# Patient Record
Sex: MALE | Race: WHITE | NOT HISPANIC OR LATINO | URBAN - METROPOLITAN AREA
[De-identification: names, ages, dates, MRNs, and addresses within clinical notes are randomized per-mention and may not be internally consistent; named-entity substitution may affect disease eponyms.]

---

## 2017-01-04 ENCOUNTER — OUTPATIENT (OUTPATIENT)
Dept: OUTPATIENT SERVICES | Facility: HOSPITAL | Age: 27
LOS: 1 days | Discharge: HOME | End: 2017-01-04

## 2017-01-04 VITALS
TEMPERATURE: 98 F | OXYGEN SATURATION: 99 % | DIASTOLIC BLOOD PRESSURE: 79 MMHG | SYSTOLIC BLOOD PRESSURE: 132 MMHG | HEART RATE: 81 BPM | RESPIRATION RATE: 18 BRPM

## 2017-01-04 VITALS
RESPIRATION RATE: 14 BRPM | TEMPERATURE: 98 F | HEIGHT: 71 IN | HEART RATE: 68 BPM | SYSTOLIC BLOOD PRESSURE: 118 MMHG | WEIGHT: 199.08 LBS | OXYGEN SATURATION: 98 % | DIASTOLIC BLOOD PRESSURE: 70 MMHG

## 2017-01-04 DIAGNOSIS — S62.617A DISPLACED FRACTURE OF PROXIMAL PHALANX OF LEFT LITTLE FINGER, INITIAL ENCOUNTER FOR CLOSED FRACTURE: ICD-10-CM

## 2017-01-04 DIAGNOSIS — Z98.890 OTHER SPECIFIED POSTPROCEDURAL STATES: Chronic | ICD-10-CM

## 2017-01-04 LAB
HCT VFR BLD CALC: 46.6 % — SIGNIFICANT CHANGE UP (ref 39–50)
HGB BLD-MCNC: 16.1 G/DL — SIGNIFICANT CHANGE UP (ref 13–17)
MCHC RBC-ENTMCNC: 28.9 PG — SIGNIFICANT CHANGE UP (ref 27–34)
MCHC RBC-ENTMCNC: 34.5 % — SIGNIFICANT CHANGE UP (ref 32–36)
MCV RBC AUTO: 83.5 FL — SIGNIFICANT CHANGE UP (ref 80–100)
PLATELET # BLD AUTO: 296 K/UL — SIGNIFICANT CHANGE UP (ref 150–400)
PMV BLD: 9.6 FL — SIGNIFICANT CHANGE UP (ref 7–13)
RBC # BLD: 5.58 M/UL — SIGNIFICANT CHANGE UP (ref 4.2–5.8)
RBC # FLD: 13 % — SIGNIFICANT CHANGE UP (ref 10.3–14.5)
WBC # BLD: 8.73 K/UL — SIGNIFICANT CHANGE UP (ref 3.8–10.5)
WBC # FLD AUTO: 8.73 K/UL — SIGNIFICANT CHANGE UP (ref 3.8–10.5)

## 2017-01-04 NOTE — H&P PST ADULT - NEGATIVE GENERAL SYMPTOMS
no polyphagia/no malaise/no sweating/no weight loss/no chills/no polyuria/no weight gain/no fatigue/no fever/no anorexia/no polydipsia

## 2017-01-04 NOTE — H&P PST ADULT - PROBLEM SELECTOR PLAN 1
Scheduled for Left Little Finger Proximal Phalanx Pinning today on 1/4/2017.  Pt's last oral intake was 10pm on 1/3/2017

## 2017-01-04 NOTE — H&P PST ADULT - NEGATIVE OPHTHALMOLOGIC SYMPTOMS
no loss of vision R/no pain R/pt wears glasses for reaading/no diplopia/no pain L/no blurred vision L/no loss of vision L/no blurred vision R/no photophobia no blurred vision L/no pain L/no diplopia/no blurred vision R/pt wears glasses for reading/no loss of vision R/no loss of vision L/no photophobia/no pain R

## 2017-01-04 NOTE — H&P PST ADULT - NEGATIVE GENERAL GENITOURINARY SYMPTOMS
no hematuria/no bladder infections/no dysuria no flank pain L/no flank pain R/no hematuria/normal urinary frequency/no dysuria/no bladder infections

## 2017-01-04 NOTE — H&P PST ADULT - MUSCULOSKELETAL COMMENTS
Ace bandage wrap in place to 4th and 5th left fingers of left hand, +capillary refill noted with swelling

## 2017-01-04 NOTE — H&P PST ADULT - NSANTHOSAYNRD_GEN_A_CORE
No. PABLO screening performed.  STOP BANG Legend: 0-2 = LOW Risk; 3-4 = INTERMEDIATE Risk; 5-8 = HIGH Risk

## 2017-01-04 NOTE — H&P PST ADULT - MS GEN HX ROS MEA POS PC
left finger fracture/joint pain/muscle cramps/joint swelling joint swelling/displaced fracture of proximal phalanx left little finger/muscle cramps/joint pain

## 2017-01-04 NOTE — ASU DISCHARGE PLAN (ADULT/PEDIATRIC). - CONDITIONS AT DISCHARGE
Alert and awake. VS stable. Tolerating po. PIV d/c. Meets all discharge criteria, meds reconciled, seen and cleared for dc home by anesthesia. Discharge instructions given and understood. Post -operative pain management expectation education and fall prevention education  provided to pt. and accompanying adult

## 2017-01-04 NOTE — H&P PST ADULT - NEGATIVE CARDIOVASCULAR SYMPTOMS
no orthopnea/no dyspnea on exertion/no paroxysmal nocturnal dyspnea/no peripheral edema/no chest pain/no palpitations/no claudication

## 2017-01-04 NOTE — H&P PST ADULT - PMH
ADD (attention deficit disorder)    Difficulty sleeping    Displaced fracture of proximal phalanx of left little finger

## 2017-01-04 NOTE — H&P PST ADULT - ANESTHESIA, PREVIOUS REACTION, PROFILE
nausea/vomiting/Pt reports after his santiago-facial surgery he experienced severe nausea and vomiting. Mother also experiences nausea and vomiting after anesthesia

## 2017-01-04 NOTE — H&P PST ADULT - LYMPHATIC
supraclavicular R/supraclavicular L/posterior cervical L/anterior cervical L/anterior cervical R/posterior cervical R

## 2017-01-04 NOTE — ASU DISCHARGE PLAN (ADULT/PEDIATRIC). - NOTIFY
Swelling that continues/Fever greater than 101/Bleeding that does not stop/Pain not relieved by Medications/Persistent Nausea and Vomiting/Numbness, color, or temperature change to extremity

## 2017-01-04 NOTE — H&P PST ADULT - FAMILY HISTORY
Grandparent  Still living? Unknown  Family history of heart disease, Age at diagnosis: Age Unknown  Family history of diabetes mellitus in grandmother, Age at diagnosis: Age Unknown

## 2017-01-04 NOTE — H&P PST ADULT - RS GEN HX ROS MEA POS PC
cough/pt reports [place] to the [place] 2 days ago he experienced a cough pt reports after flying back to the [place] 2 days ago he has experienced a dry cough/cough

## 2017-01-04 NOTE — H&P PST ADULT - NEGATIVE NEUROLOGICAL SYMPTOMS
no facial palsy/no confusion/no hemiparesis/no generalized seizures/no transient paralysis/no focal seizures/no loss of consciousness/no loss of sensation/no syncope/no weakness/no headache/no tremors/no paresthesias/no difficulty walking/no vertigo

## 2017-01-04 NOTE — H&P PST ADULT - NEGATIVE ENMT SYMPTOMS
no ear pain/no nose bleeds/no vertigo/no post-nasal discharge/no dysphagia/no hearing difficulty/no gum bleeding/no tinnitus

## 2017-01-04 NOTE — H&P PST ADULT - ACTIVITY
walks 24 city blocks a day, works out 1-2 days a week with , ADLs, house chores walks 24 city blocks daily, works out 1-2 days a week with , ADLs, house chores

## 2017-01-04 NOTE — H&P PST ADULT - NEGATIVE BREAST SYMPTOMS
no nipple discharge R/no breast lump R/no breast lump L/no nipple discharge L/no breast tenderness R/no breast tenderness L

## 2017-01-04 NOTE — H&P PST ADULT - HISTORY OF PRESENT ILLNESS
27 y/o male with PMH of ADD and Sleep Disorder presents to PST for preoperative evaluation with diagnosis of displaced fracture of proximal phalanx of left little finger. Pt reports he tripped and fell on concrete 1 week ago while vacationing in [place]. During the fall he put his hand out to wich caused his finger to twist in the opposite way. Pt is scheduled for 27 y/o male with PMH of ADD and Sleep Disorder presents to PST for preoperative evaluation with diagnosis of displaced fracture of proximal phalanx of left little finger. Pt reports he tripped and fell on concrete 1 week ago while vacationing in [place]. Pt states he put his hand out in order to break the fall but doing so caused his finger to "twist in the opposite direction".Pt is scheduled for Left Little Finger Proximal Phalanx Pinning on 1/4/2017.

## 2017-01-04 NOTE — H&P PST ADULT - NEGATIVE SKIN SYMPTOMS
no hair loss/no rash/no dryness/no itching/no pitted nails no dryness/no hair loss/no itching/no change in size/color of mole/no pitted nails/no rash

## 2017-08-23 ENCOUNTER — EMERGENCY (EMERGENCY)
Facility: HOSPITAL | Age: 27
LOS: 1 days | Discharge: HOME | End: 2017-08-23
Attending: EMERGENCY MEDICINE | Admitting: EMERGENCY MEDICINE
Payer: COMMERCIAL

## 2017-08-23 VITALS
WEIGHT: 190.04 LBS | SYSTOLIC BLOOD PRESSURE: 151 MMHG | HEART RATE: 124 BPM | RESPIRATION RATE: 19 BRPM | DIASTOLIC BLOOD PRESSURE: 97 MMHG | TEMPERATURE: 98 F | OXYGEN SATURATION: 96 %

## 2017-08-23 DIAGNOSIS — Z98.890 OTHER SPECIFIED POSTPROCEDURAL STATES: Chronic | ICD-10-CM

## 2017-08-23 LAB — GAS PNL BLDV: SIGNIFICANT CHANGE UP

## 2017-08-23 PROCEDURE — 93010 ELECTROCARDIOGRAM REPORT: CPT

## 2017-08-23 PROCEDURE — 99053 MED SERV 10PM-8AM 24 HR FAC: CPT

## 2017-08-23 PROCEDURE — 99285 EMERGENCY DEPT VISIT HI MDM: CPT | Mod: 25

## 2017-08-23 NOTE — ED ADULT NURSE NOTE - OBJECTIVE STATEMENT
brought in by [facility] ambulance from home; parents came home and found pt in bed with abnormal respirations, clammy, cyancotic; with eyes rolling back; mom had just spoken to him 45 mins prior on the phone and he was described as normal; dad is md; mom is respiratory therapist; pt was aroused with tactile stimuli after a few mins; pt had pulse; pt now alert and oriented x 3; admits to taking usual 1 mg of xanax tonight; and also had one bloody jewel; denies illicit drug use; denies si or hi; only c/o now is that he feels tired

## 2017-08-23 NOTE — ED ADULT NURSE NOTE - CHIEF COMPLAINT
The patient is a 27y Male complaining of The patient is a 27y Male complaining of pd of unresponsivness

## 2017-08-24 VITALS
RESPIRATION RATE: 18 BRPM | HEART RATE: 94 BPM | DIASTOLIC BLOOD PRESSURE: 83 MMHG | SYSTOLIC BLOOD PRESSURE: 132 MMHG | TEMPERATURE: 98 F | OXYGEN SATURATION: 100 %

## 2017-08-24 PROBLEM — S62.617A DISPLACED FRACTURE OF PROXIMAL PHALANX OF LEFT LITTLE FINGER, INITIAL ENCOUNTER FOR CLOSED FRACTURE: Chronic | Status: ACTIVE | Noted: 2017-01-04

## 2017-08-24 PROBLEM — F98.8 OTHER SPECIFIED BEHAVIORAL AND EMOTIONAL DISORDERS WITH ONSET USUALLY OCCURRING IN CHILDHOOD AND ADOLESCENCE: Chronic | Status: ACTIVE | Noted: 2017-01-04

## 2017-08-24 PROBLEM — G47.9 SLEEP DISORDER, UNSPECIFIED: Chronic | Status: ACTIVE | Noted: 2017-01-04

## 2017-08-24 LAB
ALBUMIN SERPL ELPH-MCNC: 4.7 G/DL — SIGNIFICANT CHANGE UP (ref 3.3–5)
ALP SERPL-CCNC: 54 U/L — SIGNIFICANT CHANGE UP (ref 40–120)
ALT FLD-CCNC: 29 U/L RC — SIGNIFICANT CHANGE UP (ref 10–45)
ANION GAP SERPL CALC-SCNC: 15 MMOL/L — SIGNIFICANT CHANGE UP (ref 5–17)
APAP SERPL-MCNC: <15 UG/ML — SIGNIFICANT CHANGE UP (ref 10–30)
AST SERPL-CCNC: 18 U/L — SIGNIFICANT CHANGE UP (ref 10–40)
BASE EXCESS BLDV CALC-SCNC: -0.1 MMOL/L — SIGNIFICANT CHANGE UP (ref -2–2)
BASOPHILS # BLD AUTO: 0.1 K/UL — SIGNIFICANT CHANGE UP (ref 0–0.2)
BASOPHILS NFR BLD AUTO: 0.8 % — SIGNIFICANT CHANGE UP (ref 0–2)
BILIRUB SERPL-MCNC: 0.4 MG/DL — SIGNIFICANT CHANGE UP (ref 0.2–1.2)
BUN SERPL-MCNC: 15 MG/DL — SIGNIFICANT CHANGE UP (ref 7–23)
CA-I SERPL-SCNC: 1.22 MMOL/L — SIGNIFICANT CHANGE UP (ref 1.12–1.3)
CALCIUM SERPL-MCNC: 9.1 MG/DL — SIGNIFICANT CHANGE UP (ref 8.4–10.5)
CHLORIDE BLDV-SCNC: 105 MMOL/L — SIGNIFICANT CHANGE UP (ref 96–108)
CHLORIDE SERPL-SCNC: 105 MMOL/L — SIGNIFICANT CHANGE UP (ref 96–108)
CK MB CFR SERPL CALC: 1.9 NG/ML — SIGNIFICANT CHANGE UP (ref 0–6.7)
CO2 BLDV-SCNC: 29 MMOL/L — SIGNIFICANT CHANGE UP (ref 22–30)
CO2 SERPL-SCNC: 27 MMOL/L — SIGNIFICANT CHANGE UP (ref 22–31)
CREAT SERPL-MCNC: 1.11 MG/DL — SIGNIFICANT CHANGE UP (ref 0.5–1.3)
EOSINOPHIL # BLD AUTO: 0.1 K/UL — SIGNIFICANT CHANGE UP (ref 0–0.5)
EOSINOPHIL NFR BLD AUTO: 1.2 % — SIGNIFICANT CHANGE UP (ref 0–6)
ETHANOL SERPL-MCNC: SIGNIFICANT CHANGE UP MG/DL (ref 0–10)
GAS PNL BLDV: 140 MMOL/L — SIGNIFICANT CHANGE UP (ref 136–145)
GAS PNL BLDV: SIGNIFICANT CHANGE UP
GLUCOSE BLDV-MCNC: 150 MG/DL — HIGH (ref 70–99)
GLUCOSE SERPL-MCNC: 156 MG/DL — HIGH (ref 70–99)
HCO3 BLDV-SCNC: 28 MMOL/L — SIGNIFICANT CHANGE UP (ref 21–29)
HCT VFR BLD CALC: 45.8 % — SIGNIFICANT CHANGE UP (ref 39–50)
HCT VFR BLDA CALC: 48 % — SIGNIFICANT CHANGE UP (ref 39–50)
HGB BLD CALC-MCNC: 15.7 G/DL — SIGNIFICANT CHANGE UP (ref 13–17)
HGB BLD-MCNC: 15.7 G/DL — SIGNIFICANT CHANGE UP (ref 13–17)
LACTATE BLDV-MCNC: 1.8 MMOL/L — SIGNIFICANT CHANGE UP (ref 0.7–2)
LYMPHOCYTES # BLD AUTO: 2.7 K/UL — SIGNIFICANT CHANGE UP (ref 1–3.3)
LYMPHOCYTES # BLD AUTO: 26.2 % — SIGNIFICANT CHANGE UP (ref 13–44)
MCHC RBC-ENTMCNC: 29.8 PG — SIGNIFICANT CHANGE UP (ref 27–34)
MCHC RBC-ENTMCNC: 34.3 GM/DL — SIGNIFICANT CHANGE UP (ref 32–36)
MCV RBC AUTO: 87 FL — SIGNIFICANT CHANGE UP (ref 80–100)
MONOCYTES # BLD AUTO: 0.8 K/UL — SIGNIFICANT CHANGE UP (ref 0–0.9)
MONOCYTES NFR BLD AUTO: 7.9 % — SIGNIFICANT CHANGE UP (ref 2–14)
NEUTROPHILS # BLD AUTO: 6.6 K/UL — SIGNIFICANT CHANGE UP (ref 1.8–7.4)
NEUTROPHILS NFR BLD AUTO: 63.9 % — SIGNIFICANT CHANGE UP (ref 43–77)
OTHER CELLS CSF MANUAL: 18 ML/DL — SIGNIFICANT CHANGE UP (ref 18–22)
PCO2 BLDV: 59 MMHG — HIGH (ref 35–50)
PH BLDV: 7.29 — LOW (ref 7.35–7.45)
PLATELET # BLD AUTO: 318 K/UL — SIGNIFICANT CHANGE UP (ref 150–400)
PO2 BLDV: 56 MMHG — HIGH (ref 25–45)
POTASSIUM BLDV-SCNC: 3.6 MMOL/L — SIGNIFICANT CHANGE UP (ref 3.5–5)
POTASSIUM SERPL-MCNC: 3.9 MMOL/L — SIGNIFICANT CHANGE UP (ref 3.5–5.3)
POTASSIUM SERPL-SCNC: 3.9 MMOL/L — SIGNIFICANT CHANGE UP (ref 3.5–5.3)
PROT SERPL-MCNC: 7.1 G/DL — SIGNIFICANT CHANGE UP (ref 6–8.3)
RBC # BLD: 5.26 M/UL — SIGNIFICANT CHANGE UP (ref 4.2–5.8)
RBC # FLD: 12 % — SIGNIFICANT CHANGE UP (ref 10.3–14.5)
SALICYLATES SERPL-MCNC: <2 MG/DL — LOW (ref 15–30)
SAO2 % BLDV: 85 % — SIGNIFICANT CHANGE UP (ref 67–88)
SODIUM SERPL-SCNC: 147 MMOL/L — HIGH (ref 135–145)
TROPONIN T SERPL-MCNC: <0.01 NG/ML — SIGNIFICANT CHANGE UP (ref 0–0.06)
WBC # BLD: 10.3 K/UL — SIGNIFICANT CHANGE UP (ref 3.8–10.5)
WBC # FLD AUTO: 10.3 K/UL — SIGNIFICANT CHANGE UP (ref 3.8–10.5)

## 2017-08-24 PROCEDURE — 82435 ASSAY OF BLOOD CHLORIDE: CPT

## 2017-08-24 PROCEDURE — 93005 ELECTROCARDIOGRAM TRACING: CPT | Mod: 76

## 2017-08-24 PROCEDURE — 80307 DRUG TEST PRSMV CHEM ANLYZR: CPT

## 2017-08-24 PROCEDURE — 82962 GLUCOSE BLOOD TEST: CPT

## 2017-08-24 PROCEDURE — 82330 ASSAY OF CALCIUM: CPT

## 2017-08-24 PROCEDURE — 82803 BLOOD GASES ANY COMBINATION: CPT

## 2017-08-24 PROCEDURE — 84132 ASSAY OF SERUM POTASSIUM: CPT

## 2017-08-24 PROCEDURE — 85014 HEMATOCRIT: CPT

## 2017-08-24 PROCEDURE — 83605 ASSAY OF LACTIC ACID: CPT

## 2017-08-24 PROCEDURE — 80053 COMPREHEN METABOLIC PANEL: CPT

## 2017-08-24 PROCEDURE — 82947 ASSAY GLUCOSE BLOOD QUANT: CPT

## 2017-08-24 PROCEDURE — 99284 EMERGENCY DEPT VISIT MOD MDM: CPT | Mod: 25

## 2017-08-24 PROCEDURE — 84295 ASSAY OF SERUM SODIUM: CPT

## 2017-08-24 PROCEDURE — 85027 COMPLETE CBC AUTOMATED: CPT

## 2017-08-24 PROCEDURE — 82553 CREATINE MB FRACTION: CPT

## 2017-08-24 PROCEDURE — 84484 ASSAY OF TROPONIN QUANT: CPT

## 2017-08-24 RX ORDER — SODIUM CHLORIDE 9 MG/ML
1000 INJECTION INTRAMUSCULAR; INTRAVENOUS; SUBCUTANEOUS ONCE
Qty: 0 | Refills: 0 | Status: COMPLETED | OUTPATIENT
Start: 2017-08-24 | End: 2017-08-24

## 2017-08-24 RX ADMIN — SODIUM CHLORIDE 1000 MILLILITER(S): 9 INJECTION INTRAMUSCULAR; INTRAVENOUS; SUBCUTANEOUS at 00:58

## 2017-08-24 NOTE — ED PROVIDER NOTE - PMH
ADD (attention deficit disorder)    Difficulty sleeping    Displaced fracture of proximal phalanx of left little finger    Migraine

## 2017-08-24 NOTE — ED PROVIDER NOTE - PHYSICAL EXAMINATION
Gen: NAD, AOx3  Head: NCAT  HEENT: PERRL, oral mucosa moist, normal conjunctiva, bilateral horizontal nystagmus  Lung: CTAB, no respiratory distress, no wheezing, rales, rhonchi  CV: normal s1/s2, rrr, no murmurs, Normal perfusion, pulses 2+ throughout  Abd: soft, NTND, no CVA tenderness  MSK: No edema, no visible deformities, full range of motion in all 4 extremities  Neuro: CN II-XII grossly intact, No focal neurologic deficits  Skin: No rash   Psych: normal affect Gen: NAD, AOx3  Head: NCAT  HEENT: PERRL, oral mucosa moist, normal conjunctiva, bilateral horizontal nystagmus  Lung: CTAB, no respiratory distress, no wheezing, rales, rhonchi  CV: normal s1/s2, tachycardic, no murmurs, Normal perfusion, pulses 2+ throughout  Abd: soft, NTND, no CVA tenderness  MSK: No edema, no visible deformities, full range of motion in all 4 extremities  Neuro: CN II-XII grossly intact, No focal neurologic deficits  Skin: No rash   Psych: normal affect

## 2017-08-24 NOTE — ED PROVIDER NOTE - ATTENDING CONTRIBUTION TO CARE
I have seen and evaluated this patient with the resident.   I agree with the findings  unless other wise stated.  After my face to face bedside evaluation, I am [age] year old male with episode of decreased responsiveness at home.Pe: att exam: patient awake alert NAD, slightly somnolent. LUNGS CTAB no wheeze no crackle. CARD tachycardic,  no m/r/g.  Abdomen soft NT ND no rebound no guarding no CVA tenderness. EXT WWP no edema no calf tenderness CV 2+DP/PT bilaterally. neuro A&Ox3, no focal deficits, finger to nose intact, gait normal. psych: no si/hi.  skin warm and dry no rash

## 2017-08-24 NOTE — ED PROVIDER NOTE - MEDICAL DECISION MAKING DETAILS
28yo male PMh anxiety, ADD, migraines presenting with episode of unresponsiveness, unclear syncope vs seizure, will obtain EKG, labs, tox screen, give IV hydration, reassess. Lima Bai DO

## 2017-08-24 NOTE — ED PROVIDER NOTE - PLAN OF CARE
1. Follow up with your primary care physician within 2-3days for reevaluation.  2.  Return to the Emergency Department for worsening, progressive or any other concerning symptoms.

## 2017-08-24 NOTE — ED PROVIDER NOTE - CARE PLAN
Principal Discharge DX:	Syncope  Instructions for follow-up, activity and diet:	1. Follow up with your primary care physician within 2-3days for reevaluation.  2.  Return to the Emergency Department for worsening, progressive or any other concerning symptoms.

## 2017-08-24 NOTE — ED PROVIDER NOTE - OBJECTIVE STATEMENT
26yo male PMH ADD, insomnia, migraines, anxiety presenting with episode of unresponsiveness. 26yo male PMH ADD, insomnia, migraines, anxiety presenting with episode of unresponsiveness. Patient was found in his bed at home by parents, breathing abnormally, clammy, difficult to arouse. Patient's parents called EMS and patient had a pulse, was responsive after a few minutes. Patient now awake and alert, has no complaints at this time. No fevers or chills. Of note, patient states he took xanax 1mg and drank alcohol tonight. He took oxycodone a few days ago for a migraine. Denies any other drug use. No chest pain, palpitations, or shortness of breath. Patient states that he hasn't been sleeping well over last 2 nights. 26yo male PMH ADD, insomnia, migraines, anxiety presenting with episode of unresponsiveness. Patient was found in his bed at home by parents, breathing abnormally, clammy, difficult to arouse. Patient's parents called EMS and patient always had a pulse,  was responsive after a few minutes. Patient now awake and alert, has no complaints at this time. No fevers or chills. Of note, patient states he took xanax 1mg and drank alcohol tonight. He took oxycodone a few days ago for a migraine. Denies any other drug use. No chest pain, palpitations, or shortness of breath. Patient states that he hasn't been sleeping well over last 2 nights.

## 2019-11-19 PROBLEM — G43.909 MIGRAINE, UNSPECIFIED, NOT INTRACTABLE, WITHOUT STATUS MIGRAINOSUS: Chronic | Status: ACTIVE | Noted: 2017-08-23

## 2019-11-26 ENCOUNTER — APPOINTMENT (OUTPATIENT)
Dept: MRI IMAGING | Facility: HOSPITAL | Age: 29
End: 2019-11-26
Payer: COMMERCIAL

## 2019-11-26 ENCOUNTER — OUTPATIENT (OUTPATIENT)
Dept: OUTPATIENT SERVICES | Facility: HOSPITAL | Age: 29
LOS: 1 days | End: 2019-11-26
Payer: COMMERCIAL

## 2019-11-26 DIAGNOSIS — Z98.890 OTHER SPECIFIED POSTPROCEDURAL STATES: Chronic | ICD-10-CM

## 2019-11-26 PROCEDURE — 70551 MRI BRAIN STEM W/O DYE: CPT | Mod: 26

## 2019-11-26 PROCEDURE — 70551 MRI BRAIN STEM W/O DYE: CPT

## 2020-05-24 NOTE — H&P PST ADULT - EXTREMITIES
Patient is a 84y old  Male who presents with a chief complaint of Anemia, COVID 19 infection (23 May 2020 20:31)      HPI:  HPI: The patient is an 85 yo male with Hx. of end stage CKD on hemodialysis and recent GI bleed, who was sent to the ED from [facility] because of low grade temp and tested positive for COVID 19. He had EGD for anemia and had gastric AVM s/p endoclip  He was restarted on Apixaban and has noted black stool  On 5/18/20 he developed low grade fever. His daughter and son in low tested positive for COVID 19 and were self  isolating in basement. He tested tested positive for COVID 19 on 519/20.   This AM, he is feeling well and notes that his stool is much less dark    PAST MEDICAL & SURGICAL HISTORY:  Afib  TIA (transient ischemic attack)  CAD (coronary artery disease)  OAB (overactive bladder)  Gout  LBBB (left bundle branch block)  CHF (congestive heart failure)  Aortic valve stenosis  Squamous cell carcinoma of skin  Pleural effusion on left  SANG (obstructive sleep apnea)  ESRD on dialysis  BPH (benign prostatic hyperplasia)  Cutaneous T-cell lymphoma  Hypothyroid  Hypercholesteremia  DM (diabetes mellitus)  HTN (hypertension)  H/O eye surgery  S/P coronary artery stent placement  S/P TAVR (transcatheter aortic valve replacement)  H/O hernia repair    MEDICATIONS  (STANDING):  aspirin enteric coated 81 milliGRAM(s) Oral daily  dextrose 5%. 1000 milliLiter(s) (50 mL/Hr) IV Continuous <Continuous>  dextrose 50% Injectable 12.5 Gram(s) IV Push once  dextrose 50% Injectable 25 Gram(s) IV Push once  dextrose 50% Injectable 25 Gram(s) IV Push once  epoetin-shakila-epbx (RETACRIT) Injectable 94331 Unit(s) SubCutaneous every 7 days  famotidine    Tablet 20 milliGRAM(s) Oral daily  insulin lispro (HumaLOG) corrective regimen sliding scale   SubCutaneous three times a day before meals  levothyroxine 75 MICROGram(s) Oral daily  melatonin 5 milliGRAM(s) Oral at bedtime  metoprolol tartrate 25 milliGRAM(s) Oral two times a day  pantoprazole    Tablet 40 milliGRAM(s) Oral before breakfast  polyethylene glycol 3350 17 Gram(s) Oral daily  sevelamer carbonate 800 milliGRAM(s) Oral three times a day  tamsulosin 0.4 milliGRAM(s) Oral at bedtime    MEDICATIONS  (PRN):  dextrose 40% Gel 15 Gram(s) Oral once PRN Blood Glucose LESS THAN 70 milliGRAM(s)/deciliter  glucagon  Injectable 1 milliGRAM(s) IntraMuscular once PRN Glucose LESS THAN 70 milligrams/deciliter    Allergies  No Known Allergies    SOCIAL HISTORY:no tob or etoh    FAMILY HISTORY:  No pertinent family history in first degree relatives      REVIEW OF SYSTEMS:    CONSTITUTIONAL: weakness  EYES/ENT: No visual changes;  No vertigo or throat pain   NECK: No pain or stiffness  RESPIRATORY: No cough, wheezing, hemoptysis; No shortness of breath  CARDIOVASCULAR: No chest pain or palpitations  GASTROINTESTINAL: as above  GENITOURINARY: No dysuria, frequency or hematuria  NEUROLOGICAL: No numbness or weakness  SKIN: No itching, burning, rashes, or lesions   PSYCH: Normal mood and affect  All other review of systems is negative unless indicated above.    Vital Signs Last 24 Hrs  T(C): 37 (24 May 2020 05:01), Max: 37.3 (24 May 2020 00:59)  T(F): 98.6 (24 May 2020 05:01), Max: 99.2 (24 May 2020 00:59)  HR: 62 (24 May 2020 05:01) (62 - 90)  BP: 134/72 (24 May 2020 05:01) (119/61 - 150/79)  BP(mean): 75 (23 May 2020 10:54) (75 - 75)  RR: 17 (24 May 2020 05:01) (17 - 20)  SpO2: 99% (24 May 2020 05:01) (97% - 100%)    PHYSICAL EXAM:    Constitutional: NAD  HEENT: MMM  Neck: No LAD  Respiratory: CTAB  Cardiovascular: S1 and S2, RRR, no M/G/R  Gastrointestinal: BS+, soft, NT/ND  Extremities: trace edema  Vascular: 1+ peripheral pulses  Neurological: A/O x 3  Psychiatric: Normal mood, normal affect  Skin: No rashes    LABS:                        9.2    5.41  )-----------( 125      ( 24 May 2020 06:44 )             28.4     05-23    138  |  102  |  66<H>  ----------------------------<  104<H>  3.9   |  26  |  6.82<H>    Ca    8.4<L>      23 May 2020 11:48  Phos  4.5     05-23  Mg     2.2     05-23            RADIOLOGY & ADDITIONAL STUDIES: detailed exam

## 2020-06-10 ENCOUNTER — INPATIENT (INPATIENT)
Facility: HOSPITAL | Age: 30
LOS: 18 days | Discharge: HOME HEALTH | End: 2020-06-29
Attending: SURGERY | Admitting: SURGERY
Payer: COMMERCIAL

## 2020-06-10 ENCOUNTER — TRANSCRIPTION ENCOUNTER (OUTPATIENT)
Age: 30
End: 2020-06-10

## 2020-06-10 VITALS
DIASTOLIC BLOOD PRESSURE: 113 MMHG | SYSTOLIC BLOOD PRESSURE: 169 MMHG | HEART RATE: 128 BPM | OXYGEN SATURATION: 100 % | TEMPERATURE: 100 F | RESPIRATION RATE: 24 BRPM

## 2020-06-10 DIAGNOSIS — Z98.890 OTHER SPECIFIED POSTPROCEDURAL STATES: Chronic | ICD-10-CM

## 2020-06-10 DIAGNOSIS — N17.8 OTHER ACUTE KIDNEY FAILURE: ICD-10-CM

## 2020-06-10 DIAGNOSIS — E87.5 HYPERKALEMIA: ICD-10-CM

## 2020-06-10 DIAGNOSIS — N17.9 ACUTE KIDNEY FAILURE, UNSPECIFIED: ICD-10-CM

## 2020-06-10 DIAGNOSIS — E83.51 HYPOCALCEMIA: ICD-10-CM

## 2020-06-10 LAB
ALBUMIN SERPL ELPH-MCNC: 2.3 G/DL — LOW (ref 3.3–5)
ALBUMIN SERPL ELPH-MCNC: 2.3 G/DL — LOW (ref 3.3–5)
ALP SERPL-CCNC: 51 U/L — SIGNIFICANT CHANGE UP (ref 40–120)
ALP SERPL-CCNC: 56 U/L — SIGNIFICANT CHANGE UP (ref 40–120)
ALT FLD-CCNC: 291 U/L — HIGH (ref 4–41)
ALT FLD-CCNC: 297 U/L — HIGH (ref 4–41)
ANION GAP SERPL CALC-SCNC: 12 MMO/L — SIGNIFICANT CHANGE UP (ref 7–14)
ANION GAP SERPL CALC-SCNC: 13 MMO/L — SIGNIFICANT CHANGE UP (ref 7–14)
APAP SERPL-MCNC: < 15 UG/ML — LOW (ref 15–25)
APAP SERPL-MCNC: > 600 UG/ML — CRITICAL HIGH (ref 15–25)
APTT BLD: 42.9 SEC — HIGH (ref 27.5–36.3)
AST SERPL-CCNC: 797 U/L — HIGH (ref 4–40)
AST SERPL-CCNC: 816 U/L — HIGH (ref 4–40)
BASE EXCESS BLDV CALC-SCNC: -6.9 MMOL/L — SIGNIFICANT CHANGE UP
BASOPHILS # BLD AUTO: 0.02 K/UL — SIGNIFICANT CHANGE UP (ref 0–0.2)
BASOPHILS NFR BLD AUTO: 0.1 % — SIGNIFICANT CHANGE UP (ref 0–2)
BILIRUB SERPL-MCNC: 0.5 MG/DL — SIGNIFICANT CHANGE UP (ref 0.2–1.2)
BILIRUB SERPL-MCNC: 0.5 MG/DL — SIGNIFICANT CHANGE UP (ref 0.2–1.2)
BLOOD GAS VENOUS - CREATININE: 3.46 MG/DL — HIGH (ref 0.5–1.3)
BLOOD GAS VENOUS - FIO2: 21 — SIGNIFICANT CHANGE UP
BUN SERPL-MCNC: 31 MG/DL — HIGH (ref 7–23)
BUN SERPL-MCNC: 37 MG/DL — HIGH (ref 7–23)
CALCIUM SERPL-MCNC: 5.7 MG/DL — CRITICAL LOW (ref 8.4–10.5)
CALCIUM SERPL-MCNC: 6.8 MG/DL — LOW (ref 8.4–10.5)
CHLORIDE BLDV-SCNC: 111 MMOL/L — HIGH (ref 96–108)
CHLORIDE SERPL-SCNC: 105 MMOL/L — SIGNIFICANT CHANGE UP (ref 98–107)
CHLORIDE SERPL-SCNC: 111 MMOL/L — HIGH (ref 98–107)
CK MB BLD-MCNC: 1 — SIGNIFICANT CHANGE UP (ref 0–2.5)
CK MB BLD-MCNC: 222.4 NG/ML — HIGH (ref 1–6.6)
CK SERPL-CCNC: HIGH U/L (ref 30–200)
CK SERPL-CCNC: HIGH U/L (ref 30–200)
CO2 SERPL-SCNC: 15 MMOL/L — LOW (ref 22–31)
CO2 SERPL-SCNC: 16 MMOL/L — LOW (ref 22–31)
CREAT SERPL-MCNC: 3.42 MG/DL — HIGH (ref 0.5–1.3)
CREAT SERPL-MCNC: 4.62 MG/DL — HIGH (ref 0.5–1.3)
EOSINOPHIL # BLD AUTO: 0 K/UL — SIGNIFICANT CHANGE UP (ref 0–0.5)
EOSINOPHIL NFR BLD AUTO: 0 % — SIGNIFICANT CHANGE UP (ref 0–6)
ETHANOL BLD-MCNC: < 10 MG/DL — SIGNIFICANT CHANGE UP
GAS PNL BLDV: 133 MMOL/L — LOW (ref 136–146)
GLUCOSE BLDV-MCNC: 116 MG/DL — HIGH (ref 70–99)
GLUCOSE SERPL-MCNC: 112 MG/DL — HIGH (ref 70–99)
GLUCOSE SERPL-MCNC: 125 MG/DL — HIGH (ref 70–99)
HCO3 BLDV-SCNC: 18 MMOL/L — LOW (ref 20–27)
HCT VFR BLD CALC: 38.3 % — LOW (ref 39–50)
HCT VFR BLDV CALC: 44.1 % — SIGNIFICANT CHANGE UP (ref 39–51)
HGB BLD-MCNC: 12.8 G/DL — LOW (ref 13–17)
HGB BLDV-MCNC: 14.4 G/DL — SIGNIFICANT CHANGE UP (ref 13–17)
IMM GRANULOCYTES NFR BLD AUTO: 0.3 % — SIGNIFICANT CHANGE UP (ref 0–1.5)
INR BLD: 1.87 — HIGH (ref 0.88–1.17)
LACTATE BLDV-MCNC: 1.4 MMOL/L — SIGNIFICANT CHANGE UP (ref 0.5–2)
LITHIUM SERPL-MCNC: < 0.04 MMOL/L — LOW (ref 0.6–1.2)
LYMPHOCYTES # BLD AUTO: 0.63 K/UL — LOW (ref 1–3.3)
LYMPHOCYTES # BLD AUTO: 4.3 % — LOW (ref 13–44)
MAGNESIUM SERPL-MCNC: 1.5 MG/DL — LOW (ref 1.6–2.6)
MAGNESIUM SERPL-MCNC: 2.4 MG/DL — SIGNIFICANT CHANGE UP (ref 1.6–2.6)
MCHC RBC-ENTMCNC: 28 PG — SIGNIFICANT CHANGE UP (ref 27–34)
MCHC RBC-ENTMCNC: 33.4 % — SIGNIFICANT CHANGE UP (ref 32–36)
MCV RBC AUTO: 83.8 FL — SIGNIFICANT CHANGE UP (ref 80–100)
MONOCYTES # BLD AUTO: 1.02 K/UL — HIGH (ref 0–0.9)
MONOCYTES NFR BLD AUTO: 6.9 % — SIGNIFICANT CHANGE UP (ref 2–14)
NEUTROPHILS # BLD AUTO: 13.08 K/UL — HIGH (ref 1.8–7.4)
NEUTROPHILS NFR BLD AUTO: 88.4 % — HIGH (ref 43–77)
NRBC # FLD: 0 K/UL — SIGNIFICANT CHANGE UP (ref 0–0)
PCO2 BLDV: 37 MMHG — LOW (ref 41–51)
PH BLDV: 7.31 PH — LOW (ref 7.32–7.43)
PHOSPHATE SERPL-MCNC: 3 MG/DL — SIGNIFICANT CHANGE UP (ref 2.5–4.5)
PHOSPHATE SERPL-MCNC: 4.5 MG/DL — SIGNIFICANT CHANGE UP (ref 2.5–4.5)
PLATELET # BLD AUTO: 119 K/UL — LOW (ref 150–400)
PMV BLD: 9.9 FL — SIGNIFICANT CHANGE UP (ref 7–13)
PO2 BLDV: 42 MMHG — HIGH (ref 35–40)
POTASSIUM BLDV-SCNC: 3.9 MMOL/L — SIGNIFICANT CHANGE UP (ref 3.4–4.5)
POTASSIUM SERPL-MCNC: 4 MMOL/L — SIGNIFICANT CHANGE UP (ref 3.5–5.3)
POTASSIUM SERPL-MCNC: 4.1 MMOL/L — SIGNIFICANT CHANGE UP (ref 3.5–5.3)
POTASSIUM SERPL-SCNC: 4 MMOL/L — SIGNIFICANT CHANGE UP (ref 3.5–5.3)
POTASSIUM SERPL-SCNC: 4.1 MMOL/L — SIGNIFICANT CHANGE UP (ref 3.5–5.3)
PROT SERPL-MCNC: 4 G/DL — LOW (ref 6–8.3)
PROT SERPL-MCNC: 4.6 G/DL — LOW (ref 6–8.3)
PROTHROM AB SERPL-ACNC: 21.9 SEC — HIGH (ref 9.8–13.1)
RBC # BLD: 4.57 M/UL — SIGNIFICANT CHANGE UP (ref 4.2–5.8)
RBC # FLD: 13.3 % — SIGNIFICANT CHANGE UP (ref 10.3–14.5)
RH IG SCN BLD-IMP: POSITIVE — SIGNIFICANT CHANGE UP
SALICYLATES SERPL-MCNC: < 5 MG/DL — LOW (ref 15–30)
SAO2 % BLDV: 77 % — SIGNIFICANT CHANGE UP (ref 60–85)
SODIUM SERPL-SCNC: 134 MMOL/L — LOW (ref 135–145)
SODIUM SERPL-SCNC: 138 MMOL/L — SIGNIFICANT CHANGE UP (ref 135–145)
WBC # BLD: 14.8 K/UL — HIGH (ref 3.8–10.5)
WBC # FLD AUTO: 14.8 K/UL — HIGH (ref 3.8–10.5)

## 2020-06-10 PROCEDURE — 99291 CRITICAL CARE FIRST HOUR: CPT | Mod: 25

## 2020-06-10 PROCEDURE — 86079 PHYS BLOOD BANK SERV AUTHRJ: CPT

## 2020-06-10 PROCEDURE — 99223 1ST HOSP IP/OBS HIGH 75: CPT

## 2020-06-10 PROCEDURE — 36556 INSERT NON-TUNNEL CV CATH: CPT

## 2020-06-10 PROCEDURE — 71045 X-RAY EXAM CHEST 1 VIEW: CPT | Mod: 26,77

## 2020-06-10 PROCEDURE — 71045 X-RAY EXAM CHEST 1 VIEW: CPT | Mod: 26

## 2020-06-10 RX ORDER — ACETAMINOPHEN 500 MG
1000 TABLET ORAL ONCE
Refills: 0 | Status: COMPLETED | OUTPATIENT
Start: 2020-06-10 | End: 2020-06-10

## 2020-06-10 RX ORDER — CHLORHEXIDINE GLUCONATE 213 G/1000ML
1 SOLUTION TOPICAL
Refills: 0 | Status: DISCONTINUED | OUTPATIENT
Start: 2020-06-10 | End: 2020-06-29

## 2020-06-10 RX ORDER — HYDROMORPHONE HYDROCHLORIDE 2 MG/ML
1 INJECTION INTRAMUSCULAR; INTRAVENOUS; SUBCUTANEOUS ONCE
Refills: 0 | Status: DISCONTINUED | OUTPATIENT
Start: 2020-06-10 | End: 2020-06-10

## 2020-06-10 RX ORDER — CEFAZOLIN SODIUM 1 G
VIAL (EA) INJECTION
Refills: 0 | Status: DISCONTINUED | OUTPATIENT
Start: 2020-06-10 | End: 2020-06-11

## 2020-06-10 RX ORDER — MAGNESIUM SULFATE 500 MG/ML
2 VIAL (ML) INJECTION ONCE
Refills: 0 | Status: COMPLETED | OUTPATIENT
Start: 2020-06-10 | End: 2020-06-10

## 2020-06-10 RX ORDER — FUROSEMIDE 40 MG
40 TABLET ORAL ONCE
Refills: 0 | Status: DISCONTINUED | OUTPATIENT
Start: 2020-06-10 | End: 2020-06-10

## 2020-06-10 RX ORDER — LIDOCAINE 4 G/100G
1 CREAM TOPICAL EVERY 24 HOURS
Refills: 0 | Status: DISCONTINUED | OUTPATIENT
Start: 2020-06-10 | End: 2020-06-11

## 2020-06-10 RX ORDER — HYDROMORPHONE HYDROCHLORIDE 2 MG/ML
2 INJECTION INTRAMUSCULAR; INTRAVENOUS; SUBCUTANEOUS EVERY 6 HOURS
Refills: 0 | Status: DISCONTINUED | OUTPATIENT
Start: 2020-06-10 | End: 2020-06-11

## 2020-06-10 RX ORDER — ALPRAZOLAM 0.25 MG
1 TABLET ORAL EVERY 8 HOURS
Refills: 0 | Status: DISCONTINUED | OUTPATIENT
Start: 2020-06-10 | End: 2020-06-11

## 2020-06-10 RX ORDER — CEFAZOLIN SODIUM 1 G
1000 VIAL (EA) INJECTION ONCE
Refills: 0 | Status: COMPLETED | OUTPATIENT
Start: 2020-06-10 | End: 2020-06-10

## 2020-06-10 RX ORDER — SODIUM CHLORIDE 9 MG/ML
10 INJECTION INTRAMUSCULAR; INTRAVENOUS; SUBCUTANEOUS
Refills: 0 | Status: DISCONTINUED | OUTPATIENT
Start: 2020-06-10 | End: 2020-06-29

## 2020-06-10 RX ORDER — FUROSEMIDE 40 MG
80 TABLET ORAL ONCE
Refills: 0 | Status: COMPLETED | OUTPATIENT
Start: 2020-06-10 | End: 2020-06-10

## 2020-06-10 RX ORDER — CALCIUM GLUCONATE 100 MG/ML
2 VIAL (ML) INTRAVENOUS ONCE
Refills: 0 | Status: COMPLETED | OUTPATIENT
Start: 2020-06-10 | End: 2020-06-10

## 2020-06-10 RX ORDER — HYDROMORPHONE HYDROCHLORIDE 2 MG/ML
2 INJECTION INTRAMUSCULAR; INTRAVENOUS; SUBCUTANEOUS ONCE
Refills: 0 | Status: DISCONTINUED | OUTPATIENT
Start: 2020-06-10 | End: 2020-06-10

## 2020-06-10 RX ORDER — CEFAZOLIN SODIUM 1 G
1000 VIAL (EA) INJECTION EVERY 12 HOURS
Refills: 0 | Status: DISCONTINUED | OUTPATIENT
Start: 2020-06-11 | End: 2020-06-11

## 2020-06-10 RX ORDER — DEXTROAMPHETAMINE SACCHARATE, AMPHETAMINE ASPARTATE, DEXTROAMPHETAMINE SULFATE AND AMPHETAMINE SULFATE 1.875; 1.875; 1.875; 1.875 MG/1; MG/1; MG/1; MG/1
30 TABLET ORAL EVERY 12 HOURS
Refills: 0 | Status: DISCONTINUED | OUTPATIENT
Start: 2020-06-10 | End: 2020-06-11

## 2020-06-10 RX ORDER — TRAMADOL HYDROCHLORIDE 50 MG/1
50 TABLET ORAL EVERY 8 HOURS
Refills: 0 | Status: DISCONTINUED | OUTPATIENT
Start: 2020-06-10 | End: 2020-06-10

## 2020-06-10 RX ORDER — HEPARIN SODIUM 5000 [USP'U]/ML
5000 INJECTION INTRAVENOUS; SUBCUTANEOUS EVERY 8 HOURS
Refills: 0 | Status: DISCONTINUED | OUTPATIENT
Start: 2020-06-10 | End: 2020-06-29

## 2020-06-10 RX ORDER — ALPRAZOLAM 0.25 MG
1 TABLET ORAL
Qty: 0 | Refills: 0 | DISCHARGE

## 2020-06-10 RX ORDER — SODIUM CHLORIDE 9 MG/ML
1000 INJECTION, SOLUTION INTRAVENOUS
Refills: 0 | Status: DISCONTINUED | OUTPATIENT
Start: 2020-06-10 | End: 2020-06-11

## 2020-06-10 RX ADMIN — SODIUM CHLORIDE 200 MILLILITER(S): 9 INJECTION, SOLUTION INTRAVENOUS at 14:16

## 2020-06-10 RX ADMIN — Medication 50 GRAM(S): at 17:58

## 2020-06-10 RX ADMIN — HYDROMORPHONE HYDROCHLORIDE 1 MILLIGRAM(S): 2 INJECTION INTRAMUSCULAR; INTRAVENOUS; SUBCUTANEOUS at 19:28

## 2020-06-10 RX ADMIN — Medication 1 MILLIGRAM(S): at 14:29

## 2020-06-10 RX ADMIN — Medication 400 MILLIGRAM(S): at 14:15

## 2020-06-10 RX ADMIN — SODIUM CHLORIDE 200 MILLILITER(S): 9 INJECTION, SOLUTION INTRAVENOUS at 14:30

## 2020-06-10 RX ADMIN — HYDROMORPHONE HYDROCHLORIDE 2 MILLIGRAM(S): 2 INJECTION INTRAMUSCULAR; INTRAVENOUS; SUBCUTANEOUS at 22:42

## 2020-06-10 RX ADMIN — Medication 80 MILLIGRAM(S): at 14:03

## 2020-06-10 RX ADMIN — HYDROMORPHONE HYDROCHLORIDE 2 MILLIGRAM(S): 2 INJECTION INTRAMUSCULAR; INTRAVENOUS; SUBCUTANEOUS at 21:35

## 2020-06-10 RX ADMIN — Medication 100 MILLIGRAM(S): at 14:50

## 2020-06-10 RX ADMIN — HEPARIN SODIUM 5000 UNIT(S): 5000 INJECTION INTRAVENOUS; SUBCUTANEOUS at 21:18

## 2020-06-10 RX ADMIN — Medication 1 MILLIGRAM(S): at 21:18

## 2020-06-10 RX ADMIN — Medication 200 GRAM(S): at 17:59

## 2020-06-10 NOTE — H&P ADULT - NSHPSOCIALHISTORY_GEN_ALL_CORE
Lives alone. Currently working. Former smoker, occasionally vapes. Drinks 1 alcohol beverage a week, denies illicit drug use.

## 2020-06-10 NOTE — H&P ADULT - NSHPPHYSICALEXAM_GEN_ALL_CORE
ICU Vital Signs Last 24 Hrs  T(C): 38 (10 Ramírez 2020 12:45), Max: 38 (10 Ramírez 2020 12:45)  T(F): 100.4 (10 Ramírez 2020 12:45), Max: 100.4 (10 Ramírez 2020 12:45)  HR: 120 (10 Ramírez 2020 13:05) (120 - 128)  BP: 160/126 (10 Ramírez 2020 13:05) (160/126 - 169/113)  BP(mean): 137 (10 Ramírez 2020 13:05) (137 - 137)  ABP: --  ABP(mean): --  RR: 22 (10 Ramírez 2020 13:05) (20 - 24)  SpO2: 98% (10 Ramírez 2020 13:05) (98% - 100%)    PHYSICAL EXAM:  GENERAL: NAD, well-developed  HEAD:  Atraumatic, Normocephalic  EYES: EOMI, PERRLA, conjunctiva and sclera clear  NECK: Supple, No JVD  CHEST/LUNG: Clear to auscultation bilaterally; No wheeze  HEART: Regular rate and rhythm; No murmurs, rubs, or gallops  ABDOMEN: Soft, Nontender, Nondistended; Bowel sounds present  EXTREMITIES:  2+ Peripheral Pulses, LUE swelling, erythema  PSYCH: AAOx3  NEUROLOGY: non-focal  SKIN: No rashes or lesions ICU Vital Signs Last 24 Hrs  T(C): 38 (10 Ramírez 2020 12:45), Max: 38 (10 Ramírez 2020 12:45)  T(F): 100.4 (10 Ramírez 2020 12:45), Max: 100.4 (10 Ramírez 2020 12:45)  HR: 120 (10 Ramírez 2020 13:05) (120 - 128)  BP: 160/126 (10 Ramírez 2020 13:05) (160/126 - 169/113)  BP(mean): 137 (10 Ramírez 2020 13:05) (137 - 137)  ABP: --  ABP(mean): --  RR: 22 (10 Ramírez 2020 13:05) (20 - 24)  SpO2: 98% (10 Ramírez 2020 13:05) (98% - 100%)    PHYSICAL EXAM:  GENERAL: lethargic  HEAD:  Atraumatic, Normocephalic  EYES: EOMI, PERRLA, conjunctiva and sclera clear  NECK: Supple, No JVD  CHEST/LUNG: Clear to auscultation bilaterally; No wheeze  HEART: Regular rate and rhythm; No murmurs, rubs, or gallops  ABDOMEN: Soft, Nontender, Nondistended; Bowel sounds present  EXTREMITIES:  2+ Peripheral Pulses, LUE swelling, erythema  PSYCH: AAOx3  NEUROLOGY: non-focal  SKIN: No rashes or lesions

## 2020-06-10 NOTE — H&P ADULT - NSICDXPASTMEDICALHX_GEN_ALL_CORE_FT
PAST MEDICAL HISTORY:  ADD (attention deficit disorder)     Bipolar depression     Difficulty sleeping     Displaced fracture of proximal phalanx of left little finger     Migraine

## 2020-06-10 NOTE — H&P ADULT - NSHPLABSRESULTS_GEN_ALL_CORE
12.8   14.80 )-----------( 119      ( 10 Ramírez 2020 14:15 )             38.3 12.8   14.80 )-----------( 119      ( 10 Ramírez 2020 14:15 )             38.3  06-10    138  |  111<H>  |  31<H>  ----------------------------<  112<H>  4.0   |  15<L>  |  3.42<H>    Ca    5.7<LL>      10 Ramírez 2020 14:15  Phos  3.0     06-10  Mg     1.5     06-10    TPro  4.0<L>  /  Alb  2.3<L>  /  TBili  0.5  /  DBili  x   /  AST  816<H>  /  ALT  291<H>  /  AlkPhos  51  06-10

## 2020-06-10 NOTE — PROCEDURE NOTE - NSPOSTPRCRAD_GEN_A_CORE
central line located in the superior vena cava/no pneumothorax/post-procedure radiography performed/line adjusted to depth of insertion

## 2020-06-10 NOTE — H&P ADULT - NSICDXFAMILYHX_GEN_ALL_CORE_FT
FAMILY HISTORY:  Grandparent  Still living? Unknown  Family history of diabetes mellitus in grandmother, Age at diagnosis: Age Unknown  Family history of heart disease, Age at diagnosis: Age Unknown

## 2020-06-10 NOTE — H&P ADULT - ASSESSMENT
29y M with PMH of bipolar depression     #Neuro  NAIs    #CV  Sinus tachy    #Resp  mildly tachypneic  satting well on RA, NAIs  check VBG with lactate    #Renal  Cr at OSH was 3.2, BUN 30. In 2017, Cr 1.11  c/w flanagan, stat 80 Lasix x1  monitor strict Is and Os  f/u renal recs  check CPK- LR at 200cc/hr    #ID  febrile to 100.4F, tachy  LUE appears cellulitic  start cefazolin  check blood cx, U/A    #Heme  DVT ppx: HSQ TID  check T&S, coags    #MSK  surgery c/s to r/o compartment syndrome of LUE    #Psych  Hx of bipolar depression, denies SI/HI  on xanax 1 TID, adderall 30 BID, and oxy 30 qD at home  c/w Xanax  check Lithium level  Reference #: [id number] 29y M with PMH of bipolar depression     #Neuro  NAIs    #CV  Sinus tachy    #Resp  mildly tachypneic  satting well on RA, NAIs  check VBG with lactate    #Renal  Acute renal failure due to rhabdomyolysis  Cr at OSH was 3.2, BUN 30. ,000. In 2017, Cr 1.11  c/w flanagan, stat 80 Lasix x1  monitor strict Is and Os  f/u renal recs, and determine need for HD  repeat CPK  start LR at 200cc/hr    #GI  NPO for now  Check LFTs, pt with transaminitis at OSH    #ID  febrile to 100.4F, tachy  LUE appears cellulitic  start cefazolin  check blood cx, U/A  COVID (-)     #Heme  DVT ppx: HSQ TID  check T&S, coags    #MSK  surgery c/s to r/o compartment syndrome of LUE    #Psych  Hx of bipolar depression, denies SI/HI  on xanax 1 TID, adderall 30 BID, and oxy 30 qD at home  c/w Xanax  check Lithium level  Reference #: [id number] 29y M with PMH of bipolar depression presenting with renal failure due to rhabdomyolysis    #Neuro  NAIs    #CV  Sinus tachy    #Resp  mildly tachypneic  satting well on RA, NAIs  check VBG with lactate    #Renal  Acute renal failure due to rhabdomyolysis  Cr at OSH was 3.2, BUN 30. ,000. In 2017, Cr 1.11  c/w flanagan, stat 80 Lasix x1  monitor strict Is and Os  f/u renal recs, and determine need for HD  repeat CPK  start LR at 200cc/hr    #GI  NPO for now  Check LFTs, pt with transaminitis at OSH    #ID  febrile to 100.4F, tachy- likely due to pain and rhabdo  LUE is erythematous   start cefazolin  check blood cx, U/A  COVID (-)     #Heme  DVT ppx: HSQ TID  check T&S, coags    #MSK  ortho c/s to r/o compartment syndrome of LUE    #Psych  Hx of bipolar depression, denies SI/HI  on xanax 1 TID, adderall 30 BID, and oxy 30 qD at home  c/w Xanax to avoid benzo withdrawal and c/w Adderall  check Alexander level- low at OSH  Reference #: [id number] 29y M with PMH of bipolar depression presenting with renal failure due to rhabdomyolysis    #Neuro  NAIs    #CV  Sinus tachy    #Resp  mildly tachypneic  satting well on RA, NAIs  check VBG with lactate    #Renal  Acute renal failure due to rhabdomyolysis  Cr at OSH was 3.2, BUN 30. ,000. In 2017, Cr 1.11  c/w flanagan, stat 80 Lasix x1  monitor strict Is and Os  f/u renal recs, and determine need for HD  repeat CPK  start LR at 200cc/hr    #GI  NPO for now  Check LFTs, pt with transaminitis at OSH    #ID  febrile to 100.4F, tachy with leukocytosis- likely due to pain and rhabdo  LUE is erythematous   start cefazolin  check blood cx, U/A  COVID (-)     #Heme  DVT ppx: HSQ TID  Pt with thombocytopenia, platelets 119, continue to monitor  check T&S, coags    #MSK  ortho c/s to r/o compartment syndrome of LUE, f/u recs    #Psych  Hx of bipolar depression, denies SI/HI  on xanax 1 TID, adderall 30 BID, and oxy 30 qD at home  c/w Xanax to avoid benzo withdrawal and c/w Adderall  check Arvin level- low at OSH  Reference #: [id number] 29y M with PMH of bipolar depression presenting with renal failure due to rhabdomyolysis    #Neuro  NAIs    #CV  Sinus tachy    #Resp  mildly tachypneic  satting well on RA, NAIs  check VBG with lactate    #Renal  Acute renal failure due to rhabdomyolysis  Cr at OSH was 3.2, BUN 30. ,000. In 2017, Cr 1.11  c/w flanagan, stat 80 Lasix x1  monitor strict Is and Os  f/u renal recs, and determine need for HD  repeat CPK is 93,000  start LR at 200cc/hr    #GI  NPO for now  Check LFTs, pt with transaminitis at OSH    #ID  febrile to 100.4F, tachy with leukocytosis- likely due to pain and rhabdo  LUE is erythematous   start cefazolin  check blood cx, U/A  COVID (-)     #Heme  DVT ppx: HSQ TID  Pt with thombocytopenia, platelets 119, continue to monitor  check T&S, coags    #MSK  ortho c/s to r/o compartment syndrome of LUE, f/u recs    #Psych  Hx of bipolar depression, denies SI/HI  on xanax 1 TID, adderall 30 BID, and oxy 30 qD at home  c/w Xanax to avoid benzo withdrawal and c/w Adderall  check [place] level- low at OSH  Reference #: [id number]

## 2020-06-10 NOTE — H&P ADULT - ATTENDING COMMENTS
28 yo male with bipolar disorder admitted with rhabdomyolysis with CK >[id number] after found down. Minimal UO after Lasix. Will need HD. Repeat Tylenol level (was drawn during Tylenol administration).

## 2020-06-10 NOTE — CONSULT NOTE ADULT - ASSESSMENT
ASSESSMENT: 29 y M with PMH of bipolar depression transferred from [facility] for further management of acute renal failure due to rhabdomyolysis, surgery called for r/o compartment syndrome as pt w/ swollen LUE.     PLAN:    - Pt exam not consistent w/ compartment syndrome - no pain w/ passive motion, soft, palpable pulses, distal ROM intact  - care per micu team    Seen and discussed w/ vascular fellow Dr. Granados, to be discussed w/ attending  C Surgery, [id number] ASSESSMENT: 29 y M with PMH of bipolar depression transferred from [facility] for further management of acute renal failure due to rhabdomyolysis, surgery called for r/o compartment syndrome as pt w/ swollen LUE.     PLAN:    - Pt exam not consistent w/ compartment syndrome - no pain w/ passive motion, soft, palpable pulses, distal ROM intact  - recommend xray of LUE and LLE  - care per micu team    Seen and discussed w/ vascular fellow Dr. Granados, to be discussed w/ attending  C Surgery, [id number]

## 2020-06-10 NOTE — H&P ADULT - HISTORY OF PRESENT ILLNESS
29 y M with PMH of bipolar depression transferred from [facility] for rhabdomyolysis. Pt was found down at home. 29 y M with PMH of bipolar depression transferred from [facility] for further management of acute renal failure due to rhabdomyolysis. Pt was found unresponsive at home, lying on his L side. He states he has not been taking his Lithium for at least 2 wks. At the OSH, he was treated for hyperkalemia. He received D5, insulin, calcium gluconate, Kayexalate Last BM was on 6/9. For L leg pain he was given lidocaine patch, dilaudid 1mg prn and tramadol 50mg. Hydralazine 20mg given for HTN. From 1 to 7 am only 100cc of UOP. Urine was tea colored. 500cc bolus of NS was given. CT cervical spine at OSH was (-). CT Head (-). CTAP and CXR were (-). CK on admission there was 177,000.

## 2020-06-10 NOTE — CHART NOTE - NSCHARTNOTEFT_GEN_A_CORE
Thoroughly reviewed risks and benefits of RRT/HD with patient. Patient agrees to dialytic therapy if needed.   Dr. Mejia witness to consent.  All questions answered.

## 2020-06-10 NOTE — CONSULT NOTE ADULT - SUBJECTIVE AND OBJECTIVE BOX
[facility] DIVISION OF KIDNEY DISEASES AND HYPERTENSION -- [phone]  -- INITIAL CONSULT NOTE  --------------------------------------------------------------------------------  HPI:        PAST HISTORY  --------------------------------------------------------------------------------  PAST MEDICAL & SURGICAL HISTORY:  Bipolar depression  Migraine  Displaced fracture of proximal phalanx of left little finger  Difficulty sleeping  ADD (attention deficit disorder)  History of facial surgery: Fadi-Facial Realignment Surgery in 2008    FAMILY HISTORY:  Family history of diabetes mellitus in grandmother (Grandparent)  Family history of heart disease (Grandparent)    PAST SOCIAL HISTORY: works in marketing, endorses previous illicit drug use.     ALLERGIES & MEDICATIONS  --------------------------------------------------------------------------------  Allergies    No Known Allergies    Intolerances      Standing Inpatient Medications  acetaminophen  IVPB .. 1000 milliGRAM(s) IV Intermittent once  ALPRAZolam 1 milliGRAM(s) Oral every 8 hours  amphetamine/dextroamphetamine 30 milliGRAM(s) Oral every 12 hours  ceFAZolin   IVPB      chlorhexidine 4% Liquid 1 Application(s) Topical <User Schedule>  lactated ringers. 1000 milliLiter(s) IV Continuous <Continuous>    REVIEW OF SYSTEMS  --------------------------------------------------------------------------------  Gen: no fatigue  Respiratory: No dyspnea  CV: No chest pain  GI: No abdominal pain  MSK: left arm and leg pain  Neuro: No dizziness  Heme: No bleeding    All other systems were reviewed and are negative, except as noted.    VITALS/PHYSICAL EXAM  --------------------------------------------------------------------------------  T(C): 38 (06-10-20 @ 12:45), Max: 38 (06-10-20 @ 12:45)  HR: 120 (06-10-20 @ 13:05) (120 - 128)  BP: 160/126 (06-10-20 @ 13:05) (160/126 - 169/113)  RR: 22 (06-10-20 @ 13:05) (20 - 24)  SpO2: 98% (06-10-20 @ 13:05) (98% - 100%)  Wt(kg): --    06-10-20 @ 07:01  -  06-10-20 @ 14:07  --------------------------------------------------------  IN: 0 mL / OUT: 20 mL / NET: -20 mL    Physical Exam:  	Gen: NAD  	HEENT: MMM  	Pulm: CTA B/L  	CV: S1S2  	Abd: Soft, +BS               : flanagan catheter in place  	Ext: left arm and leg swollen, no edema noted  	Neuro: Awake but drowsy  	Skin: Warm and dry  	    LABS/STUDIES  --------------------------------------------------------------------------------                Creatinine Trend: [facility] DIVISION OF KIDNEY DISEASES AND HYPERTENSION -- [phone]  -- INITIAL CONSULT NOTE  --------------------------------------------------------------------------------  HPI: 29-year-old male, with history bipolar depression was transferred from [facility] to [facility] for severe rhabdomyolysis. History obtained from pt and chart review. As per EMS report, patient was found down in his kitchen, minimally responsive. In the ambulance, pt had another syncopal episode. Pt says that he passed out in his kitchen and was found about 10 hours later. Pt. mostly complaining of left arm and leg pain. Denies any activity the night prior. Pt. denies any drug or alcohol use. Endorses that he stopped taking his Lithium medication on his own.  As per paper chart, pt was found to have cocaine and amphetamines in urine drug screen. Pt also with an elevated CPK ~ 180K. Pt was given multiple liters of IVF at prior hospital but no significant urine output. Nephrology team consulted for ANTOINETTE and rhabdomyolyiss. Upon review of labs on [facility], Scr was 1.11 on 8/23/2017. Labs on admission currently pending.    Pt. evaluated at bedside, complaining of arm and leg pain. Minimal urine noted in flanagan. Discussed possibility of requiring HD and patient is agreeable if required.    PAST HISTORY  --------------------------------------------------------------------------------  PAST MEDICAL & SURGICAL HISTORY:  Bipolar depression  Migraine  Displaced fracture of proximal phalanx of left little finger  Difficulty sleeping  ADD (attention deficit disorder)  History of facial surgery: Fadi-Facial Realignment Surgery in 2008    FAMILY HISTORY:  Family history of diabetes mellitus in grandmother (Grandparent)  Family history of heart disease (Grandparent)    PAST SOCIAL HISTORY: works in marketing, endorses previous illicit drug use.     ALLERGIES & MEDICATIONS  --------------------------------------------------------------------------------  Allergies    No Known Allergies    Intolerances    Standing Inpatient Medications  acetaminophen  IVPB .. 1000 milliGRAM(s) IV Intermittent once  ALPRAZolam 1 milliGRAM(s) Oral every 8 hours  amphetamine/dextroamphetamine 30 milliGRAM(s) Oral every 12 hours  ceFAZolin   IVPB      chlorhexidine 4% Liquid 1 Application(s) Topical <User Schedule>  lactated ringers. 1000 milliLiter(s) IV Continuous <Continuous>    REVIEW OF SYSTEMS  --------------------------------------------------------------------------------  Gen: no fatigue  Respiratory: No dyspnea  CV: No chest pain  GI: No abdominal pain  MSK: left arm and leg pain  Neuro: No dizziness  Heme: No bleeding    All other systems were reviewed and are negative, except as noted.    VITALS/PHYSICAL EXAM  --------------------------------------------------------------------------------  T(C): 38 (06-10-20 @ 12:45), Max: 38 (06-10-20 @ 12:45)  HR: 120 (06-10-20 @ 13:05) (120 - 128)  BP: 160/126 (06-10-20 @ 13:05) (160/126 - 169/113)  RR: 22 (06-10-20 @ 13:05) (20 - 24)  SpO2: 98% (06-10-20 @ 13:05) (98% - 100%)  Wt(kg): --    06-10-20 @ 07:01  -  06-10-20 @ 14:07  --------------------------------------------------------  IN: 0 mL / OUT: 20 mL / NET: -20 mL    Physical Exam:  	Gen: NAD  	HEENT: MMM  	Pulm: CTA B/L  	CV: S1S2  	Abd: Soft, +BS               : flanagan catheter in place  	Ext: left arm and leg swollen, no edema noted  	Neuro: Awake but drowsy  	Skin: Warm and dry  	  LABS/STUDIES  --------------------------------------------------------------------------------                Creatinine Trend:

## 2020-06-10 NOTE — CONSULT NOTE ADULT - SUBJECTIVE AND OBJECTIVE BOX
VASCULAR SURGERY CONSULT NOTE  --------------------------------------------------------------------------------------------    HPI:  29 y M with PMH of bipolar depression transferred from [facility] for further management of acute renal failure due to rhabdomyolysis. Pt was found unresponsive at home, lying on his L side. He states he has not been taking his Lithium for at least 2 wks. At the OSH, he was treated for hyperkalemia. He received D5, insulin, calcium gluconate, Kayexalate Last BM was on 6/9. For L leg pain he was given lidocaine patch, dilaudid 1mg prn and tramadol 50mg. Hydralazine 20mg given for HTN. From 1 to 7 am only 100cc of UOP. Urine was tea colored. 500cc bolus of NS was given. CT cervical spine at OSH was (-). CT Head (-). CTAP and CXR were (-). CK on admission there was 177,000.   Pt was seen in the MICU for LUE swelling and r/o compartment syndrome. pt reports he was down for at least 10 hours on his left side. He reports he is having trouble moving his L arm and L leg. He reports pain at this time and says he is able to move his left hand and foot, but not more proximally on extremity.       ***      PAST MEDICAL & SURGICAL HISTORY:  Bipolar depression  Migraine  Displaced fracture of proximal phalanx of left little finger  Difficulty sleeping  ADD (attention deficit disorder)  History of facial surgery: Fadi-Facial Realignment Surgery in 2008    FAMILY HISTORY:  Family history of diabetes mellitus in grandmother (Grandparent)  Family history of heart disease (Grandparent)  [] Family history not pertinent as reviewed with the patient and family    SOCIAL HISTORY:  ***    ALLERGIES: No Known Allergies      HOME MEDICATIONS: ***    CURRENT MEDICATIONS  MEDICATIONS (STANDING): ALPRAZolam 1 milliGRAM(s) Oral every 8 hours  amphetamine/dextroamphetamine 30 milliGRAM(s) Oral every 12 hours  calcium gluconate IVPB 2 Gram(s) IV Intermittent once  ceFAZolin   IVPB      heparin   Injectable 5000 Unit(s) SubCutaneous every 8 hours  lactated ringers. 1000 milliLiter(s) IV Continuous <Continuous>  magnesium sulfate  IVPB 2 Gram(s) IV Intermittent once    MEDICATIONS (PRN):  --------------------------------------------------------------------------------------------    Vitals:   T(C): 38 (06-10-20 @ 12:45), Max: 38 (06-10-20 @ 12:45)  HR: 117 (06-10-20 @ 15:15) (117 - 128)  BP: 162/115 (06-10-20 @ 15:15) (160/126 - 169/113)  RR: 16 (06-10-20 @ 15:15) (15 - 24)  SpO2: 98% (06-10-20 @ 15:15) (98% - 100%)  CAPILLARY BLOOD GLUCOSE          06-10 @ 07:01  -  06-10 @ 16:36  --------------------------------------------------------  IN:    lactated ringers.: 800 mL  Total IN: 800 mL    OUT:    Indwelling Catheter - Urethral: 20 mL  Total OUT: 20 mL    Total NET: 780 mL        Height (cm): 180.3 (06-10 @ 14:11)  Weight (kg): 104.7 (06-10 @ 14:11)  BMI (kg/m2): 32.2 (06-10 @ 14:11)  BSA (m2): 2.24 (06-10 @ 14:11)      PHYSICAL EXAM: ***  General: NAD, Lying in bed comfortably  Neuro: A+Ox3  Cardio: RRR, nml S1/S2  Resp: Good effort, CTA b/l  GI/Abd: Soft, NT/ND, no rebound/guarding, no masses palpated  Vascular: palpable radial pulses bilaterally, palpable DP/PT  Skin: Intact, no breakdown  Lymphatic/Nodes: No palpable lymphadenopathy  Musculoskeletal: RUE moving spontaneously, LUE hand moving, forearm moving at elbow, unable to move arm at shoulder, no pain with passive movement, swollen and erythematous from elbow to shoulder, however soft  RLE normal movement, LLE not swollen, soft   --------------------------------------------------------------------------------------------    LABS  CBC (06-10 @ 14:15)                              12.8<L>                         14.80<H>  )----------------(  119<L>     88.4<H>% Neutrophils, 4.3<L>% Lymphocytes, ANC: 13.08<H>                              38.3<L>    BMP (06-10 @ 14:15)             138     |  111<H>  |  31<H> 		Ca++ --      Ca 5.7<LL>             ---------------------------------( 112<H>		Mg 1.5<L>             4.0     |  15<L>   |  3.42<H>			Ph 3.0       LFTs (06-10 @ 14:15)      TPro 4.0<L> / Alb 2.3<L> / TBili 0.5 / DBili -- / <H> / <H> / AlkPhos 51    Coags (06-10 @ 14:30)  aPTT 42.9<H> / INR 1.87<H> / PT 21.9<H>    Cardiac Markers (06-10 @ 14:15)     HSTrop: -- / CKMB: -- / CK: > 78073      VBG (06-10 @ 14:15)     7.31<L> / 37<L> / 42<H> / 18<L> / -6.9 / 77.0%     Lactate: 1.4    --------------------------------------------------------------------------------------------    MICROBIOLOGY      --------------------------------------------------------------------------------------------    IMAGING  ***    --------------------------------------------------------------------------------------------

## 2020-06-11 LAB
ALBUMIN SERPL ELPH-MCNC: 2.6 G/DL — LOW (ref 3.3–5)
ALP SERPL-CCNC: 63 U/L — SIGNIFICANT CHANGE UP (ref 40–120)
ALT FLD-CCNC: 300 U/L — HIGH (ref 4–41)
ANION GAP SERPL CALC-SCNC: 13 MMO/L — SIGNIFICANT CHANGE UP (ref 7–14)
ANION GAP SERPL CALC-SCNC: 14 MMO/L — SIGNIFICANT CHANGE UP (ref 7–14)
APTT BLD: 28.1 SEC — SIGNIFICANT CHANGE UP (ref 27.5–36.3)
AST SERPL-CCNC: 793 U/L — HIGH (ref 4–40)
BASE EXCESS BLDA CALC-SCNC: -3.4 MMOL/L — SIGNIFICANT CHANGE UP
BASOPHILS # BLD AUTO: 0.04 K/UL — SIGNIFICANT CHANGE UP (ref 0–0.2)
BASOPHILS NFR BLD AUTO: 0.3 % — SIGNIFICANT CHANGE UP (ref 0–2)
BILIRUB SERPL-MCNC: 0.8 MG/DL — SIGNIFICANT CHANGE UP (ref 0.2–1.2)
BLD GP AB SCN SERPL QL: NEGATIVE — SIGNIFICANT CHANGE UP
BUN SERPL-MCNC: 31 MG/DL — HIGH (ref 7–23)
BUN SERPL-MCNC: 39 MG/DL — HIGH (ref 7–23)
CA-I BLDA-SCNC: 1.01 MMOL/L — LOW (ref 1.15–1.29)
CALCIUM SERPL-MCNC: 7.1 MG/DL — LOW (ref 8.4–10.5)
CALCIUM SERPL-MCNC: 7.4 MG/DL — LOW (ref 8.4–10.5)
CHLORIDE SERPL-SCNC: 98 MMOL/L — SIGNIFICANT CHANGE UP (ref 98–107)
CHLORIDE SERPL-SCNC: 99 MMOL/L — SIGNIFICANT CHANGE UP (ref 98–107)
CK SERPL-CCNC: HIGH U/L (ref 30–200)
CO2 SERPL-SCNC: 18 MMOL/L — LOW (ref 22–31)
CO2 SERPL-SCNC: 21 MMOL/L — LOW (ref 22–31)
CREAT SERPL-MCNC: 4.53 MG/DL — HIGH (ref 0.5–1.3)
CREAT SERPL-MCNC: 5.89 MG/DL — HIGH (ref 0.5–1.3)
EOSINOPHIL # BLD AUTO: 0.01 K/UL — SIGNIFICANT CHANGE UP (ref 0–0.5)
EOSINOPHIL NFR BLD AUTO: 0.1 % — SIGNIFICANT CHANGE UP (ref 0–6)
GLUCOSE BLDA-MCNC: 113 MG/DL — HIGH (ref 70–99)
GLUCOSE SERPL-MCNC: 111 MG/DL — HIGH (ref 70–99)
GLUCOSE SERPL-MCNC: 128 MG/DL — HIGH (ref 70–99)
HBV SURFACE AB SER-ACNC: 9.2 MLU/ML — LOW
HBV SURFACE AB SER-ACNC: NONREACTIVE — SIGNIFICANT CHANGE UP
HBV SURFACE AG SER-ACNC: NEGATIVE — SIGNIFICANT CHANGE UP
HCO3 BLDA-SCNC: 22 MMOL/L — SIGNIFICANT CHANGE UP (ref 22–26)
HCT VFR BLD CALC: 33.3 % — LOW (ref 39–50)
HCT VFR BLD CALC: 34.5 % — LOW (ref 39–50)
HCT VFR BLD CALC: 42.2 % — SIGNIFICANT CHANGE UP (ref 39–50)
HCT VFR BLDA CALC: 35.5 % — LOW (ref 39–51)
HCV AB S/CO SERPL IA: 0.07 S/CO — SIGNIFICANT CHANGE UP (ref 0–0.99)
HCV AB SERPL-IMP: SIGNIFICANT CHANGE UP
HGB BLD-MCNC: 11.5 G/DL — LOW (ref 13–17)
HGB BLD-MCNC: 11.8 G/DL — LOW (ref 13–17)
HGB BLD-MCNC: 14.1 G/DL — SIGNIFICANT CHANGE UP (ref 13–17)
HGB BLDA-MCNC: 11.5 G/DL — LOW (ref 13–17)
HIV 1+2 AB+HIV1 P24 AG SERPL QL IA: SIGNIFICANT CHANGE UP
IMM GRANULOCYTES NFR BLD AUTO: 0.4 % — SIGNIFICANT CHANGE UP (ref 0–1.5)
INR BLD: 1.08 — SIGNIFICANT CHANGE UP (ref 0.88–1.17)
LYMPHOCYTES # BLD AUTO: 0.99 K/UL — LOW (ref 1–3.3)
LYMPHOCYTES # BLD AUTO: 7.1 % — LOW (ref 13–44)
MAGNESIUM SERPL-MCNC: 2 MG/DL — SIGNIFICANT CHANGE UP (ref 1.6–2.6)
MAGNESIUM SERPL-MCNC: 2.1 MG/DL — SIGNIFICANT CHANGE UP (ref 1.6–2.6)
MCHC RBC-ENTMCNC: 27.6 PG — SIGNIFICANT CHANGE UP (ref 27–34)
MCHC RBC-ENTMCNC: 28.1 PG — SIGNIFICANT CHANGE UP (ref 27–34)
MCHC RBC-ENTMCNC: 28.9 PG — SIGNIFICANT CHANGE UP (ref 27–34)
MCHC RBC-ENTMCNC: 33.4 % — SIGNIFICANT CHANGE UP (ref 32–36)
MCHC RBC-ENTMCNC: 34.2 % — SIGNIFICANT CHANGE UP (ref 32–36)
MCHC RBC-ENTMCNC: 34.5 % — SIGNIFICANT CHANGE UP (ref 32–36)
MCV RBC AUTO: 82.1 FL — SIGNIFICANT CHANGE UP (ref 80–100)
MCV RBC AUTO: 82.7 FL — SIGNIFICANT CHANGE UP (ref 80–100)
MCV RBC AUTO: 83.7 FL — SIGNIFICANT CHANGE UP (ref 80–100)
MONOCYTES # BLD AUTO: 1.04 K/UL — HIGH (ref 0–0.9)
MONOCYTES NFR BLD AUTO: 7.5 % — SIGNIFICANT CHANGE UP (ref 2–14)
NEUTROPHILS # BLD AUTO: 11.78 K/UL — HIGH (ref 1.8–7.4)
NEUTROPHILS NFR BLD AUTO: 84.6 % — HIGH (ref 43–77)
NRBC # FLD: 0 K/UL — SIGNIFICANT CHANGE UP (ref 0–0)
PCO2 BLDA: 35 MMHG — SIGNIFICANT CHANGE UP (ref 35–48)
PH BLDA: 7.39 PH — SIGNIFICANT CHANGE UP (ref 7.35–7.45)
PHOSPHATE SERPL-MCNC: 3.5 MG/DL — SIGNIFICANT CHANGE UP (ref 2.5–4.5)
PHOSPHATE SERPL-MCNC: 5.3 MG/DL — HIGH (ref 2.5–4.5)
PLATELET # BLD AUTO: 162 K/UL — SIGNIFICANT CHANGE UP (ref 150–400)
PLATELET # BLD AUTO: 167 K/UL — SIGNIFICANT CHANGE UP (ref 150–400)
PLATELET # BLD AUTO: 203 K/UL — SIGNIFICANT CHANGE UP (ref 150–400)
PMV BLD: 10.3 FL — SIGNIFICANT CHANGE UP (ref 7–13)
PMV BLD: 9.1 FL — SIGNIFICANT CHANGE UP (ref 7–13)
PMV BLD: 9.9 FL — SIGNIFICANT CHANGE UP (ref 7–13)
PO2 BLDA: 238 MMHG — HIGH (ref 83–108)
POTASSIUM BLDA-SCNC: 3.4 MMOL/L — SIGNIFICANT CHANGE UP (ref 3.4–4.5)
POTASSIUM SERPL-MCNC: 3.8 MMOL/L — SIGNIFICANT CHANGE UP (ref 3.5–5.3)
POTASSIUM SERPL-MCNC: 4 MMOL/L — SIGNIFICANT CHANGE UP (ref 3.5–5.3)
POTASSIUM SERPL-SCNC: 3.8 MMOL/L — SIGNIFICANT CHANGE UP (ref 3.5–5.3)
POTASSIUM SERPL-SCNC: 4 MMOL/L — SIGNIFICANT CHANGE UP (ref 3.5–5.3)
PROT SERPL-MCNC: 4.9 G/DL — LOW (ref 6–8.3)
PROTHROM AB SERPL-ACNC: 12.5 SEC — SIGNIFICANT CHANGE UP (ref 9.8–13.1)
RBC # BLD: 3.98 M/UL — LOW (ref 4.2–5.8)
RBC # BLD: 4.2 M/UL — SIGNIFICANT CHANGE UP (ref 4.2–5.8)
RBC # BLD: 5.1 M/UL — SIGNIFICANT CHANGE UP (ref 4.2–5.8)
RBC # FLD: 12.9 % — SIGNIFICANT CHANGE UP (ref 10.3–14.5)
RBC # FLD: 13.1 % — SIGNIFICANT CHANGE UP (ref 10.3–14.5)
RBC # FLD: 13.2 % — SIGNIFICANT CHANGE UP (ref 10.3–14.5)
RH IG SCN BLD-IMP: POSITIVE — SIGNIFICANT CHANGE UP
SAO2 % BLDA: 99.4 % — HIGH (ref 95–99)
SARS-COV-2 RNA SPEC QL NAA+PROBE: SIGNIFICANT CHANGE UP
SODIUM BLDA-SCNC: 127 MMOL/L — LOW (ref 136–146)
SODIUM SERPL-SCNC: 129 MMOL/L — LOW (ref 135–145)
SODIUM SERPL-SCNC: 134 MMOL/L — LOW (ref 135–145)
WBC # BLD: 12.02 K/UL — HIGH (ref 3.8–10.5)
WBC # BLD: 13.59 K/UL — HIGH (ref 3.8–10.5)
WBC # BLD: 13.92 K/UL — HIGH (ref 3.8–10.5)
WBC # FLD AUTO: 12.02 K/UL — HIGH (ref 3.8–10.5)
WBC # FLD AUTO: 13.59 K/UL — HIGH (ref 3.8–10.5)
WBC # FLD AUTO: 13.92 K/UL — HIGH (ref 3.8–10.5)

## 2020-06-11 PROCEDURE — 99291 CRITICAL CARE FIRST HOUR: CPT

## 2020-06-11 PROCEDURE — 27496 DECOMPRESSION OF THIGH/KNEE: CPT | Mod: LT

## 2020-06-11 PROCEDURE — 99253 IP/OBS CNSLTJ NEW/EST LOW 45: CPT | Mod: 57

## 2020-06-11 PROCEDURE — 99233 SBSQ HOSP IP/OBS HIGH 50: CPT

## 2020-06-11 PROCEDURE — 27602 DECOMPRESSION OF LOWER LEG: CPT | Mod: LT

## 2020-06-11 RX ORDER — LABETALOL HCL 100 MG
10 TABLET ORAL EVERY 6 HOURS
Refills: 0 | Status: DISCONTINUED | OUTPATIENT
Start: 2020-06-11 | End: 2020-06-11

## 2020-06-11 RX ORDER — HYDROMORPHONE HYDROCHLORIDE 2 MG/ML
1 INJECTION INTRAMUSCULAR; INTRAVENOUS; SUBCUTANEOUS ONCE
Refills: 0 | Status: DISCONTINUED | OUTPATIENT
Start: 2020-06-11 | End: 2020-06-11

## 2020-06-11 RX ORDER — HYDRALAZINE HCL 50 MG
5 TABLET ORAL ONCE
Refills: 0 | Status: COMPLETED | OUTPATIENT
Start: 2020-06-11 | End: 2020-06-11

## 2020-06-11 RX ORDER — LABETALOL HCL 100 MG
10 TABLET ORAL ONCE
Refills: 0 | Status: COMPLETED | OUTPATIENT
Start: 2020-06-11 | End: 2020-06-11

## 2020-06-11 RX ORDER — METOCLOPRAMIDE HCL 10 MG
10 TABLET ORAL ONCE
Refills: 0 | Status: DISCONTINUED | OUTPATIENT
Start: 2020-06-11 | End: 2020-06-11

## 2020-06-11 RX ORDER — DIPHENHYDRAMINE HCL 50 MG
25 CAPSULE ORAL EVERY 4 HOURS
Refills: 0 | Status: DISCONTINUED | OUTPATIENT
Start: 2020-06-11 | End: 2020-06-11

## 2020-06-11 RX ORDER — ONDANSETRON 8 MG/1
4 TABLET, FILM COATED ORAL EVERY 6 HOURS
Refills: 0 | Status: DISCONTINUED | OUTPATIENT
Start: 2020-06-11 | End: 2020-06-17

## 2020-06-11 RX ORDER — SODIUM CHLORIDE 9 MG/ML
1000 INJECTION, SOLUTION INTRAVENOUS
Refills: 0 | Status: DISCONTINUED | OUTPATIENT
Start: 2020-06-11 | End: 2020-06-12

## 2020-06-11 RX ORDER — HYDRALAZINE HCL 50 MG
10 TABLET ORAL EVERY 6 HOURS
Refills: 0 | Status: DISCONTINUED | OUTPATIENT
Start: 2020-06-11 | End: 2020-06-11

## 2020-06-11 RX ORDER — OXYCODONE HYDROCHLORIDE 5 MG/1
10 TABLET ORAL EVERY 8 HOURS
Refills: 0 | Status: DISCONTINUED | OUTPATIENT
Start: 2020-06-11 | End: 2020-06-11

## 2020-06-11 RX ORDER — SODIUM CHLORIDE 9 MG/ML
1000 INJECTION, SOLUTION INTRAVENOUS
Refills: 0 | Status: DISCONTINUED | OUTPATIENT
Start: 2020-06-11 | End: 2020-06-11

## 2020-06-11 RX ORDER — HYDROMORPHONE HYDROCHLORIDE 2 MG/ML
30 INJECTION INTRAMUSCULAR; INTRAVENOUS; SUBCUTANEOUS
Refills: 0 | Status: DISCONTINUED | OUTPATIENT
Start: 2020-06-11 | End: 2020-06-16

## 2020-06-11 RX ORDER — LABETALOL HCL 100 MG
100 TABLET ORAL THREE TIMES A DAY
Refills: 0 | Status: DISCONTINUED | OUTPATIENT
Start: 2020-06-11 | End: 2020-06-11

## 2020-06-11 RX ORDER — HYDROMORPHONE HYDROCHLORIDE 2 MG/ML
1 INJECTION INTRAMUSCULAR; INTRAVENOUS; SUBCUTANEOUS
Refills: 0 | Status: DISCONTINUED | OUTPATIENT
Start: 2020-06-11 | End: 2020-06-11

## 2020-06-11 RX ORDER — NALOXONE HYDROCHLORIDE 4 MG/.1ML
0.1 SPRAY NASAL
Refills: 0 | Status: DISCONTINUED | OUTPATIENT
Start: 2020-06-11 | End: 2020-06-17

## 2020-06-11 RX ORDER — BUMETANIDE 0.25 MG/ML
4 INJECTION INTRAMUSCULAR; INTRAVENOUS ONCE
Refills: 0 | Status: COMPLETED | OUTPATIENT
Start: 2020-06-11 | End: 2020-06-11

## 2020-06-11 RX ORDER — HYDROMORPHONE HYDROCHLORIDE 2 MG/ML
0.5 INJECTION INTRAMUSCULAR; INTRAVENOUS; SUBCUTANEOUS
Refills: 0 | Status: DISCONTINUED | OUTPATIENT
Start: 2020-06-11 | End: 2020-06-11

## 2020-06-11 RX ORDER — ONDANSETRON 8 MG/1
4 TABLET, FILM COATED ORAL ONCE
Refills: 0 | Status: DISCONTINUED | OUTPATIENT
Start: 2020-06-11 | End: 2020-06-11

## 2020-06-11 RX ORDER — CEFAZOLIN SODIUM 1 G
1000 VIAL (EA) INJECTION EVERY 24 HOURS
Refills: 0 | Status: DISCONTINUED | OUTPATIENT
Start: 2020-06-12 | End: 2020-06-15

## 2020-06-11 RX ORDER — HYDROMORPHONE HYDROCHLORIDE 2 MG/ML
1 INJECTION INTRAMUSCULAR; INTRAVENOUS; SUBCUTANEOUS
Refills: 0 | Status: DISCONTINUED | OUTPATIENT
Start: 2020-06-11 | End: 2020-06-16

## 2020-06-11 RX ORDER — HYDROMORPHONE HYDROCHLORIDE 2 MG/ML
2 INJECTION INTRAMUSCULAR; INTRAVENOUS; SUBCUTANEOUS
Refills: 0 | Status: DISCONTINUED | OUTPATIENT
Start: 2020-06-11 | End: 2020-06-11

## 2020-06-11 RX ADMIN — HYDROMORPHONE HYDROCHLORIDE 30 MILLILITER(S): 2 INJECTION INTRAMUSCULAR; INTRAVENOUS; SUBCUTANEOUS at 21:55

## 2020-06-11 RX ADMIN — HYDROMORPHONE HYDROCHLORIDE 2 MILLIGRAM(S): 2 INJECTION INTRAMUSCULAR; INTRAVENOUS; SUBCUTANEOUS at 14:54

## 2020-06-11 RX ADMIN — LIDOCAINE 1 PATCH: 4 CREAM TOPICAL at 08:00

## 2020-06-11 RX ADMIN — OXYCODONE HYDROCHLORIDE 10 MILLIGRAM(S): 5 TABLET ORAL at 06:24

## 2020-06-11 RX ADMIN — HYDROMORPHONE HYDROCHLORIDE 2 MILLIGRAM(S): 2 INJECTION INTRAMUSCULAR; INTRAVENOUS; SUBCUTANEOUS at 00:33

## 2020-06-11 RX ADMIN — HYDROMORPHONE HYDROCHLORIDE 30 MILLILITER(S): 2 INJECTION INTRAMUSCULAR; INTRAVENOUS; SUBCUTANEOUS at 21:14

## 2020-06-11 RX ADMIN — HYDROMORPHONE HYDROCHLORIDE 1 MILLIGRAM(S): 2 INJECTION INTRAMUSCULAR; INTRAVENOUS; SUBCUTANEOUS at 02:53

## 2020-06-11 RX ADMIN — HEPARIN SODIUM 5000 UNIT(S): 5000 INJECTION INTRAVENOUS; SUBCUTANEOUS at 13:39

## 2020-06-11 RX ADMIN — BUMETANIDE 132 MILLIGRAM(S): 0.25 INJECTION INTRAMUSCULAR; INTRAVENOUS at 08:19

## 2020-06-11 RX ADMIN — Medication 1 MILLIGRAM(S): at 13:38

## 2020-06-11 RX ADMIN — OXYCODONE HYDROCHLORIDE 10 MILLIGRAM(S): 5 TABLET ORAL at 15:45

## 2020-06-11 RX ADMIN — HYDROMORPHONE HYDROCHLORIDE 2 MILLIGRAM(S): 2 INJECTION INTRAMUSCULAR; INTRAVENOUS; SUBCUTANEOUS at 08:00

## 2020-06-11 RX ADMIN — Medication 100 MILLIGRAM(S): at 05:57

## 2020-06-11 RX ADMIN — LIDOCAINE 1 PATCH: 4 CREAM TOPICAL at 13:33

## 2020-06-11 RX ADMIN — Medication 10 MILLIGRAM(S): at 13:39

## 2020-06-11 RX ADMIN — Medication 10 MILLIGRAM(S): at 23:49

## 2020-06-11 RX ADMIN — HEPARIN SODIUM 5000 UNIT(S): 5000 INJECTION INTRAVENOUS; SUBCUTANEOUS at 05:55

## 2020-06-11 RX ADMIN — Medication 100 MILLIGRAM(S): at 05:55

## 2020-06-11 RX ADMIN — Medication 1 MILLIGRAM(S): at 05:56

## 2020-06-11 RX ADMIN — Medication 5 MILLIGRAM(S): at 01:40

## 2020-06-11 RX ADMIN — CHLORHEXIDINE GLUCONATE 1 APPLICATION(S): 213 SOLUTION TOPICAL at 08:00

## 2020-06-11 RX ADMIN — SODIUM CHLORIDE 200 MILLILITER(S): 9 INJECTION, SOLUTION INTRAVENOUS at 22:47

## 2020-06-11 RX ADMIN — Medication 5 MILLIGRAM(S): at 02:46

## 2020-06-11 RX ADMIN — LIDOCAINE 1 PATCH: 4 CREAM TOPICAL at 01:40

## 2020-06-11 RX ADMIN — HYDROMORPHONE HYDROCHLORIDE 2 MILLIGRAM(S): 2 INJECTION INTRAMUSCULAR; INTRAVENOUS; SUBCUTANEOUS at 03:50

## 2020-06-11 RX ADMIN — HYDROMORPHONE HYDROCHLORIDE 2 MILLIGRAM(S): 2 INJECTION INTRAMUSCULAR; INTRAVENOUS; SUBCUTANEOUS at 11:30

## 2020-06-11 RX ADMIN — Medication 10 MILLIGRAM(S): at 04:23

## 2020-06-11 RX ADMIN — HEPARIN SODIUM 5000 UNIT(S): 5000 INJECTION INTRAVENOUS; SUBCUTANEOUS at 22:46

## 2020-06-11 NOTE — PROGRESS NOTE ADULT - ASSESSMENT
29y M with PMH of bipolar depression presenting with renal failure due to rhabdomyolysis    #Neuro  NAIs    #CV  Sinus tachy    #Resp  mildly tachypneic  satting well on RA, NAIs  check VBG with lactate    #Renal  Acute renal failure due to rhabdomyolysis  Cr at OSH was 3.2, BUN 30. ,000. In 2017, Cr 1.11  c/w flanagan, stat 80 Lasix x1  monitor strict Is and Os  f/u renal recs, and determine need for HD  repeat CPK is 93,000  start LR at 200cc/hr    #GI  NPO for now  Check LFTs, pt with transaminitis at OSH    #ID  febrile to 100.4F, tachy with leukocytosis- likely due to pain and rhabdo  LUE is erythematous   start cefazolin  check blood cx, U/A  COVID (-)     #Heme  DVT ppx: HSQ TID  Pt with thombocytopenia, platelets 119, continue to monitor  check T&S, coags    #MSK  ortho c/s to r/o compartment syndrome of LUE, f/u recs    #Psych  Hx of bipolar depression, denies SI/HI  on xanax 1 TID, adderall 30 BID, and oxy 30 qD at home  c/w Xanax to avoid benzo withdrawal and c/w Adderall  check [place] level- low at OSH  Reference #: [id number] 29y M with PMH of bipolar depression presenting with ATN due to rhabdomyolysis    #Neuro  Pain control with Dilaudid 2mg q3 for severe pain, 10mg Oxy for moderate pain, pain currently well controlled    #CV  Sinus tachy- likely due to pain    #Resp  satting well on RA, NAIs    #Renal  ATN due to rhabdomyolysis  Cr remains elevated at 4.5  c/w flanagan, stat IV Bumex 4 x1  monitor strict Is and Os- pt UOP has been 20-30 cc/hr  f/u renal recs, no plan for HD today  LR decreased to 50 cc/hr    #GI  LFTs downtrending, continue to trend    #ID  febrile to 100.4F, tachy with leukocytosis- likely due to pain and rhabdo  LUE is erythematous and edematous  start cefazolin  check blood cx, U/A  COVID (-)     #Heme  DVT ppx: HSQ TID  Pt with thombocytopenia, platelets 119, continue to monitor  check T&S, coags    #MSK  surgery eval appreciated  LUE is edematous but has 2+ pulses  continue to monitor    #Psych  Hx of bipolar depression, denies SI/HI  on xanax 1 TID, adderall 30 BID at home  c/w Xanax to avoid benzo withdrawal 29y M with PMH of bipolar depression presenting with ATN due to rhabdomyolysis, s/p HD    #Neuro  Pain control with Dilaudid 2mg q3 for severe pain, 10mg Oxy for moderate pain, pain currently well controlled    #CV  Sinus tachy- likely due to pain  HTN: labetalol 10 q6 prn for SBP> 160    #Resp  satting well on RA, NAIs    #Renal  ATN due to rhabdomyolysis  Cr remains elevated at 4.5  c/w flanagan, stat IV Bumex 4 x1  monitor strict Is and Os- pt UOP has been 20-30 cc/hr  f/u renal recs, no plan for HD today  LR decreased to 100 cc/hr  avoid nephrotoxic agents and renally dose meds    #GI  LFTs downtrending, repeat tylenol level was (-), caterinaley elevated because blood was drawn during tylenol administration   continue to trend CMP    #ID  LUE is erythematous and edematous  c/w cefazolin 1 q24 to treat cellulitis  check blood cx, U/A  COVID (-)     #Heme  DVT ppx: HSQ TID    #MSK  surgery eval appreciated  LUE is edematous but has 2+ pulses  continue to monitor  check Xrays of LUE and LLE    #Psych  Hx of bipolar depression, denies SI/HI  on xanax 1 TID, Adderall 30 BID at home  c/w Xanax to avoid benzo withdrawal, hold Adderall 29y M with PMH of bipolar depression presenting with ATN due to rhabdomyolysis, s/p HD    #Neuro  Pain control with Dilaudid 2mg q3 for severe pain, 10mg Oxy for moderate pain, pain currently well controlled    #CV  Sinus tachy- likely due to pain  HTN: labetalol 10 q6 prn for SBP> 160    #Resp  satting well on RA, NAIs    #Renal  ATN due to rhabdomyolysis  Cr remains elevated at 4.5, CPK is 64,949  c/w flanagan, stat IV Bumex 4 x1  monitor strict Is and Os- pt UOP has been 20-30 cc/hr  f/u renal recs, no plan for HD today  LR decreased to 100 cc/hr  avoid nephrotoxic agents and renally dose meds    #GI  LFTs downtrending, repeat tylenol level was (-), caterinaley elevated because blood was drawn during tylenol administration   continue to trend CMP    #ID  LUE is erythematous and edematous  c/w cefazolin 1 q24 to treat cellulitis  check blood cx, U/A  COVID (-)     #Heme  DVT ppx: HSQ TID    #MSK  surgery eval appreciated  LUE is edematous but has 2+ pulses  continue to monitor  check Xrays of LUE and LLE    #Psych  Hx of bipolar depression, denies SI/HI  on xanax 1 TID, Adderall 30 BID at home  c/w Xanax to avoid benzo withdrawal, hold Adderall

## 2020-06-11 NOTE — CONSULT NOTE ADULT - PROBLEM SELECTOR RECOMMENDATION 2
Serum potassium elevated at 5.1 on labs drawn at previous hospital. Pt. currently anuric. Continue IVF for now. Admission labs pending. Low potassium diet.
I Adam Sauer MD performed a history and physical exam of the patient and discussed  the findings and plan with the house officer. I reviewed the resident note and agree with the findings and plan

## 2020-06-11 NOTE — CONSULT NOTE ADULT - PROBLEM SELECTOR RECOMMENDATION 4
I Adam Sauer MD performed a history and physical exam of the patient and discussed  the findings and plan with the house officer. I reviewed the resident note and agree with the findings and plan

## 2020-06-11 NOTE — CHART NOTE - NSCHARTNOTEFT_GEN_A_CORE
Pt remains hypertensive with minimal urine output.  Will arrange for 2 hr HD session for clearance and 0.5L UF.  HD nurses aware. Pt remains hypertensive with minimal urine output.  Recommend to discontinue IVF at this time.  Will arrange for 2 hr HD session for clearance and 0.5L UF.  HD nurses aware.

## 2020-06-11 NOTE — DIETITIAN INITIAL EVALUATION ADULT. - OTHER INFO
29y M with PMH of bipolar depression presenting with ATN due to rhabdomyolysis, s/p HD. Pt. lethargic, , w/ 3+ L arm, leg , knee and foot edema . PO diet initiated , will monitor PO intake , tolerance , no known food allergies .

## 2020-06-11 NOTE — CONSULT NOTE ADULT - PROBLEM SELECTOR RECOMMENDATION 9
Pt with ANTOINETTE in the setting of rhabdomyolysis. Upon lab review, Scr was 1.11 on 8/23/2017. Scr at [facility] this AM was 3.06. Admission labs pending. Pt with oliguric ANTOINETTE secondary to rhabdomyolysis. Discussed with ICU team, attempting trial of fluids and Lasix initially. If no improvement, will initiate HD today. Monitor labs and urine output. Avoid potential nephrotoxins.
I Adam Sauer MD performed a history and physical exam of the patient and discussed  the findings and plan with the house officer. I reviewed the resident note and agree with the findings and plan

## 2020-06-11 NOTE — CONSULT NOTE ADULT - ASSESSMENT
ASSESSMENT:  29M with PMH of bipolar depression transferred from [facility] for further management of acute renal failure due to rhabdomyolysis, surgery now called for concern of LLE compartment syndrome.     PLAN:   - Physical exam now more concerning for LLE compartment syndrome than on previous exams  - Patient added on and consented for LLE fasciotomy  - NPO  - PRe-op labs sent  - COVID test sent  - Likely SICU post-op    Seen and examined with Dr. Cristiane Valentine, PGY-3  C Team Surgery [id number] ASSESSMENT:  29M with PMH of bipolar depression transferred from [facility] for further management of acute renal failure due to rhabdomyolysis, surgery now called for concern of LLE compartment syndrome anf s/o left gluteal compartment syndrome w a combo of neurologic and arterial insuff sx     PLAN:   - Physical exam now more concerning for LLE compartment syndrome than on previous exams  - Patient added on and consented for LLE fasciotomy  - NPO  - PRe-op labs sent  - COVID test sent  - Likely SICU post-op    Seen and examined with Dr. Cristiane Valentine, PGY-3  C Team Surgery [id number]

## 2020-06-11 NOTE — CHART NOTE - NSCHARTNOTEFT_GEN_A_CORE
Patient Name: Colin Camacho     YOB: 1990       Address: [address] [place] [address]  [place] [address]     Sex: Male                     Rx Written    Rx Dispensed    Drug    Quantity    Days Supply    Prescriber Name    Payment Method    Dispenser      06/03/2020 06/06/2020 dextroamp-amphetamin 30 mg tab  60 30 Doe White MD Cash [facility] #[id number]   06/03/2020 06/06/2020 alprazolam 1 mg tablet  90 30 Doe White MD Insurance [facility] #[id number]   05/29/2020 05/29/2020 oxycodone hcl 30 mg tablet  30 30 Doe White MD Cash [facility] #[id number]   05/19/2020 05/19/2020 dextroamp-amphetamin 30 mg tab  60 30 Doe White MD Cash [facility] #[id number]   05/19/2020 05/19/2020 alprazolam 1 mg tablet  90 30 Doe White MD Insurance [facility] #[id number]   04/21/2020 04/21/2020 dextroamp-amphetamin 30 mg tab  60 30 Doe White MD Cash [facility] #[id number]   04/21/2020 04/21/2020 alprazolam 1 mg tablet  90 30 Doe White MD Insurance [facility] #[id number]   02/15/2020 02/15/2020 dextroamp-amphetamin 30 mg tab  60 30 Doe White MD Other [facility] #[id number]   02/15/2020 02/15/2020 alprazolam 1 mg tablet  90 30 Doe White MD Other [facility] #[id number]   01/03/2020 01/07/2020 dextroamp-amphetamin 30 mg tab  60 30 Doe White MD Other [facility] #[id number]   01/03/2020 01/03/2020 alprazolam 1 mg tablet  90 30 Doe White MD Other [facility] #[id number]   12/07/2019 12/07/2019 dextroamp-amphetamin 30 mg tab  60 30 Doe White MD Other [facility] #[id number]   12/07/2019 12/07/2019 alprazolam 1 mg tablet  90 30 Doe White MD Other [facility] #[id number]   11/09/2019 11/09/2019 dextroamp-amphetamin 30 mg tab  60 30 Doe White MD Other [facility] #[id number]   11/09/2019 11/09/2019 zolpidem tartrate 10 mg tablet  30 30 Doe White MD Other [facility] #[id number]   11/09/2019 11/09/2019 alprazolam 1 mg tablet  90 30 Doe White MD Other [facility] #[id number]

## 2020-06-11 NOTE — CONSULT NOTE ADULT - SUBJECTIVE AND OBJECTIVE BOX
VASCULAR SURGERY CONSULT NOTE  --------------------------------------------------------------------------------------------    HPI:  29 y M with PMH of bipolar depression transferred from [facility] for further management of acute renal failure due to rhabdomyolysis. Pt was found unresponsive at home, lying on his L side. He states he has not been taking his Lithium for at least 2 wks. At the OSH, he was treated for hyperkalemia. He received D5, insulin, calcium gluconate, Kayexalate Last BM was on 6/9. For L leg pain he was given lidocaine patch, dilaudid 1mg prn and tramadol 50mg. Hydralazine 20mg given for HTN. From 1 to 7 am only 100cc of UOP. Urine was tea colored. 500cc bolus of NS was given. CT cervical spine at OSH was (-). CT Head (-). CTAP and CXR were (-). CK on admission there was 177,000.     The patient was admitted to the MICU yesterday for rhabdomyolysis. Seen by vascular surgery team yesterday for concern of compartment syndrome in the left upper extremity. Now vascular surgery being re-consulted for concern of compartment syndrome in the left lower extremity.      PAST MEDICAL & SURGICAL HISTORY:  Bipolar depression  Migraine  Displaced fracture of proximal phalanx of left little finger  Difficulty sleeping  ADD (attention deficit disorder)  History of facial surgery: Fadi-Facial Realignment Surgery in 2008    FAMILY HISTORY:  Family history of diabetes mellitus in grandmother (Grandparent)  Family history of heart disease (Grandparent)  [] Family history not pertinent as reviewed with the patient and family      ALLERGIES: No Known Allergies      CURRENT MEDICATIONS  MEDICATIONS (STANDING): ALPRAZolam 1 milliGRAM(s) Oral every 8 hours  heparin   Injectable 5000 Unit(s) SubCutaneous every 8 hours    MEDICATIONS (PRN):HYDROmorphone  Injectable 2 milliGRAM(s) IV Push every 3 hours PRN Severe Pain (7 - 10)  labetalol Injectable 10 milliGRAM(s) IV Push every 6 hours PRN Systolic blood pressure >180  oxyCODONE    IR 10 milliGRAM(s) Oral every 8 hours PRN Moderate Pain (4 - 6)  sodium chloride 0.9% lock flush 10 milliLiter(s) IV Push every 1 hour PRN Pre/post blood products, medications, blood draw, and to maintain line patency    --------------------------------------------------------------------------------------------    Vitals:   T(C): 37.1 (06-11-20 @ 16:00), Max: 37.2 (06-10-20 @ 20:00)  HR: 109 (06-11-20 @ 16:00) (98 - 126)  BP: 152/99 (06-11-20 @ 16:00) (152/99 - 192/127)  RR: 12 (06-11-20 @ 16:00) (12 - 26)  SpO2: 100% (06-11-20 @ 16:00) (96% - 100%)  CAPILLARY BLOOD GLUCOSE    06-10 @ 07:01  -  06-11 @ 07:00  --------------------------------------------------------  IN:    IV PiggyBack: 50 mL    lactated ringers.: 3400 mL  Total IN: 3450 mL    OUT:    Indwelling Catheter - Urethral: 525 mL  Total OUT: 525 mL    Total NET: 2925 mL      06-11 @ 07:01  -  06-11 @ 18:09  --------------------------------------------------------  IN:    IV PiggyBack: 60 mL    lactated ringers.: 600 mL    Oral Fluid: 250 mL  Total IN: 910 mL    OUT:    Indwelling Catheter - Urethral: 200 mL  Total OUT: 200 mL    Total NET: 710 mL    Height (cm): 180.3 (06-10 @ 14:11)  Weight (kg): 104.7 (06-10 @ 14:11)  BMI (kg/m2): 32.2 (06-10 @ 14:11)  BSA (m2): 2.24 (06-10 @ 14:11)      PHYSICAL EXAM:   General: NAD, Lying in bed comfortably  Neuro: A+Ox3  HEENT: NC/AT, EOMI  Neck: Soft, supple  Cardio: RRR, nml S1/S2  Resp: Good effort, CTA b/l  Thorax: No chest wall tenderness  GI/Abd: Soft, NT/ND, no rebound/guarding, no masses palpated  LLE: Decreased sensation between interspace of toes. Positive foot drop. Motor 3/5 in LLE. Palpable DP/PT.  --------------------------------------------------------------------------------------------    LABS  CBC (06-11 @ 17:30)                              11.8<L>                         12.02<H>  )----------------(  162        --    % Neutrophils, --    % Lymphocytes, ANC: --                                  34.5<L>  CBC (06-11 @ 03:40)                              14.1                           13.92<H>  )----------------(  203        84.6<H>% Neutrophils, 7.1<L>% Lymphocytes, ANC: 11.78<H>                              42.2      BMP (06-11 @ 03:40)             134<L>  |  99      |  31<H> 		Ca++ --      Ca 7.4<L>             ---------------------------------( 111<H>		Mg 2.1                3.8     |  21<L>   |  4.53<H>			Ph 3.5     BMP (06-10 @ 19:30)             134<L>  |  105     |  37<H> 		Ca++ --      Ca 6.8<L>             ---------------------------------( 125<H>		Mg 2.4                4.1     |  16<L>   |  4.62<H>			Ph 4.5       LFTs (06-11 @ 03:40)      TPro 4.9<L> / Alb 2.6<L> / TBili 0.8 / DBili -- / <H> / <H> / AlkPhos 63  LFTs (06-10 @ 19:30)      TPro 4.6<L> / Alb 2.3<L> / TBili 0.5 / DBili -- / <H> / <H> / AlkPhos 56    Coags (06-10 @ 14:30)  aPTT 42.9<H> / INR 1.87<H> / PT 21.9<H>    Cardiac Markers (06-11 @ 03:40)     HSTrop: -- / CKMB: -- / CK: > 36815  Cardiac Markers (06-10 @ 14:15)     HSTrop: -- / CKMB: 222.40 / CK: > 34258      VBG (06-10 @ 14:15)     7.31<L> / 37<L> / 42<H> / 18<L> / -6.9 / 77.0%     Lactate: 1.4    --------------------------------------------------------------------------------------------    MICROBIOLOGY    -> .Blood Blood-Peripheral Culture (06-10 @ 16:31)     NG    NG    No growth to date.      --------------------------------------------------------------------------------------------    IMAGING  < from: Xray Chest 1 View- PORTABLE-Urgent (06.10.20 @ 19:26) >  IMPRESSION:    The mediastinal cardiac silhouette is unremarkable. Right central superior vena cava. No pneumothorax.    The lungs are clear.     No acute osseous finding.     < end of copied text > VASCULAR SURGERY CONSULT NOTE  --------------------------------------------------------------------------------------------    HPI:  29 y M with PMH of bipolar depression transferred from [facility] for further management of acute renal failure due to rhabdomyolysis. Pt was found unresponsive at home, lying on his L side. He states he has not been taking his Lithium for at least 2 wks. At the OSH, he was treated for hyperkalemia. He received D5, insulin, calcium gluconate, Kayexalate Last BM was on 6/9. For L leg pain he was given lidocaine patch, dilaudid 1mg prn and tramadol 50mg. Hydralazine 20mg given for HTN. From 1 to 7 am only 100cc of UOP. Urine was tea colored. 500cc bolus of NS was given. CT cervical spine at OSH was (-). CT Head (-). CTAP and CXR were (-). CK on admission there was 177,000.     The patient was admitted to the MICU yesterday for rhabdomyolysis. Seen by vascular surgery team yesterday for concern of compartment syndrome in the left upper extremity. Now vascular surgery being re-consulted for concern of compartment syndrome in the left lower extremity.      PAST MEDICAL & SURGICAL HISTORY:  Bipolar depression  Migraine  Displaced fracture of proximal phalanx of left little finger  Difficulty sleeping  ADD (attention deficit disorder)  History of facial surgery: Fadi-Facial Realignment Surgery in 2008    FAMILY HISTORY:  Family history of diabetes mellitus in grandmother (Grandparent)  Family history of heart disease (Grandparent)  [] Family history not pertinent as reviewed with the patient and family      ALLERGIES: No Known Allergies      CURRENT MEDICATIONS  MEDICATIONS (STANDING): ALPRAZolam 1 milliGRAM(s) Oral every 8 hours  heparin   Injectable 5000 Unit(s) SubCutaneous every 8 hours    MEDICATIONS (PRN):HYDROmorphone  Injectable 2 milliGRAM(s) IV Push every 3 hours PRN Severe Pain (7 - 10)  labetalol Injectable 10 milliGRAM(s) IV Push every 6 hours PRN Systolic blood pressure >180  oxyCODONE    IR 10 milliGRAM(s) Oral every 8 hours PRN Moderate Pain (4 - 6)  sodium chloride 0.9% lock flush 10 milliLiter(s) IV Push every 1 hour PRN Pre/post blood products, medications, blood draw, and to maintain line patency    --------------------------------------------------------------------------------------------    Vitals:   T(C): 37.1 (06-11-20 @ 16:00), Max: 37.2 (06-10-20 @ 20:00)  HR: 109 (06-11-20 @ 16:00) (98 - 126)  BP: 152/99 (06-11-20 @ 16:00) (152/99 - 192/127)  RR: 12 (06-11-20 @ 16:00) (12 - 26)  SpO2: 100% (06-11-20 @ 16:00) (96% - 100%)  CAPILLARY BLOOD GLUCOSE    06-10 @ 07:01  -  06-11 @ 07:00  --------------------------------------------------------  IN:    IV PiggyBack: 50 mL    lactated ringers.: 3400 mL  Total IN: 3450 mL    OUT:    Indwelling Catheter - Urethral: 525 mL  Total OUT: 525 mL    Total NET: 2925 mL      06-11 @ 07:01  -  06-11 @ 18:09  --------------------------------------------------------  IN:    IV PiggyBack: 60 mL    lactated ringers.: 600 mL    Oral Fluid: 250 mL  Total IN: 910 mL    OUT:    Indwelling Catheter - Urethral: 200 mL  Total OUT: 200 mL    Total NET: 710 mL    Height (cm): 180.3 (06-10 @ 14:11)  Weight (kg): 104.7 (06-10 @ 14:11)  BMI (kg/m2): 32.2 (06-10 @ 14:11)  BSA (m2): 2.24 (06-10 @ 14:11)      PHYSICAL EXAM:   General: NAD, Lying in bed comfortably  Neuro: A+Ox3  HEENT: NC/AT, EOMI  Neck: Soft, supple  Cardio: RRR, nml S1/S2  Resp: Good effort, CTA b/l  Thorax: No chest wall tenderness  GI/Abd: Soft, NT/ND, no rebound/guarding, no masses palpated  LLE: Decreased sensation between interspace of toes. Positive foot drop. Motor 3/5 in LLE. Palpable DP/PT. limited flexion at the hip and knee level pt has limited lle logrolling also left buttock cheek w  mottled skin changes   --------------------------------------------------------------------------------------------    LABS  CBC (06-11 @ 17:30)                              11.8<L>                         12.02<H>  )----------------(  162        --    % Neutrophils, --    % Lymphocytes, ANC: --                                  34.5<L>  CBC (06-11 @ 03:40)                              14.1                           13.92<H>  )----------------(  203        84.6<H>% Neutrophils, 7.1<L>% Lymphocytes, ANC: 11.78<H>                              42.2      BMP (06-11 @ 03:40)             134<L>  |  99      |  31<H> 		Ca++ --      Ca 7.4<L>             ---------------------------------( 111<H>		Mg 2.1                3.8     |  21<L>   |  4.53<H>			Ph 3.5     BMP (06-10 @ 19:30)             134<L>  |  105     |  37<H> 		Ca++ --      Ca 6.8<L>             ---------------------------------( 125<H>		Mg 2.4                4.1     |  16<L>   |  4.62<H>			Ph 4.5       LFTs (06-11 @ 03:40)      TPro 4.9<L> / Alb 2.6<L> / TBili 0.8 / DBili -- / <H> / <H> / AlkPhos 63  LFTs (06-10 @ 19:30)      TPro 4.6<L> / Alb 2.3<L> / TBili 0.5 / DBili -- / <H> / <H> / AlkPhos 56    Coags (06-10 @ 14:30)  aPTT 42.9<H> / INR 1.87<H> / PT 21.9<H>    Cardiac Markers (06-11 @ 03:40)     HSTrop: -- / CKMB: -- / CK: > 31812  Cardiac Markers (06-10 @ 14:15)     HSTrop: -- / CKMB: 222.40 / CK: > 26393      VBG (06-10 @ 14:15)     7.31<L> / 37<L> / 42<H> / 18<L> / -6.9 / 77.0%     Lactate: 1.4    --------------------------------------------------------------------------------------------    MICROBIOLOGY    -> .Blood Blood-Peripheral Culture (06-10 @ 16:31)     NG    NG    No growth to date.      --------------------------------------------------------------------------------------------    IMAGING  < from: Xray Chest 1 View- PORTABLE-Urgent (06.10.20 @ 19:26) >  IMPRESSION:    The mediastinal cardiac silhouette is unremarkable. Right central superior vena cava. No pneumothorax.    The lungs are clear.     No acute osseous finding.     < end of copied text >

## 2020-06-11 NOTE — CONSULT NOTE ADULT - PROBLEM SELECTOR RECOMMENDATION 3
Pt. noted to have lower serum calcium on labs drawn at [facility]. Serum calcium 7.7 corrected and patient with borderline high serum potassium. Admission labs pending. Recommend Calcium gluconate 2 gm IV. Check EKG. Monitor serum calcium.    If any questions, please feel free to contact me  Denys Lipscomb   Nephrology Fellow  [phone]  (After 5 pm or on weekends please page the on-call fellow)
I Adam Sauer MD performed a history and physical exam of the patient and discussed  the findings and plan with the house officer. I reviewed the resident note and agree with the findings and plan

## 2020-06-11 NOTE — PROGRESS NOTE ADULT - SUBJECTIVE AND OBJECTIVE BOX
[facility] DIVISION OF KIDNEY DISEASES AND HYPERTENSION -- FOLLOW UP NOTE  --------------------------------------------------------------------------------  HPI: 29-year-old male, with history bipolar depression was transferred from [facility] to [facility] for severe rhabdomyolysis. As per paper chart, pt was found to have cocaine and amphetamines in urine drug screen. Pt also with an elevated CPK ~ 180K. Nephrology team consulted for ANTOINETTE and rhabdomyolyiss. Upon review of labs on [facility], Scr was 1.11 on 8/23/2017. Pt underwent HD yesterday with no UF for rising serum potassium and low serum calcium.    Pt. evaluated at bedside, continues to complain of leg pain. Pt. noted to have some urine output. Patient remains on IVF.     PAST HISTORY  --------------------------------------------------------------------------------  No significant changes to PMH, PSH, FHx, SHx, unless otherwise noted    ALLERGIES & MEDICATIONS  --------------------------------------------------------------------------------  Allergies    No Known Allergies    Intolerances    Standing Inpatient Medications  ALPRAZolam 1 milliGRAM(s) Oral every 8 hours  ceFAZolin   IVPB 1000 milliGRAM(s) IV Intermittent every 12 hours  ceFAZolin   IVPB      chlorhexidine 4% Liquid 1 Application(s) Topical <User Schedule>  heparin   Injectable 5000 Unit(s) SubCutaneous every 8 hours  lactated ringers. 1000 milliLiter(s) IV Continuous <Continuous>  lidocaine   Patch 1 Patch Transdermal every 24 hours    REVIEW OF SYSTEMS  --------------------------------------------------------------------------------  Gen: no fatigue  Respiratory: No dyspnea  CV: No chest pain  GI: No abdominal pain  MSK: left arm and leg pain  Neuro: No dizziness  Heme: No bleeding    All other systems were reviewed and are negative, except as noted.    VITALS/PHYSICAL EXAM  --------------------------------------------------------------------------------  T(C): 37 (06-11-20 @ 08:00), Max: 38 (06-10-20 @ 12:45)  HR: 104 (06-11-20 @ 09:00) (102 - 128)  BP: 160/105 (06-11-20 @ 09:00) (154/137 - 192/127)  RR: 14 (06-11-20 @ 09:00) (12 - 24)  SpO2: 100% (06-11-20 @ 09:00) (96% - 100%)  Wt(kg): --  Height (cm): 180.3 (06-10-20 @ 14:11)  Weight (kg): 104.7 (06-10-20 @ 14:11)  BMI (kg/m2): 32.2 (06-10-20 @ 14:11)  BSA (m2): 2.24 (06-10-20 @ 14:11)    06-10-20 @ 07:01  -  06-11-20 @ 07:00  --------------------------------------------------------  IN: 3450 mL / OUT: 525 mL / NET: 2925 mL    06-11-20 @ 07:01  -  06-11-20 @ 09:26  --------------------------------------------------------  IN: 460 mL / OUT: 45 mL / NET: 415 mL    Physical Exam:  	Gen: NAD  	HEENT: MMM  	Pulm: CTA B/L  	CV: S1S2  	Abd: Soft, +BS               : flanagan catheter in place  	Ext: left arm and leg swollen, no edema noted  	Neuro: Awake  	Skin: Warm and dry    LABS/STUDIES  --------------------------------------------------------------------------------              14.1   13.92 >-----------<  203      [06-11-20 @ 03:40]              42.2     134  |  99  |  31  ----------------------------<  111      [06-11-20 @ 03:40]  3.8   |  21  |  4.53        Ca     7.4     [06-11-20 @ 03:40]      Mg     2.1     [06-11-20 @ 03:40]      Phos  3.5     [06-11-20 @ 03:40]    CK > 71377      [06-11-20 @ 03:40]    Creatinine Trend:  SCr 4.53 [06-11 @ 03:40]  SCr 4.62 [06-10 @ 19:30]  SCr 3.42 [06-10 @ 14:15]    HBsAg NEGATIVE      [06-11-20 @ 03:40]  HIV Nonreactive    [06-10-20 @ 14:15]

## 2020-06-11 NOTE — PROGRESS NOTE ADULT - PROBLEM SELECTOR PLAN 1
Pt with ANTOINETTE in the setting of rhabdomyolysis. Upon lab review, Scr was 1.11 on 8/23/2017. Scr at [facility] this AM was 3.06. Scr today is 4.53. Pt with oliguric ANTOINETTE secondary to rhabdomyolysis. Pt. underwent HD yesterday for clearance. Labs reviewed, no plans for HD today. Decrease rate of LR to 100 ml/hour. Monitor labs and urine output. Avoid potential nephrotoxins.

## 2020-06-11 NOTE — PROGRESS NOTE ADULT - ATTENDING COMMENTS
28 yo with ANTOINETTE and rhabdo, minimal UO and persistently elevated CKs. Now with concern for compartment syndrome, being taken to OR emergently.

## 2020-06-11 NOTE — PROGRESS NOTE ADULT - SUBJECTIVE AND OBJECTIVE BOX
INTERVAL HPI/OVERNIGHT EVENTS:    SUBJECTIVE: Patient seen and examined at bedside.     ROS:  CONSTITUTIONAL: No weakness, fevers or chills  EYES/ENT: No visual changes;  No vertigo or throat pain   NECK: No pain or stiffness  RESPIRATORY: No cough, wheezing, hemoptysis; No shortness of breath  CARDIOVASCULAR: No chest pain or palpitations  GASTROINTESTINAL: No abdominal or epigastric pain. No nausea, vomiting, or hematemesis; No diarrhea or constipation. No melena or hematochezia.  GENITOURINARY: No dysuria, frequency or hematuria  NEUROLOGICAL: No numbness or weakness  SKIN: No itching, rashes    OBJECTIVE:    VITAL SIGNS:  ICU Vital Signs Last 24 Hrs  T(C): 36.7 (11 Jun 2020 04:00), Max: 38 (10 Ramírez 2020 12:45)  T(F): 98.1 (11 Jun 2020 04:00), Max: 100.4 (10 Ramírez 2020 12:45)  HR: 113 (11 Jun 2020 06:00) (102 - 128)  BP: 170/118 (11 Jun 2020 06:00) (154/137 - 192/127)  BP(mean): 132 (11 Jun 2020 06:00) (119 - 152)  ABP: --  ABP(mean): --  RR: 16 (11 Jun 2020 06:00) (12 - 24)  SpO2: 99% (11 Jun 2020 06:00) (97% - 100%)        06-10 @ 07:01  -  06-11 @ 07:00  --------------------------------------------------------  IN: 3450 mL / OUT: 525 mL / NET: 2925 mL      CAPILLARY BLOOD GLUCOSE          PHYSICAL EXAM:    General: NAD  HEENT: NC/AT; PERRL, clear conjunctiva  Neck: supple  Respiratory: CTA b/l  Cardiovascular: +S1/S2; RRR  Abdomen: soft, NT/ND; +BS x4  Extremities: WWP, 2+ peripheral pulses b/l; no LE edema  Skin: normal color and turgor; no rash  Neurological:    MEDICATIONS:  MEDICATIONS  (STANDING):  ALPRAZolam 1 milliGRAM(s) Oral every 8 hours  buMETAnide IVPB 4 milliGRAM(s) IV Intermittent once  ceFAZolin   IVPB 1000 milliGRAM(s) IV Intermittent every 12 hours  ceFAZolin   IVPB      chlorhexidine 4% Liquid 1 Application(s) Topical <User Schedule>  heparin   Injectable 5000 Unit(s) SubCutaneous every 8 hours  lactated ringers. 1000 milliLiter(s) (50 mL/Hr) IV Continuous <Continuous>  lidocaine   Patch 1 Patch Transdermal every 24 hours    MEDICATIONS  (PRN):  HYDROmorphone  Injectable 2 milliGRAM(s) IV Push every 3 hours PRN Severe Pain (7 - 10)  labetalol Injectable 10 milliGRAM(s) IV Push every 6 hours PRN Systolic blood pressure >180  oxyCODONE    IR 10 milliGRAM(s) Oral every 8 hours PRN Moderate Pain (4 - 6)  sodium chloride 0.9% lock flush 10 milliLiter(s) IV Push every 1 hour PRN Pre/post blood products, medications, blood draw, and to maintain line patency      ALLERGIES:  Allergies    No Known Allergies    Intolerances        LABS:                        14.1   13.92 )-----------( 203      ( 11 Jun 2020 03:40 )             42.2     06-11    134<L>  |  99  |  31<H>  ----------------------------<  111<H>  3.8   |  21<L>  |  4.53<H>    Ca    7.4<L>      11 Jun 2020 03:40  Phos  3.5     06-11  Mg     2.1     06-11    TPro  4.9<L>  /  Alb  2.6<L>  /  TBili  0.8  /  DBili  x   /  AST  793<H>  /  ALT  300<H>  /  AlkPhos  63  06-11    PT/INR - ( 10 Ramírez 2020 14:30 )   PT: 21.9 SEC;   INR: 1.87          PTT - ( 10 Ramírez 2020 14:30 )  PTT:42.9 SEC      RADIOLOGY & ADDITIONAL TESTS: Reviewed. INTERVAL HPI/OVERNIGHT EVENTS: Pt was hypertensive, treated with IV hydral and IV labetatolol. Completed 1 session of HD.     SUBJECTIVE: Patient seen and examined at bedside.     ROS:  CONSTITUTIONAL: No fevers or chills  EYES/ENT: No visual changes;  No vertigo or throat pain   NECK: No pain or stiffness  RESPIRATORY: No cough, wheezing, hemoptysis; No shortness of breath  CARDIOVASCULAR: No chest pain or palpitations  GASTROINTESTINAL: No abdominal or epigastric pain. No nausea, vomiting, or hematemesis; No diarrhea or constipation. No melena or hematochezia.  GENITOURINARY: No dysuria, frequency or hematuria  NEUROLOGICAL: No numbness, + generalized weakness  MSK: +L sided pain  SKIN: No itching, rashes    OBJECTIVE:    VITAL SIGNS:  ICU Vital Signs Last 24 Hrs  T(C): 36.7 (11 Jun 2020 04:00), Max: 38 (10 Ramírez 2020 12:45)  T(F): 98.1 (11 Jun 2020 04:00), Max: 100.4 (10 Ramírez 2020 12:45)  HR: 113 (11 Jun 2020 06:00) (102 - 128)  BP: 170/118 (11 Jun 2020 06:00) (154/137 - 192/127)  BP(mean): 132 (11 Jun 2020 06:00) (119 - 152)  ABP: --  ABP(mean): --  RR: 16 (11 Jun 2020 06:00) (12 - 24)  SpO2: 99% (11 Jun 2020 06:00) (97% - 100%)        06-10 @ 07:01  -  06-11 @ 07:00  --------------------------------------------------------  IN: 3450 mL / OUT: 525 mL / NET: 2925 mL      CAPILLARY BLOOD GLUCOSE          PHYSICAL EXAM:    General: NAD  HEENT: NC/AT; PERRL, clear conjunctiva  Neck: supple  Respiratory: CTA b/l  Cardiovascular: +S1/S2; RRR  Abdomen: soft, NT/ND; +BS x4  Extremities: WWP, 2+ peripheral pulses b/l; no LE edema  Skin: normal color and turgor; no rash  Neurological:    MEDICATIONS:  MEDICATIONS  (STANDING):  ALPRAZolam 1 milliGRAM(s) Oral every 8 hours  buMETAnide IVPB 4 milliGRAM(s) IV Intermittent once  ceFAZolin   IVPB 1000 milliGRAM(s) IV Intermittent every 12 hours  ceFAZolin   IVPB      chlorhexidine 4% Liquid 1 Application(s) Topical <User Schedule>  heparin   Injectable 5000 Unit(s) SubCutaneous every 8 hours  lactated ringers. 1000 milliLiter(s) (50 mL/Hr) IV Continuous <Continuous>  lidocaine   Patch 1 Patch Transdermal every 24 hours    MEDICATIONS  (PRN):  HYDROmorphone  Injectable 2 milliGRAM(s) IV Push every 3 hours PRN Severe Pain (7 - 10)  labetalol Injectable 10 milliGRAM(s) IV Push every 6 hours PRN Systolic blood pressure >180  oxyCODONE    IR 10 milliGRAM(s) Oral every 8 hours PRN Moderate Pain (4 - 6)  sodium chloride 0.9% lock flush 10 milliLiter(s) IV Push every 1 hour PRN Pre/post blood products, medications, blood draw, and to maintain line patency      ALLERGIES:  Allergies    No Known Allergies    Intolerances        LABS:                        14.1   13.92 )-----------( 203      ( 11 Jun 2020 03:40 )             42.2     06-11    134<L>  |  99  |  31<H>  ----------------------------<  111<H>  3.8   |  21<L>  |  4.53<H>    Ca    7.4<L>      11 Jun 2020 03:40  Phos  3.5     06-11  Mg     2.1     06-11    TPro  4.9<L>  /  Alb  2.6<L>  /  TBili  0.8  /  DBili  x   /  AST  793<H>  /  ALT  300<H>  /  AlkPhos  63  06-11    PT/INR - ( 10 Ramírez 2020 14:30 )   PT: 21.9 SEC;   INR: 1.87          PTT - ( 10 Ramírez 2020 14:30 )  PTT:42.9 SEC      RADIOLOGY & ADDITIONAL TESTS: Reviewed. INTERVAL HPI/OVERNIGHT EVENTS: Pt was hypertensive, treated with IV hydral and IV labetatolol. Completed 1 session of HD.     SUBJECTIVE: Patient seen and examined at bedside.     ROS:  CONSTITUTIONAL: No fevers or chills  EYES/ENT: No visual changes;  No vertigo or throat pain   NECK: No pain or stiffness  RESPIRATORY: No cough, wheezing, hemoptysis; No shortness of breath  CARDIOVASCULAR: No chest pain or palpitations  GASTROINTESTINAL: No abdominal or epigastric pain. No nausea, vomiting, or hematemesis; No diarrhea or constipation. No melena or hematochezia.  GENITOURINARY: No dysuria, frequency or hematuria  NEUROLOGICAL: No numbness, + generalized weakness  MSK: +L sided pain 4/10 in severity  SKIN: No itching, rashes    OBJECTIVE:    VITAL SIGNS:  ICU Vital Signs Last 24 Hrs  T(C): 36.7 (11 Jun 2020 04:00), Max: 38 (10 Ramírez 2020 12:45)  T(F): 98.1 (11 Jun 2020 04:00), Max: 100.4 (10 Ramírez 2020 12:45)  HR: 113 (11 Jun 2020 06:00) (102 - 128)  BP: 170/118 (11 Jun 2020 06:00) (154/137 - 192/127)  BP(mean): 132 (11 Jun 2020 06:00) (119 - 152)  ABP: --  ABP(mean): --  RR: 16 (11 Jun 2020 06:00) (12 - 24)  SpO2: 99% (11 Jun 2020 06:00) (97% - 100%)        06-10 @ 07:01  -  06-11 @ 07:00  --------------------------------------------------------  IN: 3450 mL / OUT: 525 mL / NET: 2925 mL      CAPILLARY BLOOD GLUCOSE          PHYSICAL EXAM:    General: NAD  HEENT: NC/AT; PERRL, clear conjunctiva  Neck: supple  Respiratory: CTA b/l  Cardiovascular: +S1/S2; RRR  Abdomen: soft, NT/ND; +BS x4  Extremities: WWP, 2+ peripheral pulses b/l; no LE edema  Skin: normal color and turgor; no rash  Neurological:    MEDICATIONS:  MEDICATIONS  (STANDING):  ALPRAZolam 1 milliGRAM(s) Oral every 8 hours  buMETAnide IVPB 4 milliGRAM(s) IV Intermittent once  ceFAZolin   IVPB 1000 milliGRAM(s) IV Intermittent every 12 hours  ceFAZolin   IVPB      chlorhexidine 4% Liquid 1 Application(s) Topical <User Schedule>  heparin   Injectable 5000 Unit(s) SubCutaneous every 8 hours  lactated ringers. 1000 milliLiter(s) (50 mL/Hr) IV Continuous <Continuous>  lidocaine   Patch 1 Patch Transdermal every 24 hours    MEDICATIONS  (PRN):  HYDROmorphone  Injectable 2 milliGRAM(s) IV Push every 3 hours PRN Severe Pain (7 - 10)  labetalol Injectable 10 milliGRAM(s) IV Push every 6 hours PRN Systolic blood pressure >180  oxyCODONE    IR 10 milliGRAM(s) Oral every 8 hours PRN Moderate Pain (4 - 6)  sodium chloride 0.9% lock flush 10 milliLiter(s) IV Push every 1 hour PRN Pre/post blood products, medications, blood draw, and to maintain line patency      ALLERGIES:  Allergies    No Known Allergies    Intolerances        LABS:                        14.1   13.92 )-----------( 203      ( 11 Jun 2020 03:40 )             42.2     06-11    134<L>  |  99  |  31<H>  ----------------------------<  111<H>  3.8   |  21<L>  |  4.53<H>    Ca    7.4<L>      11 Jun 2020 03:40  Phos  3.5     06-11  Mg     2.1     06-11    TPro  4.9<L>  /  Alb  2.6<L>  /  TBili  0.8  /  DBili  x   /  AST  793<H>  /  ALT  300<H>  /  AlkPhos  63  06-11    PT/INR - ( 10 Ramírez 2020 14:30 )   PT: 21.9 SEC;   INR: 1.87          PTT - ( 10 Ramírez 2020 14:30 )  PTT:42.9 SEC      RADIOLOGY & ADDITIONAL TESTS: Reviewed.

## 2020-06-12 DIAGNOSIS — T79.A29A TRAUMATIC COMPARTMENT SYNDROME OF UNSPECIFIED LOWER EXTREMITY, INITIAL ENCOUNTER: ICD-10-CM

## 2020-06-12 DIAGNOSIS — T79.A22A TRAUMATIC COMPARTMENT SYNDROME OF LEFT LOWER EXTREMITY, INITIAL ENCOUNTER: ICD-10-CM

## 2020-06-12 DIAGNOSIS — F31.9 BIPOLAR DISORDER, UNSPECIFIED: ICD-10-CM

## 2020-06-12 DIAGNOSIS — N18.5 CHRONIC KIDNEY DISEASE, STAGE 5: ICD-10-CM

## 2020-06-12 LAB
ANION GAP SERPL CALC-SCNC: 14 MMO/L — SIGNIFICANT CHANGE UP (ref 7–14)
ANION GAP SERPL CALC-SCNC: 16 MMO/L — HIGH (ref 7–14)
BUN SERPL-MCNC: 29 MG/DL — HIGH (ref 7–23)
BUN SERPL-MCNC: 46 MG/DL — HIGH (ref 7–23)
CALCIUM SERPL-MCNC: 7.5 MG/DL — LOW (ref 8.4–10.5)
CALCIUM SERPL-MCNC: 8.1 MG/DL — LOW (ref 8.4–10.5)
CHLORIDE SERPL-SCNC: 95 MMOL/L — LOW (ref 98–107)
CHLORIDE SERPL-SCNC: 98 MMOL/L — SIGNIFICANT CHANGE UP (ref 98–107)
CK MB BLD-MCNC: 27.71 NG/ML — HIGH (ref 1–6.6)
CK MB BLD-MCNC: SIGNIFICANT CHANGE UP (ref 0–2.5)
CK SERPL-CCNC: HIGH U/L (ref 30–200)
CO2 SERPL-SCNC: 19 MMOL/L — LOW (ref 22–31)
CO2 SERPL-SCNC: 21 MMOL/L — LOW (ref 22–31)
CREAT SERPL-MCNC: 4.43 MG/DL — HIGH (ref 0.5–1.3)
CREAT SERPL-MCNC: 6.3 MG/DL — HIGH (ref 0.5–1.3)
GLUCOSE SERPL-MCNC: 124 MG/DL — HIGH (ref 70–99)
GLUCOSE SERPL-MCNC: 158 MG/DL — HIGH (ref 70–99)
HCT VFR BLD CALC: 31.4 % — LOW (ref 39–50)
HGB BLD-MCNC: 10.6 G/DL — LOW (ref 13–17)
LACTATE BLDV-MCNC: 2 MMOL/L — SIGNIFICANT CHANGE UP (ref 0.5–2)
MAGNESIUM SERPL-MCNC: 1.8 MG/DL — SIGNIFICANT CHANGE UP (ref 1.6–2.6)
MAGNESIUM SERPL-MCNC: 2.2 MG/DL — SIGNIFICANT CHANGE UP (ref 1.6–2.6)
MCHC RBC-ENTMCNC: 28 PG — SIGNIFICANT CHANGE UP (ref 27–34)
MCHC RBC-ENTMCNC: 33.8 % — SIGNIFICANT CHANGE UP (ref 32–36)
MCV RBC AUTO: 83.1 FL — SIGNIFICANT CHANGE UP (ref 80–100)
NRBC # FLD: 0 K/UL — SIGNIFICANT CHANGE UP (ref 0–0)
PHOSPHATE SERPL-MCNC: 4.1 MG/DL — SIGNIFICANT CHANGE UP (ref 2.5–4.5)
PHOSPHATE SERPL-MCNC: 4.7 MG/DL — HIGH (ref 2.5–4.5)
PLATELET # BLD AUTO: 154 K/UL — SIGNIFICANT CHANGE UP (ref 150–400)
PMV BLD: 9.7 FL — SIGNIFICANT CHANGE UP (ref 7–13)
POTASSIUM SERPL-MCNC: 4.1 MMOL/L — SIGNIFICANT CHANGE UP (ref 3.5–5.3)
POTASSIUM SERPL-MCNC: 4.4 MMOL/L — SIGNIFICANT CHANGE UP (ref 3.5–5.3)
POTASSIUM SERPL-SCNC: 4.1 MMOL/L — SIGNIFICANT CHANGE UP (ref 3.5–5.3)
POTASSIUM SERPL-SCNC: 4.4 MMOL/L — SIGNIFICANT CHANGE UP (ref 3.5–5.3)
RBC # BLD: 3.78 M/UL — LOW (ref 4.2–5.8)
RBC # FLD: 12.9 % — SIGNIFICANT CHANGE UP (ref 10.3–14.5)
SODIUM SERPL-SCNC: 130 MMOL/L — LOW (ref 135–145)
SODIUM SERPL-SCNC: 133 MMOL/L — LOW (ref 135–145)
WBC # BLD: 10.49 K/UL — SIGNIFICANT CHANGE UP (ref 3.8–10.5)
WBC # FLD AUTO: 10.49 K/UL — SIGNIFICANT CHANGE UP (ref 3.8–10.5)

## 2020-06-12 PROCEDURE — 99233 SBSQ HOSP IP/OBS HIGH 50: CPT

## 2020-06-12 PROCEDURE — 71045 X-RAY EXAM CHEST 1 VIEW: CPT | Mod: 26

## 2020-06-12 PROCEDURE — 99291 CRITICAL CARE FIRST HOUR: CPT

## 2020-06-12 PROCEDURE — 99223 1ST HOSP IP/OBS HIGH 75: CPT

## 2020-06-12 RX ORDER — GABAPENTIN 400 MG/1
100 CAPSULE ORAL THREE TIMES A DAY
Refills: 0 | Status: DISCONTINUED | OUTPATIENT
Start: 2020-06-12 | End: 2020-06-15

## 2020-06-12 RX ORDER — BUMETANIDE 0.25 MG/ML
4 INJECTION INTRAMUSCULAR; INTRAVENOUS ONCE
Refills: 0 | Status: COMPLETED | OUTPATIENT
Start: 2020-06-12 | End: 2020-06-12

## 2020-06-12 RX ORDER — LABETALOL HCL 100 MG
10 TABLET ORAL ONCE
Refills: 0 | Status: COMPLETED | OUTPATIENT
Start: 2020-06-12 | End: 2020-06-12

## 2020-06-12 RX ORDER — CLEVIDIPINE BUTYRATE 50MG/100ML
5 VIAL (ML) INTRAVENOUS
Qty: 25 | Refills: 0 | Status: DISCONTINUED | OUTPATIENT
Start: 2020-06-12 | End: 2020-06-15

## 2020-06-12 RX ORDER — FUROSEMIDE 40 MG
40 TABLET ORAL ONCE
Refills: 0 | Status: COMPLETED | OUTPATIENT
Start: 2020-06-12 | End: 2020-06-12

## 2020-06-12 RX ORDER — CALCIUM GLUCONATE 100 MG/ML
2 VIAL (ML) INTRAVENOUS ONCE
Refills: 0 | Status: COMPLETED | OUTPATIENT
Start: 2020-06-12 | End: 2020-06-12

## 2020-06-12 RX ORDER — ALPRAZOLAM 0.25 MG
1 TABLET ORAL THREE TIMES A DAY
Refills: 0 | Status: DISCONTINUED | OUTPATIENT
Start: 2020-06-12 | End: 2020-06-19

## 2020-06-12 RX ORDER — SODIUM CHLORIDE 9 MG/ML
1000 INJECTION, SOLUTION INTRAVENOUS ONCE
Refills: 0 | Status: COMPLETED | OUTPATIENT
Start: 2020-06-12 | End: 2020-06-12

## 2020-06-12 RX ORDER — CYCLOBENZAPRINE HYDROCHLORIDE 10 MG/1
5 TABLET, FILM COATED ORAL THREE TIMES A DAY
Refills: 0 | Status: DISCONTINUED | OUTPATIENT
Start: 2020-06-12 | End: 2020-06-17

## 2020-06-12 RX ADMIN — Medication 100 MILLIGRAM(S): at 13:44

## 2020-06-12 RX ADMIN — Medication 40 MILLIGRAM(S): at 04:30

## 2020-06-12 RX ADMIN — HEPARIN SODIUM 5000 UNIT(S): 5000 INJECTION INTRAVENOUS; SUBCUTANEOUS at 05:57

## 2020-06-12 RX ADMIN — Medication 10 MILLIGRAM(S): at 00:50

## 2020-06-12 RX ADMIN — HYDROMORPHONE HYDROCHLORIDE 30 MILLILITER(S): 2 INJECTION INTRAMUSCULAR; INTRAVENOUS; SUBCUTANEOUS at 07:32

## 2020-06-12 RX ADMIN — HYDROMORPHONE HYDROCHLORIDE 30 MILLILITER(S): 2 INJECTION INTRAMUSCULAR; INTRAVENOUS; SUBCUTANEOUS at 19:24

## 2020-06-12 RX ADMIN — BUMETANIDE 132 MILLIGRAM(S): 0.25 INJECTION INTRAMUSCULAR; INTRAVENOUS at 12:23

## 2020-06-12 RX ADMIN — CHLORHEXIDINE GLUCONATE 1 APPLICATION(S): 213 SOLUTION TOPICAL at 06:40

## 2020-06-12 RX ADMIN — HEPARIN SODIUM 5000 UNIT(S): 5000 INJECTION INTRAVENOUS; SUBCUTANEOUS at 13:44

## 2020-06-12 RX ADMIN — Medication 200 GRAM(S): at 05:57

## 2020-06-12 RX ADMIN — Medication 10 MG/HR: at 07:01

## 2020-06-12 RX ADMIN — SODIUM CHLORIDE 1000 MILLILITER(S): 9 INJECTION, SOLUTION INTRAVENOUS at 05:56

## 2020-06-12 RX ADMIN — SODIUM CHLORIDE 1000 MILLILITER(S): 9 INJECTION, SOLUTION INTRAVENOUS at 00:45

## 2020-06-12 RX ADMIN — Medication 10 MILLIGRAM(S): at 02:15

## 2020-06-12 RX ADMIN — HEPARIN SODIUM 5000 UNIT(S): 5000 INJECTION INTRAVENOUS; SUBCUTANEOUS at 23:07

## 2020-06-12 RX ADMIN — Medication 10 MG/HR: at 19:36

## 2020-06-12 RX ADMIN — GABAPENTIN 100 MILLIGRAM(S): 400 CAPSULE ORAL at 23:07

## 2020-06-12 NOTE — PROGRESS NOTE ADULT - PROBLEM SELECTOR PLAN 1
Pt with ANTOINETTE in the setting of rhabdomyolysis. Upon lab review, Scr was 1.11 on 8/23/2017. Scr at [facility] this AM was 3.06. Scr today is 4.43. Pt with oliguric ANTOINETTE secondary to rhabdomyolysis. Pt. underwent HD yesterday for clearance. Labs reviewed, no plans for HD today. Decrease rate of LR to 100 ml/hour. Monitor labs and urine output. Avoid potential nephrotoxins. Pt with ANTOINETTE in the setting of rhabdomyolysis. Upon lab review, Scr was 1.11 on 8/23/2017. Scr at [facility] this AM was 3.06. Scr today is 4.43. Pt with oliguric ANTOINETTE secondary to rhabdomyolysis. Pt. underwent HD yesterday for clearance. Labs reviewed. Decrease rate of LR to 100 ml/hour. Give Bumex 4 mg IV. If no improvement in urine output, will arrange for HD with [facility]. Monitor labs and urine output. Avoid potential nephrotoxins.    If any questions, please feel free to contact me  Denys Lipscomb   Nephrology Fellow  [phone]  (After 5 pm or on weekends please page the on-call fellow) Pt with ANTOINETTE in the setting of rhabdomyolysis. Upon lab review, Scr was 1.11 on 8/23/2017. Scr at [facility] this AM was 3.06. Scr today is 4.43. Pt with oliguric ANTOINETTE secondary to rhabdomyolysis. Pt. underwent HD yesterday for clearance. Labs reviewed. Hold IVF. Give Bumex 4 mg IV. If no improvement in urine output, will arrange for HD with UF. Monitor labs and urine output. Avoid potential nephrotoxins.    If any questions, please feel free to contact me  Denys Lipscomb   Nephrology Fellow  [phone]  (After 5 pm or on weekends please page the on-call fellow)

## 2020-06-12 NOTE — PROGRESS NOTE ADULT - ATTENDING COMMENTS
2I Adam Sauer MD have seen and examined the patient today and agree with  the  evaluation, assessment and plan of the surgical house officer  I Adam Sauer MD have personally seen and examined the patient at bedside today at  9am

## 2020-06-12 NOTE — BEHAVIORAL HEALTH ASSESSMENT NOTE - CASE SUMMARY
Pt seen/evaluated with PGY2, agree with above. In brief, pt is a 30 yo M, no significant PMHx, PPHx bipolar disorder, no prior inpatient hospitalizations, follows w/ Dr. White once every 2 weeks, no prior SA, +occasional cocaine and alcohol use, transferred from [facility] after presenting with acute renal failure 2/2 rhabdomyolysis after being found unresponsive at home by neighbors. Patient has received HDx2 since admission, and is s/p LLE 4-compartment fasciotomy. On exam, pt calm, cooperative, no current mood symptoms, no psychosis, denies suicidal ideation, states he does not know what led him to being on the floor. Utox + cocaine, benzo, amphetamines, oxy. No SI/HI. Impression: bipolar I disorder, appears currently to be at his baseline mental status. Plan: as above- pt no longer on lithium so will not restart that. Xanax 1mg BID as above; hold Adderall at this time. No psych contraindication to discharge, once pt is medically stable. He will continue with outpatient psychiatrist Dr. White. Will follow while here.

## 2020-06-12 NOTE — BEHAVIORAL HEALTH ASSESSMENT NOTE - HPI (INCLUDE ILLNESS QUALITY, SEVERITY, DURATION, TIMING, CONTEXT, MODIFYING FACTORS, ASSOCIATED SIGNS AND SYMPTOMS)
Patient is a 28 y/o single male, no significant PMHx, PPHx bipolar disorder, no prior inpatient hospitalizations, follows w/ Dr. White once every 2 weeks, no prior SA, no prior NSSIB, occasional cocaine and alcohol use, transferred from [facility] after presenting with acute renal failure 2/2 rhabdomyolysis after being found unresponsive at home by neighbors. Patient has received HDx2 since admission, and is s/p fasciotomy for compartment syndrome in E.   Patient reports he recalls he was watching TV at home in the afteroon, and then being on the floor and woken by his neighbors and brought to the hospital. He states he has no other recollection of what happened, and has no idea as to what led to him being passed out on the floor for so long. When he did not show up to work the next day, his work called his emergency contact, his parents, who then called his neighbors to check on him. He states since being at the hospital he has been in kidney failure and had to get surgery on his leg. He says he continues to have some pain, but "otherwise I'm okay." Patient denies recent depressive episodes, states he has had good sleep, good appetite, has been able to focus on work, no anhedonia, no hopelesness, no change in energy. He denies ever having had symptoms of siria, no AVH, no paranoia, no other delusions elicited. Patient states he was taking Lithium for approximately 1 year, but was told to discontinue it 2 weeks ago by his psychiatrist, as the lithium level was high. otherwise he has been taking xanax as needed, and adderall for work occasionally. He reports he had been compliant with medications. He reports he uses cocaine occasionally, last use 2 weeks ago, drinks alcohol 1drink/week, denies other drug use. Patient denies suicidal ideation, denies hx of suicidal ideation, denies prior suicide attempts, denies NSSIB. No current HI.    Collateral obtained from father in person: Patient has been functioning well, had been working, had been keeping to himself more but has friends, has been dealing with some issues around his sexuality, and feels like he's not as successful as his brother, which has been a long standing issue. Otherwise, patient has made vague statements like "I shouldn't be here anymore" but has never made any active suicidal comments, has no prior SA, no hx NSSIB. He did not know that patient was using cocaine and found no evidence of it at the house. Knows of no other substance use. patient has been seeing outpatient psychiatrist in [place] for some time, has been on Lithium for bipolar disorder, patient has had episodes of siria in the past. Recently mood has been stable. Wonders if it would be beneficial to see therapist more regularly. Has no safety concerns, does not believe this was a suicide attempt.

## 2020-06-12 NOTE — BEHAVIORAL HEALTH ASSESSMENT NOTE - NSBHCHARTREVIEWVS_PSY_A_CORE FT
Vital Signs Last 24 Hrs  T(C): 37 (12 Jun 2020 12:00), Max: 38 (11 Jun 2020 22:00)  T(F): 98.6 (12 Jun 2020 12:00), Max: 100.4 (11 Jun 2020 22:00)  HR: 126 (12 Jun 2020 14:00) (96 - 127)  BP: 150/90 (12 Jun 2020 02:15) (150/90 - 179/113)  BP(mean): 102 (12 Jun 2020 02:15) (102 - 130)  RR: 17 (12 Jun 2020 14:00) (0 - 19)  SpO2: 95% (12 Jun 2020 14:00) (92% - 100%)

## 2020-06-12 NOTE — PROGRESS NOTE ADULT - ATTENDING COMMENTS
Patient noted to have diffuse bibasilar B lines this morning on POCUS performed by me as well as new oxygen requirement.  Suspect patient is now hypervolemic and therefore would hold on further IVF and consider a diuretic challenge -- CPK beginning to trend down.

## 2020-06-12 NOTE — BEHAVIORAL HEALTH ASSESSMENT NOTE - NSBHADMITCOUNSEL_PSY_A_CORE
importance of adherence to chosen treatment/client/family/caregiver education/instructions for management, treatment and follow up/prognosis

## 2020-06-12 NOTE — PROGRESS NOTE ADULT - SUBJECTIVE AND OBJECTIVE BOX
Anesthesia Pain Management Service    SUBJECTIVE: Patient is doing well with IV PCA and no significant problems reported.    Pain Scale Score	At rest: _4_ 	With Activity: ___ 	[X ] Refer to charted pain scores    THERAPY:    [ ] IV PCA Morphine		[ ] 5 mg/mL	[ ] 1 mg/mL  [X ] IV PCA Hydromorphone	[ ] 5 mg/mL	[X ] 1 mg/mL  [ ] IV PCA Fentanyl		[ ] 50 micrograms/mL    Demand dose __0.2_ lockout __6_ (minutes) Continuous Rate _0__ Total: _7__  mg used (in past 24 hours)      MEDICATIONS  (STANDING):  ceFAZolin   IVPB 1000 milliGRAM(s) IV Intermittent every 24 hours  chlorhexidine 4% Liquid 1 Application(s) Topical <User Schedule>  clevidipine Infusion 5 mG/Hr (10 mL/Hr) IV Continuous <Continuous>  heparin   Injectable 5000 Unit(s) SubCutaneous every 8 hours  HYDROmorphone PCA (1 mG/mL) 30 milliLiter(s) PCA Continuous PCA Continuous    MEDICATIONS  (PRN):  HYDROmorphone PCA (1 mG/mL) Rescue Clinician Bolus 1 milliGRAM(s) IV Push every 15 minutes PRN for Pain Scale GREATER THAN 6  naloxone Injectable 0.1 milliGRAM(s) IV Push every 3 minutes PRN For ANY of the following changes in patient status:  A. RR LESS THAN 10 breaths per minute, B. Oxygen saturation LESS THAN 90%, C. Sedation score of 6  ondansetron Injectable 4 milliGRAM(s) IV Push every 6 hours PRN Nausea  sodium chloride 0.9% lock flush 10 milliLiter(s) IV Push every 1 hour PRN Pre/post blood products, medications, blood draw, and to maintain line patency      OBJECTIVE: laying in bed     Sedation Score:	[ X] Alert	[ ] Drowsy 	[ ] Arousable	[ ] Asleep	[ ] Unresponsive    Side Effects:	[X ] None	[ ] Nausea	[ ] Vomiting	[ ] Pruritus  		[ ] Other:    Vital Signs Last 24 Hrs  T(C): 37 (12 Jun 2020 12:00), Max: 38 (11 Jun 2020 22:00)  T(F): 98.6 (12 Jun 2020 12:00), Max: 100.4 (11 Jun 2020 22:00)  HR: 123 (12 Jun 2020 13:00) (96 - 127)  BP: 150/90 (12 Jun 2020 02:15) (150/90 - 179/113)  BP(mean): 102 (12 Jun 2020 02:15) (102 - 130)  RR: 16 (12 Jun 2020 13:00) (0 - 19)  SpO2: 96% (12 Jun 2020 13:00) (92% - 100%)    ASSESSMENT/ PLAN    Therapy to  be:	[ X] Continue   [ ] Discontinued   [ ] Change to prn Analgesics    Documentation and Verification of current medications:   [X] Done	[ ] Not done, not elligible    Comments: Recommend non-opioid adjuvant analgesics to be used when possible and when allowed by primary surgical team.    Progress Note written now but Patient was seen earlier.

## 2020-06-12 NOTE — PROGRESS NOTE ADULT - ASSESSMENT
29 y M with PMH of bipolar depression transferred from [facility] for further management of acute renal failure due to rhabdomyolysis, Taken to the OR for LLE compartment syndrome. Now s/p four compartment fasciotomy of the left lower leg. HD after surgery. Doing well today    Plan  - f/u CK, Labs  - Will discuss with Dr. Sauer in the AM  - Appreciate excellent SICU care    C Team Surgery  [id number] 29 y M with PMH of bipolar depression transferred from [facility] for further management of acute renal failure due to rhabdomyolysis, Taken to the OR for LLE compartment syndrome. Now s/p four compartment fasciotomy of the left lower leg. HD after surgery. Doing well today    Plan  - pain management  - f/u CK, Labs  - VAC placement Saturday with vascular team / fellow  - Appreciate excellent SICU care    	  C Team Surgery  [id number] 29 y M with PMH of bipolar depression transferred from [facility] for further management of acute renal failure due to rhabdomyolysis, Taken to the OR for LLE compartment syndrome. Now s/p five compartment fasciotomy of the left lower leg. HD after surgery. Doing well today    Plan  - pt is clinically improving  dressing change milad  continue renal and hepatic optimazation  will follow

## 2020-06-12 NOTE — PROVIDER CONTACT NOTE (OTHER) - ACTION/TREATMENT ORDERED:
Per vascular fellow, No dressing change necessary at this time, dressing to be changed tomorrow morning. Continue to change nora if soiled.

## 2020-06-12 NOTE — PROVIDER CONTACT NOTE (OTHER) - REASON
L upper thigh/gluteal dressing heavily saturated with serous drainage, nora changed and SICU team and vascular notified

## 2020-06-12 NOTE — BEHAVIORAL HEALTH ASSESSMENT NOTE - NSBHREFER_PSY_A_CORE
No retinal holes or tears seen on exam. Recommended OBSERVATION. We reviewed the signs and symptoms of retinal tear/retinal detachment and the importance of prompt evaluation should there be increasing floaters, new flashing lights, or decreasing peripheral vision in either eye at any time. Patient understands condition, prognosis and need for follow up care. inpatient Medical/Surgical team

## 2020-06-12 NOTE — BEHAVIORAL HEALTH ASSESSMENT NOTE - SUICIDE RISK FACTORS
Mood Disorder current/past/ADHD current/past/Access to lethal methods (pills, firearm, etc.: Ask specifically about presence or absence of a firearm in the home or ease of accessing

## 2020-06-12 NOTE — BEHAVIORAL HEALTH ASSESSMENT NOTE - SUICIDE PROTECTIVE FACTORS
Supportive social network of family or friends/Responsibility to family and others/Identifies reasons for living/Has future plans/Beloved pets/Engaged in work or school/Positive therapeutic relationships

## 2020-06-12 NOTE — CONSULT NOTE ADULT - ASSESSMENT
29M w/ PMH of bipolar depression, s/p found down at home, transferred from [facility] for further management of acute renal failure due to rhabdomyolysis, found to have compartment syndrome on the LLE, now s/p four compartment fasciotomy and lateral/medial thigh fasciotomies    Neuro  - AO x 3  - pain control w/ PCA    Resp  - No issues, monitor for fluid overload in setting of aggressive IVF ressucitation    CVS  - Hypertensive to 180~190's o/n, improved by 10 labetolol IV push given  - lactate of 1.4    GI 29M w/ PMH of bipolar depression, s/p found down at home, transferred from [facility] for further management of acute renal failure due to rhabdomyolysis, found to have compartment syndrome on the LLE, now s/p four compartment fasciotomy and lateral/medial thigh fasciotomies    Neuro  - AO x 3  - pain control w/ PCA    Resp  - No issues, monitor for fluid overload in setting of aggressive IVF ressucitation    CVS  - Hypertensive to 180~190's o/n, improved by 10 labetolol IV push given  - lactate of 1.4    GI  - regular diet      - ANTOINETTE in setting of rhabdomyolysis  - aggressive fluid resuscitation w/ IVF, w/ goal UOP of about 100cc/hr, currently oliguric to 10cc/hr  - CPK postop >22,000  - 1L bolus given, IVF @ 250cc/hr  - HD obtained postop o/n    Heme  - postop CBC stable  - SQH for dvt ppx    ID  - Ancef for LLE cellulitis  - mild leukocytosis    Endo  - monitor FGS 29M w/ PMH of bipolar depression, s/p found down at home, transferred from [facility] for further management of acute renal failure due to rhabdomyolysis, found to have compartment syndrome on the LLE, now s/p four compartment fasciotomy and lateral/medial thigh fasciotomies    Neuro  - AO x 3  - pain control w/ PCA    Resp  -currently requiring 2L NC   -continue to monitor for fluid overload in setting     CVS  - Hypertensive to 180~190's o/n, given 10 labetolol IV push x3   -started on Cleviprex for strict BP control   - lactate of 1.4    GI  - regular diet      - ANTOINETTE in setting of rhabdomyolysis  - aggressive fluid resuscitation w/ IVF, w/ goal UOP of about 100cc/hr, currently oliguric to 10cc/hr  - CPK postop >22,000  - 1L bolus given, IVF @ 250cc/hr--> per nephrology d/c IVF   - no HD for     Heme  - postop CBC stable  - SQH for dvt ppx    ID  - Ancef for LLE cellulitis  - mild leukocytosis    Endo  - monitor FGS

## 2020-06-12 NOTE — BEHAVIORAL HEALTH ASSESSMENT NOTE - SUMMARY
Patient is a 28 y/o single male, no significant PMHx, PPHx bipolar disorder, no prior inpatient hospitalizations, follows w/ Dr. White once every 2 weeks, no prior SA, no prior NSSIB, occasional cocaine and alcohol use, transferred from [facility] after presenting with acute renal failure 2/2 rhabdomyolysis after being found unresponsive at home by neighbors. Patient has received HDx2 since admission, and is s/p fasciotomy for compartment syndrome in E. No current mood symptoms, no psychosis, denies suicidal ideation, adamantly states he does not know what led him to being on the floor. Utox + cocaine, benzo, amphetamines, oxy.

## 2020-06-12 NOTE — PROGRESS NOTE ADULT - SUBJECTIVE AND OBJECTIVE BOX
[facility] DIVISION OF KIDNEY DISEASES AND HYPERTENSION -- FOLLOW UP NOTE  --------------------------------------------------------------------------------  HPI: 29-year-old male, with history bipolar depression was transferred from [facility] to [facility] for severe rhabdomyolysis. As per paper chart, pt was found to have cocaine and amphetamines in urine drug screen. Pt also with an elevated CPK ~ 180K. Nephrology team consulted for ANTOINETTE and rhabdomyolyiss. Upon review of labs on [facility], Scr was 1.11 on 8/23/2017. Pt. underwent fasciotomy yesterday (6/11/20) for LLE compartement syndrome. Pt. underwent two sessions of HD, last HD yesterday.    Pt. evaluated at bedside, reports improvement in leg pain. Pt. given Lasix with minimal response.     PAST HISTORY  --------------------------------------------------------------------------------  No significant changes to PMH, PSH, FHx, SHx, unless otherwise noted    ALLERGIES & MEDICATIONS  --------------------------------------------------------------------------------  Allergies    No Known Allergies    Intolerances    Standing Inpatient Medications  ceFAZolin   IVPB 1000 milliGRAM(s) IV Intermittent every 24 hours  chlorhexidine 4% Liquid 1 Application(s) Topical <User Schedule>  clevidipine Infusion 5 mG/Hr IV Continuous <Continuous>  heparin   Injectable 5000 Unit(s) SubCutaneous every 8 hours  HYDROmorphone PCA (1 mG/mL) 30 milliLiter(s) PCA Continuous PCA Continuous    REVIEW OF SYSTEMS  --------------------------------------------------------------------------------  Gen: no fatigue  Respiratory: No dyspnea  CV: No chest pain  GI: No abdominal pain  MSK: left arm and leg pain  Neuro: No dizziness  Heme: No bleeding    All other systems were reviewed and are negative, except as noted.    VITALS/PHYSICAL EXAM  --------------------------------------------------------------------------------  T(C): 36.9 (06-12-20 @ 08:00), Max: 38 (06-11-20 @ 22:00)  HR: 120 (06-12-20 @ 08:15) (96 - 121)  BP: 150/90 (06-12-20 @ 02:15) (150/90 - 179/113)  RR: 16 (06-12-20 @ 08:15) (0 - 26)  SpO2: 92% (06-12-20 @ 08:15) (92% - 100%)  Wt(kg): --  Height (cm): 180.3 (06-10-20 @ 14:11)  Weight (kg): 104.7 (06-10-20 @ 14:11)  BMI (kg/m2): 32.2 (06-10-20 @ 14:11)  BSA (m2): 2.24 (06-10-20 @ 14:11)    06-11-20 @ 07:01  -  06-12-20 @ 07:00  --------------------------------------------------------  IN: 6720 mL / OUT: 885 mL / NET: 5835 mL    06-12-20 @ 07:01  -  06-12-20 @ 09:00  --------------------------------------------------------  IN: 30 mL / OUT: 0 mL / NET: 30 mL    Physical Exam:  	Gen: NAD  	HEENT: MMM  	Pulm: CTA B/L  	CV: S1S2  	Abd: Soft, +BS               : flanagan catheter in place  	Ext: left lower extremity in bandage  	Neuro: Awake  	Skin: Warm and dry              Vascular: RIJ non tunneled HD catheter    LABS/STUDIES  --------------------------------------------------------------------------------              10.6   10.49 >-----------<  154      [06-12-20 @ 01:20]              31.4     133  |  98  |  29  ----------------------------<  124      [06-12-20 @ 01:20]  4.1   |  21  |  4.43        Ca     8.1     [06-12-20 @ 01:20]      Mg     1.8     [06-12-20 @ 01:20]      Phos  4.1     [06-12-20 @ 01:20]    Creatinine Trend:  SCr 4.43 [06-12 @ 01:20]  SCr 5.89 [06-11 @ 20:50]  SCr 4.53 [06-11 @ 03:40]  SCr 4.62 [06-10 @ 19:30]  SCr 3.42 [06-10 @ 14:15]

## 2020-06-12 NOTE — CONSULT NOTE ADULT - SUBJECTIVE AND OBJECTIVE BOX
SICU CONSULT NOTE    HPI  29 y M with PMH of bipolar depression transferred from [facility] for further management of acute renal failure due to rhabdomyolysis. Pt was found unresponsive at home, lying on his L side. He states he has not been taking his Lithium for at least 2 wks. At the OSH, he was treated for hyperkalemia. He received D5, insulin, calcium gluconate, Kayexalate Last BM was on 6/9. For L leg pain he was given lidocaine patch, dilaudid 1mg prn and tramadol 50mg. Hydralazine 20mg given for HTN. From 1 to 7 am only 100cc of UOP. Urine was tea colored. 500cc bolus of NS was given. CT cervical spine at OSH was (-). CT Head (-). CTAP and CXR were (-). CK on admission there was 177,000.     Pt was seen by surgery team in MICU for LUE swelling and compartment syndrome was ruled out. Surgery team was re-consulted today due to new symptoms of L foot drop and numbness. Pt was found to be highly suspicious for compartment syndrome in the upper thigh, and was emergently taken to OR.     Pt is now s/p four compartment fasciotomy of LLE, and left lateral and medial thigh fasciotomies. SICU consulted for q1h neurovascular checks and hemodynamic monitoring        PAST MEDICAL HISTORY: Bipolar depression  Migraine  Displaced fracture of proximal phalanx of left little finger  Difficulty sleeping  ADD (attention deficit disorder)      PAST SURGICAL HISTORY: History of facial surgery      FAMILY HISTORY: Family history of diabetes mellitus in grandmother (Grandparent)  Family history of heart disease (Grandparent)    ALLERGIES: No Known Allergies      VITAL SIGNS:  ICU Vital Signs Last 24 Hrs  T(C): 37.3 (12 Jun 2020 00:00), Max: 38 (11 Jun 2020 22:00)  T(F): 99.1 (12 Jun 2020 00:00), Max: 100.4 (11 Jun 2020 22:00)  HR: 102 (12 Jun 2020 00:00) (98 - 126)  BP: 154/90 (11 Jun 2020 22:15) (152/99 - 192/127)  BP(mean): 105 (11 Jun 2020 22:15) (105 - 152)  ABP: 171/94 (12 Jun 2020 00:00) (137/77 - 186/100)  ABP(mean): 120 (12 Jun 2020 00:00) (99 - 127)  RR: 13 (12 Jun 2020 00:00) (9 - 26)  SpO2: 98% (12 Jun 2020 00:00) (95% - 100%)      NEURO  Exam: AOx3  HYDROmorphone PCA (1 mG/mL) 30 milliLiter(s) PCA Continuous PCA Continuous  HYDROmorphone PCA (1 mG/mL) Rescue Clinician Bolus 1 milliGRAM(s) IV Push every 15 minutes PRN for Pain Scale GREATER THAN 6  ondansetron Injectable 4 milliGRAM(s) IV Push every 6 hours PRN Nausea      RESPIRATORY  Mechanical Ventilation: none  ABG - ( 11 Jun 2020 19:20 )  pH: 7.39  /  pCO2: 35    /  pO2: 238   / HCO3: 22    / Base Excess: -3.4  /  SaO2: 99.4    Lactate: x      Exam: CTABL      CARDIOVASCULAR  VBG - ( 10 Ramírez 2020 14:15 )  pH: 7.31  /  pCO2: 37    /  pO2: 42    / HCO3: 18    / Base Excess: -6.9  /  SaO2: 77.0   Lactate: 1.4    Exam: RRR, no murmurs, rubs, gallops  Cardiac Rhythm: sinus      GI/NUTRITION  Exam: soft, NT/ND, no guarding  Diet: regular      GENITOURINARY/RENAL  lactated ringers. 1000 milliLiter(s) IV Continuous <Continuous>  sodium chloride 0.9% lock flush 10 milliLiter(s) IV Push every 1 hour PRN Pre/post blood products, medications, blood draw, and to maintain line patency      06-10 @ 07:01 - 06-11 @ 07:00  --------------------------------------------------------  IN:    IV PiggyBack: 50 mL    lactated ringers.: 3400 mL  Total IN: 3450 mL    OUT:    Indwelling Catheter - Urethral: 525 mL  Total OUT: 525 mL    Total NET: 2925 mL      06-11 @ 07:01  -  06-12 @ 00:19  --------------------------------------------------------  IN:    IV PiggyBack: 60 mL    lactated ringers.: 600 mL    lactated ringers.: 650 mL    Oral Fluid: 250 mL  Total IN: 1560 mL    OUT:    Indwelling Catheter - Urethral: 260 mL  Total OUT: 260 mL    Total NET: 1300 mL          06-11    129<L>  |  98  |  39<H>  ----------------------------<  128<H>  4.0   |  18<L>  |  5.89<H>    Ca    7.1<L>      11 Jun 2020 20:50  Phos  5.3     06-11  Mg     2.0     06-11    TPro  4.9<L>  /  Alb  2.6<L>  /  TBili  0.8  /  DBili  x   /  AST  793<H>  /  ALT  300<H>  /  AlkPhos  63  06-11    [ ] Motta catheter, indication: urine output monitoring in critically ill patient    HEMATOLOGIC  [ ] VTE Prophylaxis:  heparin   Injectable 5000 Unit(s) SubCutaneous every 8 hours                          11.5   13.59 )-----------( 167      ( 11 Jun 2020 20:50 )             33.3     PT/INR - ( 11 Jun 2020 20:50 )   PT: 12.5 SEC;   INR: 1.08          PTT - ( 11 Jun 2020 20:50 )  PTT:28.1 SEC  Transfusion: [ ] PRBC	[ ] Platelets	[ ] FFP	[ ] Cryoprecipitate      INFECTIOUS DISEASES  ceFAZolin   IVPB 1000 milliGRAM(s) IV Intermittent every 24 hours    RECENT CULTURES:  Specimen Source: .Blood Blood-Peripheral  Date/Time: 06-10 @ 16:31  Culture Results:   No growth to date.  Gram Stain: --  Organism: --      ENDOCRINE    CAPILLARY BLOOD GLUCOSE          PATIENT CARE ACCESS DEVICES:  [x ] Peripheral IV  [ ] Central Venous Line	[ ] R	[ ] L	[ ] IJ	[ ] Fem	[ ] SC	Placed:   [x ] Arterial Line		[ ] R	[ ] L	[ ] Fem	[ ] Rad	[ ] Ax	Placed:   [ ] PICC:					[ ] Mediport  [ ] Urinary Catheter, Date Placed:   [x] Necessity of urinary, arterial, and venous catheters discussed    OTHER MEDICATIONS: chlorhexidine 4% Liquid 1 Application(s) Topical <User Schedule>  naloxone Injectable 0.1 milliGRAM(s) IV Push every 3 minutes PRN

## 2020-06-12 NOTE — CHART NOTE - NSCHARTNOTEFT_GEN_A_CORE
POST-OPERATIVE NOTE    Subjective:  Patient is s/p LLE 4 compartment fasciotomy. Patient reports pain issues, denies any n/v, chest pain, or SOB. Pain is currently well controlled. Recovering appropriately.    Objective:  Physical Exam:  General: moderately discomfort, resting comfortably in bed  Pulmonary: Nonlabored breathing, no respiratory distress  Cardiovascular: RRR  Abdominal: soft, NTND  Extremities: WWP, Left gluteal dressing saturated, no active bleeding, dressing reinforced  : blader flanagan in place draining clear yellow urine      Vital Signs Last 24 Hrs  T(C): 37.3 (12 Jun 2020 00:00), Max: 38 (11 Jun 2020 22:00)  T(F): 99.1 (12 Jun 2020 00:00), Max: 100.4 (11 Jun 2020 22:00)  HR: 109 (12 Jun 2020 02:00) (96 - 126)  BP: 158/94 (12 Jun 2020 01:22) (152/99 - 192/127)  BP(mean): 109 (12 Jun 2020 01:22) (105 - 145)  RR: 17 (12 Jun 2020 02:00) (9 - 26)  SpO2: 97% (12 Jun 2020 02:00) (95% - 100%)  I&O's Detail    10 Ramírez 2020 07:01  -  11 Jun 2020 07:00  --------------------------------------------------------  IN:    IV PiggyBack: 50 mL    lactated ringers.: 3400 mL  Total IN: 3450 mL    OUT:    Indwelling Catheter - Urethral: 525 mL  Total OUT: 525 mL    Total NET: 2925 mL      11 Jun 2020 07:01  -  12 Jun 2020 02:08  --------------------------------------------------------  IN:    IV PiggyBack: 60 mL    Lactated Ringers IV Bolus: 1000 mL    lactated ringers.: 600 mL    lactated ringers.: 900 mL    Oral Fluid: 250 mL  Total IN: 2810 mL    OUT:    Indwelling Catheter - Urethral: 265 mL  Total OUT: 265 mL    Total NET: 2545 mL        ceFAZolin   IVPB 1000  ceFAZolin   IVPB 1000  heparin   Injectable 5000    PAST MEDICAL & SURGICAL HISTORY:  Bipolar depression  Migraine  Displaced fracture of proximal phalanx of left little finger  Difficulty sleeping  ADD (attention deficit disorder)  History of facial surgery: Fadi-Facial Realignment Surgery in 2008          LABS:                        11.5   13.59 )-----------( 167      ( 11 Jun 2020 20:50 )             33.3     06-11    129<L>  |  98  |  39<H>  ----------------------------<  128<H>  4.0   |  18<L>  |  5.89<H>    Ca    7.1<L>      11 Jun 2020 20:50  Phos  5.3     06-11  Mg     2.0     06-11    TPro  4.9<L>  /  Alb  2.6<L>  /  TBili  0.8  /  DBili  x   /  AST  793<H>  /  ALT  300<H>  /  AlkPhos  63  06-11    PT/INR - ( 11 Jun 2020 20:50 )   PT: 12.5 SEC;   INR: 1.08          PTT - ( 11 Jun 2020 20:50 )  PTT:28.1 SEC  CAPILLARY BLOOD GLUCOSE          Radiology and Additional Studies:    Assessment:  The patient is a 29y Male who is now several hours post-op from a LLE 4 compartment fasciotomy.     Plan:  - Regular diet  - Pain control as needed  - SQH for DVT ppx  - OOB and ambulating as tolerated  - F/u AM labs    C Team Surgery  [id number]

## 2020-06-12 NOTE — BEHAVIORAL HEALTH ASSESSMENT NOTE - DESCRIPTION (FIRST USE, LAST USE, QUANTITY, FREQUENCY, DURATION)
drinks 1 drink/week uses "rarely," last use 2 weeks ago. Of note, utox + cocaine at [facility]

## 2020-06-12 NOTE — PROGRESS NOTE ADULT - PROBLEM SELECTOR PLAN 1
I Adam Sauer MD have seen and examined the patient today and agree with  the  evaluation, assessment and plan of the surgical house officer

## 2020-06-12 NOTE — BEHAVIORAL HEALTH ASSESSMENT NOTE - RISK ASSESSMENT
Low Acute Suicide Risk Risk factors include hx bipolar disorder, lives alone, active substance use, male, acute medical issues. Protective factors include no prior inpatient hospitalizations, no prior suicide attempts, connected with family, has a pet, engaged in work, highly educated

## 2020-06-12 NOTE — PROGRESS NOTE ADULT - SUBJECTIVE AND OBJECTIVE BOX
Vascular Progress Note    S: Patient seen and examined. Pain controlled    O:  Physical Exam:  Gen: Laying in bed, NAD  HEENT: atrumatic, EMOI  Resp: Unlabored breathing  Abd: soft, NT,ND, no rebound or guarding.   Vascular: palpable DP & PT   Ext: Fasciotomy dressing c/d/i      Vital Signs Last 24 Hrs  T(C): 37.3 (12 Jun 2020 00:00), Max: 38 (11 Jun 2020 22:00)  T(F): 99.1 (12 Jun 2020 00:00), Max: 100.4 (11 Jun 2020 22:00)  HR: 97 (12 Jun 2020 01:00) (96 - 126)  BP: 154/90 (11 Jun 2020 22:15) (152/99 - 192/127)  BP(mean): 105 (11 Jun 2020 22:15) (105 - 152)  RR: 12 (12 Jun 2020 01:00) (9 - 26)  SpO2: 99% (12 Jun 2020 01:00) (95% - 100%)    I&O's Detail    10 Ramírez 2020 07:01  -  11 Jun 2020 07:00  --------------------------------------------------------  IN:    IV PiggyBack: 50 mL    lactated ringers.: 3400 mL  Total IN: 3450 mL    OUT:    Indwelling Catheter - Urethral: 525 mL  Total OUT: 525 mL    Total NET: 2925 mL      11 Jun 2020 07:01  -  12 Jun 2020 01:11  --------------------------------------------------------  IN:    IV PiggyBack: 60 mL    Lactated Ringers IV Bolus: 1000 mL    lactated ringers.: 600 mL    lactated ringers.: 900 mL    Oral Fluid: 250 mL  Total IN: 2810 mL    OUT:    Indwelling Catheter - Urethral: 265 mL  Total OUT: 265 mL    Total NET: 2545 mL                                11.5   13.59 )-----------( 167      ( 11 Jun 2020 20:50 )             33.3       06-11    129<L>  |  98  |  39<H>  ----------------------------<  128<H>  4.0   |  18<L>  |  5.89<H>    Ca    7.1<L>      11 Jun 2020 20:50  Phos  5.3     06-11  Mg     2.0     06-11    TPro  4.9<L>  /  Alb  2.6<L>  /  TBili  0.8  /  DBili  x   /  AST  793<H>  /  ALT  300<H>  /  AlkPhos  63  06-11 Vascular Progress Note    S: Patient seen and examined. Pain controlled    O:  Physical Exam:  Gen: Laying in bed, NAD  HEENT: atrumatic, EMOI  Resp: Unlabored breathing  Abd: soft, NT,ND, no rebound or guarding.   Vascular: palpable DP & PT   Ext: Fasciotomy dressing c/d/i      Vital Signs Last 24 Hrs  T(C): 36.9 (12 Jun 2020 08:00), Max: 38 (11 Jun 2020 22:00)  T(F): 98.4 (12 Jun 2020 08:00), Max: 100.4 (11 Jun 2020 22:00)  HR: 114 (12 Jun 2020 09:00) (96 - 121)  BP: 150/90 (12 Jun 2020 02:15) (150/90 - 179/113)  BP(mean): 102 (12 Jun 2020 02:15) (102 - 130)  RR: 14 (12 Jun 2020 09:00) (0 - 26)  SpO2: 98% (12 Jun 2020 09:00) (92% - 100%)    I&O's Summary    11 Jun 2020 07:01  -  12 Jun 2020 07:00  --------------------------------------------------------  IN: 6720 mL / OUT: 885 mL / NET: 5835 mL    12 Jun 2020 07:01  -  12 Jun 2020 09:25  --------------------------------------------------------  IN: 30 mL / OUT: 0 mL / NET: 30 mL      LABS:                           10.6   10.49 )-----------( 154      ( 12 Jun 2020 01:20 )             31.4     06-12    133<L>  |  98  |  29<H>  ----------------------------<  124<H>  4.1   |  21<L>  |  4.43<H>    Ca    8.1<L>      12 Jun 2020 01:20  Phos  4.1     06-12  Mg     1.8     06-12    TPro  4.9<L>  /  Alb  2.6<L>  /  TBili  0.8  /  DBili  x   /  AST  793<H>  /  ALT  300<H>  /  AlkPhos  63  06-11 Vascular Progress Note    S: Patient seen and examined. Pain controlled  pt states left foot w more sensation and he is able to move the left leg a little better     O:  Physical Exam:  Gen: Laying in bed, NAD  HEENT: atrumatic, EMOI  Resp: Unlabored breathing  Abd: soft, NT,ND, no rebound or guarding.   Vascular: palpable DP & PT  jonn +2 w excellent cap refill and perf   Ext: Fasciotomy dressing c/d/i  pt w improvment in left foot dorsum and left 1and 2nd web space sensation (not 100%) and increased motor function  for left knee and hip flexion   pt is able to break gravity       Vital Signs Last 24 Hrs  T(C): 36.9 (12 Jun 2020 08:00), Max: 38 (11 Jun 2020 22:00)  T(F): 98.4 (12 Jun 2020 08:00), Max: 100.4 (11 Jun 2020 22:00)  HR: 114 (12 Jun 2020 09:00) (96 - 121)  BP: 150/90 (12 Jun 2020 02:15) (150/90 - 179/113)  BP(mean): 102 (12 Jun 2020 02:15) (102 - 130)  RR: 14 (12 Jun 2020 09:00) (0 - 26)  SpO2: 98% (12 Jun 2020 09:00) (92% - 100%)    I&O's Summary    11 Jun 2020 07:01  -  12 Jun 2020 07:00  --------------------------------------------------------  IN: 6720 mL / OUT: 885 mL / NET: 5835 mL    12 Jun 2020 07:01  -  12 Jun 2020 09:25  --------------------------------------------------------  IN: 30 mL / OUT: 0 mL / NET: 30 mL      LABS:                           10.6   10.49 )-----------( 154      ( 12 Jun 2020 01:20 )             31.4     06-12    133<L>  |  98  |  29<H>  ----------------------------<  124<H>  4.1   |  21<L>  |  4.43<H>    Ca    8.1<L>      12 Jun 2020 01:20  Phos  4.1     06-12  Mg     1.8     06-12    TPro  4.9<L>  /  Alb  2.6<L>  /  TBili  0.8  /  DBili  x   /  AST  793<H>  /  ALT  300<H>  /  AlkPhos  63  06-11

## 2020-06-12 NOTE — BEHAVIORAL HEALTH ASSESSMENT NOTE - NSBHCHARTREVIEWLAB_PSY_A_CORE FT
10.6   10.49 )-----------( 154      ( 12 Jun 2020 01:20 )             31.4     06-12    130<L>  |  95<L>  |  46<H>  ----------------------------<  158<H>  4.4   |  19<L>  |  6.30<H>    Ca    7.5<L>      12 Jun 2020 14:26  Phos  4.7     06-12  Mg     2.2     06-12    TPro  4.9<L>  /  Alb  2.6<L>  /  TBili  0.8  /  DBili  x   /  AST  793<H>  /  ALT  300<H>  /  AlkPhos  63  06-11      LIVER FUNCTIONS - ( 11 Jun 2020 03:40 )  Alb: 2.6 g/dL / Pro: 4.9 g/dL / ALK PHOS: 63 u/L / ALT: 300 u/L / AST: 793 u/L / GGT: x           PT/INR - ( 11 Jun 2020 20:50 )   PT: 12.5 SEC;   INR: 1.08          PTT - ( 11 Jun 2020 20:50 )  PTT:28.1 SEC

## 2020-06-12 NOTE — BEHAVIORAL HEALTH ASSESSMENT NOTE - COMMENTS ON SUICIDE RISK/PROTECTIVE FACTORS:
Patient reports he has many reasons to live including his family, dog, friends, he is future oriented, denies SI/I/P

## 2020-06-13 DIAGNOSIS — T79.6XXS TRAUMATIC ISCHEMIA OF MUSCLE, SEQUELA: ICD-10-CM

## 2020-06-13 LAB
ALBUMIN SERPL ELPH-MCNC: 2.7 G/DL — LOW (ref 3.3–5)
ALP SERPL-CCNC: 72 U/L — SIGNIFICANT CHANGE UP (ref 40–120)
ALT FLD-CCNC: 42 U/L — HIGH (ref 4–41)
ANION GAP SERPL CALC-SCNC: 15 MMO/L — HIGH (ref 7–14)
APTT BLD: 28.4 SEC — SIGNIFICANT CHANGE UP (ref 27.5–36.3)
AST SERPL-CCNC: 374 U/L — HIGH (ref 4–40)
BASE EXCESS BLDA CALC-SCNC: 2 MMOL/L — SIGNIFICANT CHANGE UP
BILIRUB DIRECT SERPL-MCNC: < 0.2 MG/DL — SIGNIFICANT CHANGE UP (ref 0.1–0.2)
BILIRUB SERPL-MCNC: 0.5 MG/DL — SIGNIFICANT CHANGE UP (ref 0.2–1.2)
BLOOD GAS ARTERIAL - FIO2: 60 — SIGNIFICANT CHANGE UP
BUN SERPL-MCNC: 35 MG/DL — HIGH (ref 7–23)
CALCIUM SERPL-MCNC: 7.4 MG/DL — LOW (ref 8.4–10.5)
CHLORIDE BLDA-SCNC: 97 MMOL/L — SIGNIFICANT CHANGE UP (ref 96–108)
CHLORIDE SERPL-SCNC: 95 MMOL/L — LOW (ref 98–107)
CK SERPL-CCNC: HIGH U/L (ref 30–200)
CO2 SERPL-SCNC: 22 MMOL/L — SIGNIFICANT CHANGE UP (ref 22–31)
CREAT SERPL-MCNC: 5.2 MG/DL — HIGH (ref 0.5–1.3)
GLUCOSE BLDA-MCNC: 133 MG/DL — HIGH (ref 70–99)
GLUCOSE SERPL-MCNC: 131 MG/DL — HIGH (ref 70–99)
HCO3 BLDA-SCNC: 26 MMOL/L — SIGNIFICANT CHANGE UP (ref 22–26)
HCT VFR BLD CALC: 30 % — LOW (ref 39–50)
HCT VFR BLDA CALC: 32.5 % — LOW (ref 39–51)
HGB BLD-MCNC: 10.3 G/DL — LOW (ref 13–17)
HGB BLDA-MCNC: 10.5 G/DL — LOW (ref 13–17)
INR BLD: 0.98 — SIGNIFICANT CHANGE UP (ref 0.88–1.17)
LACTATE BLDA-SCNC: 1 MMOL/L — SIGNIFICANT CHANGE UP (ref 0.5–2)
MAGNESIUM SERPL-MCNC: 2.1 MG/DL — SIGNIFICANT CHANGE UP (ref 1.6–2.6)
MCHC RBC-ENTMCNC: 28.1 PG — SIGNIFICANT CHANGE UP (ref 27–34)
MCHC RBC-ENTMCNC: 34.3 % — SIGNIFICANT CHANGE UP (ref 32–36)
MCV RBC AUTO: 82 FL — SIGNIFICANT CHANGE UP (ref 80–100)
NRBC # FLD: 0 K/UL — SIGNIFICANT CHANGE UP (ref 0–0)
PCO2 BLDA: 37 MMHG — SIGNIFICANT CHANGE UP (ref 35–48)
PH BLDA: 7.45 PH — SIGNIFICANT CHANGE UP (ref 7.35–7.45)
PHOSPHATE SERPL-MCNC: 3.6 MG/DL — SIGNIFICANT CHANGE UP (ref 2.5–4.5)
PLATELET # BLD AUTO: 203 K/UL — SIGNIFICANT CHANGE UP (ref 150–400)
PMV BLD: 9.3 FL — SIGNIFICANT CHANGE UP (ref 7–13)
PO2 BLDA: 106 MMHG — SIGNIFICANT CHANGE UP (ref 83–108)
POTASSIUM BLDA-SCNC: 3.6 MMOL/L — SIGNIFICANT CHANGE UP (ref 3.4–4.5)
POTASSIUM SERPL-MCNC: 3.8 MMOL/L — SIGNIFICANT CHANGE UP (ref 3.5–5.3)
POTASSIUM SERPL-SCNC: 3.8 MMOL/L — SIGNIFICANT CHANGE UP (ref 3.5–5.3)
PROT SERPL-MCNC: 4.9 G/DL — LOW (ref 6–8.3)
PROTHROM AB SERPL-ACNC: 11.3 SEC — SIGNIFICANT CHANGE UP (ref 9.8–13.1)
RBC # BLD: 3.66 M/UL — LOW (ref 4.2–5.8)
RBC # FLD: 12.9 % — SIGNIFICANT CHANGE UP (ref 10.3–14.5)
SAO2 % BLDA: 98 % — SIGNIFICANT CHANGE UP (ref 95–99)
SODIUM BLDA-SCNC: 129 MMOL/L — LOW (ref 136–146)
SODIUM SERPL-SCNC: 132 MMOL/L — LOW (ref 135–145)
WBC # BLD: 14.78 K/UL — HIGH (ref 3.8–10.5)
WBC # FLD AUTO: 14.78 K/UL — HIGH (ref 3.8–10.5)

## 2020-06-13 PROCEDURE — 99233 SBSQ HOSP IP/OBS HIGH 50: CPT | Mod: GC

## 2020-06-13 PROCEDURE — 99291 CRITICAL CARE FIRST HOUR: CPT

## 2020-06-13 PROCEDURE — 76604 US EXAM CHEST: CPT | Mod: 26

## 2020-06-13 RX ORDER — HYDROMORPHONE HYDROCHLORIDE 2 MG/ML
1 INJECTION INTRAMUSCULAR; INTRAVENOUS; SUBCUTANEOUS ONCE
Refills: 0 | Status: DISCONTINUED | OUTPATIENT
Start: 2020-06-13 | End: 2020-06-13

## 2020-06-13 RX ORDER — ACETAMINOPHEN 500 MG
650 TABLET ORAL EVERY 6 HOURS
Refills: 0 | Status: DISCONTINUED | OUTPATIENT
Start: 2020-06-13 | End: 2020-06-24

## 2020-06-13 RX ORDER — BUMETANIDE 0.25 MG/ML
4 INJECTION INTRAMUSCULAR; INTRAVENOUS ONCE
Refills: 0 | Status: COMPLETED | OUTPATIENT
Start: 2020-06-13 | End: 2020-06-13

## 2020-06-13 RX ADMIN — HYDROMORPHONE HYDROCHLORIDE 1 MILLIGRAM(S): 2 INJECTION INTRAMUSCULAR; INTRAVENOUS; SUBCUTANEOUS at 07:25

## 2020-06-13 RX ADMIN — Medication 650 MILLIGRAM(S): at 23:02

## 2020-06-13 RX ADMIN — HEPARIN SODIUM 5000 UNIT(S): 5000 INJECTION INTRAVENOUS; SUBCUTANEOUS at 14:49

## 2020-06-13 RX ADMIN — Medication 100 MILLIGRAM(S): at 04:21

## 2020-06-13 RX ADMIN — GABAPENTIN 100 MILLIGRAM(S): 400 CAPSULE ORAL at 14:49

## 2020-06-13 RX ADMIN — HYDROMORPHONE HYDROCHLORIDE 30 MILLILITER(S): 2 INJECTION INTRAMUSCULAR; INTRAVENOUS; SUBCUTANEOUS at 16:16

## 2020-06-13 RX ADMIN — Medication 10 MG/HR: at 19:25

## 2020-06-13 RX ADMIN — HYDROMORPHONE HYDROCHLORIDE 1 MILLIGRAM(S): 2 INJECTION INTRAMUSCULAR; INTRAVENOUS; SUBCUTANEOUS at 07:43

## 2020-06-13 RX ADMIN — HYDROMORPHONE HYDROCHLORIDE 30 MILLILITER(S): 2 INJECTION INTRAMUSCULAR; INTRAVENOUS; SUBCUTANEOUS at 07:02

## 2020-06-13 RX ADMIN — HYDROMORPHONE HYDROCHLORIDE 30 MILLILITER(S): 2 INJECTION INTRAMUSCULAR; INTRAVENOUS; SUBCUTANEOUS at 18:55

## 2020-06-13 RX ADMIN — BUMETANIDE 4 MILLIGRAM(S): 0.25 INJECTION INTRAMUSCULAR; INTRAVENOUS at 10:49

## 2020-06-13 RX ADMIN — HYDROMORPHONE HYDROCHLORIDE 1 MILLIGRAM(S): 2 INJECTION INTRAMUSCULAR; INTRAVENOUS; SUBCUTANEOUS at 16:32

## 2020-06-13 RX ADMIN — HEPARIN SODIUM 5000 UNIT(S): 5000 INJECTION INTRAVENOUS; SUBCUTANEOUS at 05:43

## 2020-06-13 RX ADMIN — HEPARIN SODIUM 5000 UNIT(S): 5000 INJECTION INTRAVENOUS; SUBCUTANEOUS at 23:02

## 2020-06-13 RX ADMIN — Medication 650 MILLIGRAM(S): at 16:36

## 2020-06-13 RX ADMIN — GABAPENTIN 100 MILLIGRAM(S): 400 CAPSULE ORAL at 23:02

## 2020-06-13 RX ADMIN — GABAPENTIN 100 MILLIGRAM(S): 400 CAPSULE ORAL at 05:43

## 2020-06-13 RX ADMIN — Medication 1 MILLIGRAM(S): at 14:50

## 2020-06-13 NOTE — PROGRESS NOTE ADULT - ATTENDING COMMENTS
Recommendations as noted above.  Patient required repeat UF yesterday with some improvement in symptoms today.  Need to monitor closely for need to further increase fluid removal.  Still with marked elevation in CK.  Reviewed course with patient at bedside and ICU staff.

## 2020-06-13 NOTE — PROGRESS NOTE ADULT - SUBJECTIVE AND OBJECTIVE BOX
SICU DAILY PROGRESS NOTE    29 y M with PMH of bipolar depression transferred from [facility] for further management of acute renal failure due to rhabdomyolysis. Pt was found unresponsive at home, lying on his L side. He states he has not been taking his Lithium for at least 2 wks. At the OSH, he was treated for hyperkalemia. He received D5, insulin, calcium gluconate, Kayexalate Last BM was on 6/9. For L leg pain he was given lidocaine patch, dilaudid 1mg prn and tramadol 50mg. Hydralazine 20mg given for HTN. From 1 to 7 am only 100cc of UOP. Urine was tea colored. 500cc bolus of NS was given. CT cervical spine at OSH was (-). CT Head (-). CTAP and CXR were (-). CK on admission there was 177,000.     Pt was seen by surgery team in MICU for LUE swelling and compartment syndrome was ruled out. Surgery team was re-consulted today due to new symptoms of L foot drop and numbness. Pt was found to be highly suspicious for compartment syndrome in the upper thigh, and was emergently taken to OR.     Pt is now s/p four compartment fasciotomy of LLE, and left lateral and medial thigh fasciotomies. SICU consulted for q1h neurovascular checks and hemodynamic monitoring      24 HOUR EVENTS:  - IV locked   - 4 bumex given w/o response  - started on cleviprex gtt for hypertension  - psych consulted, hold off on Li, started Xanax  - CPK decreasing, q4 labs to q8  - advanced to regular diet  - Pt received HD in the PM w/ -1L taken off  - During the day POCUS showing multiple b lines b/l lung fields, repeat POCUS in the evening redemonstrating b/l b lines  - gabapentin and flexeril added for better pain control o/n  - O2 requirements increased through the evening, put on Venturi mask, 2nd round of HD done overnight      SUBJECTIVE/ROS:  [ ] A ten-point review of systems was otherwise negative except as noted.  [ ] Due to altered mental status/intubation, subjective information were not able to be obtained from the patient. History was obtained, to the extent possible, from review of the chart and collateral sources of information.      NEURO  Exam: awake, alert, oriented  Meds: ALPRAZolam 1 milliGRAM(s) Oral three times a day PRN Anxiety  cyclobenzaprine 5 milliGRAM(s) Oral three times a day PRN Muscle Spasm  gabapentin 100 milliGRAM(s) Oral three times a day  HYDROmorphone PCA (1 mG/mL) 30 milliLiter(s) PCA Continuous PCA Continuous  HYDROmorphone PCA (1 mG/mL) Rescue Clinician Bolus 1 milliGRAM(s) IV Push every 15 minutes PRN for Pain Scale GREATER THAN 6  ondansetron Injectable 4 milliGRAM(s) IV Push every 6 hours PRN Nausea    [x] Adequacy of sedation and pain control has been assessed and adjusted      RESPIRATORY  RR: 18 (06-13-20 @ 00:37) (0 - 23)  SpO2: 91% (06-13-20 @ 00:37) (87% - 100%)  Wt(kg): --  Exam: unlabored, clear to auscultation bilaterally  Mechanical Ventilation:   ABG - ( 11 Jun 2020 19:20 )  pH: 7.39  /  pCO2: 35    /  pO2: 238   / HCO3: 22    / Base Excess: -3.4  /  SaO2: 99.4    Lactate: x                [N/A] Extubation Readiness Assessed  Meds:       CARDIOVASCULAR  HR: 120 (06-13-20 @ 00:37) (96 - 129)  BP: 150/90 (06-12-20 @ 02:15) (150/90 - 158/94)  BP(mean): 102 (06-12-20 @ 02:15) (102 - 109)  ABP: 142/79 (06-13-20 @ 00:37) (121/77 - 198/102)  ABP(mean): 105 (06-13-20 @ 00:00) (88 - 131)  Wt(kg): --  CVP(cm H2O): --  VBG - ( 12 Jun 2020 01:20 )  pH: x     /  pCO2: x     /  pO2: x     / HCO3: x     / Base Excess: x     /  SaO2: x      Lactate: 2.0                Exam: regular rate and rhythm  Cardiac Rhythm: sinus  Perfusion     [x]Adequate   [ ]Inadequate  Mentation   [x]Normal       [ ]Reduced  Extremities  [x]Warm         [ ]Cool  Volume Status [ ]Hypervolemic [x]Euvolemic [ ]Hypovolemic  Meds: clevidipine Infusion 5 mG/Hr IV Continuous <Continuous>        GI/NUTRITION  Exam: soft, nontender, nondistended, incision C/D/I    GENITOURINARY  I&O's Detail    06-11 @ 07:01 - 06-12 @ 07:00  --------------------------------------------------------  IN:    clevidipine Infusion: 10 mL    IV PiggyBack: 160 mL    Lactated Ringers IV Bolus: 2000 mL    lactated ringers.: 600 mL    lactated ringers.: 3300 mL    Oral Fluid: 250 mL    Other: 400 mL  Total IN: 6720 mL    OUT:    Indwelling Catheter - Urethral: 320 mL    Other: 565 mL  Total OUT: 885 mL    Total NET: 5835 mL      06-12 @ 07:01 - 06-13 @ 00:43  --------------------------------------------------------  IN:    clevidipine Infusion: 300 mL    IV PiggyBack: 100 mL    Oral Fluid: 500 mL    Other: 400 mL  Total IN: 1300 mL    OUT:    Indwelling Catheter - Urethral: 187 mL    Other: 1400 mL  Total OUT: 1587 mL    Total NET: -287 mL          06-12    130<L>  |  95<L>  |  46<H>  ----------------------------<  158<H>  4.4   |  19<L>  |  6.30<H>    Ca    7.5<L>      12 Jun 2020 14:26  Phos  4.7     06-12  Mg     2.2     06-12    TPro  4.9<L>  /  Alb  2.6<L>  /  TBili  0.8  /  DBili  x   /  AST  793<H>  /  ALT  300<H>  /  AlkPhos  63  06-11    [ ] Motta catheter, indication: N/A  Meds: sodium chloride 0.9% lock flush 10 milliLiter(s) IV Push every 1 hour PRN Pre/post blood products, medications, blood draw, and to maintain line patency        HEMATOLOGIC  Meds: heparin   Injectable 5000 Unit(s) SubCutaneous every 8 hours    [x] VTE Prophylaxis                        10.6   10.49 )-----------( 154      ( 12 Jun 2020 01:20 )             31.4     PT/INR - ( 11 Jun 2020 20:50 )   PT: 12.5 SEC;   INR: 1.08          PTT - ( 11 Jun 2020 20:50 )  PTT:28.1 SEC  Transfusion     [ ] PRBC   [ ] Platelets   [ ] FFP   [ ] Cryoprecipitate      INFECTIOUS DISEASES  WBC Count: 10.49 K/uL (06-12 @ 01:20)    RECENT CULTURES:  Specimen Source: .Blood Blood-Peripheral  Date/Time: 06-10 @ 16:31  Culture Results:   No growth to date.  Gram Stain: --  Organism: --    Meds: ceFAZolin   IVPB 1000 milliGRAM(s) IV Intermittent every 24 hours        ENDOCRINE  CAPILLARY BLOOD GLUCOSE        Meds:       ACCESS DEVICES:  [ x] Peripheral IV  [ ] Central Venous Line	[ ] R	[ ] L	[ ] IJ	[ ] Fem	[ ] SC	Placed:   [ ] Arterial Line		[ ] R	[ ] L	[ ] Fem	[ ] Rad	[ ] Ax	Placed:   [ ] PICC:					[ ] Mediport  [ ] Urinary Catheter, Date Placed:   [x] Necessity of urinary, arterial, and venous catheters discussed    OTHER MEDICATIONS:  chlorhexidine 4% Liquid 1 Application(s) Topical <User Schedule>  naloxone Injectable 0.1 milliGRAM(s) IV Push every 3 minutes PRN SICU DAILY PROGRESS NOTE    29 y M with PMH of bipolar depression transferred from [facility] for further management of acute renal failure due to rhabdomyolysis. Pt was found unresponsive at home, lying on his L side. He states he has not been taking his Lithium for at least 2 wks. At the OSH, he was treated for hyperkalemia. He received D5, insulin, calcium gluconate, Kayexalate Last BM was on 6/9. For L leg pain he was given lidocaine patch, dilaudid 1mg prn and tramadol 50mg. Hydralazine 20mg given for HTN. From 1 to 7 am only 100cc of UOP. Urine was tea colored. 500cc bolus of NS was given. CT cervical spine at OSH was (-). CT Head (-). CTAP and CXR were (-). CK on admission there was 177,000.     Pt was seen by surgery team in MICU for LUE swelling and compartment syndrome was ruled out. Surgery team was re-consulted today due to new symptoms of L foot drop and numbness. Pt was found to be highly suspicious for compartment syndrome in the upper thigh, and was emergently taken to OR.     Pt is now s/p four compartment fasciotomy of LLE, and left lateral and medial thigh fasciotomies. SICU consulted for q1h neurovascular checks and hemodynamic monitoring      24 HOUR EVENTS:  - IV locked   - 4 bumex given w/o response  - started on cleviprex gtt for hypertension  - psych consulted, hold off on Li, started Xanax  - CPK decreasing, q4 labs to q8  - advanced to regular diet  - Pt received HD in the PM w/ -1L taken off  - During the day POCUS showing multiple b lines b/l lung fields, repeat POCUS in the evening redemonstrating b/l b lines  - gabapentin and flexeril added for better pain control o/n  - O2 requirements increased through the evening, put on Venturi mask, 2nd round of HD done overnight, -2L      SUBJECTIVE/ROS:  [ ] A ten-point review of systems was otherwise negative except as noted.  [ ] Due to altered mental status/intubation, subjective information were not able to be obtained from the patient. History was obtained, to the extent possible, from review of the chart and collateral sources of information.      NEURO  Exam: awake, alert, oriented  Meds: ALPRAZolam 1 milliGRAM(s) Oral three times a day PRN Anxiety  cyclobenzaprine 5 milliGRAM(s) Oral three times a day PRN Muscle Spasm  gabapentin 100 milliGRAM(s) Oral three times a day  HYDROmorphone PCA (1 mG/mL) 30 milliLiter(s) PCA Continuous PCA Continuous  HYDROmorphone PCA (1 mG/mL) Rescue Clinician Bolus 1 milliGRAM(s) IV Push every 15 minutes PRN for Pain Scale GREATER THAN 6  ondansetron Injectable 4 milliGRAM(s) IV Push every 6 hours PRN Nausea    [x] Adequacy of sedation and pain control has been assessed and adjusted      RESPIRATORY  RR: 18 (06-13-20 @ 00:37) (0 - 23)  SpO2: 91% (06-13-20 @ 00:37) (87% - 100%)  Wt(kg): --  Exam: unlabored, clear to auscultation bilaterally  Mechanical Ventilation:   ABG - ( 11 Jun 2020 19:20 )  pH: 7.39  /  pCO2: 35    /  pO2: 238   / HCO3: 22    / Base Excess: -3.4  /  SaO2: 99.4    Lactate: x                [N/A] Extubation Readiness Assessed  Meds:       CARDIOVASCULAR  HR: 120 (06-13-20 @ 00:37) (96 - 129)  BP: 150/90 (06-12-20 @ 02:15) (150/90 - 158/94)  BP(mean): 102 (06-12-20 @ 02:15) (102 - 109)  ABP: 142/79 (06-13-20 @ 00:37) (121/77 - 198/102)  ABP(mean): 105 (06-13-20 @ 00:00) (88 - 131)  Wt(kg): --  CVP(cm H2O): --  VBG - ( 12 Jun 2020 01:20 )  pH: x     /  pCO2: x     /  pO2: x     / HCO3: x     / Base Excess: x     /  SaO2: x      Lactate: 2.0                Exam: regular rate and rhythm  Cardiac Rhythm: sinus  Perfusion     [x]Adequate   [ ]Inadequate  Mentation   [x]Normal       [ ]Reduced  Extremities  [x]Warm         [ ]Cool  Volume Status [ ]Hypervolemic [x]Euvolemic [ ]Hypovolemic  Meds: clevidipine Infusion 5 mG/Hr IV Continuous <Continuous>        GI/NUTRITION  Exam: soft, nontender, nondistended, incision C/D/I    GENITOURINARY  I&O's Detail    06-11 @ 07:01 - 06-12 @ 07:00  --------------------------------------------------------  IN:    clevidipine Infusion: 10 mL    IV PiggyBack: 160 mL    Lactated Ringers IV Bolus: 2000 mL    lactated ringers.: 600 mL    lactated ringers.: 3300 mL    Oral Fluid: 250 mL    Other: 400 mL  Total IN: 6720 mL    OUT:    Indwelling Catheter - Urethral: 320 mL    Other: 565 mL  Total OUT: 885 mL    Total NET: 5835 mL      06-12 @ 07:01 - 06-13 @ 00:43  --------------------------------------------------------  IN:    clevidipine Infusion: 300 mL    IV PiggyBack: 100 mL    Oral Fluid: 500 mL    Other: 400 mL  Total IN: 1300 mL    OUT:    Indwelling Catheter - Urethral: 187 mL    Other: 1400 mL  Total OUT: 1587 mL    Total NET: -287 mL          06-12    130<L>  |  95<L>  |  46<H>  ----------------------------<  158<H>  4.4   |  19<L>  |  6.30<H>    Ca    7.5<L>      12 Jun 2020 14:26  Phos  4.7     06-12  Mg     2.2     06-12    TPro  4.9<L>  /  Alb  2.6<L>  /  TBili  0.8  /  DBili  x   /  AST  793<H>  /  ALT  300<H>  /  AlkPhos  63  06-11    [ ] Motta catheter, indication: N/A  Meds: sodium chloride 0.9% lock flush 10 milliLiter(s) IV Push every 1 hour PRN Pre/post blood products, medications, blood draw, and to maintain line patency        HEMATOLOGIC  Meds: heparin   Injectable 5000 Unit(s) SubCutaneous every 8 hours    [x] VTE Prophylaxis                        10.6   10.49 )-----------( 154      ( 12 Jun 2020 01:20 )             31.4     PT/INR - ( 11 Jun 2020 20:50 )   PT: 12.5 SEC;   INR: 1.08          PTT - ( 11 Jun 2020 20:50 )  PTT:28.1 SEC  Transfusion     [ ] PRBC   [ ] Platelets   [ ] FFP   [ ] Cryoprecipitate      INFECTIOUS DISEASES  WBC Count: 10.49 K/uL (06-12 @ 01:20)    RECENT CULTURES:  Specimen Source: .Blood Blood-Peripheral  Date/Time: 06-10 @ 16:31  Culture Results:   No growth to date.  Gram Stain: --  Organism: --    Meds: ceFAZolin   IVPB 1000 milliGRAM(s) IV Intermittent every 24 hours        ENDOCRINE  CAPILLARY BLOOD GLUCOSE        Meds:       ACCESS DEVICES:  [ x] Peripheral IV  [ ] Central Venous Line	[ ] R	[ ] L	[ ] IJ	[ ] Fem	[ ] SC	Placed:   [ ] Arterial Line		[ ] R	[ ] L	[ ] Fem	[ ] Rad	[ ] Ax	Placed:   [ ] PICC:					[ ] Mediport  [ ] Urinary Catheter, Date Placed:   [x] Necessity of urinary, arterial, and venous catheters discussed    OTHER MEDICATIONS:  chlorhexidine 4% Liquid 1 Application(s) Topical <User Schedule>  naloxone Injectable 0.1 milliGRAM(s) IV Push every 3 minutes PRN SICU DAILY PROGRESS NOTE    29 y M with PMH of bipolar depression transferred from [facility] for further management of acute renal failure due to rhabdomyolysis. Pt was found unresponsive at home, lying on his L side. He states he has not been taking his Lithium for at least 2 wks. At the OSH, he was treated for hyperkalemia. He received D5, insulin, calcium gluconate, Kayexalate Last BM was on 6/9. For L leg pain he was given lidocaine patch, dilaudid 1mg prn and tramadol 50mg. Hydralazine 20mg given for HTN. From 1 to 7 am only 100cc of UOP. Urine was tea colored. 500cc bolus of NS was given. CT cervical spine at OSH was (-). CT Head (-). CTAP and CXR were (-). CK on admission there was 177,000.     Pt was seen by surgery team in MICU for LUE swelling and compartment syndrome was ruled out. Surgery team was re-consulted today due to new symptoms of L foot drop and numbness. Pt was found to be highly suspicious for compartment syndrome in the upper thigh, and was emergently taken to OR.     Pt is now s/p four compartment fasciotomy of LLE, and left lateral and medial thigh fasciotomies. SICU consulted for q1h neurovascular checks and hemodynamic monitoring      24 HOUR EVENTS:  - IV locked   - 4 bumex given w/o response  - started on cleviprex gtt for hypertension  - psych consulted, hold off on Li, started Xanax  - CPK decreasing, q4 labs to q8  - advanced to regular diet  - Pt received HD in the PM w/ -1L taken off  - During the day POCUS showing multiple b lines b/l lung fields, repeat POCUS in the evening redemonstrating b/l b lines  - gabapentin and flexeril added for better pain control o/n  - O2 requirements increased through the evening, put on HFNC o/n, 2nd round of HD done overnight, -2L      SUBJECTIVE/ROS:  [ ] A ten-point review of systems was otherwise negative except as noted.  [ ] Due to altered mental status/intubation, subjective information were not able to be obtained from the patient. History was obtained, to the extent possible, from review of the chart and collateral sources of information.      NEURO  Exam: awake, alert, oriented  Meds: ALPRAZolam 1 milliGRAM(s) Oral three times a day PRN Anxiety  cyclobenzaprine 5 milliGRAM(s) Oral three times a day PRN Muscle Spasm  gabapentin 100 milliGRAM(s) Oral three times a day  HYDROmorphone PCA (1 mG/mL) 30 milliLiter(s) PCA Continuous PCA Continuous  HYDROmorphone PCA (1 mG/mL) Rescue Clinician Bolus 1 milliGRAM(s) IV Push every 15 minutes PRN for Pain Scale GREATER THAN 6  ondansetron Injectable 4 milliGRAM(s) IV Push every 6 hours PRN Nausea    [x] Adequacy of sedation and pain control has been assessed and adjusted      RESPIRATORY  RR: 18 (06-13-20 @ 00:37) (0 - 23)  SpO2: 91% (06-13-20 @ 00:37) (87% - 100%)  Wt(kg): --  Exam: unlabored, clear to auscultation bilaterally  Mechanical Ventilation:   ABG - ( 11 Jun 2020 19:20 )  pH: 7.39  /  pCO2: 35    /  pO2: 238   / HCO3: 22    / Base Excess: -3.4  /  SaO2: 99.4    Lactate: x                [N/A] Extubation Readiness Assessed  Meds:       CARDIOVASCULAR  HR: 120 (06-13-20 @ 00:37) (96 - 129)  BP: 150/90 (06-12-20 @ 02:15) (150/90 - 158/94)  BP(mean): 102 (06-12-20 @ 02:15) (102 - 109)  ABP: 142/79 (06-13-20 @ 00:37) (121/77 - 198/102)  ABP(mean): 105 (06-13-20 @ 00:00) (88 - 131)  Wt(kg): --  CVP(cm H2O): --  VBG - ( 12 Jun 2020 01:20 )  pH: x     /  pCO2: x     /  pO2: x     / HCO3: x     / Base Excess: x     /  SaO2: x      Lactate: 2.0                Exam: regular rate and rhythm  Cardiac Rhythm: sinus  Perfusion     [x]Adequate   [ ]Inadequate  Mentation   [x]Normal       [ ]Reduced  Extremities  [x]Warm         [ ]Cool  Volume Status [ ]Hypervolemic [x]Euvolemic [ ]Hypovolemic  Meds: clevidipine Infusion 5 mG/Hr IV Continuous <Continuous>        GI/NUTRITION  Exam: soft, nontender, nondistended, incision C/D/I    GENITOURINARY  I&O's Detail    06-11 @ 07:01 - 06-12 @ 07:00  --------------------------------------------------------  IN:    clevidipine Infusion: 10 mL    IV PiggyBack: 160 mL    Lactated Ringers IV Bolus: 2000 mL    lactated ringers.: 600 mL    lactated ringers.: 3300 mL    Oral Fluid: 250 mL    Other: 400 mL  Total IN: 6720 mL    OUT:    Indwelling Catheter - Urethral: 320 mL    Other: 565 mL  Total OUT: 885 mL    Total NET: 5835 mL      06-12 @ 07:01 - 06-13 @ 00:43  --------------------------------------------------------  IN:    clevidipine Infusion: 300 mL    IV PiggyBack: 100 mL    Oral Fluid: 500 mL    Other: 400 mL  Total IN: 1300 mL    OUT:    Indwelling Catheter - Urethral: 187 mL    Other: 1400 mL  Total OUT: 1587 mL    Total NET: -287 mL          06-12    130<L>  |  95<L>  |  46<H>  ----------------------------<  158<H>  4.4   |  19<L>  |  6.30<H>    Ca    7.5<L>      12 Jun 2020 14:26  Phos  4.7     06-12  Mg     2.2     06-12    TPro  4.9<L>  /  Alb  2.6<L>  /  TBili  0.8  /  DBili  x   /  AST  793<H>  /  ALT  300<H>  /  AlkPhos  63  06-11    [ ] Motta catheter, indication: N/A  Meds: sodium chloride 0.9% lock flush 10 milliLiter(s) IV Push every 1 hour PRN Pre/post blood products, medications, blood draw, and to maintain line patency        HEMATOLOGIC  Meds: heparin   Injectable 5000 Unit(s) SubCutaneous every 8 hours    [x] VTE Prophylaxis                        10.6   10.49 )-----------( 154      ( 12 Jun 2020 01:20 )             31.4     PT/INR - ( 11 Jun 2020 20:50 )   PT: 12.5 SEC;   INR: 1.08          PTT - ( 11 Jun 2020 20:50 )  PTT:28.1 SEC  Transfusion     [ ] PRBC   [ ] Platelets   [ ] FFP   [ ] Cryoprecipitate      INFECTIOUS DISEASES  WBC Count: 10.49 K/uL (06-12 @ 01:20)    RECENT CULTURES:  Specimen Source: .Blood Blood-Peripheral  Date/Time: 06-10 @ 16:31  Culture Results:   No growth to date.  Gram Stain: --  Organism: --    Meds: ceFAZolin   IVPB 1000 milliGRAM(s) IV Intermittent every 24 hours        ENDOCRINE  CAPILLARY BLOOD GLUCOSE        Meds:       ACCESS DEVICES:  [ x] Peripheral IV  [ ] Central Venous Line	[ ] R	[ ] L	[ ] IJ	[ ] Fem	[ ] SC	Placed:   [ ] Arterial Line		[ ] R	[ ] L	[ ] Fem	[ ] Rad	[ ] Ax	Placed:   [ ] PICC:					[ ] Mediport  [ ] Urinary Catheter, Date Placed:   [x] Necessity of urinary, arterial, and venous catheters discussed    OTHER MEDICATIONS:  chlorhexidine 4% Liquid 1 Application(s) Topical <User Schedule>  naloxone Injectable 0.1 milliGRAM(s) IV Push every 3 minutes PRN

## 2020-06-13 NOTE — PROGRESS NOTE ADULT - ASSESSMENT
29 y M with PMH of bipolar depression transferred from [facility] for further management of acute renal failure due to rhabdomyolysis, Taken to the OR for LLE compartment syndrome. Now s/p five compartment fasciotomy of the left lower leg. HD after surgery. Doing well today    Plan  - pt is clinically improving  -dressing change   - continue renal and hepatic optimization  - trend CK 29 y M with PMH of bipolar depression transferred from [facility] for further management of acute renal failure due to rhabdomyolysis, Taken to the OR for LLE compartment syndrome. Now s/p five compartment fasciotomy of the left lower leg. HD after surgery. Doing well today    Plan  - pt is clinically improving from a vasc surg standpoint   -dressing change   - continue renal and hepatic optimization  - trend CK  pt/ot eval   will follow

## 2020-06-13 NOTE — CHART NOTE - NSCHARTNOTEFT_GEN_A_CORE
Pt with worsening sob with increasing oxygen requirements as per SICU, remains hypertensive, oliguric, will do HD tonight again goal 2 to 2.5 lit as tolerated.

## 2020-06-13 NOTE — PROGRESS NOTE ADULT - SUBJECTIVE AND OBJECTIVE BOX
Anesthesia Pain Management Service    SUBJECTIVE: Pumps helped out a lot     Pain Scale Score	At rest: _3__ 	With Activity: ___ 	[X ] Refer to charted pain scores    THERAPY:    [ ] IV PCA Morphine		[ ] 5 mg/mL	[ ] 1 mg/mL  [X ] IV PCA Hydromorphone	[ ] 5 mg/mL	[X ] 1 mg/mL  [ ] IV PCA Fentanyl		[ ] 50 micrograms/mL    Demand dose __0.2_ lockout __6_ (minutes) Continuous Rate _0__ Total: __12.75_  mg used (in past 24 hours)      MEDICATIONS  (STANDING):  buMETAnide Injectable 4 milliGRAM(s) IV Push once  ceFAZolin   IVPB 1000 milliGRAM(s) IV Intermittent every 24 hours  chlorhexidine 4% Liquid 1 Application(s) Topical <User Schedule>  clevidipine Infusion 5 mG/Hr (10 mL/Hr) IV Continuous <Continuous>  gabapentin 100 milliGRAM(s) Oral three times a day  heparin   Injectable 5000 Unit(s) SubCutaneous every 8 hours  HYDROmorphone PCA (1 mG/mL) 30 milliLiter(s) PCA Continuous PCA Continuous    MEDICATIONS  (PRN):  ALPRAZolam 1 milliGRAM(s) Oral three times a day PRN Anxiety  cyclobenzaprine 5 milliGRAM(s) Oral three times a day PRN Muscle Spasm  HYDROmorphone PCA (1 mG/mL) Rescue Clinician Bolus 1 milliGRAM(s) IV Push every 15 minutes PRN for Pain Scale GREATER THAN 6  naloxone Injectable 0.1 milliGRAM(s) IV Push every 3 minutes PRN For ANY of the following changes in patient status:  A. RR LESS THAN 10 breaths per minute, B. Oxygen saturation LESS THAN 90%, C. Sedation score of 6  ondansetron Injectable 4 milliGRAM(s) IV Push every 6 hours PRN Nausea  sodium chloride 0.9% lock flush 10 milliLiter(s) IV Push every 1 hour PRN Pre/post blood products, medications, blood draw, and to maintain line patency      OBJECTIVE: laying in bed     Sedation Score:	[ X] Alert	[ ] Drowsy 	[ ] Arousable	[ ] Asleep	[ ] Unresponsive    Side Effects:	[X ] None	[ ] Nausea	[ ] Vomiting	[ ] Pruritus  		[ ] Other:    Vital Signs Last 24 Hrs  T(C): 37.1 (13 Jun 2020 08:00), Max: 37.7 (12 Jun 2020 20:00)  T(F): 98.8 (13 Jun 2020 08:00), Max: 99.8 (12 Jun 2020 20:00)  HR: 124 (13 Jun 2020 10:00) (112 - 129)  BP: --  BP(mean): --  RR: 22 (13 Jun 2020 10:00) (14 - 23)  SpO2: 93% (13 Jun 2020 10:00) (87% - 99%)    ASSESSMENT/ PLAN    Therapy to  be:	[ X] Continue   [ ] Discontinued   [ ] Change to prn Analgesics    Documentation and Verification of current medications:   [X] Done	[ ] Not done, not elligible    Comments: Recommend non-opioid adjuvant analgesics to be used when possible and when allowed by primary surgical team.    Progress Note written now but Patient was seen earlier.

## 2020-06-13 NOTE — PROGRESS NOTE ADULT - ATTENDING COMMENTS
ASHTYN Sauer MD have seen and examined the patient today and agree with  the  evaluation, assessment and plan of the surgical house officer  ASHTYN Sauer MD have personally seen and examined the patient at bedside today at  930am

## 2020-06-13 NOTE — PROGRESS NOTE ADULT - SUBJECTIVE AND OBJECTIVE BOX
[facility] DIVISION OF KIDNEY DISEASES AND HYPERTENSION -- FOLLOW UP NOTE  --------------------------------------------------------------------------------  HPI: 29-year-old male, with history bipolar depression was transferred from [facility] to [facility] for severe rhabdomyolysis. As per paper chart, pt was found to have cocaine and amphetamines in urine drug screen. Pt also with an elevated CPK ~ 180K. Nephrology team consulted for ANTOINETTE and rhabdomyolyiss. Upon review of labs on [facility], Scr was 1.11 on 8/23/2017. Pt. underwent fasciotomy yesterday (6/11/20) for LLE compartement syndrome. Pt. underwent HD, completed at 3 am this morning tolearated 3 lit of UF    Pt. evaluated at bedside, reports improvement in leg pain and less sob, however remains on HFNC, BP improved.       PAST HISTORY  --------------------------------------------------------------------------------  No significant changes to PMH, PSH, FHx, SHx, unless otherwise noted    ALLERGIES & MEDICATIONS  --------------------------------------------------------------------------------  Allergies    No Known Allergies    Intolerances      Standing Inpatient Medications  ceFAZolin   IVPB 1000 milliGRAM(s) IV Intermittent every 24 hours  chlorhexidine 4% Liquid 1 Application(s) Topical <User Schedule>  clevidipine Infusion 5 mG/Hr IV Continuous <Continuous>  gabapentin 100 milliGRAM(s) Oral three times a day  heparin   Injectable 5000 Unit(s) SubCutaneous every 8 hours  HYDROmorphone PCA (1 mG/mL) 30 milliLiter(s) PCA Continuous PCA Continuous    PRN Inpatient Medications  ALPRAZolam 1 milliGRAM(s) Oral three times a day PRN  cyclobenzaprine 5 milliGRAM(s) Oral three times a day PRN  HYDROmorphone PCA (1 mG/mL) Rescue Clinician Bolus 1 milliGRAM(s) IV Push every 15 minutes PRN  naloxone Injectable 0.1 milliGRAM(s) IV Push every 3 minutes PRN  ondansetron Injectable 4 milliGRAM(s) IV Push every 6 hours PRN  sodium chloride 0.9% lock flush 10 milliLiter(s) IV Push every 1 hour PRN      REVIEW OF SYSTEMS  --------------------------------------------------------------------------------  Gen: no fatigue  Respiratory: improved dyspnea  CV: No chest pain  GI: No abdominal pain  MSK: left arm and leg pain  Neuro: No dizziness  Heme: No bleeding    All other systems were reviewed and are negative, except as noted.    VITALS/PHYSICAL EXAM  --------------------------------------------------------------------------------  T(C): 37.1 (06-13-20 @ 08:00), Max: 37.7 (06-12-20 @ 20:00)  HR: 124 (06-13-20 @ 10:00) (112 - 129)  BP: --  RR: 22 (06-13-20 @ 10:00) (14 - 23)  SpO2: 93% (06-13-20 @ 10:00) (87% - 99%)  Wt(kg): --        06-12-20 @ 07:01  -  06-13-20 @ 07:00  --------------------------------------------------------  IN: 1890 mL / OUT: 4047 mL / NET: -2157 mL    06-13-20 @ 07:01  -  06-13-20 @ 11:20  --------------------------------------------------------  IN: 320 mL / OUT: 25 mL / NET: 295 mL      Physical Exam:  	Gen: NAD  	HEENT: MMM ON HFNC  	Pulm: CTA B/L  	CV: S1S2  	Abd: Soft, +BS               : flanagan catheter in place  	Ext: left lower extremity in bandage  	Neuro: Awake  	Skin: Warm and dry              Vascular: RIJ non tunneled HD catheter    LABS/STUDIES  --------------------------------------------------------------------------------              10.3   14.78 >-----------<  203      [06-13-20 @ 04:20]              30.0     132  |  95  |  35  ----------------------------<  131      [06-13-20 @ 04:20]  3.8   |  22  |  5.20        Ca     7.4     [06-13-20 @ 04:20]      Mg     2.1     [06-13-20 @ 04:20]      Phos  3.6     [06-13-20 @ 04:20]    TPro  4.9  /  Alb  2.7  /  TBili  0.5  /  DBili  < 0.2  /  AST  374  /  ALT  42  /  AlkPhos  72  [06-13-20 @ 04:20]    PT/INR: PT 11.3 , INR 0.98       [06-13-20 @ 04:20]  PTT: 28.4       [06-13-20 @ 04:20]    CK > 76287      [06-13-20 @ 06:00]    Creatinine Trend:  SCr 5.20 [06-13 @ 04:20]  SCr 6.30 [06-12 @ 14:26]  SCr 4.43 [06-12 @ 01:20]  SCr 5.89 [06-11 @ 20:50]  SCr 4.53 [06-11 @ 03:40]          HBsAb 9.2      [06-11-20 @ 03:40]  HBsAb Nonreactive      [06-10-20 @ 21:47]  HBsAg NEGATIVE      [06-11-20 @ 03:40]  HCV 0.07, Nonreactive Hepatitis C AB  S/CO Ratio                        Interpretation  < 1.00                                   Non-Reactive  1.00 - 4.99                         Weakly-Reactive  >= 5.00                                Reactive  Non-Reactive: Aperson with a non-reactive HCV antibody  result is considered uninfected.  No further action is  needed unless recent infection is suspected.  In these  cases, consider repeat testing later to detect  seroconversion..  Weakly-Reactive: HCV antibody test is abnormal, HCV RNA  Qualitative test will follow.  Reactive: HCV antibody test is abnormal, HCV RNA  Qualitative test will follow.  Note: HCV antibody testing is performed on the [employer]   system.      [06-11-20 @ 03:40]  HIV Nonreactive The HIV Ag/Ab Combo test performed screens for HIV-1 p24  antigen, antibodies to HIV-1 (group M and group O), and  antibodies to HIV-2. All specimens repeatedly reactive  will reflex to an HIV 1/2 antibody confirmation and  differentiation test. This assay detects p24 antigen which  may be present prior to the development of HIV antibodies,  therefore a reactive result with a negative HIV 1/2 AB  Confirmation should be followed up with HIV-1 RNA, HIV-2  RNA and repeat testing in 4-8 weeks. A nonreactive result  does not preclude previous exposure to or infection with  HIV-1 or HIV-2.      [06-10-20 @ 14:15]

## 2020-06-13 NOTE — PROGRESS NOTE ADULT - ASSESSMENT
29M w/ PMH of bipolar depression, s/p found down at home, transferred from [facility] for further management of acute renal failure due to rhabdomyolysis, found to have compartment syndrome on the LLE, now s/p four compartment fasciotomy and lateral/medial thigh fasciotomies    Neuro  - AO x 3  - pain control w/ PCA, gabapentin, flexeril    Resp  - b/l b lines on POCUS  -increasing O2 requirements, venturi mask o/n. Will escalate  -continue HD as needed for fluid overload    CVS  - started on Cleviprex for strict BP control    GI  - regular diet      - ANTOINETTE in setting of rhabdomyolysis  - IV locked, currently oliguric to 10cc/hr  - CPK postop >22,000, improving  - HD x 2 in the past 24hrs    Heme  - stable CBC  - SQH for dvt ppx    ID  - Ancef for LLE cellulitis, may not be indicated anymore    Endo  - monitor FGS

## 2020-06-13 NOTE — PROGRESS NOTE ADULT - SUBJECTIVE AND OBJECTIVE BOX
SURGERY DAILY PROGRESS NOTE:       SUBJECTIVE/ROS: Patient examined at bedside. No acute events overnight, dressing changed this AM  Denies nausea, vomiting, chest pain, shortness of breath         MEDICATIONS  (STANDING):  ceFAZolin   IVPB 1000 milliGRAM(s) IV Intermittent every 24 hours  chlorhexidine 4% Liquid 1 Application(s) Topical <User Schedule>  clevidipine Infusion 5 mG/Hr (10 mL/Hr) IV Continuous <Continuous>  gabapentin 100 milliGRAM(s) Oral three times a day  heparin   Injectable 5000 Unit(s) SubCutaneous every 8 hours  HYDROmorphone PCA (1 mG/mL) 30 milliLiter(s) PCA Continuous PCA Continuous    MEDICATIONS  (PRN):  ALPRAZolam 1 milliGRAM(s) Oral three times a day PRN Anxiety  cyclobenzaprine 5 milliGRAM(s) Oral three times a day PRN Muscle Spasm  HYDROmorphone PCA (1 mG/mL) Rescue Clinician Bolus 1 milliGRAM(s) IV Push every 15 minutes PRN for Pain Scale GREATER THAN 6  naloxone Injectable 0.1 milliGRAM(s) IV Push every 3 minutes PRN For ANY of the following changes in patient status:  A. RR LESS THAN 10 breaths per minute, B. Oxygen saturation LESS THAN 90%, C. Sedation score of 6  ondansetron Injectable 4 milliGRAM(s) IV Push every 6 hours PRN Nausea  sodium chloride 0.9% lock flush 10 milliLiter(s) IV Push every 1 hour PRN Pre/post blood products, medications, blood draw, and to maintain line patency      OBJECTIVE:    Vital Signs Last 24 Hrs  T(C): 37.1 (2020 08:00), Max: 37.7 (2020 20:00)  T(F): 98.8 (2020 08:00), Max: 99.8 (2020 20:00)  HR: 123 (2020 08:00) (112 - 129)  BP: --  BP(mean): --  RR: 18 (2020 08:00) (14 - 23)  SpO2: 95% (2020 08:00) (87% - 99%)        I&O's Detail    2020 07:01  -  2020 07:00  --------------------------------------------------------  IN:    clevidipine Infusion: 440 mL    IV PiggyBack: 150 mL    Oral Fluid: 500 mL    Other: 800 mL  Total IN: 1890 mL    OUT:    Indwelling Catheter - Urethral: 247 mL    Other: 3800 mL  Total OUT: 4047 mL    Total NET: -2157 mL      2020 07:01  -  2020 08:37  --------------------------------------------------------  IN:    clevidipine Infusion: 40 mL    Oral Fluid: 120 mL  Total IN: 160 mL    OUT:    Indwelling Catheter - Urethral: 20 mL  Total OUT: 20 mL    Total NET: 140 mL          Daily     Daily Weight in k.4 (2020 06:00)    LABS:                        10.3   14.78 )-----------( 203      ( 2020 04:20 )             30.0     06-13    132<L>  |  95<L>  |  35<H>  ----------------------------<  131<H>  3.8   |  22  |  5.20<H>    Ca    7.4<L>      2020 04:20  Phos  3.6     06-13  Mg     2.1     06-13      PT/INR - ( 2020 04:20 )   PT: 11.3 SEC;   INR: 0.98          PTT - ( 2020 04:20 )  PTT:28.4 SEC                  PHYSICAL EXAM:  Constitutional: well developed, well nourished, NAD  Eyes: anicteric  ENMT: normal facies, symmetric  Respiratory: Breathing comfortably    Gastrointestinal: abdomen soft, nontender, nondistended.   Extremities: All fasciotomy sites with intact and viable muscle. Able to move left lower extremity and toes, sensation intact in LLE  Neurological: intact, non-focal  Psychiatric: oriented x 3; appropriate SURGERY DAILY PROGRESS NOTE:       SUBJECTIVE/ROS: Patient examined at bedside. No acute events overnight, dressing changed this AM  Denies nausea, vomiting, chest pain, shortness of breath  pt states less incisional pain  pt states left foot dorsum w slightly more sensation          MEDICATIONS  (STANDING):  ceFAZolin   IVPB 1000 milliGRAM(s) IV Intermittent every 24 hours  chlorhexidine 4% Liquid 1 Application(s) Topical <User Schedule>  clevidipine Infusion 5 mG/Hr (10 mL/Hr) IV Continuous <Continuous>  gabapentin 100 milliGRAM(s) Oral three times a day  heparin   Injectable 5000 Unit(s) SubCutaneous every 8 hours  HYDROmorphone PCA (1 mG/mL) 30 milliLiter(s) PCA Continuous PCA Continuous    MEDICATIONS  (PRN):  ALPRAZolam 1 milliGRAM(s) Oral three times a day PRN Anxiety  cyclobenzaprine 5 milliGRAM(s) Oral three times a day PRN Muscle Spasm  HYDROmorphone PCA (1 mG/mL) Rescue Clinician Bolus 1 milliGRAM(s) IV Push every 15 minutes PRN for Pain Scale GREATER THAN 6  naloxone Injectable 0.1 milliGRAM(s) IV Push every 3 minutes PRN For ANY of the following changes in patient status:  A. RR LESS THAN 10 breaths per minute, B. Oxygen saturation LESS THAN 90%, C. Sedation score of 6  ondansetron Injectable 4 milliGRAM(s) IV Push every 6 hours PRN Nausea  sodium chloride 0.9% lock flush 10 milliLiter(s) IV Push every 1 hour PRN Pre/post blood products, medications, blood draw, and to maintain line patency      OBJECTIVE:    Vital Signs Last 24 Hrs  T(C): 37.1 (2020 08:00), Max: 37.7 (2020 20:00)  T(F): 98.8 (2020 08:00), Max: 99.8 (2020 20:00)  HR: 123 (2020 08:00) (112 - 129)  BP: --  BP(mean): --  RR: 18 (2020 08:00) (14 - 23)  SpO2: 95% (2020 08:00) (87% - 99%)        I&O's Detail    2020 07:01  -  2020 07:00  --------------------------------------------------------  IN:    clevidipine Infusion: 440 mL    IV PiggyBack: 150 mL    Oral Fluid: 500 mL    Other: 800 mL  Total IN: 1890 mL    OUT:    Indwelling Catheter - Urethral: 247 mL    Other: 3800 mL  Total OUT: 4047 mL    Total NET: -2157 mL      2020 07:01  -  2020 08:37  --------------------------------------------------------  IN:    clevidipine Infusion: 40 mL    Oral Fluid: 120 mL  Total IN: 160 mL    OUT:    Indwelling Catheter - Urethral: 20 mL  Total OUT: 20 mL    Total NET: 140 mL    Daily Weight in k.4 (2020 06:00)    LABS:                        10.3   14.78 )-----------( 203      ( 2020 04:20 )             30.0     06-13    132<L>  |  95<L>  |  35<H>  ----------------------------<  131<H>  3.8   |  22  |  5.20<H>    Ca    7.4<L>      2020 04:20  Phos  3.6     06-13  Mg     2.1     06-13      PT/INR - ( 2020 04:20 )   PT: 11.3 SEC;   INR: 0.98          PTT - ( 2020 04:20 )  PTT:28.4 SEC    PHYSICAL EXAM:  Constitutional: well developed, well nourished, NAD  Eyes: anicteric  ENMT: normal facies, symmetric  Respiratory: Breathing comfortably    Gastrointestinal: abdomen soft, nontender, nondistended.   Extremities: All fasciotomy sites with intact and viable muscle. Able to move left lower extremity at the hip and knee  and toes, sensation intact in LLE except left foot dorsum w some dec as per pt  Neurological: intact, non-focal  Psychiatric: oriented x 3; appropriate

## 2020-06-13 NOTE — PROGRESS NOTE ADULT - PROBLEM SELECTOR PLAN 1
Pt with ANTOINETTE in the setting of rhabdomyolysis. Upon lab review, Scr was 1.11 on 8/23/2017. Scr at [facility] this AM was 3.06. Scr today is 4.43. Pt with oliguric ANTOINETTE secondary to rhabdomyolysis. Pt. underwent HD yesterday completed tx at 3 am, tolerated 3 lit of UF. Labs reviewed. Hold IVF. Give Bumex 4 mg IV. Remains oliguric.  Monitor labs and urine output. Avoid potential nephrotoxins. will assess daily for HD.     Matias Marie   Nephrology Fellow  Pager   (After 5 pm or on weekends please page the on-call fellow)

## 2020-06-13 NOTE — PROGRESS NOTE ADULT - SUBJECTIVE AND OBJECTIVE BOX
Anesthesia Pain Management Service    SUBJECTIVE: Pt doing well with IV PCA without problems reported.    Therapy:	  [ X] IV PCA	   [ ] Epidural           [ ] s/p Spinal Opoid              [ ] Postpartum infusion	  [ ] Patient controlled regional anesthesia (PCRA)    [ ] prn Analgesics    Allergies    No Known Allergies    Intolerances      MEDICATIONS  (STANDING):  ceFAZolin   IVPB 1000 milliGRAM(s) IV Intermittent every 24 hours  chlorhexidine 4% Liquid 1 Application(s) Topical <User Schedule>  clevidipine Infusion 5 mG/Hr (10 mL/Hr) IV Continuous <Continuous>  gabapentin 100 milliGRAM(s) Oral three times a day  heparin   Injectable 5000 Unit(s) SubCutaneous every 8 hours  HYDROmorphone PCA (1 mG/mL) 30 milliLiter(s) PCA Continuous PCA Continuous    MEDICATIONS  (PRN):  ALPRAZolam 1 milliGRAM(s) Oral three times a day PRN Anxiety  cyclobenzaprine 5 milliGRAM(s) Oral three times a day PRN Muscle Spasm  HYDROmorphone PCA (1 mG/mL) Rescue Clinician Bolus 1 milliGRAM(s) IV Push every 15 minutes PRN for Pain Scale GREATER THAN 6  naloxone Injectable 0.1 milliGRAM(s) IV Push every 3 minutes PRN For ANY of the following changes in patient status:  A. RR LESS THAN 10 breaths per minute, B. Oxygen saturation LESS THAN 90%, C. Sedation score of 6  ondansetron Injectable 4 milliGRAM(s) IV Push every 6 hours PRN Nausea  sodium chloride 0.9% lock flush 10 milliLiter(s) IV Push every 1 hour PRN Pre/post blood products, medications, blood draw, and to maintain line patency      OBJECTIVE:   [X] No new signs     [ ] Other:    Side Effects:  [X ] None			[ ] Other:    Assessment of Catheter Site:		[ ] Intact		[ ] Other:    ASSESSMENT/PLAN  [ X] Continue current therapy    [ ] Therapy changed to:    [ ] IV PCA       [ ] Epidural     [ ] prn Analgesics     Comments:

## 2020-06-13 NOTE — PROGRESS NOTE ADULT - ATTENDING COMMENTS
Critical Care Dx  T79.6XXA Traumatic rhabdomyolysis, initial encounter   -fluid resuscitation balanced with ARF causing fluid retention   -electrolytes stable, monitor sodium  -HD yesterday due to new oxygen requirement  N17.9 Acute renal failure, unspecified acute renal failure type   -bumex trial today  E87.1 Hyponatremia   -trend sodium, keep >130, should increase with diuresis/dialysis    The patient is a critical care patient with life threatening hemodynamic and respiratory instability in SICU.  I have personally interviewed and examined the patient, reviewed data and laboratory tests/x-rays and all pertinent electronic images.  The SICU team has a constant risk benefit analyzes discussion with the primary team, all consultants, House Staff and PA's on all decisions.   Time involved in performance of separately billable procedures was not counted toward my critical care time. There is no overlap.    I have personally provided 60 minutes ([id number]) of critical care time concurrently with the resident/fellow. This time excludes time spent on separate procedures and time spent teaching. I have reviewed the resident's/fellow's documentation and agree with the assessment and plan of care.  I was physically present for the key portions of the evaluation and management (E/M) service provided.      Petar Pulliam MD  Acute and Critical Care Surgery .

## 2020-06-14 DIAGNOSIS — E87.1 HYPO-OSMOLALITY AND HYPONATREMIA: ICD-10-CM

## 2020-06-14 LAB
ALBUMIN SERPL ELPH-MCNC: 2.5 G/DL — LOW (ref 3.3–5)
ALBUMIN SERPL ELPH-MCNC: 2.6 G/DL — LOW (ref 3.3–5)
ALP SERPL-CCNC: 120 U/L — SIGNIFICANT CHANGE UP (ref 40–120)
ALP SERPL-CCNC: 122 U/L — HIGH (ref 40–120)
ALT FLD-CCNC: 24 U/L — SIGNIFICANT CHANGE UP (ref 4–41)
ALT FLD-CCNC: 25 U/L — SIGNIFICANT CHANGE UP (ref 4–41)
ANION GAP SERPL CALC-SCNC: 14 MMO/L — SIGNIFICANT CHANGE UP (ref 7–14)
ANION GAP SERPL CALC-SCNC: 15 MMO/L — HIGH (ref 7–14)
ANION GAP SERPL CALC-SCNC: 17 MMO/L — HIGH (ref 7–14)
AST SERPL-CCNC: 184 U/L — HIGH (ref 4–40)
AST SERPL-CCNC: 271 U/L — HIGH (ref 4–40)
BILIRUB SERPL-MCNC: 0.5 MG/DL — SIGNIFICANT CHANGE UP (ref 0.2–1.2)
BILIRUB SERPL-MCNC: 0.7 MG/DL — SIGNIFICANT CHANGE UP (ref 0.2–1.2)
BUN SERPL-MCNC: 43 MG/DL — HIGH (ref 7–23)
BUN SERPL-MCNC: 48 MG/DL — HIGH (ref 7–23)
BUN SERPL-MCNC: 49 MG/DL — HIGH (ref 7–23)
CALCIUM SERPL-MCNC: 7.4 MG/DL — LOW (ref 8.4–10.5)
CALCIUM SERPL-MCNC: 7.6 MG/DL — LOW (ref 8.4–10.5)
CALCIUM SERPL-MCNC: 7.6 MG/DL — LOW (ref 8.4–10.5)
CHLORIDE SERPL-SCNC: 90 MMOL/L — LOW (ref 98–107)
CHLORIDE SERPL-SCNC: 91 MMOL/L — LOW (ref 98–107)
CHLORIDE SERPL-SCNC: 91 MMOL/L — LOW (ref 98–107)
CO2 SERPL-SCNC: 19 MMOL/L — LOW (ref 22–31)
CO2 SERPL-SCNC: 21 MMOL/L — LOW (ref 22–31)
CO2 SERPL-SCNC: 22 MMOL/L — SIGNIFICANT CHANGE UP (ref 22–31)
CREAT SERPL-MCNC: 5.56 MG/DL — HIGH (ref 0.5–1.3)
CREAT SERPL-MCNC: 5.96 MG/DL — HIGH (ref 0.5–1.3)
CREAT SERPL-MCNC: 6.4 MG/DL — HIGH (ref 0.5–1.3)
GLUCOSE SERPL-MCNC: 119 MG/DL — HIGH (ref 70–99)
GLUCOSE SERPL-MCNC: 124 MG/DL — HIGH (ref 70–99)
GLUCOSE SERPL-MCNC: 147 MG/DL — HIGH (ref 70–99)
HCT VFR BLD CALC: 30.3 % — LOW (ref 39–50)
HGB BLD-MCNC: 10.5 G/DL — LOW (ref 13–17)
MAGNESIUM SERPL-MCNC: 2.1 MG/DL — SIGNIFICANT CHANGE UP (ref 1.6–2.6)
MAGNESIUM SERPL-MCNC: 2.2 MG/DL — SIGNIFICANT CHANGE UP (ref 1.6–2.6)
MAGNESIUM SERPL-MCNC: 2.4 MG/DL — SIGNIFICANT CHANGE UP (ref 1.6–2.6)
MCHC RBC-ENTMCNC: 27.7 PG — SIGNIFICANT CHANGE UP (ref 27–34)
MCHC RBC-ENTMCNC: 34.7 % — SIGNIFICANT CHANGE UP (ref 32–36)
MCV RBC AUTO: 79.9 FL — LOW (ref 80–100)
NRBC # FLD: 0 K/UL — SIGNIFICANT CHANGE UP (ref 0–0)
PHOSPHATE SERPL-MCNC: 3.5 MG/DL — SIGNIFICANT CHANGE UP (ref 2.5–4.5)
PHOSPHATE SERPL-MCNC: 3.9 MG/DL — SIGNIFICANT CHANGE UP (ref 2.5–4.5)
PHOSPHATE SERPL-MCNC: 4.4 MG/DL — SIGNIFICANT CHANGE UP (ref 2.5–4.5)
PLATELET # BLD AUTO: 237 K/UL — SIGNIFICANT CHANGE UP (ref 150–400)
PMV BLD: 9.1 FL — SIGNIFICANT CHANGE UP (ref 7–13)
POTASSIUM SERPL-MCNC: 4 MMOL/L — SIGNIFICANT CHANGE UP (ref 3.5–5.3)
POTASSIUM SERPL-SCNC: 4 MMOL/L — SIGNIFICANT CHANGE UP (ref 3.5–5.3)
PROT SERPL-MCNC: 5.1 G/DL — LOW (ref 6–8.3)
PROT SERPL-MCNC: 5.1 G/DL — LOW (ref 6–8.3)
RBC # BLD: 3.79 M/UL — LOW (ref 4.2–5.8)
RBC # FLD: 12.7 % — SIGNIFICANT CHANGE UP (ref 10.3–14.5)
SODIUM SERPL-SCNC: 126 MMOL/L — LOW (ref 135–145)
SODIUM SERPL-SCNC: 127 MMOL/L — LOW (ref 135–145)
SODIUM SERPL-SCNC: 127 MMOL/L — LOW (ref 135–145)
WBC # BLD: 20.28 K/UL — HIGH (ref 3.8–10.5)
WBC # FLD AUTO: 20.28 K/UL — HIGH (ref 3.8–10.5)

## 2020-06-14 PROCEDURE — 99232 SBSQ HOSP IP/OBS MODERATE 35: CPT | Mod: GC

## 2020-06-14 PROCEDURE — 99233 SBSQ HOSP IP/OBS HIGH 50: CPT | Mod: GC

## 2020-06-14 RX ORDER — METOPROLOL TARTRATE 50 MG
12.5 TABLET ORAL ONCE
Refills: 0 | Status: COMPLETED | OUTPATIENT
Start: 2020-06-14 | End: 2020-06-14

## 2020-06-14 RX ORDER — HYDROMORPHONE HYDROCHLORIDE 2 MG/ML
1 INJECTION INTRAMUSCULAR; INTRAVENOUS; SUBCUTANEOUS ONCE
Refills: 0 | Status: DISCONTINUED | OUTPATIENT
Start: 2020-06-14 | End: 2020-06-14

## 2020-06-14 RX ORDER — HYDROMORPHONE HYDROCHLORIDE 2 MG/ML
2 INJECTION INTRAMUSCULAR; INTRAVENOUS; SUBCUTANEOUS ONCE
Refills: 0 | Status: DISCONTINUED | OUTPATIENT
Start: 2020-06-14 | End: 2020-06-14

## 2020-06-14 RX ORDER — METOPROLOL TARTRATE 50 MG
12.5 TABLET ORAL
Refills: 0 | Status: DISCONTINUED | OUTPATIENT
Start: 2020-06-14 | End: 2020-06-15

## 2020-06-14 RX ORDER — SODIUM CHLORIDE 9 MG/ML
1 INJECTION INTRAMUSCULAR; INTRAVENOUS; SUBCUTANEOUS THREE TIMES A DAY
Refills: 0 | Status: DISCONTINUED | OUTPATIENT
Start: 2020-06-14 | End: 2020-06-14

## 2020-06-14 RX ADMIN — Medication 10 MG/HR: at 19:09

## 2020-06-14 RX ADMIN — Medication 12.5 MILLIGRAM(S): at 17:26

## 2020-06-14 RX ADMIN — GABAPENTIN 100 MILLIGRAM(S): 400 CAPSULE ORAL at 12:06

## 2020-06-14 RX ADMIN — Medication 100 MILLIGRAM(S): at 05:22

## 2020-06-14 RX ADMIN — GABAPENTIN 100 MILLIGRAM(S): 400 CAPSULE ORAL at 05:23

## 2020-06-14 RX ADMIN — Medication 1 MILLIGRAM(S): at 00:39

## 2020-06-14 RX ADMIN — Medication 650 MILLIGRAM(S): at 12:07

## 2020-06-14 RX ADMIN — GABAPENTIN 100 MILLIGRAM(S): 400 CAPSULE ORAL at 20:47

## 2020-06-14 RX ADMIN — Medication 650 MILLIGRAM(S): at 05:23

## 2020-06-14 RX ADMIN — HYDROMORPHONE HYDROCHLORIDE 30 MILLILITER(S): 2 INJECTION INTRAMUSCULAR; INTRAVENOUS; SUBCUTANEOUS at 18:52

## 2020-06-14 RX ADMIN — HYDROMORPHONE HYDROCHLORIDE 1 MILLIGRAM(S): 2 INJECTION INTRAMUSCULAR; INTRAVENOUS; SUBCUTANEOUS at 04:50

## 2020-06-14 RX ADMIN — HYDROMORPHONE HYDROCHLORIDE 2 MILLIGRAM(S): 2 INJECTION INTRAMUSCULAR; INTRAVENOUS; SUBCUTANEOUS at 08:16

## 2020-06-14 RX ADMIN — HYDROMORPHONE HYDROCHLORIDE 30 MILLILITER(S): 2 INJECTION INTRAMUSCULAR; INTRAVENOUS; SUBCUTANEOUS at 06:51

## 2020-06-14 RX ADMIN — Medication 12.5 MILLIGRAM(S): at 12:07

## 2020-06-14 RX ADMIN — HEPARIN SODIUM 5000 UNIT(S): 5000 INJECTION INTRAVENOUS; SUBCUTANEOUS at 05:23

## 2020-06-14 RX ADMIN — Medication 650 MILLIGRAM(S): at 17:26

## 2020-06-14 RX ADMIN — HEPARIN SODIUM 5000 UNIT(S): 5000 INJECTION INTRAVENOUS; SUBCUTANEOUS at 21:00

## 2020-06-14 RX ADMIN — Medication 1 MILLIGRAM(S): at 20:46

## 2020-06-14 RX ADMIN — HEPARIN SODIUM 5000 UNIT(S): 5000 INJECTION INTRAVENOUS; SUBCUTANEOUS at 12:06

## 2020-06-14 RX ADMIN — Medication 1 MILLIGRAM(S): at 12:06

## 2020-06-14 RX ADMIN — HYDROMORPHONE HYDROCHLORIDE 1 MILLIGRAM(S): 2 INJECTION INTRAMUSCULAR; INTRAVENOUS; SUBCUTANEOUS at 17:46

## 2020-06-14 NOTE — PROGRESS NOTE ADULT - SUBJECTIVE AND OBJECTIVE BOX
SICU DAILY PROGRESS NOTE    29 y M with PMH of bipolar depression transferred from [facility] for further management of acute renal failure due to rhabdomyolysis. Pt was found unresponsive at home, lying on his L side. He states he has not been taking his Lithium for at least 2 wks. At the OSH, he was treated for hyperkalemia. He received D5, insulin, calcium gluconate, Kayexalate Last BM was on 6/9. For L leg pain he was given lidocaine patch, dilaudid 1mg prn and tramadol 50mg. Hydralazine 20mg given for HTN. From 1 to 7 am only 100cc of UOP. Urine was tea colored. 500cc bolus of NS was given. CT cervical spine at OSH was (-). CT Head (-). CTAP and CXR were (-). CK on admission there was 177,000.     Pt was seen by surgery team in MICU for LUE swelling and compartment syndrome was ruled out. Surgery team was re-consulted today due to new symptoms of L foot drop and numbness. Pt was found to be highly suspicious for compartment syndrome in the upper thigh, and was emergently taken to OR.     Pt is now s/p four compartment fasciotomy of LLE, and left lateral and medial thigh fasciotomies. SICU consulted for q1h neurovascular checks and hemodynamic monitoring      24 HOUR EVENTS:  - IV locked   - 4 bumex given w/o response  - started on cleviprex gtt for hypertension  - psych consulted, hold off on Li, started Xanax  - CPK decreasing, q4 labs to q8  - advanced to regular diet  - Pt received HD in the PM w/ -1L taken off  - During the day POCUS showing multiple b lines b/l lung fields, repeat POCUS in the evening redemonstrating b/l b lines  - gabapentin and flexeril added for better pain control o/n  - O2 requirements increased through the evening, put on HFNC o/n, 2nd round of HD done overnight, -2L      SUBJECTIVE/ROS:  [ ] A ten-point review of systems was otherwise negative except as noted.  [ ] Due to altered mental status/intubation, subjective information were not able to be obtained from the patient. History was obtained, to the extent possible, from review of the chart and collateral sources of information.      NEURO  Exam: awake, alert, oriented  Meds: ALPRAZolam 1 milliGRAM(s) Oral three times a day PRN Anxiety  cyclobenzaprine 5 milliGRAM(s) Oral three times a day PRN Muscle Spasm  gabapentin 100 milliGRAM(s) Oral three times a day  HYDROmorphone PCA (1 mG/mL) 30 milliLiter(s) PCA Continuous PCA Continuous  HYDROmorphone PCA (1 mG/mL) Rescue Clinician Bolus 1 milliGRAM(s) IV Push every 15 minutes PRN for Pain Scale GREATER THAN 6  ondansetron Injectable 4 milliGRAM(s) IV Push every 6 hours PRN Nausea    [x] Adequacy of sedation and pain control has been assessed and adjusted      RESPIRATORY  RR: 18 (06-13-20 @ 00:37) (0 - 23)  SpO2: 91% (06-13-20 @ 00:37) (87% - 100%)  Wt(kg): --  Exam: unlabored, clear to auscultation bilaterally  Mechanical Ventilation:   ABG - ( 11 Jun 2020 19:20 )  pH: 7.39  /  pCO2: 35    /  pO2: 238   / HCO3: 22    / Base Excess: -3.4  /  SaO2: 99.4    Lactate: x                [N/A] Extubation Readiness Assessed  Meds:       CARDIOVASCULAR  HR: 120 (06-13-20 @ 00:37) (96 - 129)  BP: 150/90 (06-12-20 @ 02:15) (150/90 - 158/94)  BP(mean): 102 (06-12-20 @ 02:15) (102 - 109)  ABP: 142/79 (06-13-20 @ 00:37) (121/77 - 198/102)  ABP(mean): 105 (06-13-20 @ 00:00) (88 - 131)  Wt(kg): --  CVP(cm H2O): --  VBG - ( 12 Jun 2020 01:20 )  pH: x     /  pCO2: x     /  pO2: x     / HCO3: x     / Base Excess: x     /  SaO2: x      Lactate: 2.0                Exam: regular rate and rhythm  Cardiac Rhythm: sinus  Perfusion     [x]Adequate   [ ]Inadequate  Mentation   [x]Normal       [ ]Reduced  Extremities  [x]Warm         [ ]Cool  Volume Status [ ]Hypervolemic [x]Euvolemic [ ]Hypovolemic  Meds: clevidipine Infusion 5 mG/Hr IV Continuous <Continuous>        GI/NUTRITION  Exam: soft, nontender, nondistended, incision C/D/I    GENITOURINARY  I&O's Detail    06-11 @ 07:01 - 06-12 @ 07:00  --------------------------------------------------------  IN:    clevidipine Infusion: 10 mL    IV PiggyBack: 160 mL    Lactated Ringers IV Bolus: 2000 mL    lactated ringers.: 600 mL    lactated ringers.: 3300 mL    Oral Fluid: 250 mL    Other: 400 mL  Total IN: 6720 mL    OUT:    Indwelling Catheter - Urethral: 320 mL    Other: 565 mL  Total OUT: 885 mL    Total NET: 5835 mL      06-12 @ 07:01 - 06-13 @ 00:43  --------------------------------------------------------  IN:    clevidipine Infusion: 300 mL    IV PiggyBack: 100 mL    Oral Fluid: 500 mL    Other: 400 mL  Total IN: 1300 mL    OUT:    Indwelling Catheter - Urethral: 187 mL    Other: 1400 mL  Total OUT: 1587 mL    Total NET: -287 mL          06-12    130<L>  |  95<L>  |  46<H>  ----------------------------<  158<H>  4.4   |  19<L>  |  6.30<H>    Ca    7.5<L>      12 Jun 2020 14:26  Phos  4.7     06-12  Mg     2.2     06-12    TPro  4.9<L>  /  Alb  2.6<L>  /  TBili  0.8  /  DBili  x   /  AST  793<H>  /  ALT  300<H>  /  AlkPhos  63  06-11    [ ] Motta catheter, indication: N/A  Meds: sodium chloride 0.9% lock flush 10 milliLiter(s) IV Push every 1 hour PRN Pre/post blood products, medications, blood draw, and to maintain line patency        HEMATOLOGIC  Meds: heparin   Injectable 5000 Unit(s) SubCutaneous every 8 hours    [x] VTE Prophylaxis                        10.6   10.49 )-----------( 154      ( 12 Jun 2020 01:20 )             31.4     PT/INR - ( 11 Jun 2020 20:50 )   PT: 12.5 SEC;   INR: 1.08          PTT - ( 11 Jun 2020 20:50 )  PTT:28.1 SEC  Transfusion     [ ] PRBC   [ ] Platelets   [ ] FFP   [ ] Cryoprecipitate      INFECTIOUS DISEASES  WBC Count: 10.49 K/uL (06-12 @ 01:20)    RECENT CULTURES:  Specimen Source: .Blood Blood-Peripheral  Date/Time: 06-10 @ 16:31  Culture Results:   No growth to date.  Gram Stain: --  Organism: --    Meds: ceFAZolin   IVPB 1000 milliGRAM(s) IV Intermittent every 24 hours        ENDOCRINE  CAPILLARY BLOOD GLUCOSE        Meds:       ACCESS DEVICES:  [ x] Peripheral IV  [ ] Central Venous Line	[ ] R	[ ] L	[ ] IJ	[ ] Fem	[ ] SC	Placed:   [ ] Arterial Line		[ ] R	[ ] L	[ ] Fem	[ ] Rad	[ ] Ax	Placed:   [ ] PICC:					[ ] Mediport  [ ] Urinary Catheter, Date Placed:   [x] Necessity of urinary, arterial, and venous catheters discussed    OTHER MEDICATIONS:  chlorhexidine 4% Liquid 1 Application(s) Topical <User Schedule>  naloxone Injectable 0.1 milliGRAM(s) IV Push every 3 minutes PRN SICU DAILY PROGRESS NOTE    29 y M with PMH of bipolar depression transferred from [facility] for further management of acute renal failure due to rhabdomyolysis. Pt was found unresponsive at home, lying on his L side. He states he has not been taking his Lithium for at least 2 wks. At the OSH, he was treated for hyperkalemia. He received D5, insulin, calcium gluconate, Kayexalate Last BM was on 6/9. For L leg pain he was given lidocaine patch, dilaudid 1mg prn and tramadol 50mg. Hydralazine 20mg given for HTN. From 1 to 7 am only 100cc of UOP. Urine was tea colored. 500cc bolus of NS was given. CT cervical spine at OSH was (-). CT Head (-). CTAP and CXR were (-). CK on admission there was 177,000.     Pt was seen by surgery team in MICU for LUE swelling and compartment syndrome was ruled out. Surgery team was re-consulted today due to new symptoms of L foot drop and numbness. Pt was found to be highly suspicious for compartment syndrome in the upper thigh, and was emergently taken to OR.     Pt is now s/p four compartment fasciotomy of LLE, and left lateral and medial thigh fasciotomies. SICU consulted for q1h neurovascular checks and hemodynamic monitoring      24 HOUR EVENTS:  - HD overnight for fluid overload (-2L)  - cleviprex gtt for hypertension  - psych consulted, hold off on Li, started Xanax  - gabapentin and flexeril added for better pain control      SUBJECTIVE/ROS:  [ ] A ten-point review of systems was otherwise negative except as noted.  [ ] Due to altered mental status/intubation, subjective information were not able to be obtained from the patient. History was obtained, to the extent possible, from review of the chart and collateral sources of information.      NEURO  Exam: awake, alert, oriented  Neurologic Medications:  acetaminophen   Tablet .. 650 milliGRAM(s) Oral every 6 hours  ALPRAZolam 1 milliGRAM(s) Oral three times a day PRN  cyclobenzaprine 5 milliGRAM(s) Oral three times a day PRN  gabapentin 100 milliGRAM(s) Oral three times a day  HYDROmorphone PCA (1 mG/mL) 30 milliLiter(s) PCA Continuous PCA Continuous  HYDROmorphone PCA (1 mG/mL) Rescue Clinician Bolus 1 milliGRAM(s) IV Push every 15 minutes PRN  ondansetron Injectable 4 milliGRAM(s) IV Push every 6 hours PRN    [x] Adequacy of sedation and pain control has been assessed and adjusted      RESPIRATORY  RR: 28 (14 Jun 2020 04:00) (16 - 31)  SpO2: 94% (14 Jun 2020 04:00) (91% - 99%)    Exam: unlabored, clear to auscultation bilaterally  Mechanical Ventilation:   ABG - ( 11 Jun 2020 19:20 )  pH: 7.39  /  pCO2: 35    /  pO2: 238   / HCO3: 22    / Base Excess: -3.4  /  SaO2: 99.4    Lactate: x          CARDIOVASCULAR  HR: 120 (14 Jun 2020 04:00) (114 - 128)  ABP: 130/70 (14 Jun 2020 04:00) (127/70 - 167/85)  ABP(mean): 89 (14 Jun 2020 04:00) (87 - 113)  CVP(cm H2O): --    Exam: regular rate and rhythm  Cardiac Rhythm: sinus  Perfusion     [x]Adequate   [ ]Inadequate  Mentation   [x]Normal       [ ]Reduced  Extremities  [x]Warm         [ ]Cool  Volume Status [ ]Hypervolemic [x]Euvolemic [ ]Hypovolemic  Cardiovascular Medications:  clevidipine Infusion 5 mG/Hr IV Continuous <Continuous>      GI/NUTRITION  Exam: soft, nontender, nondistended, incision C/D/I      GENITOURINARY  I&O's Detail    12 Jun 2020 07:01  -  13 Jun 2020 07:00  --------------------------------------------------------  IN:    clevidipine Infusion: 440 mL    IV PiggyBack: 150 mL    Oral Fluid: 500 mL    Other: 800 mL  Total IN: 1890 mL    OUT:    Indwelling Catheter - Urethral: 247 mL    Other: 3800 mL  Total OUT: 4047 mL    Total NET: -2157 mL      13 Jun 2020 07:01  -  14 Jun 2020 05:13  --------------------------------------------------------  IN:    clevidipine Infusion: 500 mL    Oral Fluid: 720 mL    Other: 400 mL  Total IN: 1620 mL    OUT:    Indwelling Catheter - Urethral: 155 mL    Other: 2400 mL  Total OUT: 2555 mL    Total NET: -935 mL    06-14    127<L>  |  91<L>  |  43<H>  ----------------------------<  124<H>  4.0   |  21<L>  |  5.96<H>    Ca    7.6<L>      14 Jun 2020 01:10  Phos  3.5     06-14  Mg     2.2     06-14    TPro  5.1<L>  /  Alb  2.6<L>  /  TBili  0.7  /  DBili  x   /  AST  271<H>  /  ALT  25  /  AlkPhos  120  06-14    [ ] Motta catheter, indication: N/A      HEMATOLOGIC  Hematologic/Oncologic Medications:  heparin   Injectable 5000 Unit(s) SubCutaneous every 8 hours    [x] VTE Prophylaxis                          10.5   20.28 )-----------( 237      ( 14 Jun 2020 01:10 )             30.3     PT/INR - ( 13 Jun 2020 04:20 )   PT: 11.3 SEC;   INR: 0.98     PTT - ( 13 Jun 2020 04:20 )  PTT:28.4 SEC    Transfusion     [ ] PRBC   [ ] Platelets   [ ] FFP   [ ] Cryoprecipitate      INFECTIOUS DISEASES  T(C): 37.6 (14 Jun 2020 04:00), Max: 37.7 (13 Jun 2020 12:00)  T(F): 99.7 (14 Jun 2020 04:00), Max: 99.9 (13 Jun 2020 12:00)    RECENT CULTURES:  Specimen Source: .Blood Blood-Peripheral  Date/Time: 06-10 @ 16:31  Culture Results:   No growth to date.  Gram Stain: --  Organism: --    Antimicrobials/Immunologic Medications:  ceFAZolin   IVPB 1000 milliGRAM(s) IV Intermittent every 24 hours      ENDOCRINE  CAPILLARY BLOOD GLUCOSE  Meds:       ACCESS DEVICES:  [ x] Peripheral IV  [ ] Central Venous Line	[ ] R	[ ] L	[ ] IJ	[ ] Fem	[ ] SC	Placed:   [ ] Arterial Line		[ ] R	[ ] L	[ ] Fem	[ ] Rad	[ ] Ax	Placed:   [ ] PICC:					[ ] Mediport  [ ] Urinary Catheter, Date Placed:   [x] Necessity of urinary, arterial, and venous catheters discussed    OTHER MEDICATIONS:  chlorhexidine 4% Liquid 1 Application(s) Topical <User Schedule>  naloxone Injectable 0.1 milliGRAM(s) IV Push every 3 minutes PRN SICU DAILY PROGRESS NOTE    29 y M with PMH of bipolar depression transferred from [facility] for further management of acute renal failure due to rhabdomyolysis. Pt was found unresponsive at home, lying on his L side. He states he has not been taking his Lithium for at least 2 wks. At the OSH, he was treated for hyperkalemia. He received D5, insulin, calcium gluconate, Kayexalate Last BM was on 6/9. For L leg pain he was given lidocaine patch, dilaudid 1mg prn and tramadol 50mg. Hydralazine 20mg given for HTN. From 1 to 7 am only 100cc of UOP. Urine was tea colored. 500cc bolus of NS was given. CT cervical spine at OSH was (-). CT Head (-). CTAP and CXR were (-). CK on admission there was 177,000.     Pt was seen by surgery team in MICU for LUE swelling and compartment syndrome was ruled out. Surgery team was re-consulted today due to new symptoms of L foot drop and numbness. Pt was found to be highly suspicious for compartment syndrome in the upper thigh, and was emergently taken to OR.     Pt is now s/p four compartment fasciotomy of LLE, and left lateral and medial thigh fasciotomies. SICU consulted for q1h neurovascular checks and hemodynamic monitoring      24 HOUR EVENTS:  - HD overnight for fluid overload (-2L)  - cleviprex gtt for hypertension  - psych consulted, hold off on Li, started Xanax  - gabapentin and flexeril added for better pain control      SUBJECTIVE/ROS:  [x ] A ten-point review of systems was otherwise negative except as noted.  [ ] Due to altered mental status/intubation, subjective information were not able to be obtained from the patient. History was obtained, to the extent possible, from review of the chart and collateral sources of information.      NEURO  Exam: awake, alert, oriented  Neurologic Medications:  acetaminophen   Tablet .. 650 milliGRAM(s) Oral every 6 hours  ALPRAZolam 1 milliGRAM(s) Oral three times a day PRN  cyclobenzaprine 5 milliGRAM(s) Oral three times a day PRN  gabapentin 100 milliGRAM(s) Oral three times a day  HYDROmorphone PCA (1 mG/mL) 30 milliLiter(s) PCA Continuous PCA Continuous  HYDROmorphone PCA (1 mG/mL) Rescue Clinician Bolus 1 milliGRAM(s) IV Push every 15 minutes PRN  ondansetron Injectable 4 milliGRAM(s) IV Push every 6 hours PRN    [x] Adequacy of sedation and pain control has been assessed and adjusted      RESPIRATORY  RR: 28 (14 Jun 2020 04:00) (16 - 31)  SpO2: 94% (14 Jun 2020 04:00) (91% - 99%)    Exam: unlabored, clear to auscultation bilaterally  Mechanical Ventilation:   ABG - ( 11 Jun 2020 19:20 )  pH: 7.39  /  pCO2: 35    /  pO2: 238   / HCO3: 22    / Base Excess: -3.4  /  SaO2: 99.4    Lactate: x          CARDIOVASCULAR  HR: 120 (14 Jun 2020 04:00) (114 - 128)  ABP: 130/70 (14 Jun 2020 04:00) (127/70 - 167/85)  ABP(mean): 89 (14 Jun 2020 04:00) (87 - 113)    Exam: regular rate and rhythm  Cardiac Rhythm: sinus  Perfusion     [x]Adequate   [ ]Inadequate  Mentation   [x]Normal       [ ]Reduced  Extremities  [x]Warm         [ ]Cool  Volume Status [ ]Hypervolemic [x]Euvolemic [ ]Hypovolemic  Cardiovascular Medications:  clevidipine Infusion 5 mG/Hr IV Continuous <Continuous>      GI/NUTRITION  Exam: soft, nontender, nondistended       GENITOURINARY  I&O's Detail    12 Jun 2020 07:01  -  13 Jun 2020 07:00  --------------------------------------------------------  IN:    clevidipine Infusion: 440 mL    IV PiggyBack: 150 mL    Oral Fluid: 500 mL    Other: 800 mL  Total IN: 1890 mL    OUT:    Indwelling Catheter - Urethral: 247 mL    Other: 3800 mL  Total OUT: 4047 mL    Total NET: -2157 mL      13 Jun 2020 07:01  -  14 Jun 2020 05:13  --------------------------------------------------------  IN:    clevidipine Infusion: 500 mL    Oral Fluid: 720 mL    Other: 400 mL  Total IN: 1620 mL    OUT:    Indwelling Catheter - Urethral: 155 mL    Other: 2400 mL  Total OUT: 2555 mL    Total NET: -935 mL    06-14    127<L>  |  91<L>  |  43<H>  ----------------------------<  124<H>  4.0   |  21<L>  |  5.96<H>    Ca    7.6<L>      14 Jun 2020 01:10  Phos  3.5     06-14  Mg     2.2     06-14    TPro  5.1<L>  /  Alb  2.6<L>  /  TBili  0.7  /  DBili  x   /  AST  271<H>  /  ALT  25  /  AlkPhos  120  06-14    [ ] Motta catheter, indication: N/A      HEMATOLOGIC  Hematologic/Oncologic Medications:  heparin   Injectable 5000 Unit(s) SubCutaneous every 8 hours    [x] VTE Prophylaxis                          10.5   20.28 )-----------( 237      ( 14 Jun 2020 01:10 )             30.3     PT/INR - ( 13 Jun 2020 04:20 )   PT: 11.3 SEC;   INR: 0.98     PTT - ( 13 Jun 2020 04:20 )  PTT:28.4 SEC    Transfusion     [ ] PRBC   [ ] Platelets   [ ] FFP   [ ] Cryoprecipitate      INFECTIOUS DISEASES  T(C): 37.6 (14 Jun 2020 04:00), Max: 37.7 (13 Jun 2020 12:00)  T(F): 99.7 (14 Jun 2020 04:00), Max: 99.9 (13 Jun 2020 12:00)    RECENT CULTURES:  Specimen Source: .Blood Blood-Peripheral  Date/Time: 06-10 @ 16:31  Culture Results:   No growth to date.  Gram Stain: --  Organism: --    Antimicrobials/Immunologic Medications:  ceFAZolin   IVPB 1000 milliGRAM(s) IV Intermittent every 24 hours      ENDOCRINE  CAPILLARY BLOOD GLUCOSE  Meds:       ACCESS DEVICES:  [ x] Peripheral IV  [ ] Central Venous Line	[ ] R	[ ] L	[ ] IJ	[ ] Fem	[ ] SC	Placed:   [ ] Arterial Line		[ ] R	[ ] L	[ ] Fem	[ ] Rad	[ ] Ax	Placed:   [ ] PICC:					[ ] Mediport  [ ] Urinary Catheter, Date Placed:   [x] Necessity of urinary, arterial, and venous catheters discussed    OTHER MEDICATIONS:  chlorhexidine 4% Liquid 1 Application(s) Topical <User Schedule>  naloxone Injectable 0.1 milliGRAM(s) IV Push every 3 minutes PRN

## 2020-06-14 NOTE — PROGRESS NOTE ADULT - SUBJECTIVE AND OBJECTIVE BOX
[facility] DIVISION OF KIDNEY DISEASES AND HYPERTENSION -- FOLLOW UP NOTE  --------------------------------------------------------------------------------  HPI: 29-year-old male, with history bipolar depression was transferred from [facility] to [facility] for severe rhabdomyolysis. As per paper chart, pt was found to have cocaine and amphetamines in urine drug screen. Pt also with an elevated CPK ~ 180K. Nephrology team consulted for ANTOINETTE and rhabdomyolyiss. Upon review of labs on [facility], Scr was 1.11 on 8/23/2017. Pt. underwent fasciotomy  (6/11/20) for LLE compartement syndrome. Last HD 06/13/20    Pt. evaluated at bedside, BP improved but remains on clevidipine Infusion, on NC o2 sat 92% during examination, had HD yesterday tolerated 2 lit, had some flow issues probably from catheter, positional.       PAST HISTORY  --------------------------------------------------------------------------------  No significant changes to PMH, PSH, FHx, SHx, unless otherwise noted    ALLERGIES & MEDICATIONS  --------------------------------------------------------------------------------  Allergies    No Known Allergies    Intolerances      Standing Inpatient Medications  acetaminophen   Tablet .. 650 milliGRAM(s) Oral every 6 hours  ceFAZolin   IVPB 1000 milliGRAM(s) IV Intermittent every 24 hours  chlorhexidine 4% Liquid 1 Application(s) Topical <User Schedule>  clevidipine Infusion 5 mG/Hr IV Continuous <Continuous>  gabapentin 100 milliGRAM(s) Oral three times a day  heparin   Injectable 5000 Unit(s) SubCutaneous every 8 hours  HYDROmorphone PCA (1 mG/mL) 30 milliLiter(s) PCA Continuous PCA Continuous  metoprolol tartrate 12.5 milliGRAM(s) Oral two times a day    PRN Inpatient Medications  ALPRAZolam 1 milliGRAM(s) Oral three times a day PRN  cyclobenzaprine 5 milliGRAM(s) Oral three times a day PRN  HYDROmorphone PCA (1 mG/mL) Rescue Clinician Bolus 1 milliGRAM(s) IV Push every 15 minutes PRN  naloxone Injectable 0.1 milliGRAM(s) IV Push every 3 minutes PRN  ondansetron Injectable 4 milliGRAM(s) IV Push every 6 hours PRN  sodium chloride 0.9% lock flush 10 milliLiter(s) IV Push every 1 hour PRN      REVIEW OF SYSTEMS  --------------------------------------------------------------------------------  Gen: no fatigue  Respiratory: improved dyspnea  CV: No chest pain  GI: No abdominal pain  MSK: left arm and leg pain  Neuro: No dizziness  Heme: No bleeding    All other systems were reviewed and are negative, except as noted.    VITALS/PHYSICAL EXAM  --------------------------------------------------------------------------------  T(C): 37.1 (06-14-20 @ 08:00), Max: 37.7 (06-13-20 @ 12:00)  HR: 115 (06-14-20 @ 09:00) (115 - 128)  BP: --  RR: 25 (06-14-20 @ 09:00) (19 - 31)  SpO2: 94% (06-14-20 @ 09:00) (91% - 99%)  Wt(kg): --        06-13-20 @ 07:01  -  06-14-20 @ 07:00  --------------------------------------------------------  IN: 1760 mL / OUT: 2580 mL / NET: -820 mL    06-14-20 @ 07:01  -  06-14-20 @ 09:21  --------------------------------------------------------  IN: 90 mL / OUT: 15 mL / NET: 75 mL      Physical Exam:  	Gen: NAD  	HEENT: MMM ON HFNC  	Pulm: CTA B/L  	CV: S1S2  	Abd: Soft, +BS               : flanagan catheter in place  	Ext: left lower extremity in bandage  	Neuro: Awake  	Skin: Warm and dry              Vascular: RIJ non tunneled HD catheter    LABS/STUDIES  --------------------------------------------------------------------------------              10.5   20.28 >-----------<  237      [06-14-20 @ 01:10]              30.3     126  |  90  |  49  ----------------------------<  119      [06-14-20 @ 05:15]  4.0   |  19  |  6.40        Ca     7.6     [06-14-20 @ 05:15]      Mg     2.4     [06-14-20 @ 05:15]      Phos  4.4     [06-14-20 @ 05:15]    TPro  5.1  /  Alb  2.6  /  TBili  0.7  /  DBili  x   /  AST  271  /  ALT  25  /  AlkPhos  120  [06-14-20 @ 01:10]    PT/INR: PT 11.3 , INR 0.98       [06-13-20 @ 04:20]  PTT: 28.4       [06-13-20 @ 04:20]    CK > 33805      [06-13-20 @ 06:00]    Creatinine Trend:  SCr 6.40 [06-14 @ 05:15]  SCr 5.96 [06-14 @ 01:10]  SCr 5.20 [06-13 @ 04:20]  SCr 6.30 [06-12 @ 14:26]  SCr 4.43 [06-12 @ 01:20]          HBsAb 9.2      [06-11-20 @ 03:40]  HBsAb Nonreactive      [06-10-20 @ 21:47]  HBsAg NEGATIVE      [06-11-20 @ 03:40]  HCV 0.07, Nonreactive Hepatitis C AB  S/CO Ratio                        Interpretation  < 1.00                                   Non-Reactive  1.00 - 4.99                         Weakly-Reactive  >= 5.00                                Reactive  Non-Reactive: Aperson with a non-reactive HCV antibody  result is considered uninfected.  No further action is  needed unless recent infection is suspected.  In these  cases, consider repeat testing later to detect  seroconversion..  Weakly-Reactive: HCV antibody test is abnormal, HCV RNA  Qualitative test will follow.  Reactive: HCV antibody test is abnormal, HCV RNA  Qualitative test will follow.  Note: HCV antibody testing is performed on the [employer]   system.      [06-11-20 @ 03:40]  HIV Nonreactive The HIV Ag/Ab Combo test performed screens for HIV-1 p24  antigen, antibodies to HIV-1 (group M and group O), and  antibodies to HIV-2. All specimens repeatedly reactive  will reflex to an HIV 1/2 antibody confirmation and  differentiation test. This assay detects p24 antigen which  may be present prior to the development of HIV antibodies,  therefore a reactive result with a negative HIV 1/2 AB  Confirmation should be followed up with HIV-1 RNA, HIV-2  RNA and repeat testing in 4-8 weeks. A nonreactive result  does not preclude previous exposure to or infection with  HIV-1 or HIV-2.      [06-10-20 @ 14:15]

## 2020-06-14 NOTE — PROGRESS NOTE ADULT - SUBJECTIVE AND OBJECTIVE BOX
Anesthesia Pain Management Service    SUBJECTIVE: Pt doing well with IV PCA without problems reported.    Therapy:	  [ X] IV PCA	   [ ] Epidural           [ ] s/p Spinal Opoid              [ ] Postpartum infusion	  [ ] Patient controlled regional anesthesia (PCRA)    [ ] prn Analgesics    Allergies    No Known Allergies    Intolerances      MEDICATIONS  (STANDING):  acetaminophen   Tablet .. 650 milliGRAM(s) Oral every 6 hours  ceFAZolin   IVPB 1000 milliGRAM(s) IV Intermittent every 24 hours  chlorhexidine 4% Liquid 1 Application(s) Topical <User Schedule>  clevidipine Infusion 5 mG/Hr (10 mL/Hr) IV Continuous <Continuous>  gabapentin 100 milliGRAM(s) Oral three times a day  heparin   Injectable 5000 Unit(s) SubCutaneous every 8 hours  HYDROmorphone PCA (1 mG/mL) 30 milliLiter(s) PCA Continuous PCA Continuous    MEDICATIONS  (PRN):  ALPRAZolam 1 milliGRAM(s) Oral three times a day PRN Anxiety  cyclobenzaprine 5 milliGRAM(s) Oral three times a day PRN Muscle Spasm  HYDROmorphone PCA (1 mG/mL) Rescue Clinician Bolus 1 milliGRAM(s) IV Push every 15 minutes PRN for Pain Scale GREATER THAN 6  naloxone Injectable 0.1 milliGRAM(s) IV Push every 3 minutes PRN For ANY of the following changes in patient status:  A. RR LESS THAN 10 breaths per minute, B. Oxygen saturation LESS THAN 90%, C. Sedation score of 6  ondansetron Injectable 4 milliGRAM(s) IV Push every 6 hours PRN Nausea  sodium chloride 0.9% lock flush 10 milliLiter(s) IV Push every 1 hour PRN Pre/post blood products, medications, blood draw, and to maintain line patency      OBJECTIVE:   [X] No new signs     [ ] Other:    Side Effects:  [X ] None			[ ] Other:    Assessment of Catheter Site:		[ ] Intact		[ ] Other:    ASSESSMENT/PLAN  [ X] Continue current therapy    [ ] Therapy changed to:    [ ] IV PCA       [ ] Epidural     [ ] prn Analgesics     Comments:

## 2020-06-14 NOTE — PROGRESS NOTE ADULT - ASSESSMENT
29M w/ PMH of bipolar depression, s/p found down at home, transferred from [facility] for further management of acute renal failure due to rhabdomyolysis, found to have compartment syndrome on the LLE, now s/p four compartment fasciotomy and lateral/medial thigh fasciotomies    Neuro  - AO x 3  - pain control w/ PCA, gabapentin, flexeril    Resp  - b/l b lines on POCUS  -increasing O2 requirements, venturi mask o/n. Will escalate  -continue HD as needed for fluid overload    CVS  - started on Cleviprex for strict BP control    GI  - regular diet      - ANTOINETTE in setting of rhabdomyolysis  - IV locked, currently oliguric to 10cc/hr  - CPK postop >22,000, improving  - HD overnight for fluid overload     Heme  - stable CBC  - SQH for dvt ppx    ID  - Ancef for LLE cellulitis, may not be indicated anymore    Endo  - monitor FGS 29M w/ PMH of bipolar depression, s/p found down at home, transferred from [facility] for further management of acute renal failure due to rhabdomyolysis, found to have compartment syndrome on the LLE, now s/p four compartment fasciotomy and lateral/medial thigh fasciotomies    Neuro  - AO x 3  - pain control w/ PCA, gabapentin, flexeril  - OOB to chair    Resp  - b/l b lines on POCUS  -increasing O2 requirements, venturi mask o/n, wean o2 as tolerated  -continue HD as needed for fluid overload    CVS  - started on Cleviprex for strict BP control  - start Lopressor 12.5 po bid    GI  - regular diet      - ANTOINETTE in setting of rhabdomyolysis  - IV locked, currently oliguric to 10cc/hr  - CPK postop >22,000, improving  - HD overnight for fluid overload   - check pm BMP  - HD today    Heme  - stable CBC  - SQH for dvt ppx    ID  - Ancef for LLE cellulitis, may not be indicated anymore    Endo  - monitor FGS

## 2020-06-14 NOTE — PROGRESS NOTE ADULT - SUBJECTIVE AND OBJECTIVE BOX
SURGERY DAILY PROGRESS NOTE:       SUBJECTIVE/ROS: Patient examined at bedside. Dialyzed overnight for fluid overload, cleviprex started for HTN  Denies nausea, vomiting, chest pain, shortness of breath         MEDICATIONS  (STANDING):  acetaminophen   Tablet .. 650 milliGRAM(s) Oral every 6 hours  ceFAZolin   IVPB 1000 milliGRAM(s) IV Intermittent every 24 hours  chlorhexidine 4% Liquid 1 Application(s) Topical <User Schedule>  clevidipine Infusion 5 mG/Hr (10 mL/Hr) IV Continuous <Continuous>  gabapentin 100 milliGRAM(s) Oral three times a day  heparin   Injectable 5000 Unit(s) SubCutaneous every 8 hours  HYDROmorphone PCA (1 mG/mL) 30 milliLiter(s) PCA Continuous PCA Continuous  sodium chloride 1 Gram(s) Oral three times a day    MEDICATIONS  (PRN):  ALPRAZolam 1 milliGRAM(s) Oral three times a day PRN Anxiety  cyclobenzaprine 5 milliGRAM(s) Oral three times a day PRN Muscle Spasm  HYDROmorphone PCA (1 mG/mL) Rescue Clinician Bolus 1 milliGRAM(s) IV Push every 15 minutes PRN for Pain Scale GREATER THAN 6  naloxone Injectable 0.1 milliGRAM(s) IV Push every 3 minutes PRN For ANY of the following changes in patient status:  A. RR LESS THAN 10 breaths per minute, B. Oxygen saturation LESS THAN 90%, C. Sedation score of 6  ondansetron Injectable 4 milliGRAM(s) IV Push every 6 hours PRN Nausea  sodium chloride 0.9% lock flush 10 milliLiter(s) IV Push every 1 hour PRN Pre/post blood products, medications, blood draw, and to maintain line patency      OBJECTIVE:    Vital Signs Last 24 Hrs  T(C): 37.6 (2020 04:00), Max: 37.7 (2020 12:00)  T(F): 99.7 (2020 04:00), Max: 99.9 (2020 12:00)  HR: 117 (2020 07:17) (115 - 128)  BP: --  BP(mean): --  RR: 22 (2020 07:17) (18 - 31)  SpO2: 93% (2020 07:17) (91% - 99%)        I&O's Detail    2020 07:01  -  2020 07:00  --------------------------------------------------------  IN:    clevidipine Infusion: 590 mL    IV PiggyBack: 50 mL    Oral Fluid: 720 mL    Other: 400 mL  Total IN: 1760 mL    OUT:    Indwelling Catheter - Urethral: 180 mL    Other: 2400 mL  Total OUT: 2580 mL    Total NET: -820 mL          Daily     Daily Weight in k.9 (2020 05:00)    LABS:                        10.5   20.28 )-----------( 237      ( 2020 01:10 )             30.3     06-14    126<L>  |  90<L>  |  49<H>  ----------------------------<  119<H>  4.0   |  19<L>  |  6.40<H>    Ca    7.6<L>      2020 05:15  Phos  4.4     06-14  Mg     2.4     06-14    TPro  5.1<L>  /  Alb  2.6<L>  /  TBili  0.7  /  DBili  x   /  AST  271<H>  /  ALT  25  /  AlkPhos  120  06-14    PT/INR - ( 2020 04:20 )   PT: 11.3 SEC;   INR: 0.98          PTT - ( 2020 04:20 )  PTT:28.4 SEC                  PHYSICAL EXAM:    Constitutional: well developed, well nourished, NAD  Eyes: anicteric  ENMT: normal facies, symmetric  Respiratory: Breathing comfortably    Gastrointestinal: abdomen soft, nontender, nondistended.   Extremities: Dressing c/d/i, sensation on left foot, able to move toes  Neurological: intact, non-focal  Psychiatric: oriented x 3; appropriate SURGERY DAILY PROGRESS NOTE:       SUBJECTIVE/ROS: Patient examined at bedside. Dialyzed overnight for fluid overload, cleviprex started for HTN  Denies nausea, vomiting, chest pain, shortness of breath  pt states improvment in left forefoot dorsum sensation now up to 40%  the rest of the lle feels normal re motor and sensory fx         MEDICATIONS  (STANDING):  acetaminophen   Tablet .. 650 milliGRAM(s) Oral every 6 hours  ceFAZolin   IVPB 1000 milliGRAM(s) IV Intermittent every 24 hours  chlorhexidine 4% Liquid 1 Application(s) Topical <User Schedule>  clevidipine Infusion 5 mG/Hr (10 mL/Hr) IV Continuous <Continuous>  gabapentin 100 milliGRAM(s) Oral three times a day  heparin   Injectable 5000 Unit(s) SubCutaneous every 8 hours  HYDROmorphone PCA (1 mG/mL) 30 milliLiter(s) PCA Continuous PCA Continuous  sodium chloride 1 Gram(s) Oral three times a day    MEDICATIONS  (PRN):  ALPRAZolam 1 milliGRAM(s) Oral three times a day PRN Anxiety  cyclobenzaprine 5 milliGRAM(s) Oral three times a day PRN Muscle Spasm  HYDROmorphone PCA (1 mG/mL) Rescue Clinician Bolus 1 milliGRAM(s) IV Push every 15 minutes PRN for Pain Scale GREATER THAN 6  naloxone Injectable 0.1 milliGRAM(s) IV Push every 3 minutes PRN For ANY of the following changes in patient status:  A. RR LESS THAN 10 breaths per minute, B. Oxygen saturation LESS THAN 90%, C. Sedation score of 6  ondansetron Injectable 4 milliGRAM(s) IV Push every 6 hours PRN Nausea  sodium chloride 0.9% lock flush 10 milliLiter(s) IV Push every 1 hour PRN Pre/post blood products, medications, blood draw, and to maintain line patency      OBJECTIVE:    Vital Signs Last 24 Hrs  T(C): 37.6 (2020 04:00), Max: 37.7 (2020 12:00)  T(F): 99.7 (2020 04:00), Max: 99.9 (2020 12:00)  HR: 117 (2020 07:17) (115 - 128)  BP: --  BP(mean): --  RR: 22 (2020 07:17) (18 - 31)  SpO2: 93% (2020 07:17) (91% - 99%)        I&O's Detail    2020 07:01  -  2020 07:00  --------------------------------------------------------  IN:    clevidipine Infusion: 590 mL    IV PiggyBack: 50 mL    Oral Fluid: 720 mL    Other: 400 mL  Total IN: 1760 mL    OUT:    Indwelling Catheter - Urethral: 180 mL    Other: 2400 mL  Total OUT: 2580 mL    Total NET: -820 mL  Daily Weight in k.9 (2020 05:00)    LABS:                        10.5   20.28 )-----------( 237      ( 2020 01:10 )             30.3     06-14    126<L>  |  90<L>  |  49<H>  ----------------------------<  119<H>  4.0   |  19<L>  |  6.40<H>    Ca    7.6<L>      2020 05:15  Phos  4.4     06-14  Mg     2.4     06-14    TPro  5.1<L>  /  Alb  2.6<L>  /  TBili  0.7  /  DBili  x   /  AST  271<H>  /  ALT  25  /  AlkPhos  120  06-14    PT/INR - ( 2020 04:20 )   PT: 11.3 SEC;   INR: 0.98          PTT - ( 2020 04:20 )  PTT:28.4 SEC      PHYSICAL EXAM:    Constitutional: well developed, well nourished, NAD  Eyes: anicteric  ENMT: normal facies, symmetric  Respiratory: Breathing comfortably    Gastrointestinal: abdomen soft, nontender, nondistended.   Extremities: Dressing c/d/i  rle intact  lle all fasciotomies sites w viable muscle  lle gross motor intact from hip to toes  lle sensorium normal from groin and buttock  to toes except left forefoot dorsum w dec sensation as per pt   no signs of lle art insuff   Neurological: intact, non-focal  Psychiatric: oriented x 3; appropriate

## 2020-06-14 NOTE — PROGRESS NOTE ADULT - ATTENDING COMMENTS
I agree with the above history, physical, and plan which I have reviewed and edited where appropriate.    T79.6XXD Traumatic rhabdomyolysis, subsequent encounter   N17.9 Acute renal failure, unspecified acute renal failure type   E87.1 Hyponatremia   Z91.89 At risk for fluid volume overload   -HD for volume overload causing mild supplemental oxygen requirement and oliguria   -bumex did not stimulate urine production - awaiting renal recovery   -hyponatremia should correct with diasylate change - hold salt tabs per nephro   -appreciate psych input, continue home meds.   HTN -cleviprex continued, transition to PO beta blocker     I spent ***min reviewing history, data, images and in discussion/coordination of care. Greater than 50% of my time was spent in face-to-face discussion with the patient regarding ***    Petar Pulliam MD  Acute and Critical Care Surgery    The Acute Care Surgery (B Team) Attending Group Practice:  Dr. Pia Mena, Dr. Diogenes Amor, Dr. Petar Pulliam, and Dr. Ritchie Barnes    Urgent issues - spectra [id number] or [id number]  Nonurgent issues - [phone]  Patient appointments or after hours - [phone]

## 2020-06-14 NOTE — PROGRESS NOTE ADULT - ASSESSMENT
29 y M with PMH of bipolar depression transferred from [facility] for further management of acute renal failure due to rhabdomyolysis, Taken to the OR for LLE compartment syndrome. Now s/p five compartment fasciotomy of the left lower leg. HD after surgery. Doing well today    Plan  - pt is clinically improving from a vasc surg standpoint   - continue renal and hepatic optimization  - trend CK  - pt/ot eval 29 y M with PMH of bipolar depression transferred from [facility] for further management of acute renal failure due to rhabdomyolysis, Taken to the OR for LLE compartment syndrome. Now s/p five compartment fasciotomy of the left lower leg. HD after surgery. Doing well today    Plan  - pt is clinically improving from a vasc surg standpoint   - continue renal and hepatic optimization  - CK downtrending   - pt/ot eval   will follow

## 2020-06-14 NOTE — PROGRESS NOTE ADULT - ATTENDING COMMENTS
I Adam Sauer MD have seen and examined the patient today and agree with  the  evaluation, assessment and plan of the surgical house officer  ASHTYN Sauer MD have personally seen and examined the patient at bedside today at  220pm

## 2020-06-14 NOTE — PROGRESS NOTE ADULT - SUBJECTIVE AND OBJECTIVE BOX
Anesthesia Pain Management Service    SUBJECTIVE: Pump helps     Pain Scale Score	At rest: _5__ 	With Activity: ___ 	[X ] Refer to charted pain scores    THERAPY:    [ ] IV PCA Morphine		[ ] 5 mg/mL	[ ] 1 mg/mL  [X ] IV PCA Hydromorphone	[ ] 5 mg/mL	[X ] 1 mg/mL  [ ] IV PCA Fentanyl		[ ] 50 micrograms/mL    Demand dose __0.2_ lockout __6_ (minutes) Continuous Rate _0__ Total: _14.75__  mg used (in past 24 hours)      MEDICATIONS  (STANDING):  acetaminophen   Tablet .. 650 milliGRAM(s) Oral every 6 hours  ceFAZolin   IVPB 1000 milliGRAM(s) IV Intermittent every 24 hours  chlorhexidine 4% Liquid 1 Application(s) Topical <User Schedule>  clevidipine Infusion 5 mG/Hr (10 mL/Hr) IV Continuous <Continuous>  gabapentin 100 milliGRAM(s) Oral three times a day  heparin   Injectable 5000 Unit(s) SubCutaneous every 8 hours  HYDROmorphone PCA (1 mG/mL) 30 milliLiter(s) PCA Continuous PCA Continuous  metoprolol tartrate 12.5 milliGRAM(s) Oral two times a day    MEDICATIONS  (PRN):  ALPRAZolam 1 milliGRAM(s) Oral three times a day PRN Anxiety  cyclobenzaprine 5 milliGRAM(s) Oral three times a day PRN Muscle Spasm  HYDROmorphone PCA (1 mG/mL) Rescue Clinician Bolus 1 milliGRAM(s) IV Push every 15 minutes PRN for Pain Scale GREATER THAN 6  naloxone Injectable 0.1 milliGRAM(s) IV Push every 3 minutes PRN For ANY of the following changes in patient status:  A. RR LESS THAN 10 breaths per minute, B. Oxygen saturation LESS THAN 90%, C. Sedation score of 6  ondansetron Injectable 4 milliGRAM(s) IV Push every 6 hours PRN Nausea  sodium chloride 0.9% lock flush 10 milliLiter(s) IV Push every 1 hour PRN Pre/post blood products, medications, blood draw, and to maintain line patency      OBJECTIVE: laying in bed     Sedation Score:	[ X] Alert	[ ] Drowsy 	[ ] Arousable	[ ] Asleep	[ ] Unresponsive    Side Effects:	[X ] None	[ ] Nausea	[ ] Vomiting	[ ] Pruritus  		[ ] Other:    Vital Signs Last 24 Hrs  T(C): 37.1 (14 Jun 2020 08:00), Max: 37.7 (13 Jun 2020 12:00)  T(F): 98.8 (14 Jun 2020 08:00), Max: 99.9 (13 Jun 2020 12:00)  HR: 115 (14 Jun 2020 08:00) (115 - 128)  BP: --  BP(mean): --  RR: 28 (14 Jun 2020 08:00) (18 - 31)  SpO2: 93% (14 Jun 2020 08:00) (91% - 99%)    ASSESSMENT/ PLAN    Therapy to  be:	[ X] Continue   [ ] Discontinued   [ ] Change to prn Analgesics    Documentation and Verification of current medications:   [X] Done	[ ] Not done, not elligible    Comments: Recommend non-opioid adjuvant analgesics to be used when possible and when allowed by primary surgical team.    Progress Note written now but Patient was seen earlier.

## 2020-06-14 NOTE — PROGRESS NOTE ADULT - PROBLEM SELECTOR PLAN 1
Pt with ANTOINETTE in the setting of rhabdomyolysis. Upon lab review, Scr was 1.11 on 8/23/2017. Scr at [facility] this AM was 3.06. Scr today is 4.43. Pt with oliguric ANTOINETTE secondary to rhabdomyolysis. Pt. underwent HD yesterday completed tx 2 lit removed, had flow issues, from catheter likely ?positional.  Give Bumex 4 mg IV again today. Will attempt HD again to optimize volume status however if catheter continues to have pressure alarm might need to change catheter.  Remains oliguric.  Monitor labs and urine output. Avoid potential nephrotoxins. will assess daily for HD.

## 2020-06-15 LAB
ALBUMIN SERPL ELPH-MCNC: 2.5 G/DL — LOW (ref 3.3–5)
ALP SERPL-CCNC: 124 U/L — HIGH (ref 40–120)
ALT FLD-CCNC: 24 U/L — SIGNIFICANT CHANGE UP (ref 4–41)
ANION GAP SERPL CALC-SCNC: 18 MMO/L — HIGH (ref 7–14)
AST SERPL-CCNC: 177 U/L — HIGH (ref 4–40)
BILIRUB SERPL-MCNC: 0.5 MG/DL — SIGNIFICANT CHANGE UP (ref 0.2–1.2)
BUN SERPL-MCNC: 58 MG/DL — HIGH (ref 7–23)
CA-I BLD-SCNC: 0.98 MMOL/L — LOW (ref 1.03–1.23)
CALCIUM SERPL-MCNC: 7.6 MG/DL — LOW (ref 8.4–10.5)
CHLORIDE SERPL-SCNC: 90 MMOL/L — LOW (ref 98–107)
CK SERPL-CCNC: 6004 U/L — HIGH (ref 30–200)
CO2 SERPL-SCNC: 19 MMOL/L — LOW (ref 22–31)
CREAT SERPL-MCNC: 6.3 MG/DL — HIGH (ref 0.5–1.3)
CULTURE RESULTS: SIGNIFICANT CHANGE UP
CULTURE RESULTS: SIGNIFICANT CHANGE UP
GLUCOSE SERPL-MCNC: 109 MG/DL — HIGH (ref 70–99)
HCT VFR BLD CALC: 28.9 % — LOW (ref 39–50)
HGB BLD-MCNC: 10 G/DL — LOW (ref 13–17)
MAGNESIUM SERPL-MCNC: 2.4 MG/DL — SIGNIFICANT CHANGE UP (ref 1.6–2.6)
MCHC RBC-ENTMCNC: 28 PG — SIGNIFICANT CHANGE UP (ref 27–34)
MCHC RBC-ENTMCNC: 34.6 % — SIGNIFICANT CHANGE UP (ref 32–36)
MCV RBC AUTO: 81 FL — SIGNIFICANT CHANGE UP (ref 80–100)
NRBC # FLD: 0 K/UL — SIGNIFICANT CHANGE UP (ref 0–0)
PHOSPHATE SERPL-MCNC: 5.3 MG/DL — HIGH (ref 2.5–4.5)
PLATELET # BLD AUTO: 254 K/UL — SIGNIFICANT CHANGE UP (ref 150–400)
PMV BLD: 9.6 FL — SIGNIFICANT CHANGE UP (ref 7–13)
POTASSIUM SERPL-MCNC: 4.3 MMOL/L — SIGNIFICANT CHANGE UP (ref 3.5–5.3)
POTASSIUM SERPL-SCNC: 4.3 MMOL/L — SIGNIFICANT CHANGE UP (ref 3.5–5.3)
PROT SERPL-MCNC: 5.1 G/DL — LOW (ref 6–8.3)
RBC # BLD: 3.57 M/UL — LOW (ref 4.2–5.8)
RBC # FLD: 12.6 % — SIGNIFICANT CHANGE UP (ref 10.3–14.5)
SODIUM SERPL-SCNC: 127 MMOL/L — LOW (ref 135–145)
SPECIMEN SOURCE: SIGNIFICANT CHANGE UP
SPECIMEN SOURCE: SIGNIFICANT CHANGE UP
WBC # BLD: 18.01 K/UL — HIGH (ref 3.8–10.5)
WBC # FLD AUTO: 18.01 K/UL — HIGH (ref 3.8–10.5)

## 2020-06-15 PROCEDURE — 99291 CRITICAL CARE FIRST HOUR: CPT | Mod: 24

## 2020-06-15 PROCEDURE — 99233 SBSQ HOSP IP/OBS HIGH 50: CPT | Mod: GC

## 2020-06-15 RX ORDER — HYDROMORPHONE HYDROCHLORIDE 2 MG/ML
2 INJECTION INTRAMUSCULAR; INTRAVENOUS; SUBCUTANEOUS ONCE
Refills: 0 | Status: DISCONTINUED | OUTPATIENT
Start: 2020-06-15 | End: 2020-06-15

## 2020-06-15 RX ORDER — HYDRALAZINE HCL 50 MG
20 TABLET ORAL EVERY 4 HOURS
Refills: 0 | Status: DISCONTINUED | OUTPATIENT
Start: 2020-06-15 | End: 2020-06-15

## 2020-06-15 RX ORDER — NICARDIPINE HYDROCHLORIDE 30 MG/1
30 CAPSULE, EXTENDED RELEASE ORAL EVERY 8 HOURS
Refills: 0 | Status: DISCONTINUED | OUTPATIENT
Start: 2020-06-15 | End: 2020-06-15

## 2020-06-15 RX ORDER — ACETAMINOPHEN 500 MG
1000 TABLET ORAL ONCE
Refills: 0 | Status: COMPLETED | OUTPATIENT
Start: 2020-06-15 | End: 2020-06-15

## 2020-06-15 RX ORDER — LABETALOL HCL 100 MG
200 TABLET ORAL ONCE
Refills: 0 | Status: COMPLETED | OUTPATIENT
Start: 2020-06-15 | End: 2020-06-15

## 2020-06-15 RX ORDER — HYDRALAZINE HCL 50 MG
10 TABLET ORAL ONCE
Refills: 0 | Status: COMPLETED | OUTPATIENT
Start: 2020-06-15 | End: 2020-06-15

## 2020-06-15 RX ORDER — GABAPENTIN 400 MG/1
200 CAPSULE ORAL THREE TIMES A DAY
Refills: 0 | Status: DISCONTINUED | OUTPATIENT
Start: 2020-06-15 | End: 2020-06-15

## 2020-06-15 RX ORDER — BUMETANIDE 0.25 MG/ML
4 INJECTION INTRAMUSCULAR; INTRAVENOUS ONCE
Refills: 0 | Status: COMPLETED | OUTPATIENT
Start: 2020-06-15 | End: 2020-06-15

## 2020-06-15 RX ORDER — SENNA PLUS 8.6 MG/1
2 TABLET ORAL AT BEDTIME
Refills: 0 | Status: DISCONTINUED | OUTPATIENT
Start: 2020-06-15 | End: 2020-06-29

## 2020-06-15 RX ORDER — NICARDIPINE HYDROCHLORIDE 30 MG/1
30 CAPSULE, EXTENDED RELEASE ORAL ONCE
Refills: 0 | Status: COMPLETED | OUTPATIENT
Start: 2020-06-15 | End: 2020-06-15

## 2020-06-15 RX ORDER — HYDROMORPHONE HYDROCHLORIDE 2 MG/ML
1 INJECTION INTRAMUSCULAR; INTRAVENOUS; SUBCUTANEOUS ONCE
Refills: 0 | Status: DISCONTINUED | OUTPATIENT
Start: 2020-06-15 | End: 2020-06-15

## 2020-06-15 RX ORDER — BUMETANIDE 0.25 MG/ML
4 INJECTION INTRAMUSCULAR; INTRAVENOUS ONCE
Refills: 0 | Status: DISCONTINUED | OUTPATIENT
Start: 2020-06-15 | End: 2020-06-15

## 2020-06-15 RX ORDER — CALCIUM GLUCONATE 100 MG/ML
2 VIAL (ML) INTRAVENOUS ONCE
Refills: 0 | Status: COMPLETED | OUTPATIENT
Start: 2020-06-15 | End: 2020-06-15

## 2020-06-15 RX ORDER — POLYETHYLENE GLYCOL 3350 17 G/17G
17 POWDER, FOR SOLUTION ORAL DAILY
Refills: 0 | Status: DISCONTINUED | OUTPATIENT
Start: 2020-06-15 | End: 2020-06-16

## 2020-06-15 RX ORDER — CLEVIDIPINE BUTYRATE 50MG/100ML
5 VIAL (ML) INTRAVENOUS
Qty: 25 | Refills: 0 | Status: DISCONTINUED | OUTPATIENT
Start: 2020-06-15 | End: 2020-06-16

## 2020-06-15 RX ORDER — CLEVIDIPINE BUTYRATE 50MG/100ML
5 VIAL (ML) INTRAVENOUS
Qty: 25 | Refills: 0 | Status: DISCONTINUED | OUTPATIENT
Start: 2020-06-15 | End: 2020-06-15

## 2020-06-15 RX ORDER — LABETALOL HCL 100 MG
10 TABLET ORAL ONCE
Refills: 0 | Status: DISCONTINUED | OUTPATIENT
Start: 2020-06-15 | End: 2020-06-15

## 2020-06-15 RX ORDER — GABAPENTIN 400 MG/1
100 CAPSULE ORAL THREE TIMES A DAY
Refills: 0 | Status: DISCONTINUED | OUTPATIENT
Start: 2020-06-15 | End: 2020-06-20

## 2020-06-15 RX ADMIN — HYDROMORPHONE HYDROCHLORIDE 30 MILLILITER(S): 2 INJECTION INTRAMUSCULAR; INTRAVENOUS; SUBCUTANEOUS at 07:14

## 2020-06-15 RX ADMIN — HYDROMORPHONE HYDROCHLORIDE 1 MILLIGRAM(S): 2 INJECTION INTRAMUSCULAR; INTRAVENOUS; SUBCUTANEOUS at 03:30

## 2020-06-15 RX ADMIN — HYDROMORPHONE HYDROCHLORIDE 1 MILLIGRAM(S): 2 INJECTION INTRAMUSCULAR; INTRAVENOUS; SUBCUTANEOUS at 15:44

## 2020-06-15 RX ADMIN — Medication 650 MILLIGRAM(S): at 13:30

## 2020-06-15 RX ADMIN — SENNA PLUS 2 TABLET(S): 8.6 TABLET ORAL at 21:43

## 2020-06-15 RX ADMIN — HEPARIN SODIUM 5000 UNIT(S): 5000 INJECTION INTRAVENOUS; SUBCUTANEOUS at 21:43

## 2020-06-15 RX ADMIN — Medication 10 MG/HR: at 08:53

## 2020-06-15 RX ADMIN — HYDROMORPHONE HYDROCHLORIDE 30 MILLILITER(S): 2 INJECTION INTRAMUSCULAR; INTRAVENOUS; SUBCUTANEOUS at 15:52

## 2020-06-15 RX ADMIN — HYDROMORPHONE HYDROCHLORIDE 1 MILLIGRAM(S): 2 INJECTION INTRAMUSCULAR; INTRAVENOUS; SUBCUTANEOUS at 19:52

## 2020-06-15 RX ADMIN — GABAPENTIN 100 MILLIGRAM(S): 400 CAPSULE ORAL at 21:44

## 2020-06-15 RX ADMIN — HYDROMORPHONE HYDROCHLORIDE 2 MILLIGRAM(S): 2 INJECTION INTRAMUSCULAR; INTRAVENOUS; SUBCUTANEOUS at 11:41

## 2020-06-15 RX ADMIN — Medication 200 GRAM(S): at 14:27

## 2020-06-15 RX ADMIN — Medication 200 MILLIGRAM(S): at 15:00

## 2020-06-15 RX ADMIN — BUMETANIDE 132 MILLIGRAM(S): 0.25 INJECTION INTRAMUSCULAR; INTRAVENOUS at 14:27

## 2020-06-15 RX ADMIN — Medication 10 MILLIGRAM(S): at 12:28

## 2020-06-15 RX ADMIN — Medication 10 MILLIGRAM(S): at 14:00

## 2020-06-15 RX ADMIN — Medication 1 MILLIGRAM(S): at 21:43

## 2020-06-15 RX ADMIN — HEPARIN SODIUM 5000 UNIT(S): 5000 INJECTION INTRAVENOUS; SUBCUTANEOUS at 05:54

## 2020-06-15 RX ADMIN — HYDROMORPHONE HYDROCHLORIDE 1 MILLIGRAM(S): 2 INJECTION INTRAMUSCULAR; INTRAVENOUS; SUBCUTANEOUS at 23:14

## 2020-06-15 RX ADMIN — Medication 12.5 MILLIGRAM(S): at 05:54

## 2020-06-15 RX ADMIN — HYDROMORPHONE HYDROCHLORIDE 30 MILLILITER(S): 2 INJECTION INTRAMUSCULAR; INTRAVENOUS; SUBCUTANEOUS at 19:24

## 2020-06-15 RX ADMIN — GABAPENTIN 100 MILLIGRAM(S): 400 CAPSULE ORAL at 13:00

## 2020-06-15 RX ADMIN — HYDROMORPHONE HYDROCHLORIDE 2 MILLIGRAM(S): 2 INJECTION INTRAMUSCULAR; INTRAVENOUS; SUBCUTANEOUS at 10:44

## 2020-06-15 RX ADMIN — POLYETHYLENE GLYCOL 3350 17 GRAM(S): 17 POWDER, FOR SOLUTION ORAL at 13:01

## 2020-06-15 RX ADMIN — GABAPENTIN 100 MILLIGRAM(S): 400 CAPSULE ORAL at 05:54

## 2020-06-15 RX ADMIN — Medication 100 MILLIGRAM(S): at 05:54

## 2020-06-15 RX ADMIN — HYDROMORPHONE HYDROCHLORIDE 30 MILLILITER(S): 2 INJECTION INTRAMUSCULAR; INTRAVENOUS; SUBCUTANEOUS at 08:33

## 2020-06-15 RX ADMIN — Medication 650 MILLIGRAM(S): at 19:44

## 2020-06-15 RX ADMIN — NICARDIPINE HYDROCHLORIDE 30 MILLIGRAM(S): 30 CAPSULE, EXTENDED RELEASE ORAL at 21:43

## 2020-06-15 RX ADMIN — Medication 400 MILLIGRAM(S): at 01:01

## 2020-06-15 RX ADMIN — Medication 650 MILLIGRAM(S): at 08:53

## 2020-06-15 RX ADMIN — HEPARIN SODIUM 5000 UNIT(S): 5000 INJECTION INTRAVENOUS; SUBCUTANEOUS at 13:00

## 2020-06-15 NOTE — CHART NOTE - NSCHARTNOTEFT_GEN_A_CORE
Source: Patient , nursing    Diet :  Regular   - No Concentrated Potassium       Patient on PO diet , tolerating but PO remains < 75%, wt. trending up , pt. will benefit from No Conc. Phos. Regular diet .     Current Weight: 109.1 kg on 6/15/2020; Admit wt. - 104.7 kg ; IBW - 78 KG    Pertinent Medications: MEDICATIONS  (STANDING):  acetaminophen   Tablet .. 650 milliGRAM(s) Oral every 6 hours  bisacodyl Suppository 10 milliGRAM(s) Rectal once  chlorhexidine 4% Liquid 1 Application(s) Topical <User Schedule>  gabapentin 100 milliGRAM(s) Oral three times a day  heparin   Injectable 5000 Unit(s) SubCutaneous every 8 hours  HYDROmorphone PCA (1 mG/mL) 30 milliLiter(s) PCA Continuous PCA Continuous  polyethylene glycol 3350 17 Gram(s) Oral daily  senna 2 Tablet(s) Oral at bedtime    MEDICATIONS  (PRN):  ALPRAZolam 1 milliGRAM(s) Oral three times a day PRN Anxiety  cyclobenzaprine 5 milliGRAM(s) Oral three times a day PRN Muscle Spasm  HYDROmorphone PCA (1 mG/mL) Rescue Clinician Bolus 1 milliGRAM(s) IV Push every 15 minutes PRN for Pain Scale GREATER THAN 6  naloxone Injectable 0.1 milliGRAM(s) IV Push every 3 minutes PRN For ANY of the following changes in patient status:  A. RR LESS THAN 10 breaths per minute, B. Oxygen saturation LESS THAN 90%, C. Sedation score of 6  ondansetron Injectable 4 milliGRAM(s) IV Push every 6 hours PRN Nausea  sodium chloride 0.9% lock flush 10 milliLiter(s) IV Push every 1 hour PRN Pre/post blood products, medications, blood draw, and to maintain line patency    Pertinent Labs:  06-15 Na127 mmol/L<L> Glu 109 mg/dL<H> K+ 4.3 mmol/L Cr  6.30 mg/dL<H> BUN 58 mg/dL<H> 06-15 Phos 5.3 mg/dL<H> 06-15 Alb 2.5 g/dL<L>      Skin: intact    Estimated Needs:   no change since previous assessment      Recommend Regular No Conc. Phos diet     Monitoring and Evaluation:  PO intake , Tolerance to diet prescription , weights & follow up per protocol

## 2020-06-15 NOTE — PROGRESS NOTE ADULT - ATTENDING COMMENTS
I have personally interviewed and examined this patient, reviewed pertinent labs and imaging, and discussed the case with colleagues, residents, physician assistants, and nurses on SICU rounds.    40   minutes in total were spent in providing direct critical care for the diagnoses, assessment and plan outlined below.  These diagnoses are unrelated to the surgical procedure.  Additionally, time spent in the performance of separately billable procedures was not counted toward the critical care time.  There is no overlap.    The active critical care issues are:  1.  acute pain, anxiety, risk of addiction, opioid constipation  2.  sinus tachycardia, hypertension  3.  hyponatremia  4.  oliguric acute renal insufficiency 2/2 rhabdomyolysis  5. acute blood loss anemia    Uptitrate gabapentin and continue IV narcotic PCA as pain may be driving tachycardia, high BP, low sodium.  Escalate bowel regimen.  D/c beta-blocker and drip and allow for permissive hypertension to optimize renal perfusion in absence of other symptoms of high BP (may judiciously use iv hydralazine if SBP>180).  Coordinate HD with renal service for maximal fluid removal as this may be contributing to hypertension.  Febrile-remove [place] and flanagan catheter, monitor off antibiotics given no other hard signs of infection.  Local wound care per vascular surgery service; deferred plastic surgery consult at present.  PT.

## 2020-06-15 NOTE — PROGRESS NOTE ADULT - ASSESSMENT
29M w/ PMH of bipolar depression, s/p found down at home, transferred from [facility] for further management of acute renal failure due to rhabdomyolysis, found to have compartment syndrome on the LLE, now s/p four compartment fasciotomy and lateral/medial thigh fasciotomies    Neuro  - AO x 3  - pain control w/ PCA, gabapentin, flexeril  - OOB to chair    Resp  - Weaned to 2LNC  - continue HD as needed for fluid overload    CVS  - started on Cleviprex for strict BP control  - start Lopressor 12.5 po bid    GI  - regular diet      - ANTOINETTE in setting of rhabdomyolysis  - IV locked, currently oliguric to 10cc/hr  - CPK postop >22,000, improving  - HD for fluid overload (-3L 6/14)  - Hyponatremia to 127, discussed with Nephrology.    Heme  - stable CBC  - SQH for dvt ppx    ID  - Ancef for LLE cellulitis, may not be indicated anymore    Endo  - monitor FGS 29M w/ PMH of bipolar depression, s/p found down at home, transferred from [facility] for further management of acute renal failure due to rhabdomyolysis, found to have compartment syndrome on the LLE, now s/p four compartment fasciotomy and lateral/medial thigh fasciotomies    Neuro  - AO x 3  - pain control w/ PCA, gabapentin, flexeril  - OOB to chair    Resp  - Weaned to 2LNC  - continue to monitor for fluid overload    CVS  - started on Cleviprex for strict BP control-->weaning off Cleviprex   - permissive HTN-->will d/c lopressor 12.5 BID as pt likely hypertensive from fluid overload     GI  - regular diet  - will start bowel regimen as pt has not had BM since admission       - ANTOINETTE in setting of rhabdomyolysis  - IV locked, currently oliguric to 10cc/hr  - CPK postop >22,000, improving--> now 6004  - HD for fluid overload (-3L 6/14); No immediate need for dialysis today   - Hyponatremia to 127, discussed with Nephrology--> likely 2/2 to dialysate and hypervolemia     Heme  - stable CBC  - SQH for dvt ppx    ID  - Ancef for LLE cellulitis, may not be indicated anymore    Endo  - monitor FGS

## 2020-06-15 NOTE — PROGRESS NOTE BEHAVIORAL HEALTH - SUMMARY
Patient is a 30 y/o single male, no significant PMHx, PPHx bipolar disorder, no prior inpatient hospitalizations, follows w/ Dr. White once every 2 weeks, no prior SA, no prior NSSIB, occasional cocaine and alcohol use, transferred from [facility] after presenting with acute renal failure 2/2 rhabdomyolysis after being found unresponsive at home by neighbors. Patient has received HDx2 since admission, and is s/p fasciotomy for compartment syndrome in E. No current mood symptoms, no psychosis, denies suicidal ideation, adamantly states he does not know what led him to being on the floor. Utox + cocaine, benzo, amphetamines, oxy.

## 2020-06-15 NOTE — PROGRESS NOTE ADULT - ATTENDING COMMENTS
ANTOINETTE sec to ATN from rhabdo  on intermittent HD    Will try IV diuresis today, if he does not make any urine will consider HD for UF

## 2020-06-15 NOTE — PROGRESS NOTE ADULT - SUBJECTIVE AND OBJECTIVE BOX
SICU Morning Progress Note    24 HOUR EVENTS:  - Febrile overnight 101.4  - HD net -3L  - Weaned to 2LNC.    HPI:  29 y M with PMH of bipolar depression transferred from [facility] for further management of acute renal failure due to rhabdomyolysis. Pt was found unresponsive at home, lying on his L side. He states he has not been taking his Lithium for at least 2 wks. At the OSH, he was treated for hyperkalemia. He received D5, insulin, calcium gluconate, Kayexalate Last BM was on 6/9. For L leg pain he was given lidocaine patch, dilaudid 1mg prn and tramadol 50mg. Hydralazine 20mg given for HTN. From 1 to 7 am only 100cc of UOP. Urine was tea colored. 500cc bolus of NS was given. CT cervical spine at OSH was (-). CT Head (-). CTAP and CXR were (-). CK on admission there was 177,000. (10 Ramírez 2020 13:14)    Allergies:  Allergies  No known allergies    PAST MEDICAL & SURGICAL HISTORY:  Bipolar depression  Migraine  Displaced fracture of proximal phalanx of left little finger  Difficulty sleeping  ADD (attention deficit disorder)  History of facial surgery: Fadi-Facial Realignment Surgery in 2008    Medications:  acetaminophen   Tablet .. 650 milliGRAM(s) Oral every 6 hours  ALPRAZolam 1 milliGRAM(s) Oral three times a day PRN  ceFAZolin   IVPB 1000 milliGRAM(s) IV Intermittent every 24 hours  chlorhexidine 4% Liquid 1 Application(s) Topical <User Schedule>  clevidipine Infusion 5 mG/Hr IV Continuous <Continuous>  cyclobenzaprine 5 milliGRAM(s) Oral three times a day PRN  gabapentin 100 milliGRAM(s) Oral three times a day  heparin   Injectable 5000 Unit(s) SubCutaneous every 8 hours  HYDROmorphone PCA (1 mG/mL) 30 milliLiter(s) PCA Continuous PCA Continuous  HYDROmorphone PCA (1 mG/mL) Rescue Clinician Bolus 1 milliGRAM(s) IV Push every 15 minutes PRN  metoprolol tartrate 12.5 milliGRAM(s) Oral two times a day  naloxone Injectable 0.1 milliGRAM(s) IV Push every 3 minutes PRN  ondansetron Injectable 4 milliGRAM(s) IV Push every 6 hours PRN  sodium chloride 0.9% lock flush 10 milliLiter(s) IV Push every 1 hour PRN      Vital Signs:  ICU Vital Signs Last 24 Hrs  T(C): 38.6 (15 Ramírez 2020 00:00), Max: 38.6 (15 Ramírez 2020 00:00)  T(F): 101.4 (15 Ramírez 2020 00:00), Max: 101.4 (15 Ramírez 2020 00:00)  HR: 123 (15 Ramírez 2020 01:00) (109 - 126)  ABP: 145/86 (15 Armírez 2020 01:00) (122/88 - 156/88)  ABP(mean): 104 (15 Ramírez 2020 01:00) (88 - 108)  RR: 26 (15 Ramírez 2020 01:00) (19 - 30)  SpO2: 93% (15 Ramírez 2020 01:00) (91% - 100%)      I&O:  I&O's Detail    13 Jun 2020 07:01  -  14 Jun 2020 07:00  --------------------------------------------------------  IN:    clevidipine Infusion: 590 mL    IV PiggyBack: 50 mL    Oral Fluid: 720 mL    Other: 400 mL  Total IN: 1760 mL    OUT:    Indwelling Catheter - Urethral: 180 mL    Other: 2400 mL  Total OUT: 2580 mL    Total NET: -820 mL      14 Jun 2020 07:01  -  15 Ramírez 2020 01:36  --------------------------------------------------------  IN:    clevidipine Infusion: 450 mL    IV PiggyBack: 100 mL    Oral Fluid: 100 mL    Other: 400 mL  Total IN: 1050 mL    OUT:    Indwelling Catheter - Urethral: 150 mL    Other: 3400 mL  Total OUT: 3550 mL    Total NET: -2500 mL    EXAM:  Neuro: NAD, awake, alert.  HEENT: normocephalic, atraumatic  Respiratory: nonlabored respirations, normal CW expansion  Cardiovascular: S1,S2, regular rate and rhythm.  Abdomen: soft, nondistended  Vascular: distal pulses intact.    LABS: SICU Morning Progress Note    24 HOUR EVENTS:  - Febrile overnight 101.4  - HD net -3L  - Weaned to 2LNC.    HPI:  29 y M with PMH of bipolar depression transferred from [facility] for further management of acute renal failure due to rhabdomyolysis. Pt was found unresponsive at home, lying on his L side. He states he has not been taking his Lithium for at least 2 wks. At the OSH, he was treated for hyperkalemia. He received D5, insulin, calcium gluconate, Kayexalate Last BM was on 6/9. For L leg pain he was given lidocaine patch, dilaudid 1mg prn and tramadol 50mg. Hydralazine 20mg given for HTN. From 1 to 7 am only 100cc of UOP. Urine was tea colored. 500cc bolus of NS was given. CT cervical spine at OSH was (-). CT Head (-). CTAP and CXR were (-). CK on admission there was 177,000. (10 Ramírez 2020 13:14)    Allergies:  Allergies  No known allergies    PAST MEDICAL & SURGICAL HISTORY:  Bipolar depression  Migraine  Displaced fracture of proximal phalanx of left little finger  Difficulty sleeping  ADD (attention deficit disorder)  History of facial surgery: Fadi-Facial Realignment Surgery in 2008    Medications:  acetaminophen   Tablet .. 650 milliGRAM(s) Oral every 6 hours  ALPRAZolam 1 milliGRAM(s) Oral three times a day PRN  ceFAZolin   IVPB 1000 milliGRAM(s) IV Intermittent every 24 hours  chlorhexidine 4% Liquid 1 Application(s) Topical <User Schedule>  clevidipine Infusion 5 mG/Hr IV Continuous <Continuous>  cyclobenzaprine 5 milliGRAM(s) Oral three times a day PRN  gabapentin 100 milliGRAM(s) Oral three times a day  heparin   Injectable 5000 Unit(s) SubCutaneous every 8 hours  HYDROmorphone PCA (1 mG/mL) 30 milliLiter(s) PCA Continuous PCA Continuous  HYDROmorphone PCA (1 mG/mL) Rescue Clinician Bolus 1 milliGRAM(s) IV Push every 15 minutes PRN  metoprolol tartrate 12.5 milliGRAM(s) Oral two times a day  naloxone Injectable 0.1 milliGRAM(s) IV Push every 3 minutes PRN  ondansetron Injectable 4 milliGRAM(s) IV Push every 6 hours PRN  sodium chloride 0.9% lock flush 10 milliLiter(s) IV Push every 1 hour PRN      Vital Signs:  ICU Vital Signs Last 24 Hrs  T(C): 38.6 (15 Ramírez 2020 00:00), Max: 38.6 (15 Ramírez 2020 00:00)  T(F): 101.4 (15 Ramírez 2020 00:00), Max: 101.4 (15 Ramírez 2020 00:00)  HR: 123 (15 Ramírez 2020 01:00) (109 - 126)  ABP: 145/86 (15 Ramírez 2020 01:00) (122/88 - 156/88)  ABP(mean): 104 (15 Ramírez 2020 01:00) (88 - 108)  RR: 26 (15 Ramírez 2020 01:00) (19 - 30)  SpO2: 93% (15 Ramírez 2020 01:00) (91% - 100%)      I&O:  I&O's Detail    13 Jun 2020 07:01  -  14 Jun 2020 07:00  --------------------------------------------------------  IN:    clevidipine Infusion: 590 mL    IV PiggyBack: 50 mL    Oral Fluid: 720 mL    Other: 400 mL  Total IN: 1760 mL    OUT:    Indwelling Catheter - Urethral: 180 mL    Other: 2400 mL  Total OUT: 2580 mL    Total NET: -820 mL      14 Jun 2020 07:01  -  15 Ramírez 2020 01:36  --------------------------------------------------------  IN:    clevidipine Infusion: 450 mL    IV PiggyBack: 100 mL    Oral Fluid: 100 mL    Other: 400 mL  Total IN: 1050 mL    OUT:    Indwelling Catheter - Urethral: 150 mL    Other: 3400 mL  Total OUT: 3550 mL    Total NET: -2500 mL    EXAM:  Neuro: NAD, awake, alert.  HEENT: normocephalic, atraumatic  Respiratory: nonlabored respirations, normal CW expansion  Cardiovascular: S1,S2, regular rate and rhythm.  Abdomen: soft, nondistended  Vascular: distal pulses intact.    LABS:                          10.0   18.01 )-----------( 254      ( 15 Ramírez 2020 00:50 )             28.9     06-15    127<L>  |  90<L>  |  58<H>  ----------------------------<  109<H>  4.3   |  19<L>  |  6.30<H>    Ca    7.6<L>      15 Ramírez 2020 00:50  Phos  5.3     06-15  Mg     2.4     06-15    TPro  5.1<L>  /  Alb  2.5<L>  /  TBili  0.5  /  DBili  x   /  AST  177<H>  /  ALT  24  /  AlkPhos  124<H>  06-15      PT/INR - ( 13 Jun 2020 04:20 )   PT: 11.3 SEC;   INR: 0.98          PTT - ( 13 Jun 2020 04:20 )  PTT:28.4 SEC    ABG - ( 13 Jun 2020 04:30 )  pH, Arterial: 7.45  pH, Blood: x     /  pCO2: 37    /  pO2: 106   / HCO3: 26    / Base Excess: 2.0   /  SaO2: 98.0 SICU Morning Progress Note    24 HOUR EVENTS:  - Febrile overnight 101.4  - HD net -3L  - Weaned to 2LNC.    HPI:  29 y M with PMH of bipolar depression transferred from [facility] for further management of acute renal failure due to rhabdomyolysis. Pt was found unresponsive at home, lying on his L side. He states he has not been taking his Lithium for at least 2 wks. At the OSH, he was treated for hyperkalemia. He received D5, insulin, calcium gluconate, Kayexalate Last BM was on 6/9. For L leg pain he was given lidocaine patch, dilaudid 1mg prn and tramadol 50mg. Hydralazine 20mg given for HTN. From 1 to 7 am only 100cc of UOP. Urine was tea colored. 500cc bolus of NS was given. CT cervical spine at OSH was (-). CT Head (-). CTAP and CXR were (-). CK on admission there was 177,000. (10 Ramírez 2020 13:14)    Allergies:  Allergies  No known allergies    PAST MEDICAL & SURGICAL HISTORY:  Bipolar depression  Migraine  Displaced fracture of proximal phalanx of left little finger  Difficulty sleeping  ADD (attention deficit disorder)  History of facial surgery: Fadi-Facial Realignment Surgery in 2008    Medications:  acetaminophen   Tablet .. 650 milliGRAM(s) Oral every 6 hours  ALPRAZolam 1 milliGRAM(s) Oral three times a day PRN  ceFAZolin   IVPB 1000 milliGRAM(s) IV Intermittent every 24 hours  chlorhexidine 4% Liquid 1 Application(s) Topical <User Schedule>  clevidipine Infusion 5 mG/Hr IV Continuous <Continuous>  cyclobenzaprine 5 milliGRAM(s) Oral three times a day PRN  gabapentin 100 milliGRAM(s) Oral three times a day  heparin   Injectable 5000 Unit(s) SubCutaneous every 8 hours  HYDROmorphone PCA (1 mG/mL) 30 milliLiter(s) PCA Continuous PCA Continuous  HYDROmorphone PCA (1 mG/mL) Rescue Clinician Bolus 1 milliGRAM(s) IV Push every 15 minutes PRN  metoprolol tartrate 12.5 milliGRAM(s) Oral two times a day  naloxone Injectable 0.1 milliGRAM(s) IV Push every 3 minutes PRN  ondansetron Injectable 4 milliGRAM(s) IV Push every 6 hours PRN  sodium chloride 0.9% lock flush 10 milliLiter(s) IV Push every 1 hour PRN      Vital Signs:  ICU Vital Signs Last 24 Hrs  T(C): 37.4 (06-15-20 @ 08:00), Max: 38.6 (06-15-20 @ 00:00)  HR: 110 (06-15-20 @ 09:00) (106 - 126)  BP: --  ABP: 158/89 (06-15-20 @ 09:00) (122/88 - 158/89)  ABP(mean): 105 (06-15-20 @ 09:00) (92 - 111)  RR: 22 (06-15-20 @ 09:00) (19 - 30)  SpO2: 96% (06-15-20 @ 09:00) (89% - 100%)  Wt(kg): --  CVP(mm Hg): --      06-14 @ 07:01  -  06-15 @ 07:00  --------------------------------------------------------  IN:    clevidipine Infusion: 570 mL    IV PiggyBack: 150 mL    Oral Fluid: 250 mL    Other: 400 mL  Total IN: 1370 mL    OUT:    Indwelling Catheter - Urethral: 210 mL    Other: 3400 mL  Total OUT: 3610 mL    Total NET: -2240 mL      06-15 @ 07:01  -  06-15 @ 09:59  --------------------------------------------------------  IN:    clevidipine Infusion: 40 mL    Oral Fluid: 240 mL  Total IN: 280 mL    OUT:    Indwelling Catheter - Urethral: 40 mL  Total OUT: 40 mL    Total NET: 240 mL        EXAM:  Neuro: NAD, awake, alert.  HEENT: normocephalic, atraumatic  Respiratory: nonlabored respirations, normal CW expansion  Cardiovascular: S1,S2, regular rate and rhythm.  Abdomen: soft, nondistended  Vascular: distal pulses intact.    LABS:                          10.0   18.01 )-----------( 254      ( 15 Ramírez 2020 00:50 )             28.9     06-15    127<L>  |  90<L>  |  58<H>  ----------------------------<  109<H>  4.3   |  19<L>  |  6.30<H>    Ca    7.6<L>      15 Ramírez 2020 00:50  Phos  5.3     06-15  Mg     2.4     06-15    TPro  5.1<L>  /  Alb  2.5<L>  /  TBili  0.5  /  DBili  x   /  AST  177<H>  /  ALT  24  /  AlkPhos  124<H>  06-15      PT/INR - ( 13 Jun 2020 04:20 )   PT: 11.3 SEC;   INR: 0.98          PTT - ( 13 Jun 2020 04:20 )  PTT:28.4 SEC    ABG - ( 13 Jun 2020 04:30 )  pH, Arterial: 7.45  pH, Blood: x     /  pCO2: 37    /  pO2: 106   / HCO3: 26    / Base Excess: 2.0   /  SaO2: 98.0

## 2020-06-15 NOTE — PROGRESS NOTE ADULT - SUBJECTIVE AND OBJECTIVE BOX
Anesthesia Pain Management Service    SUBJECTIVE: Patient states his pain is managed well with IV PCA. Patient states the primary team will be changing his left leg dressing today and place a wound vac.  Pain Scale Score	At rest: 6/10___ 	With Activity: ___ 	[X ] Refer to charted pain scores    THERAPY:    [ ] IV PCA Morphine		[ ] 5 mg/mL	[ ] 1 mg/mL  [X ] IV PCA Hydromorphone	[ ] 5 mg/mL	[X ] 1 mg/mL  [ ] IV PCA Fentanyl		[ ] 50 micrograms/mL    Demand dose __0.25_ lockout __6_ (minutes) Continuous Rate _0__ Total: _13.25__  mg used (in past 24 hours)      MEDICATIONS  (STANDING):  acetaminophen   Tablet .. 650 milliGRAM(s) Oral every 6 hours  ceFAZolin   IVPB 1000 milliGRAM(s) IV Intermittent every 24 hours  chlorhexidine 4% Liquid 1 Application(s) Topical <User Schedule>  clevidipine Infusion 5 mG/Hr (10 mL/Hr) IV Continuous <Continuous>  gabapentin 100 milliGRAM(s) Oral three times a day  heparin   Injectable 5000 Unit(s) SubCutaneous every 8 hours  HYDROmorphone  Injectable 2 milliGRAM(s) IV Push once  HYDROmorphone PCA (1 mG/mL) 30 milliLiter(s) PCA Continuous PCA Continuous  metoprolol tartrate 12.5 milliGRAM(s) Oral two times a day    MEDICATIONS  (PRN):  ALPRAZolam 1 milliGRAM(s) Oral three times a day PRN Anxiety  cyclobenzaprine 5 milliGRAM(s) Oral three times a day PRN Muscle Spasm  HYDROmorphone PCA (1 mG/mL) Rescue Clinician Bolus 1 milliGRAM(s) IV Push every 15 minutes PRN for Pain Scale GREATER THAN 6  naloxone Injectable 0.1 milliGRAM(s) IV Push every 3 minutes PRN For ANY of the following changes in patient status:  A. RR LESS THAN 10 breaths per minute, B. Oxygen saturation LESS THAN 90%, C. Sedation score of 6  ondansetron Injectable 4 milliGRAM(s) IV Push every 6 hours PRN Nausea  sodium chloride 0.9% lock flush 10 milliLiter(s) IV Push every 1 hour PRN Pre/post blood products, medications, blood draw, and to maintain line patency      OBJECTIVE: Patient sitting up in bed.    Sedation Score:	[ X] Alert	[ ] Drowsy 	[ ] Arousable	[ ] Asleep	[ ] Unresponsive    Side Effects:	[X ] None	[ ] Nausea	[ ] Vomiting	[ ] Pruritus  		[ ] Other:    Vital Signs Last 24 Hrs  T(C): 37.4 (15 Ramírez 2020 08:00), Max: 38.6 (15 Ramírez 2020 00:00)  T(F): 99.4 (15 Ramírez 2020 08:00), Max: 101.4 (15 Ramírez 2020 00:00)  HR: 106 (15 Ramírez 2020 07:00) (106 - 126)  BP: --  BP(mean): --  RR: 25 (15 Ramírez 2020 07:00) (19 - 30)  SpO2: 92% (15 Ramírez 2020 07:00) (89% - 100%)    ASSESSMENT/ PLAN    Therapy to  be:	[ X] Continue   [ ] Discontinued   [ ] Change to prn Analgesics    Documentation and Verification of current medications:   [X] Done	[ ] Not done, not elligible    Comments: Discussed patient with primary team. Continue IV Dilaudid PCA. Recommend non-opioid adjuvant analgesics to be used when possible and when allowed by primary surgical team.    Progress Note written now but Patient was seen earlier.

## 2020-06-15 NOTE — PROGRESS NOTE BEHAVIORAL HEALTH - NSBHFUPINTERVALHXFT_PSY_A_CORE
No acute events over the weekend, patient continued to received xanax PRN for anxiety as ordered. Patient reports things are looking up, his labs are improving. He states he has been in contact with his mom and friends. He reports his anxiety flares before a procedure, but otherwise is under control. Denies depressed mood, denies suicidal/homicidal ideation/intent/plan. States he plans on going home to live with his parents, and eventually go back to work. He still does not recall anything that happened about the incident, denies using drugs, alcohol or other substances at the time, states last time was "about 2 days" prior to incident.  Patient states he is still in pain, but it is under control. Reports good sleep, good appetite.

## 2020-06-15 NOTE — PROGRESS NOTE ADULT - PROBLEM SELECTOR PLAN 1
Pt with ANTOINETTE in the setting of rhabdomyolysis. Upon lab review, Scr was 1.11 on 8/23/2017. Scr at [facility] this AM was 3.06. Scr today is 6.30. Pt with oliguric ANTOINETTE secondary to rhabdomyolysis. Pt. underwent HD yesterday to optimize volume status. Pt. saturing 97% on room air. Labs reviewed, no plans for HD today. Will assess daily for HD needs. Monitor labs and urine output. Avoid potential nephrotoxins.

## 2020-06-15 NOTE — PROGRESS NOTE ADULT - SUBJECTIVE AND OBJECTIVE BOX
Anesthesia Pain Management Service    SUBJECTIVE: Pt doing well with IV PCA without problems reported.    Therapy:	  [ X] IV PCA	   [ ] Epidural           [ ] s/p Spinal Opoid              [ ] Postpartum infusion	  [ ] Patient controlled regional anesthesia (PCRA)    [ ] prn Analgesics    Allergies    No Known Allergies    Intolerances      MEDICATIONS  (STANDING):  acetaminophen   Tablet .. 650 milliGRAM(s) Oral every 6 hours  ceFAZolin   IVPB 1000 milliGRAM(s) IV Intermittent every 24 hours  chlorhexidine 4% Liquid 1 Application(s) Topical <User Schedule>  clevidipine Infusion 5 mG/Hr (10 mL/Hr) IV Continuous <Continuous>  gabapentin 100 milliGRAM(s) Oral three times a day  heparin   Injectable 5000 Unit(s) SubCutaneous every 8 hours  HYDROmorphone  Injectable 2 milliGRAM(s) IV Push once  HYDROmorphone PCA (1 mG/mL) 30 milliLiter(s) PCA Continuous PCA Continuous  metoprolol tartrate 12.5 milliGRAM(s) Oral two times a day    MEDICATIONS  (PRN):  ALPRAZolam 1 milliGRAM(s) Oral three times a day PRN Anxiety  cyclobenzaprine 5 milliGRAM(s) Oral three times a day PRN Muscle Spasm  HYDROmorphone PCA (1 mG/mL) Rescue Clinician Bolus 1 milliGRAM(s) IV Push every 15 minutes PRN for Pain Scale GREATER THAN 6  naloxone Injectable 0.1 milliGRAM(s) IV Push every 3 minutes PRN For ANY of the following changes in patient status:  A. RR LESS THAN 10 breaths per minute, B. Oxygen saturation LESS THAN 90%, C. Sedation score of 6  ondansetron Injectable 4 milliGRAM(s) IV Push every 6 hours PRN Nausea  sodium chloride 0.9% lock flush 10 milliLiter(s) IV Push every 1 hour PRN Pre/post blood products, medications, blood draw, and to maintain line patency      OBJECTIVE:   [X] No new signs     [ ] Other:    Side Effects:  [X ] None			[ ] Other:    Assessment of Catheter Site:		[ ] Intact		[ ] Other:    ASSESSMENT/PLAN  [ X] Continue current therapy    [ ] Therapy changed to:    [ ] IV PCA       [ ] Epidural     [ ] prn Analgesics     Comments:

## 2020-06-15 NOTE — PROGRESS NOTE ADULT - ATTENDING COMMENTS
I Adam Sauer MD have seen and examined the patient today and agree with  the  evaluation, assessment and plan of the surgical house officer  ASHTYN Sauer MD have personally seen and examined the patient at bedside today at  220pm ASHTYN Sauer MD have seen and examined the patient today and agree with  the  evaluation, assessment and plan of the surgical house officer  ASHTYN Sauer MD have personally seen and examined the patient at bedside today at  5pm

## 2020-06-15 NOTE — PROGRESS NOTE ADULT - SUBJECTIVE AND OBJECTIVE BOX
[facility] DIVISION OF KIDNEY DISEASES AND HYPERTENSION -- FOLLOW UP NOTE  --------------------------------------------------------------------------------  HPI: 29-year-old male, with history bipolar depression was transferred from [facility] to [facility] for severe rhabdomyolysis. As per paper chart, pt was found to have cocaine and amphetamines in urine drug screen. Pt also with an elevated CPK ~ 180K. Nephrology team consulted for ANTOINETTE and rhabdomyolyiss. Upon review of labs on [facility], Scr was 1.11 on 8/23/2017. Pt. underwent fasciotomy on 6/11/20 for LLE compartement syndrome. Last HD 06/14/20 for volume optimization.    Pt. evaluated at bedside, BP improved but remains on clevidipine infusion. Planned for wound vac placement by surgery team. Pt. saturating 97% on room air.    PAST HISTORY  --------------------------------------------------------------------------------  No significant changes to PMH, PSH, FHx, SHx, unless otherwise noted    ALLERGIES & MEDICATIONS  --------------------------------------------------------------------------------  Allergies    No Known Allergies    Intolerances    Standing Inpatient Medications  acetaminophen   Tablet .. 650 milliGRAM(s) Oral every 6 hours  bisacodyl Suppository 10 milliGRAM(s) Rectal once  ceFAZolin   IVPB 1000 milliGRAM(s) IV Intermittent every 24 hours  chlorhexidine 4% Liquid 1 Application(s) Topical <User Schedule>  clevidipine Infusion 5 mG/Hr IV Continuous <Continuous>  gabapentin 100 milliGRAM(s) Oral three times a day  heparin   Injectable 5000 Unit(s) SubCutaneous every 8 hours  HYDROmorphone  Injectable 2 milliGRAM(s) IV Push once  HYDROmorphone PCA (1 mG/mL) 30 milliLiter(s) PCA Continuous PCA Continuous  polyethylene glycol 3350 17 Gram(s) Oral daily  senna 2 Tablet(s) Oral at bedtime    REVIEW OF SYSTEMS  --------------------------------------------------------------------------------  Gen: no fatigue  Respiratory: improved dyspnea  CV: No chest pain  GI: No abdominal pain  MSK: left arm and leg pain  Neuro: No dizziness  Heme: No bleeding    All other systems were reviewed and are negative, except as noted.     VITALS/PHYSICAL EXAM  --------------------------------------------------------------------------------  T(C): 37.4 (06-15-20 @ 08:00), Max: 38.6 (06-15-20 @ 00:00)  HR: 110 (06-15-20 @ 09:00) (106 - 126)  BP: --  RR: 22 (06-15-20 @ 09:00) (19 - 30)  SpO2: 96% (06-15-20 @ 09:00) (89% - 100%)  Wt(kg): --    06-14-20 @ 07:01  -  06-15-20 @ 07:00  --------------------------------------------------------  IN: 1370 mL / OUT: 3610 mL / NET: -2240 mL    06-15-20 @ 07:01  -  06-15-20 @ 09:52  --------------------------------------------------------  IN: 280 mL / OUT: 40 mL / NET: 240 mL    Physical Exam:  	Gen: NAD  	HEENT: MMM on nasal cannula  	Pulm: CTA B/L  	CV: S1S2  	Abd: Soft, +BS               : flanagan catheter in place  	Ext: left lower extremity in bandage  	Neuro: Awake  	Skin: Warm and dry              Vascular: RIJ non tunneled HD catheter, no bleeding noted    LABS/STUDIES  --------------------------------------------------------------------------------              10.0   18.01 >-----------<  254      [06-15-20 @ 00:50]              28.9     127  |  90  |  58  ----------------------------<  109      [06-15-20 @ 00:50]  4.3   |  19  |  6.30        Ca     7.6     [06-15-20 @ 00:50]      iCa    0.98     [06-15 @ 00:50]      Mg     2.4     [06-15-20 @ 00:50]      Phos  5.3     [06-15-20 @ 00:50]    CK 6004      [06-15-20 @ 00:50]    Creatinine Trend:  SCr 6.30 [06-15 @ 00:50]  SCr 5.56 [06-14 @ 19:30]  SCr 6.40 [06-14 @ 05:15]  SCr 5.96 [06-14 @ 01:10]  SCr 5.20 [06-13 @ 04:20]

## 2020-06-16 LAB
ALBUMIN SERPL ELPH-MCNC: 2.3 G/DL — LOW (ref 3.3–5)
ALP SERPL-CCNC: 111 U/L — SIGNIFICANT CHANGE UP (ref 40–120)
ALT FLD-CCNC: 11 U/L — SIGNIFICANT CHANGE UP (ref 4–41)
ANION GAP SERPL CALC-SCNC: 16 MMO/L — HIGH (ref 7–14)
ANION GAP SERPL CALC-SCNC: 18 MMO/L — HIGH (ref 7–14)
APTT BLD: 29.7 SEC — SIGNIFICANT CHANGE UP (ref 27.5–36.3)
AST SERPL-CCNC: 85 U/L — HIGH (ref 4–40)
BILIRUB SERPL-MCNC: 0.4 MG/DL — SIGNIFICANT CHANGE UP (ref 0.2–1.2)
BUN SERPL-MCNC: 71 MG/DL — HIGH (ref 7–23)
BUN SERPL-MCNC: 76 MG/DL — HIGH (ref 7–23)
CALCIUM SERPL-MCNC: 7.3 MG/DL — LOW (ref 8.4–10.5)
CALCIUM SERPL-MCNC: 7.4 MG/DL — LOW (ref 8.4–10.5)
CHLORIDE SERPL-SCNC: 85 MMOL/L — LOW (ref 98–107)
CHLORIDE SERPL-SCNC: 88 MMOL/L — LOW (ref 98–107)
CK SERPL-CCNC: 1961 U/L — HIGH (ref 30–200)
CO2 SERPL-SCNC: 19 MMOL/L — LOW (ref 22–31)
CO2 SERPL-SCNC: 20 MMOL/L — LOW (ref 22–31)
CREAT SERPL-MCNC: 6.87 MG/DL — HIGH (ref 0.5–1.3)
CREAT SERPL-MCNC: 7.3 MG/DL — HIGH (ref 0.5–1.3)
GLUCOSE SERPL-MCNC: 115 MG/DL — HIGH (ref 70–99)
GLUCOSE SERPL-MCNC: 125 MG/DL — HIGH (ref 70–99)
HCT VFR BLD CALC: 27 % — LOW (ref 39–50)
HGB BLD-MCNC: 9.3 G/DL — LOW (ref 13–17)
INR BLD: 0.97 — SIGNIFICANT CHANGE UP (ref 0.88–1.17)
MAGNESIUM SERPL-MCNC: 2.4 MG/DL — SIGNIFICANT CHANGE UP (ref 1.6–2.6)
MAGNESIUM SERPL-MCNC: 2.4 MG/DL — SIGNIFICANT CHANGE UP (ref 1.6–2.6)
MCHC RBC-ENTMCNC: 27.7 PG — SIGNIFICANT CHANGE UP (ref 27–34)
MCHC RBC-ENTMCNC: 34.4 % — SIGNIFICANT CHANGE UP (ref 32–36)
MCV RBC AUTO: 80.4 FL — SIGNIFICANT CHANGE UP (ref 80–100)
NRBC # FLD: 0 K/UL — SIGNIFICANT CHANGE UP (ref 0–0)
OSMOLALITY SERPL: 275 MOSMO/KG — SIGNIFICANT CHANGE UP (ref 275–295)
OSMOLALITY UR: 234 MOSMO/KG — SIGNIFICANT CHANGE UP (ref 50–1200)
PHOSPHATE SERPL-MCNC: 7.8 MG/DL — HIGH (ref 2.5–4.5)
PHOSPHATE SERPL-MCNC: 8.3 MG/DL — HIGH (ref 2.5–4.5)
PLATELET # BLD AUTO: 247 K/UL — SIGNIFICANT CHANGE UP (ref 150–400)
PMV BLD: 9.6 FL — SIGNIFICANT CHANGE UP (ref 7–13)
POTASSIUM SERPL-MCNC: 4.8 MMOL/L — SIGNIFICANT CHANGE UP (ref 3.5–5.3)
POTASSIUM SERPL-MCNC: 5 MMOL/L — SIGNIFICANT CHANGE UP (ref 3.5–5.3)
POTASSIUM SERPL-SCNC: 4.8 MMOL/L — SIGNIFICANT CHANGE UP (ref 3.5–5.3)
POTASSIUM SERPL-SCNC: 5 MMOL/L — SIGNIFICANT CHANGE UP (ref 3.5–5.3)
PROT SERPL-MCNC: 4.9 G/DL — LOW (ref 6–8.3)
PROTHROM AB SERPL-ACNC: 11.1 SEC — SIGNIFICANT CHANGE UP (ref 9.8–13.1)
RBC # BLD: 3.36 M/UL — LOW (ref 4.2–5.8)
RBC # FLD: 12.9 % — SIGNIFICANT CHANGE UP (ref 10.3–14.5)
SODIUM SERPL-SCNC: 122 MMOL/L — LOW (ref 135–145)
SODIUM SERPL-SCNC: 124 MMOL/L — LOW (ref 135–145)
WBC # BLD: 18.22 K/UL — HIGH (ref 3.8–10.5)
WBC # FLD AUTO: 18.22 K/UL — HIGH (ref 3.8–10.5)

## 2020-06-16 PROCEDURE — 99291 CRITICAL CARE FIRST HOUR: CPT

## 2020-06-16 PROCEDURE — 99233 SBSQ HOSP IP/OBS HIGH 50: CPT | Mod: GC

## 2020-06-16 RX ORDER — HYDROMORPHONE HYDROCHLORIDE 2 MG/ML
1 INJECTION INTRAMUSCULAR; INTRAVENOUS; SUBCUTANEOUS
Refills: 0 | Status: DISCONTINUED | OUTPATIENT
Start: 2020-06-16 | End: 2020-06-17

## 2020-06-16 RX ORDER — NICARDIPINE HYDROCHLORIDE 30 MG/1
30 CAPSULE, EXTENDED RELEASE ORAL EVERY 8 HOURS
Refills: 0 | Status: DISCONTINUED | OUTPATIENT
Start: 2020-06-16 | End: 2020-06-24

## 2020-06-16 RX ORDER — SEVELAMER CARBONATE 2400 MG/1
800 POWDER, FOR SUSPENSION ORAL EVERY 8 HOURS
Refills: 0 | Status: DISCONTINUED | OUTPATIENT
Start: 2020-06-16 | End: 2020-06-28

## 2020-06-16 RX ORDER — ALTEPLASE 100 MG
2 KIT INTRAVENOUS ONCE
Refills: 0 | Status: COMPLETED | OUTPATIENT
Start: 2020-06-16 | End: 2020-06-16

## 2020-06-16 RX ORDER — NICARDIPINE HYDROCHLORIDE 30 MG/1
30 CAPSULE, EXTENDED RELEASE ORAL ONCE
Refills: 0 | Status: COMPLETED | OUTPATIENT
Start: 2020-06-16 | End: 2020-06-16

## 2020-06-16 RX ORDER — UREA 15 G
30 POWDER IN PACKET (EA) ORAL ONCE
Refills: 0 | Status: COMPLETED | OUTPATIENT
Start: 2020-06-16 | End: 2020-06-16

## 2020-06-16 RX ORDER — POLYETHYLENE GLYCOL 3350 17 G/17G
17 POWDER, FOR SOLUTION ORAL
Refills: 0 | Status: DISCONTINUED | OUTPATIENT
Start: 2020-06-16 | End: 2020-06-29

## 2020-06-16 RX ORDER — HYDROMORPHONE HYDROCHLORIDE 2 MG/ML
30 INJECTION INTRAMUSCULAR; INTRAVENOUS; SUBCUTANEOUS
Refills: 0 | Status: DISCONTINUED | OUTPATIENT
Start: 2020-06-16 | End: 2020-06-17

## 2020-06-16 RX ADMIN — ALTEPLASE 2 MILLIGRAM(S): KIT at 20:29

## 2020-06-16 RX ADMIN — Medication 650 MILLIGRAM(S): at 01:33

## 2020-06-16 RX ADMIN — HYDROMORPHONE HYDROCHLORIDE 1 MILLIGRAM(S): 2 INJECTION INTRAMUSCULAR; INTRAVENOUS; SUBCUTANEOUS at 23:55

## 2020-06-16 RX ADMIN — SEVELAMER CARBONATE 800 MILLIGRAM(S): 2400 POWDER, FOR SUSPENSION ORAL at 21:34

## 2020-06-16 RX ADMIN — HYDROMORPHONE HYDROCHLORIDE 30 MILLILITER(S): 2 INJECTION INTRAMUSCULAR; INTRAVENOUS; SUBCUTANEOUS at 01:45

## 2020-06-16 RX ADMIN — HYDROMORPHONE HYDROCHLORIDE 30 MILLILITER(S): 2 INJECTION INTRAMUSCULAR; INTRAVENOUS; SUBCUTANEOUS at 13:42

## 2020-06-16 RX ADMIN — GABAPENTIN 100 MILLIGRAM(S): 400 CAPSULE ORAL at 21:34

## 2020-06-16 RX ADMIN — HYDROMORPHONE HYDROCHLORIDE 1 MILLIGRAM(S): 2 INJECTION INTRAMUSCULAR; INTRAVENOUS; SUBCUTANEOUS at 05:49

## 2020-06-16 RX ADMIN — Medication 30 GRAM(S): at 22:25

## 2020-06-16 RX ADMIN — NICARDIPINE HYDROCHLORIDE 30 MILLIGRAM(S): 30 CAPSULE, EXTENDED RELEASE ORAL at 21:33

## 2020-06-16 RX ADMIN — HYDROMORPHONE HYDROCHLORIDE 30 MILLILITER(S): 2 INJECTION INTRAMUSCULAR; INTRAVENOUS; SUBCUTANEOUS at 06:49

## 2020-06-16 RX ADMIN — SEVELAMER CARBONATE 800 MILLIGRAM(S): 2400 POWDER, FOR SUSPENSION ORAL at 13:05

## 2020-06-16 RX ADMIN — HYDROMORPHONE HYDROCHLORIDE 1 MILLIGRAM(S): 2 INJECTION INTRAMUSCULAR; INTRAVENOUS; SUBCUTANEOUS at 13:38

## 2020-06-16 RX ADMIN — HYDROMORPHONE HYDROCHLORIDE 30 MILLILITER(S): 2 INJECTION INTRAMUSCULAR; INTRAVENOUS; SUBCUTANEOUS at 18:52

## 2020-06-16 RX ADMIN — Medication 650 MILLIGRAM(S): at 20:34

## 2020-06-16 RX ADMIN — HYDROMORPHONE HYDROCHLORIDE 1 MILLIGRAM(S): 2 INJECTION INTRAMUSCULAR; INTRAVENOUS; SUBCUTANEOUS at 10:46

## 2020-06-16 RX ADMIN — Medication 1 MILLIGRAM(S): at 21:34

## 2020-06-16 RX ADMIN — NICARDIPINE HYDROCHLORIDE 30 MILLIGRAM(S): 30 CAPSULE, EXTENDED RELEASE ORAL at 06:45

## 2020-06-16 RX ADMIN — GABAPENTIN 100 MILLIGRAM(S): 400 CAPSULE ORAL at 05:36

## 2020-06-16 RX ADMIN — GABAPENTIN 100 MILLIGRAM(S): 400 CAPSULE ORAL at 13:05

## 2020-06-16 RX ADMIN — Medication 1 MILLIGRAM(S): at 13:05

## 2020-06-16 RX ADMIN — HEPARIN SODIUM 5000 UNIT(S): 5000 INJECTION INTRAVENOUS; SUBCUTANEOUS at 21:34

## 2020-06-16 RX ADMIN — HEPARIN SODIUM 5000 UNIT(S): 5000 INJECTION INTRAVENOUS; SUBCUTANEOUS at 13:04

## 2020-06-16 RX ADMIN — HEPARIN SODIUM 5000 UNIT(S): 5000 INJECTION INTRAVENOUS; SUBCUTANEOUS at 05:35

## 2020-06-16 RX ADMIN — HYDROMORPHONE HYDROCHLORIDE 30 MILLILITER(S): 2 INJECTION INTRAMUSCULAR; INTRAVENOUS; SUBCUTANEOUS at 09:18

## 2020-06-16 RX ADMIN — Medication 650 MILLIGRAM(S): at 07:25

## 2020-06-16 RX ADMIN — NICARDIPINE HYDROCHLORIDE 30 MILLIGRAM(S): 30 CAPSULE, EXTENDED RELEASE ORAL at 15:49

## 2020-06-16 RX ADMIN — HYDROMORPHONE HYDROCHLORIDE 1 MILLIGRAM(S): 2 INJECTION INTRAMUSCULAR; INTRAVENOUS; SUBCUTANEOUS at 20:30

## 2020-06-16 RX ADMIN — Medication 650 MILLIGRAM(S): at 13:05

## 2020-06-16 NOTE — PROGRESS NOTE ADULT - ATTENDING COMMENTS
I Adam Sauer MD have seen and examined the patient today and agree with  the  evaluation, assessment and plan of the surgical house officer  ASHTYN Sauer MD have personally seen and examined the patient at bedside today at  6 pm

## 2020-06-16 NOTE — CHART NOTE - NSCHARTNOTEFT_GEN_A_CORE
Notified by HD RN that flow the the [facility] shiley is too low for the pt to continue getting HD. I discussed the issue w/ Nephrology fellow on call, who recommended making pt NPO, giving 30g Ure-Na, and use Nifedipine PO as needed for pt's asymptomatic hypertension. In addition, salt tabs should be held for now, as risk of worsening pt's hypertension may outweigh the benefits.     - will administer Ure-Na  - will f/u w/ AM labs   - depending on pt's improvement in hyponatremia, pt may go for IR perm cath tomorrow VS new shiley placement in [facility].  - discussed the plans w/ pt, pt's RN, and w/ surgical critical care attending on call    LEAH Barnes PGY-2  SICU  [id number] Notified by HD RN that flow the the RIJ shiley is too low for the pt to continue getting HD. I discussed the issue w/ Nephrology fellow on call, who recommended making pt NPO, giving 30g Ure-Na, and use Nifedipine PO as needed for pt's asymptomatic hypertension. In addition, salt tabs should be held for now, as risk of worsening pt's hypertension may outweigh the benefits.     - will administer Ure-Na  - will f/u w/ AM labs   - will administer Nifedipine IF needed  - depending on pt's improvement in hyponatremia, pt may go for IR perm cath tomorrow VS new shiley placement in [place].  - discussed the plans w/ pt, pt's RN, and w/ surgical critical care attending on call    LEAH Barnes PGY-2  SICU  [id number]

## 2020-06-16 NOTE — CHART NOTE - NSCHARTNOTEFT_GEN_A_CORE
Called by primary team for discussion about HTN and possibility of withdrawal from amphetamines and cocaine.  patient admitted 1 week ago, BP worsening.  Team reporting patient denying withdrawal symptoms, appears comfortable but has continuously been hypertensive and not responding well to medications.   Discussed option of clonidine 0.1mg if no contraindications to this medication as a one time to monitor for response. Clonidine used for reducing BP and HR by relaxing the blood vessels.  IN withdrawal, it can reduce feelings of anxiety and agitation.  If patient improves can make 0.1mg q8hrs PRN.

## 2020-06-16 NOTE — PROGRESS NOTE ADULT - PROBLEM SELECTOR PLAN 1
Pt with ANTOINETTE in the setting of rhabdomyolysis. Upon lab review, Scr was 1.11 on 8/23/2017. Scr at [facility] this AM was 3.06. Scr today is 7.30. Pt with oliguric ANTOINETTE secondary to rhabdomyolysis. Pt. underwent HD yesterday to optimize volume status. Pt. saturing well on room air. Pt. noted to have improved urine output from midnight on. Recommend another dose of Bumex 4 mg IV. If no response, will arrange for HD with [facility] again today. Will assess daily for HD needs. Monitor labs and urine output. Avoid potential nephrotoxins.

## 2020-06-16 NOTE — PROVIDER CONTACT NOTE (OTHER) - ASSESSMENT
Pt is asymptomatic at this time, denies pain or any acute distress. Pt seen sleeping . Heart rate sustaining in 90s NSR, O2 saturation 97% on room air, respiratory rate & temperature within normal limits. Patient was given nicardipine 30mg PO with no improvement in blood pressure. patient is awaiting bedside hemodialysis

## 2020-06-16 NOTE — PROGRESS NOTE ADULT - SUBJECTIVE AND OBJECTIVE BOX
[facility] DIVISION OF KIDNEY DISEASES AND HYPERTENSION -- FOLLOW UP NOTE  --------------------------------------------------------------------------------  HPI: 29-year-old male, with history bipolar depression was transferred from [facility] to [facility] for severe rhabdomyolysis. As per paper chart, pt was found to have cocaine and amphetamines in urine drug screen. Pt also with an elevated CPK ~ 180K. Nephrology team consulted for ANTOINETTE and rhabdomyolyiss. Upon review of labs on [facility], Scr was 1.11 on 8/23/2017. Pt. underwent fasciotomy on 6/11/20 for LLE compartement syndrome. Pt. had wound vac placed yesterday (6/15/20). Last HD 06/15/20 for volume optimization.    Pt. evaluated at bedside, BP improved. Pt. saturating well on room air. Reports some tightness in LLE.    PAST HISTORY  --------------------------------------------------------------------------------  No significant changes to PMH, PSH, FHx, SHx, unless otherwise noted    ALLERGIES & MEDICATIONS  --------------------------------------------------------------------------------  Allergies    No Known Allergies    Intolerances      Standing Inpatient Medications  acetaminophen   Tablet .. 650 milliGRAM(s) Oral every 6 hours  chlorhexidine 4% Liquid 1 Application(s) Topical <User Schedule>  gabapentin 100 milliGRAM(s) Oral three times a day  heparin   Injectable 5000 Unit(s) SubCutaneous every 8 hours  HYDROmorphone PCA (1 mG/mL) 30 milliLiter(s) PCA Continuous PCA Continuous  niCARdipine 30 milliGRAM(s) Oral every 8 hours  polyethylene glycol 3350 17 Gram(s) Oral two times a day  senna 2 Tablet(s) Oral at bedtime    REVIEW OF SYSTEMS  --------------------------------------------------------------------------------  Gen: no fatigue  Respiratory: improved dyspnea  CV: No chest pain  GI: No abdominal pain  MSK: L leg pain  Neuro: No dizziness  Heme: No bleeding    All other systems were reviewed and are negative, except as noted.     VITALS/PHYSICAL EXAM  --------------------------------------------------------------------------------  T(C): 37.3 (06-16-20 @ 08:00), Max: 37.8 (06-15-20 @ 12:00)  HR: 110 (06-16-20 @ 09:00) (98 - 124)  BP: --  RR: 16 (06-16-20 @ 09:00) (15 - 23)  SpO2: 94% (06-16-20 @ 09:00) (89% - 99%)  Wt(kg): --    06-15-20 @ 07:01  -  06-16-20 @ 07:00  --------------------------------------------------------  IN: 2120 mL / OUT: 3895 mL / NET: -1775 mL    06-16-20 @ 07:01  -  06-16-20 @ 10:14  --------------------------------------------------------  IN: 240 mL / OUT: 0 mL / NET: 240 mL    Physical Exam:  	Gen: NAD  	HEENT: MMM on room air  	Pulm: CTA B/L  	CV: S1S2  	Abd: Soft, +BS               Ext: left lower extremity in wound vac  	Neuro: Awake  	Skin: Warm and dry              Vascular: RIJ non tunneled HD catheter, no bleeding noted    LABS/STUDIES  --------------------------------------------------------------------------------              9.3    18.22 >-----------<  247      [06-16-20 @ 01:30]              27.0     122  |  85  |  76  ----------------------------<  125      [06-16-20 @ 07:40]  4.8   |  19  |  7.30        Ca     7.3     [06-16-20 @ 07:40]      iCa    0.98     [06-15 @ 00:50]      Mg     2.4     [06-16-20 @ 07:40]      Phos  8.3     [06-16-20 @ 07:40]    CK 1961      [06-16-20 @ 07:40]    Creatinine Trend:  SCr 7.30 [06-16 @ 07:40]  SCr 6.87 [06-16 @ 01:30]  SCr 6.30 [06-15 @ 00:50]  SCr 5.56 [06-14 @ 19:30]  SCr 6.40 [06-14 @ 05:15]

## 2020-06-16 NOTE — PROGRESS NOTE ADULT - ASSESSMENT
29 y M with PMH of bipolar depression transferred from [facility] for further management of acute renal failure due to rhabdomyolysis, Taken to the OR for LLE compartment syndrome. Now s/p five compartment fasciotomy of the left lower leg. HD after surgery. Doing well today.    Plan  - pt continues to clinically improve from a vasc surg standpoint   - c/w vac dressings   - continue renal and hepatic optimization   - f/u pt/ot eval  - oob to chair / ambi as tolerated    vascular surgery  [id number] 29 y M with PMH of bipolar depression transferred from [facility] for further management of acute renal failure due to rhabdomyolysis, Taken to the OR for LLE compartment syndrome. Now s/p five compartment fasciotomy of the left lower leg. HD after surgery. Doing well today.    Plan  - pt continues to clinically improve from a vasc surg standpoint   - c/w vac dressings   - continue renal and hepatic optimization   - f/u pt/ot eval  - oob to chair / ambi as tolerated  will d/w renal if pt needs permacath only or long term hd access  will follow    vascular surgery  [id number]

## 2020-06-16 NOTE — PROVIDER CONTACT NOTE (OTHER) - ACTION/TREATMENT ORDERED:
None at this time as patient is asymptomatic. Awaiting bedside hemodialysis treatment. will continue to monitor.

## 2020-06-16 NOTE — PROGRESS NOTE ADULT - SUBJECTIVE AND OBJECTIVE BOX
SICU DAILY PROGRESS NOTE    29M with PMH of bipolar depression transferred from [facility] for further management of acute renal failure due to rhabdomyolysis. Pt was found unresponsive at home, lying on his L side. He states he has not been taking his Lithium for at least 2 wks. At the OSH, he was treated for hyperkalemia. He received D5, insulin, calcium gluconate, Kayexalate Last BM was on 6/9. For L leg pain he was given lidocaine patch, dilaudid 1mg prn and tramadol 50mg. Hydralazine 20mg given for HTN. From 1 to 7 am only 100cc of UOP. Urine was tea colored. 500cc bolus of NS was given. CT cervical spine at OSH was (-). CT Head (-). CTAP and CXR were (-). CK on admission there was 177,000.     Pt was seen by surgery team in MICU for LUE swelling and compartment syndrome was ruled out. Surgery team was re-consulted today due to new symptoms of L foot drop and numbness. Pt was found to be highly suspicious for compartment syndrome in the upper thigh, and was emergently taken to OR.     Pt is now s/p four compartment fasciotomy of LLE, and left lateral and medial thigh fasciotomies. SICU consulted for q1h neurovascular checks and hemodynamic monitoring    INTERVAL EVENTS  - Wound vac placed  - Dc'd Cleviprex and metoprolol, became significantly hypertensive to 190s/110s, gave two pushes hydralazine 10 without decrease in BP --> given bumex 4, restarted on Cleviprex   - Trial of Bumex 4mg --> voided 100 over 4 hours (passed TOV)  - Started a bowel regimen, refused dulcolax suppository    - Given oral and IV calcium   - Persistently hypertensive requiring Cleviprex so he was dialyzed again overnight, 2L taken off  - Still requiring Cleviprex gtt to maintain SBP <170, given 1 dose of nicardipine 30    SUBJECTIVE/ROS:  [X] A ten-point review of systems was otherwise negative except as noted.  [ ] Due to altered mental status/intubation, subjective information were not able to be obtained from the patient. History was obtained, to the extent possible, from review of the chart and collateral sources of information.    NEURO  Exam: awake, alert, oriented x4, motor and sensory intact in all 4 extremities   Meds: acetaminophen   Tablet .. 650 milliGRAM(s) Oral every 6 hours  ALPRAZolam 1 milliGRAM(s) Oral three times a day PRN Anxiety  cyclobenzaprine 5 milliGRAM(s) Oral three times a day PRN Muscle Spasm  gabapentin 100 milliGRAM(s) Oral three times a day  HYDROmorphone PCA (1 mG/mL) 30 milliLiter(s) PCA Continuous PCA Continuous  HYDROmorphone PCA (1 mG/mL) Rescue Clinician Bolus 1 milliGRAM(s) IV Push every 15 minutes PRN for Pain Scale GREATER THAN 6  ondansetron Injectable 4 milliGRAM(s) IV Push every 6 hours PRN Nausea    [x] Adequacy of sedation and pain control has been assessed and adjusted    RESPIRATORY  RR: 16 (06-15-20 @ 23:00) (15 - 26)  SpO2: 97% (06-15-20 @ 23:00) (89% - 99%  Exam: nonlabored breathing on 2L NC    CARDIOVASCULAR  HR: 111 (06-15-20 @ 23:00) (98 - 124)  ABP: 152/78 (06-15-20 @ 23:00) (115/65 - 200/108)  ABP(mean): 101 (06-15-20 @ 23:00) (80 - 138)    Exam: regular rate and rhythm  Cardiac Rhythm: sinus  Perfusion     [x]Adequate   [ ]Inadequate  Mentation   [x]Normal       [ ]Reduced  Extremities  [x]Warm         [ ]Cool  Volume Status [ ]Hypervolemic [x]Euvolemic [ ]Hypovolemic  Meds: clevidipine Infusion 5 mG/Hr IV Continuous <Continuous>    GI/NUTRITION  Exam: soft, nontender, nondistended  Diet: Regular   Meds: polyethylene glycol 3350 17 Gram(s) Oral daily  senna 2 Tablet(s) Oral at bedtime    GENITOURINARY  I&O's Detail    06-14 @ 07:01  -  06-15 @ 07:00  --------------------------------------------------------  IN:    clevidipine Infusion: 570 mL    IV PiggyBack: 150 mL    Oral Fluid: 250 mL    Other: 400 mL  Total IN: 1370 mL    OUT:    Indwelling Catheter - Urethral: 210 mL    Other: 3400 mL  Total OUT: 3610 mL    Total NET: -2240 mL    06-15 @ 07:01  -  06-16 @ 00:06  --------------------------------------------------------  IN:    clevidipine Infusion: 60 mL    clevidipine Infusion: 40 mL    clevidipine Infusion: 15 mL    IV PiggyBack: 150 mL    Oral Fluid: 1090 mL    Other: 400 mL  Total IN: 1755 mL    OUT:    Indwelling Catheter - Urethral: 95 mL    Other: 2900 mL    VAC (Vacuum Assisted Closure) System: 200 mL    VAC (Vacuum Assisted Closure) System: 50 mL    Voided: 125 mL  Total OUT: 3370 mL    Total NET: -1615 mL    06-15    127<L>  |  90<L>  |  58<H>  ----------------------------<  109<H>  4.3   |  19<L>  |  6.30<H>    Ca    7.6<L>      15 Ramírez 2020 00:50  Phos  5.3     06-15  Mg     2.4     06-15    TPro  5.1<L>  /  Alb  2.5<L>  /  TBili  0.5  /  DBili  x   /  AST  177<H>  /  ALT  24  /  AlkPhos  124<H>  06-15    [ ] Motta catheter, indication: removed 6/15  Meds: sodium chloride 0.9% lock flush 10 milliLiter(s) IV Push every 1 hour PRN Pre/post blood products, medications, blood draw, and to maintain line patency    HEMATOLOGIC  Meds: heparin   Injectable 5000 Unit(s) SubCutaneous every 8 hours    [x] VTE Prophylaxis                        10.0   18.01 )-----------( 254      ( 15 Ramírez 2020 00:50 )             28.9       Transfusion     [ ] PRBC   [ ] Platelets   [ ] FFP   [ ] Cryoprecipitate    INFECTIOUS DISEASES  WBC Count: 18.01 K/uL (06-15 @ 00:50)    ACCESS DEVICES:  [X] Peripheral IV  [X] Central Venous HD Line	[X] R	[ ] L	[X] IJ	[ ] Fem	[ ] SC	Placed:   [X] Arterial Line		[ ] R	[ ] L	[ ] Fem	[ ] Rad	[ ] Ax	Placed:   [ ] PICC:					[ ] Mediport  [ ] Urinary Catheter, Date Placed:   [x] Necessity of urinary, arterial, and venous catheters discussed    OTHER MEDICATIONS:  chlorhexidine 4% Liquid 1 Application(s) Topical <User Schedule>  naloxone Injectable 0.1 milliGRAM(s) IV Push every 3 minutes PRN SICU DAILY PROGRESS NOTE    29M with PMH of bipolar depression transferred from [facility] for further management of acute renal failure due to rhabdomyolysis. Pt was found unresponsive at home, lying on his L side. He states he has not been taking his Lithium for at least 2 wks. At the OSH, he was treated for hyperkalemia. He received D5, insulin, calcium gluconate, Kayexalate Last BM was on 6/9. For L leg pain he was given lidocaine patch, dilaudid 1mg prn and tramadol 50mg. Hydralazine 20mg given for HTN. From 1 to 7 am only 100cc of UOP. Urine was tea colored. 500cc bolus of NS was given. CT cervical spine at OSH was (-). CT Head (-). CTAP and CXR were (-). CK on admission there was 177,000.     Pt was seen by surgery team in MICU for LUE swelling and compartment syndrome was ruled out. Surgery team was re-consulted today due to new symptoms of L foot drop and numbness. Pt was found to be highly suspicious for compartment syndrome in the upper thigh, and was emergently taken to OR.     Pt is now s/p four compartment fasciotomy of LLE, and left lateral and medial thigh fasciotomies. SICU consulted for q1h neurovascular checks and hemodynamic monitoring    INTERVAL EVENTS  - Wound vac placed  - Dc'd Cleviprex and metoprolol, became significantly hypertensive to 190s/110s, gave two pushes hydralazine 10 without decrease in BP --> given bumex 4, restarted on Cleviprex   - Trial of Bumex 4mg --> voided 100 over 4 hours (passed TOV)  - Started a bowel regimen, refused dulcolax suppository    - Given oral and IV calcium   - Persistently hypertensive requiring Cleviprex so he was dialyzed again overnight, 2.5L taken off  - Still requiring Cleviprex gtt to maintain SBP <170, given 1 dose of nicardipine 30mg with good response     SUBJECTIVE/ROS:  [X] A ten-point review of systems was otherwise negative except as noted.  [ ] Due to altered mental status/intubation, subjective information were not able to be obtained from the patient. History was obtained, to the extent possible, from review of the chart and collateral sources of information.    NEURO  Exam: awake, alert, oriented x4, motor and sensory intact in all 4 extremities   Meds: acetaminophen   Tablet .. 650 milliGRAM(s) Oral every 6 hours  ALPRAZolam 1 milliGRAM(s) Oral three times a day PRN Anxiety  cyclobenzaprine 5 milliGRAM(s) Oral three times a day PRN Muscle Spasm  gabapentin 100 milliGRAM(s) Oral three times a day  HYDROmorphone PCA (1 mG/mL) 30 milliLiter(s) PCA Continuous PCA Continuous  HYDROmorphone PCA (1 mG/mL) Rescue Clinician Bolus 1 milliGRAM(s) IV Push every 15 minutes PRN for Pain Scale GREATER THAN 6  ondansetron Injectable 4 milliGRAM(s) IV Push every 6 hours PRN Nausea    [x] Adequacy of sedation and pain control has been assessed and adjusted    RESPIRATORY  RR: 16 (06-15-20 @ 23:00) (15 - 26)  SpO2: 97% (06-15-20 @ 23:00) (89% - 99%  Exam: nonlabored breathing on 2L NC    CARDIOVASCULAR  HR: 111 (06-15-20 @ 23:00) (98 - 124)  ABP: 152/78 (06-15-20 @ 23:00) (115/65 - 200/108)  ABP(mean): 101 (06-15-20 @ 23:00) (80 - 138)    Exam: regular rate and rhythm  Cardiac Rhythm: sinus  Perfusion     [x]Adequate   [ ]Inadequate  Mentation   [x]Normal       [ ]Reduced  Extremities  [x]Warm         [ ]Cool  Volume Status [ ]Hypervolemic [x]Euvolemic [ ]Hypovolemic  Meds: clevidipine Infusion 5 mG/Hr IV Continuous <Continuous>    GI/NUTRITION  Exam: soft, nontender, nondistended  Diet: Regular   Meds: polyethylene glycol 3350 17 Gram(s) Oral daily  senna 2 Tablet(s) Oral at bedtime    GENITOURINARY  I&O's Detail    06-14 @ 07:01  -  06-15 @ 07:00  --------------------------------------------------------  IN:    clevidipine Infusion: 570 mL    IV PiggyBack: 150 mL    Oral Fluid: 250 mL    Other: 400 mL  Total IN: 1370 mL    OUT:    Indwelling Catheter - Urethral: 210 mL    Other: 3400 mL  Total OUT: 3610 mL    Total NET: -2240 mL    06-15 @ 07:01  -  06-16 @ 00:06  --------------------------------------------------------  IN:    clevidipine Infusion: 60 mL    clevidipine Infusion: 40 mL    clevidipine Infusion: 15 mL    IV PiggyBack: 150 mL    Oral Fluid: 1090 mL    Other: 400 mL  Total IN: 1755 mL    OUT:    Indwelling Catheter - Urethral: 95 mL    Other: 2900 mL    VAC (Vacuum Assisted Closure) System: 200 mL    VAC (Vacuum Assisted Closure) System: 50 mL    Voided: 125 mL  Total OUT: 3370 mL    Total NET: -1615 mL    06-15    127<L>  |  90<L>  |  58<H>  ----------------------------<  109<H>  4.3   |  19<L>  |  6.30<H>    Ca    7.6<L>      15 Ramírez 2020 00:50  Phos  5.3     06-15  Mg     2.4     06-15    TPro  5.1<L>  /  Alb  2.5<L>  /  TBili  0.5  /  DBili  x   /  AST  177<H>  /  ALT  24  /  AlkPhos  124<H>  06-15    [ ] Motta catheter, indication: removed 6/15  Meds: sodium chloride 0.9% lock flush 10 milliLiter(s) IV Push every 1 hour PRN Pre/post blood products, medications, blood draw, and to maintain line patency    HEMATOLOGIC  Meds: heparin   Injectable 5000 Unit(s) SubCutaneous every 8 hours    [x] VTE Prophylaxis                        10.0   18.01 )-----------( 254      ( 15 Ramírez 2020 00:50 )             28.9       Transfusion     [ ] PRBC   [ ] Platelets   [ ] FFP   [ ] Cryoprecipitate    INFECTIOUS DISEASES  WBC Count: 18.01 K/uL (06-15 @ 00:50)    ACCESS DEVICES:  [X] Peripheral IV  [X] Central Venous HD Line	[X] R	[ ] L	[X] IJ	[ ] Fem	[ ] SC	Placed:   [X] Arterial Line		[ ] R	[ ] L	[ ] Fem	[ ] Rad	[ ] Ax	Placed:   [ ] PICC:					[ ] Mediport  [ ] Urinary Catheter, Date Placed:   [x] Necessity of urinary, arterial, and venous catheters discussed    OTHER MEDICATIONS:  chlorhexidine 4% Liquid 1 Application(s) Topical <User Schedule>  naloxone Injectable 0.1 milliGRAM(s) IV Push every 3 minutes PRN
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
## 2020-06-16 NOTE — PROGRESS NOTE ADULT - SUBJECTIVE AND OBJECTIVE BOX
VASCULAR SURGERY PROGRESS NOTE    INTERVAL EVENTS:  - No acute events overnight      SUBJECTIVE:  Denies fevers, chills, nausea, vomiting, chest pain, and shortness of breath. C/o of left leg pain, tightness, heaviness (unchanged) though limiting his ability to ambulate/OOB. Will try for OOB to chair today.      OBJECTIVE:     Vital Signs:  Vital Signs Last 24 Hrs  T(C): 37.3 (16 Jun 2020 08:00), Max: 37.8 (15 Ramírez 2020 12:00)  T(F): 99.1 (16 Jun 2020 08:00), Max: 100.1 (16 Jun 2020 00:00)  HR: 113 (16 Jun 2020 08:00) (98 - 124)  BP: --  BP(mean): --  RR: 16 (16 Jun 2020 08:00) (15 - 23)  SpO2: 93% (16 Jun 2020 08:00) (89% - 99%)      Inputs/Outputs:    15 Ramírez 2020 07:01  -  16 Jun 2020 07:00  --------------------------------------------------------  IN:    clevidipine Infusion: 60 mL    clevidipine Infusion: 40 mL    clevidipine Infusion: 30 mL    IV PiggyBack: 150 mL    Oral Fluid: 1440 mL    Other: 400 mL  Total IN: 2120 mL    OUT:    Indwelling Catheter - Urethral: 95 mL    Other: 2900 mL    VAC (Vacuum Assisted Closure) System: 50 mL    VAC (Vacuum Assisted Closure) System: 600 mL    Voided: 250 mL  Total OUT: 3895 mL    Total NET: -1775 mL      16 Jun 2020 07:01  -  16 Jun 2020 08:35  --------------------------------------------------------  IN:    Oral Fluid: 240 mL  Total IN: 240 mL    OUT:  Total OUT: 0 mL    Total NET: 240 mL            Physical Exam:  General: NAD  Respiratory: no increased work of breathing   Abdomen: soft, nontender, nondistended  Extremities: dressing cdi, LLE fasciotomies with vacs (x2) in place apparently functioning, LLE gross motor intact from hip to toes, no signs of LLE arterial insufficiency       Laboratories:                        9.3    18.22 )-----------( 247      ( 16 Jun 2020 01:30 )             27.0     LIVER FUNCTIONS - ( 16 Jun 2020 01:30 )  Alb: 2.3 g/dL / Pro: 4.9 g/dL / ALK PHOS: 111 u/L / ALT: 11 u/L / AST: 85 u/L / GGT: x             16 Jun 2020 01:30    124    |  88     |  71     ----------------------------<  115    5.0     |  20     |  6.87     Ca    7.4        16 Jun 2020 01:30  Phos  7.8       16 Jun 2020 01:30  Mg     2.4       16 Jun 2020 01:30    TPro  4.9    /  Alb  2.3    /  TBili  0.4    /  DBili  x      /  AST  85     /  ALT  11     /  AlkPhos  111    16 Jun 2020 01:30    PT/INR - ( 16 Jun 2020 01:30 )   PT: 11.1 SEC;   INR: 0.97          PTT - ( 16 Jun 2020 01:30 )  PTT:29.7 SEC  CAPILLARY BLOOD GLUCOSE        CARDIAC MARKERS ( 15 Ramírez 2020 00:50 )  x     / x     / 6004 u/L / x     / x              Medications:  MEDICATIONS  (STANDING):  acetaminophen   Tablet .. 650 milliGRAM(s) Oral every 6 hours  chlorhexidine 4% Liquid 1 Application(s) Topical <User Schedule>  gabapentin 100 milliGRAM(s) Oral three times a day  heparin   Injectable 5000 Unit(s) SubCutaneous every 8 hours  HYDROmorphone PCA (1 mG/mL) 30 milliLiter(s) PCA Continuous PCA Continuous  niCARdipine 30 milliGRAM(s) Oral every 8 hours  polyethylene glycol 3350 17 Gram(s) Oral two times a day  senna 2 Tablet(s) Oral at bedtime    MEDICATIONS  (PRN):  ALPRAZolam 1 milliGRAM(s) Oral three times a day PRN Anxiety  cyclobenzaprine 5 milliGRAM(s) Oral three times a day PRN Muscle Spasm  HYDROmorphone PCA (1 mG/mL) Rescue Clinician Bolus 1 milliGRAM(s) IV Push every 15 minutes PRN for Pain Scale GREATER THAN 6  naloxone Injectable 0.1 milliGRAM(s) IV Push every 3 minutes PRN For ANY of the following changes in patient status:  A. RR LESS THAN 10 breaths per minute, B. Oxygen saturation LESS THAN 90%, C. Sedation score of 6  ondansetron Injectable 4 milliGRAM(s) IV Push every 6 hours PRN Nausea  sodium chloride 0.9% lock flush 10 milliLiter(s) IV Push every 1 hour PRN Pre/post blood products, medications, blood draw, and to maintain line patency VASCULAR SURGERY PROGRESS NOTE    INTERVAL EVENTS:  - No acute events overnight  pt states no changes in lle  sensation   was able to be oob today       SUBJECTIVE:  Denies fevers, chills, nausea, vomiting, chest pain, and shortness of breath. C/o of left leg pain, tightness, heaviness (unchanged) though limiting his ability to ambulate/OOB. Will try for OOB to chair today.      OBJECTIVE:     Vital Signs:  Vital Signs Last 24 Hrs  T(C): 37.3 (16 Jun 2020 08:00), Max: 37.8 (15 Ramírez 2020 12:00)  T(F): 99.1 (16 Jun 2020 08:00), Max: 100.1 (16 Jun 2020 00:00)  HR: 113 (16 Jun 2020 08:00) (98 - 124)  BP: --  BP(mean): --  RR: 16 (16 Jun 2020 08:00) (15 - 23)  SpO2: 93% (16 Jun 2020 08:00) (89% - 99%)      Inputs/Outputs:    15 Armírez 2020 07:01  -  16 Jun 2020 07:00  --------------------------------------------------------  IN:    clevidipine Infusion: 60 mL    clevidipine Infusion: 40 mL    clevidipine Infusion: 30 mL    IV PiggyBack: 150 mL    Oral Fluid: 1440 mL    Other: 400 mL  Total IN: 2120 mL    OUT:    Indwelling Catheter - Urethral: 95 mL    Other: 2900 mL    VAC (Vacuum Assisted Closure) System: 50 mL    VAC (Vacuum Assisted Closure) System: 600 mL    Voided: 250 mL  Total OUT: 3895 mL    Total NET: -1775 mL      16 Jun 2020 07:01  -  16 Jun 2020 08:35  --------------------------------------------------------  IN:    Oral Fluid: 240 mL  Total IN: 240 mL    OUT:  Total OUT: 0 mL    Total NET: 240 mL      Physical Exam:  General: NAD  Respiratory: no increased work of breathing   Abdomen: soft, nontender, nondistended  Extremities: dressing cdi, LLE fasciotomies with vacs (x2) in place apparently functioning muscle all viable    LLE gross motor intact from hip to toes, no signs of LLE arterial insufficiency       Laboratories:                        9.3    18.22 )-----------( 247      ( 16 Jun 2020 01:30 )             27.0     LIVER FUNCTIONS - ( 16 Jun 2020 01:30 )  Alb: 2.3 g/dL / Pro: 4.9 g/dL / ALK PHOS: 111 u/L / ALT: 11 u/L / AST: 85 u/L / GGT: x             16 Jun 2020 01:30    124    |  88     |  71     ----------------------------<  115    5.0     |  20     |  6.87     Ca    7.4        16 Jun 2020 01:30  Phos  7.8       16 Jun 2020 01:30  Mg     2.4       16 Jun 2020 01:30    TPro  4.9    /  Alb  2.3    /  TBili  0.4    /  DBili  x      /  AST  85     /  ALT  11     /  AlkPhos  111    16 Jun 2020 01:30    PT/INR - ( 16 Jun 2020 01:30 )   PT: 11.1 SEC;   INR: 0.97          PTT - ( 16 Jun 2020 01:30 )  PTT:29.7 SEC  CAPILLARY BLOOD GLUCOSE        CARDIAC MARKERS ( 15 Ramírez 2020 00:50 )  x     / x     / 6004 u/L / x     / x          Medications:  MEDICATIONS  (STANDING):  acetaminophen   Tablet .. 650 milliGRAM(s) Oral every 6 hours  chlorhexidine 4% Liquid 1 Application(s) Topical <User Schedule>  gabapentin 100 milliGRAM(s) Oral three times a day  heparin   Injectable 5000 Unit(s) SubCutaneous every 8 hours  HYDROmorphone PCA (1 mG/mL) 30 milliLiter(s) PCA Continuous PCA Continuous  niCARdipine 30 milliGRAM(s) Oral every 8 hours  polyethylene glycol 3350 17 Gram(s) Oral two times a day  senna 2 Tablet(s) Oral at bedtime    MEDICATIONS  (PRN):  ALPRAZolam 1 milliGRAM(s) Oral three times a day PRN Anxiety  cyclobenzaprine 5 milliGRAM(s) Oral three times a day PRN Muscle Spasm  HYDROmorphone PCA (1 mG/mL) Rescue Clinician Bolus 1 milliGRAM(s) IV Push every 15 minutes PRN for Pain Scale GREATER THAN 6  naloxone Injectable 0.1 milliGRAM(s) IV Push every 3 minutes PRN For ANY of the following changes in patient status:  A. RR LESS THAN 10 breaths per minute, B. Oxygen saturation LESS THAN 90%, C. Sedation score of 6  ondansetron Injectable 4 milliGRAM(s) IV Push every 6 hours PRN Nausea  sodium chloride 0.9% lock flush 10 milliLiter(s) IV Push every 1 hour PRN Pre/post blood products, medications, blood draw, and to maintain line patency

## 2020-06-16 NOTE — PROGRESS NOTE ADULT - SUBJECTIVE AND OBJECTIVE BOX
Anesthesia Pain Management Service    SUBJECTIVE: Patient is doing well with IV PCA and no significant problems reported.    Pain Scale Score	At rest: ___ 	With Activity: ___ 	[X ] Refer to charted pain scores    THERAPY:    [ ] IV PCA Morphine		[ ] 5 mg/mL	[ ] 1 mg/mL  [X ] IV PCA Hydromorphone	[ ] 5 mg/mL	[X ] 1 mg/mL  [ ] IV PCA Fentanyl		[ ] 50 micrograms/mL    Demand dose __0.2_ lockout __6_ (minutes) Continuous Rate _0__ Total: ___  Daily      MEDICATIONS  (STANDING):  acetaminophen   Tablet .. 650 milliGRAM(s) Oral every 6 hours  chlorhexidine 4% Liquid 1 Application(s) Topical <User Schedule>  gabapentin 100 milliGRAM(s) Oral three times a day  heparin   Injectable 5000 Unit(s) SubCutaneous every 8 hours  HYDROmorphone PCA (1 mG/mL) 30 milliLiter(s) PCA Continuous PCA Continuous  niCARdipine 30 milliGRAM(s) Oral every 8 hours  polyethylene glycol 3350 17 Gram(s) Oral two times a day  senna 2 Tablet(s) Oral at bedtime  sevelamer carbonate 800 milliGRAM(s) Oral every 8 hours    MEDICATIONS  (PRN):  ALPRAZolam 1 milliGRAM(s) Oral three times a day PRN Anxiety  cyclobenzaprine 5 milliGRAM(s) Oral three times a day PRN Muscle Spasm  HYDROmorphone PCA (1 mG/mL) Rescue Clinician Bolus 1 milliGRAM(s) IV Push every 15 minutes PRN for Pain Scale GREATER THAN 6  naloxone Injectable 0.1 milliGRAM(s) IV Push every 3 minutes PRN For ANY of the following changes in patient status:  A. RR LESS THAN 10 breaths per minute, B. Oxygen saturation LESS THAN 90%, C. Sedation score of 6  ondansetron Injectable 4 milliGRAM(s) IV Push every 6 hours PRN Nausea  sodium chloride 0.9% lock flush 10 milliLiter(s) IV Push every 1 hour PRN Pre/post blood products, medications, blood draw, and to maintain line patency      OBJECTIVE:    Sedation Score:	[ X] Alert	[ ] Drowsy 	[ ] Arousable	[ ] Asleep	[ ] Unresponsive    Side Effects:	[X ] None	[ ] Nausea	[ ] Vomiting	[ ] Pruritus  		[ ] Other:    Vital Signs Last 24 Hrs  T(C): 37.4 (16 Jun 2020 12:00), Max: 37.8 (16 Jun 2020 00:00)  T(F): 99.4 (16 Jun 2020 12:00), Max: 100.1 (16 Jun 2020 00:00)  HR: 98 (16 Jun 2020 16:00) (98 - 124)  BP: --  BP(mean): --  RR: 13 (16 Jun 2020 16:00) (13 - 23)  SpO2: 97% (16 Jun 2020 16:00) (89% - 99%)    ASSESSMENT/ PLAN    Therapy to  be:	[ X] Continue   [ ] Discontinued   [ ] Change to prn Analgesics    Documentation and Verification of current medications:   [X] Done	[ ] Not done, not elligible    Comments: Continue Dilaudid IVPCA, of which the interval was increased to 8min and patient is doing well on this regimen. Continue nonopioid adjuvant analgesics in addition.

## 2020-06-16 NOTE — PROGRESS NOTE ADULT - ATTENDING COMMENTS
I have personally interviewed and examined this patient, reviewed pertinent labs and imaging, and discussed the case with colleagues, residents, physician assistants, and nurses on SICU rounds.    40   minutes in total were spent in providing direct critical care for the diagnoses, assessment and plan outlined below.  These diagnoses are unrelated to the surgical procedure.  Additionally, time spent in the performance of separately billable procedures was not counted toward the critical care time.  There is no overlap.    The active critical care issues are:  1.  acute pain, anxiety, risk of addiction, opioid constipation  2.  sinus tachycardia, hypertension  3.  hyponatremia, hyperphosphatemia  4.  oliguric acute renal insufficiency 2/2 rhabdomyolysis  5. acute blood loss anemia, stable    Attempt transition from IV narcotic PCA to oral equivalents; this is limited by patient requesting continued IV narcotics.  Escalate bowel regimen as patient still has not had a BM since hospital admission.  Uptitrate oral calcium channel blocker and coordinate additional fluid removal with HD for better management of asympotomatic acute hypertension.  Consider oral phos binder.  Remove america given correlation with NIBP monitor.    Local wound care per vascular surgery service; deferred plastic surgery consult at present.    PT.      I was physically present for the key portions of the evaluation and management (E/M) service provided.  I agree with the above history, physical, and plan which I have reviewed and edited where appropriate.

## 2020-06-16 NOTE — PROVIDER CONTACT NOTE (OTHER) - BACKGROUND
Pt admitted for acute renal failure r/t rhabdomyolysis. patient is status post L leg fasciotomy d/t compartment syndrome after being found unconscious on L side for prolonged period of time.

## 2020-06-17 LAB
ANION GAP SERPL CALC-SCNC: 14 MMO/L — SIGNIFICANT CHANGE UP (ref 7–14)
ANION GAP SERPL CALC-SCNC: 17 MMO/L — HIGH (ref 7–14)
ANION GAP SERPL CALC-SCNC: 17 MMO/L — HIGH (ref 7–14)
BUN SERPL-MCNC: 102 MG/DL — HIGH (ref 7–23)
BUN SERPL-MCNC: 110 MG/DL — HIGH (ref 7–23)
BUN SERPL-MCNC: 74 MG/DL — HIGH (ref 7–23)
CALCIUM SERPL-MCNC: 7.1 MG/DL — LOW (ref 8.4–10.5)
CALCIUM SERPL-MCNC: 7.2 MG/DL — LOW (ref 8.4–10.5)
CALCIUM SERPL-MCNC: 7.3 MG/DL — LOW (ref 8.4–10.5)
CHLORIDE SERPL-SCNC: 88 MMOL/L — LOW (ref 98–107)
CHLORIDE SERPL-SCNC: 89 MMOL/L — LOW (ref 98–107)
CHLORIDE SERPL-SCNC: 90 MMOL/L — LOW (ref 98–107)
CO2 SERPL-SCNC: 18 MMOL/L — LOW (ref 22–31)
CO2 SERPL-SCNC: 18 MMOL/L — LOW (ref 22–31)
CO2 SERPL-SCNC: 23 MMOL/L — SIGNIFICANT CHANGE UP (ref 22–31)
CREAT SERPL-MCNC: 6.67 MG/DL — HIGH (ref 0.5–1.3)
CREAT SERPL-MCNC: 8.03 MG/DL — HIGH (ref 0.5–1.3)
CREAT SERPL-MCNC: 8.3 MG/DL — HIGH (ref 0.5–1.3)
GLUCOSE SERPL-MCNC: 116 MG/DL — HIGH (ref 70–99)
GLUCOSE SERPL-MCNC: 117 MG/DL — HIGH (ref 70–99)
GLUCOSE SERPL-MCNC: 142 MG/DL — HIGH (ref 70–99)
HCT VFR BLD CALC: 28 % — LOW (ref 39–50)
HGB BLD-MCNC: 9.6 G/DL — LOW (ref 13–17)
MAGNESIUM SERPL-MCNC: 2.3 MG/DL — SIGNIFICANT CHANGE UP (ref 1.6–2.6)
MAGNESIUM SERPL-MCNC: 2.4 MG/DL — SIGNIFICANT CHANGE UP (ref 1.6–2.6)
MAGNESIUM SERPL-MCNC: 2.4 MG/DL — SIGNIFICANT CHANGE UP (ref 1.6–2.6)
MCHC RBC-ENTMCNC: 27.9 PG — SIGNIFICANT CHANGE UP (ref 27–34)
MCHC RBC-ENTMCNC: 34.3 % — SIGNIFICANT CHANGE UP (ref 32–36)
MCV RBC AUTO: 81.4 FL — SIGNIFICANT CHANGE UP (ref 80–100)
NRBC # FLD: 0 K/UL — SIGNIFICANT CHANGE UP (ref 0–0)
PHOSPHATE SERPL-MCNC: 6.1 MG/DL — HIGH (ref 2.5–4.5)
PHOSPHATE SERPL-MCNC: 8.4 MG/DL — HIGH (ref 2.5–4.5)
PHOSPHATE SERPL-MCNC: 8.5 MG/DL — HIGH (ref 2.5–4.5)
PLATELET # BLD AUTO: 317 K/UL — SIGNIFICANT CHANGE UP (ref 150–400)
PMV BLD: 9.5 FL — SIGNIFICANT CHANGE UP (ref 7–13)
POTASSIUM SERPL-MCNC: 5.4 MMOL/L — HIGH (ref 3.5–5.3)
POTASSIUM SERPL-MCNC: 5.7 MMOL/L — HIGH (ref 3.5–5.3)
POTASSIUM SERPL-MCNC: 5.7 MMOL/L — HIGH (ref 3.5–5.3)
POTASSIUM SERPL-SCNC: 5.4 MMOL/L — HIGH (ref 3.5–5.3)
POTASSIUM SERPL-SCNC: 5.7 MMOL/L — HIGH (ref 3.5–5.3)
POTASSIUM SERPL-SCNC: 5.7 MMOL/L — HIGH (ref 3.5–5.3)
RBC # BLD: 3.44 M/UL — LOW (ref 4.2–5.8)
RBC # FLD: 12.8 % — SIGNIFICANT CHANGE UP (ref 10.3–14.5)
SODIUM SERPL-SCNC: 123 MMOL/L — LOW (ref 135–145)
SODIUM SERPL-SCNC: 124 MMOL/L — LOW (ref 135–145)
SODIUM SERPL-SCNC: 127 MMOL/L — LOW (ref 135–145)
WBC # BLD: 17.47 K/UL — HIGH (ref 3.8–10.5)
WBC # FLD AUTO: 17.47 K/UL — HIGH (ref 3.8–10.5)

## 2020-06-17 PROCEDURE — 93010 ELECTROCARDIOGRAM REPORT: CPT

## 2020-06-17 PROCEDURE — 36556 INSERT NON-TUNNEL CV CATH: CPT | Mod: GC

## 2020-06-17 PROCEDURE — 99291 CRITICAL CARE FIRST HOUR: CPT | Mod: 25

## 2020-06-17 PROCEDURE — 99233 SBSQ HOSP IP/OBS HIGH 50: CPT | Mod: GC

## 2020-06-17 PROCEDURE — 36620 INSERTION CATHETER ARTERY: CPT

## 2020-06-17 RX ORDER — HYDROMORPHONE HYDROCHLORIDE 2 MG/ML
1 INJECTION INTRAMUSCULAR; INTRAVENOUS; SUBCUTANEOUS ONCE
Refills: 0 | Status: DISCONTINUED | OUTPATIENT
Start: 2020-06-17 | End: 2020-06-17

## 2020-06-17 RX ORDER — HYDROMORPHONE HYDROCHLORIDE 2 MG/ML
2 INJECTION INTRAMUSCULAR; INTRAVENOUS; SUBCUTANEOUS ONCE
Refills: 0 | Status: DISCONTINUED | OUTPATIENT
Start: 2020-06-17 | End: 2020-06-17

## 2020-06-17 RX ORDER — SODIUM ZIRCONIUM CYCLOSILICATE 10 G/10G
10 POWDER, FOR SUSPENSION ORAL ONCE
Refills: 0 | Status: COMPLETED | OUTPATIENT
Start: 2020-06-17 | End: 2020-06-17

## 2020-06-17 RX ORDER — KETAMINE HYDROCHLORIDE 100 MG/ML
30 INJECTION INTRAMUSCULAR; INTRAVENOUS ONCE
Refills: 0 | Status: DISCONTINUED | OUTPATIENT
Start: 2020-06-17 | End: 2020-06-18

## 2020-06-17 RX ORDER — OXYCODONE HYDROCHLORIDE 5 MG/1
15 TABLET ORAL
Refills: 0 | Status: DISCONTINUED | OUTPATIENT
Start: 2020-06-17 | End: 2020-06-19

## 2020-06-17 RX ORDER — KETAMINE HYDROCHLORIDE 100 MG/ML
15 INJECTION INTRAMUSCULAR; INTRAVENOUS ONCE
Refills: 0 | Status: DISCONTINUED | OUTPATIENT
Start: 2020-06-17 | End: 2020-06-17

## 2020-06-17 RX ORDER — HYDROMORPHONE HYDROCHLORIDE 2 MG/ML
1 INJECTION INTRAMUSCULAR; INTRAVENOUS; SUBCUTANEOUS
Refills: 0 | Status: DISCONTINUED | OUTPATIENT
Start: 2020-06-17 | End: 2020-06-18

## 2020-06-17 RX ORDER — OXYCODONE HYDROCHLORIDE 5 MG/1
10 TABLET ORAL
Refills: 0 | Status: DISCONTINUED | OUTPATIENT
Start: 2020-06-17 | End: 2020-06-19

## 2020-06-17 RX ORDER — ALTEPLASE 100 MG
2 KIT INTRAVENOUS ONCE
Refills: 0 | Status: COMPLETED | OUTPATIENT
Start: 2020-06-17 | End: 2020-06-17

## 2020-06-17 RX ORDER — KETAMINE HYDROCHLORIDE 100 MG/ML
30 INJECTION INTRAMUSCULAR; INTRAVENOUS ONCE
Refills: 0 | Status: DISCONTINUED | OUTPATIENT
Start: 2020-06-17 | End: 2020-06-17

## 2020-06-17 RX ORDER — FENTANYL CITRATE 50 UG/ML
25 INJECTION INTRAVENOUS ONCE
Refills: 0 | Status: DISCONTINUED | OUTPATIENT
Start: 2020-06-17 | End: 2020-06-17

## 2020-06-17 RX ORDER — HYDROMORPHONE HYDROCHLORIDE 2 MG/ML
2 INJECTION INTRAMUSCULAR; INTRAVENOUS; SUBCUTANEOUS DAILY
Refills: 0 | Status: DISCONTINUED | OUTPATIENT
Start: 2020-06-17 | End: 2020-06-19

## 2020-06-17 RX ORDER — MIDAZOLAM HYDROCHLORIDE 1 MG/ML
2 INJECTION, SOLUTION INTRAMUSCULAR; INTRAVENOUS ONCE
Refills: 0 | Status: DISCONTINUED | OUTPATIENT
Start: 2020-06-17 | End: 2020-06-17

## 2020-06-17 RX ORDER — LACTULOSE 10 G/15ML
10 SOLUTION ORAL ONCE
Refills: 0 | Status: COMPLETED | OUTPATIENT
Start: 2020-06-17 | End: 2020-06-18

## 2020-06-17 RX ADMIN — OXYCODONE HYDROCHLORIDE 15 MILLIGRAM(S): 5 TABLET ORAL at 19:26

## 2020-06-17 RX ADMIN — FENTANYL CITRATE 25 MICROGRAM(S): 50 INJECTION INTRAVENOUS at 11:30

## 2020-06-17 RX ADMIN — HYDROMORPHONE HYDROCHLORIDE 1 MILLIGRAM(S): 2 INJECTION INTRAMUSCULAR; INTRAVENOUS; SUBCUTANEOUS at 19:53

## 2020-06-17 RX ADMIN — HEPARIN SODIUM 5000 UNIT(S): 5000 INJECTION INTRAVENOUS; SUBCUTANEOUS at 21:19

## 2020-06-17 RX ADMIN — NICARDIPINE HYDROCHLORIDE 30 MILLIGRAM(S): 30 CAPSULE, EXTENDED RELEASE ORAL at 12:44

## 2020-06-17 RX ADMIN — OXYCODONE HYDROCHLORIDE 15 MILLIGRAM(S): 5 TABLET ORAL at 22:28

## 2020-06-17 RX ADMIN — HYDROMORPHONE HYDROCHLORIDE 1 MILLIGRAM(S): 2 INJECTION INTRAMUSCULAR; INTRAVENOUS; SUBCUTANEOUS at 23:42

## 2020-06-17 RX ADMIN — OXYCODONE HYDROCHLORIDE 15 MILLIGRAM(S): 5 TABLET ORAL at 16:08

## 2020-06-17 RX ADMIN — NICARDIPINE HYDROCHLORIDE 30 MILLIGRAM(S): 30 CAPSULE, EXTENDED RELEASE ORAL at 21:20

## 2020-06-17 RX ADMIN — SEVELAMER CARBONATE 800 MILLIGRAM(S): 2400 POWDER, FOR SUSPENSION ORAL at 05:00

## 2020-06-17 RX ADMIN — Medication 1 MILLIGRAM(S): at 20:45

## 2020-06-17 RX ADMIN — SEVELAMER CARBONATE 800 MILLIGRAM(S): 2400 POWDER, FOR SUSPENSION ORAL at 21:20

## 2020-06-17 RX ADMIN — HEPARIN SODIUM 5000 UNIT(S): 5000 INJECTION INTRAVENOUS; SUBCUTANEOUS at 13:00

## 2020-06-17 RX ADMIN — GABAPENTIN 100 MILLIGRAM(S): 400 CAPSULE ORAL at 05:00

## 2020-06-17 RX ADMIN — HEPARIN SODIUM 5000 UNIT(S): 5000 INJECTION INTRAVENOUS; SUBCUTANEOUS at 05:00

## 2020-06-17 RX ADMIN — Medication 650 MILLIGRAM(S): at 07:30

## 2020-06-17 RX ADMIN — NICARDIPINE HYDROCHLORIDE 30 MILLIGRAM(S): 30 CAPSULE, EXTENDED RELEASE ORAL at 05:00

## 2020-06-17 RX ADMIN — HYDROMORPHONE HYDROCHLORIDE 1 MILLIGRAM(S): 2 INJECTION INTRAMUSCULAR; INTRAVENOUS; SUBCUTANEOUS at 11:40

## 2020-06-17 RX ADMIN — KETAMINE HYDROCHLORIDE 15 MILLIGRAM(S): 100 INJECTION INTRAMUSCULAR; INTRAVENOUS at 21:19

## 2020-06-17 RX ADMIN — HYDROMORPHONE HYDROCHLORIDE 1 MILLIGRAM(S): 2 INJECTION INTRAMUSCULAR; INTRAVENOUS; SUBCUTANEOUS at 01:45

## 2020-06-17 RX ADMIN — Medication 650 MILLIGRAM(S): at 20:12

## 2020-06-17 RX ADMIN — CHLORHEXIDINE GLUCONATE 1 APPLICATION(S): 213 SOLUTION TOPICAL at 05:00

## 2020-06-17 RX ADMIN — KETAMINE HYDROCHLORIDE 30 MILLIGRAM(S): 100 INJECTION INTRAMUSCULAR; INTRAVENOUS at 22:51

## 2020-06-17 RX ADMIN — HYDROMORPHONE HYDROCHLORIDE 1 MILLIGRAM(S): 2 INJECTION INTRAMUSCULAR; INTRAVENOUS; SUBCUTANEOUS at 16:58

## 2020-06-17 RX ADMIN — HYDROMORPHONE HYDROCHLORIDE 1 MILLIGRAM(S): 2 INJECTION INTRAMUSCULAR; INTRAVENOUS; SUBCUTANEOUS at 06:55

## 2020-06-17 RX ADMIN — KETAMINE HYDROCHLORIDE 15 MILLIGRAM(S): 100 INJECTION INTRAMUSCULAR; INTRAVENOUS at 22:06

## 2020-06-17 RX ADMIN — HYDROMORPHONE HYDROCHLORIDE 30 MILLILITER(S): 2 INJECTION INTRAMUSCULAR; INTRAVENOUS; SUBCUTANEOUS at 06:49

## 2020-06-17 RX ADMIN — GABAPENTIN 100 MILLIGRAM(S): 400 CAPSULE ORAL at 12:44

## 2020-06-17 RX ADMIN — HYDROMORPHONE HYDROCHLORIDE 1 MILLIGRAM(S): 2 INJECTION INTRAMUSCULAR; INTRAVENOUS; SUBCUTANEOUS at 11:15

## 2020-06-17 RX ADMIN — MIDAZOLAM HYDROCHLORIDE 2 MILLIGRAM(S): 1 INJECTION, SOLUTION INTRAMUSCULAR; INTRAVENOUS at 11:50

## 2020-06-17 RX ADMIN — Medication 650 MILLIGRAM(S): at 01:47

## 2020-06-17 RX ADMIN — SEVELAMER CARBONATE 800 MILLIGRAM(S): 2400 POWDER, FOR SUSPENSION ORAL at 12:44

## 2020-06-17 RX ADMIN — GABAPENTIN 100 MILLIGRAM(S): 400 CAPSULE ORAL at 21:29

## 2020-06-17 RX ADMIN — KETAMINE HYDROCHLORIDE 30 MILLIGRAM(S): 100 INJECTION INTRAMUSCULAR; INTRAVENOUS at 23:13

## 2020-06-17 RX ADMIN — HYDROMORPHONE HYDROCHLORIDE 2 MILLIGRAM(S): 2 INJECTION INTRAMUSCULAR; INTRAVENOUS; SUBCUTANEOUS at 10:50

## 2020-06-17 RX ADMIN — CYCLOBENZAPRINE HYDROCHLORIDE 5 MILLIGRAM(S): 10 TABLET, FILM COATED ORAL at 01:57

## 2020-06-17 RX ADMIN — HYDROMORPHONE HYDROCHLORIDE 1 MILLIGRAM(S): 2 INJECTION INTRAMUSCULAR; INTRAVENOUS; SUBCUTANEOUS at 18:32

## 2020-06-17 RX ADMIN — Medication 650 MILLIGRAM(S): at 13:00

## 2020-06-17 RX ADMIN — SODIUM ZIRCONIUM CYCLOSILICATE 10 GRAM(S): 10 POWDER, FOR SUSPENSION ORAL at 23:21

## 2020-06-17 RX ADMIN — HYDROMORPHONE HYDROCHLORIDE 1 MILLIGRAM(S): 2 INJECTION INTRAMUSCULAR; INTRAVENOUS; SUBCUTANEOUS at 05:17

## 2020-06-17 NOTE — PROGRESS NOTE ADULT - ATTENDING COMMENTS
I have personally interviewed and examined this patient, reviewed pertinent labs and imaging, and discussed the case with colleagues, residents, physician assistants, and nurses on SICU rounds.    40   minutes in total were spent in providing direct critical care for the diagnoses, assessment and plan outlined below.  These diagnoses are unrelated to the surgical procedure.  Additionally, time spent in the performance of separately billable procedures was not counted toward the critical care time.  There is no overlap.    The active critical care issues are:  1.  acute pain, anxiety, risk of addiction, opioid constipation  2.  sinus tachycardia, hypertension  3.  hyponatremia, hyperphosphatemia  4.  oliguric acute renal insufficiency 2/2 rhabdomyolysis  5. acute blood loss anemia, stable    Transition today from IV narcotic PCA to oral equivalents.  Uptitrate oral calcium channel blocker and coordinate additional fluid removal with HD for better management of asympotomatic acute hypertension (highly doubt renal artery dissection).  Needs urgent HD for fluid removal and hyperkalemia.  Expect hyponatremia will correct with HD.  Per anesthesia/IR, cannot get tunneled permcath due to Na+<128.  Will continue temp HD cath until sodium improves.  Antionette for blood draws.    Local wound care per vascular surgery service.    PT.      I was physically present for the key portions of the evaluation and management (E/M) service provided.  I agree with the above history, physical, and plan which I have reviewed and edited where appropriate.

## 2020-06-17 NOTE — PROGRESS NOTE ADULT - SUBJECTIVE AND OBJECTIVE BOX
[facility] DIVISION OF KIDNEY DISEASES AND HYPERTENSION -- FOLLOW UP NOTE  --------------------------------------------------------------------------------  HPI: 29-year-old male, with history bipolar depression was transferred from [facility] to [facility] for severe rhabdomyolysis. As per paper chart, pt was found to have cocaine and amphetamines in urine drug screen. Pt also with an elevated CPK ~ 180K. Nephrology team consulted for ANTOINETTE and rhabdomyolyiss. Upon review of labs on [facility], Scr was 1.11 on 8/23/2017. Pt. underwent fasciotomy on 6/11/20 for LLE compartement syndrome. Pt. had wound vac placed yesterday (6/15/20). Last HD 06/15/20 for volume optimization. Attempted HD yesterday but HD catheter was not functioning. Cathflo placed but no improvement. Surgery team placed new femoral HD catheter today.    Pt. evaluated at bedside, in no distress. Appears tired.    PAST HISTORY  --------------------------------------------------------------------------------  No significant changes to PMH, PSH, FHx, SHx, unless otherwise noted    ALLERGIES & MEDICATIONS  --------------------------------------------------------------------------------  Allergies    No Known Allergies    Intolerances      Standing Inpatient Medications  acetaminophen   Tablet .. 650 milliGRAM(s) Oral every 6 hours  chlorhexidine 4% Liquid 1 Application(s) Topical <User Schedule>  gabapentin 100 milliGRAM(s) Oral three times a day  heparin   Injectable 5000 Unit(s) SubCutaneous every 8 hours  HYDROmorphone PCA (1 mG/mL) 30 milliLiter(s) PCA Continuous PCA Continuous  niCARdipine 30 milliGRAM(s) Oral every 8 hours  polyethylene glycol 3350 17 Gram(s) Oral two times a day  senna 2 Tablet(s) Oral at bedtime  sevelamer carbonate 800 milliGRAM(s) Oral every 8 hours    REVIEW OF SYSTEMS  --------------------------------------------------------------------------------  Gen: no fatigue  Respiratory: improved dyspnea  CV: No chest pain  GI: No abdominal pain  MSK: L leg pain  Neuro: No dizziness  Heme: No bleeding    All other systems were reviewed and are negative, except as noted.    VITALS/PHYSICAL EXAM  --------------------------------------------------------------------------------  T(C): 37.5 (06-17-20 @ 08:00), Max: 37.7 (06-17-20 @ 04:00)  HR: 114 (06-17-20 @ 08:00) (98 - 119)  BP: 147/88 (06-16-20 @ 20:00) (147/88 - 175/110)  RR: 19 (06-17-20 @ 08:00) (13 - 23)  SpO2: 95% (06-17-20 @ 08:00) (94% - 98%)  Wt(kg): --    06-16-20 @ 07:01  -  06-17-20 @ 07:00  --------------------------------------------------------  IN: 1840 mL / OUT: 2549 mL / NET: -709 mL    06-17-20 @ 07:01  -  06-17-20 @ 08:52  --------------------------------------------------------  IN: 0 mL / OUT: 0 mL / NET: 0 mL    Physical Exam:  	Gen: NAD  	HEENT: MMM on on NC  	Pulm: CTA B/L  	CV: S1S2  	Abd: Soft, +BS               Ext: left lower extremity in wound vac  	Neuro: Awake  	Skin: Warm and dry  	Vascular access: R femoral non tunneled HD catheter    LABS/STUDIES  --------------------------------------------------------------------------------              9.6    17.47 >-----------<  317      [06-17-20 @ 01:50]              28.0     123  |  88  |  110  ----------------------------<  117      [06-17-20 @ 07:29]  5.7   |  18  |  8.30        Ca     7.3     [06-17-20 @ 07:29]      Mg     2.4     [06-17-20 @ 07:29]      Phos  8.4     [06-17-20 @ 07:29]    CK 1961      [06-16-20 @ 07:40]  Serum Osmolality 275      [06-16-20 @ 10:43]    Creatinine Trend:  SCr 8.30 [06-17 @ 07:29]  SCr 8.03 [06-17 @ 01:50]  SCr 7.30 [06-16 @ 07:40]  SCr 6.87 [06-16 @ 01:30]  SCr 6.30 [06-15 @ 00:50]    Urine Osmolality 234      [06-16-20 @ 10:43]    HBsAb 9.2      [06-11-20 @ 03:40]  HBsAb Nonreactive      [06-10-20 @ 21:47]  HBsAg NEGATIVE      [06-11-20 @ 03:40]  HCV 0.07, Nonreactive  [06-11-20 @ 03:40]  HIV Nonreactive   [06-10-20 @ 14:15]

## 2020-06-17 NOTE — PROGRESS NOTE BEHAVIORAL HEALTH - SUMMARY
Patient is a 28 y/o single male, no significant PMHx, PPHx bipolar disorder, no prior inpatient hospitalizations, follows w/ Dr. White once every 2 weeks, no prior SA, no prior NSSIB, occasional cocaine and alcohol use, transferred from [facility] after presenting with acute renal failure 2/2 rhabdomyolysis after being found unresponsive at home by neighbors. Patient has received multiple HD since admission, and is s/p fasciotomy for compartment syndrome in LLE. Patient seems to be minimizing symptoms, states he feels "fine," denies all mood symptoms, no psychosis, denies suicidal ideation, adamantly states he does not know what led him to being on the floor. Utox + cocaine, benzo, amphetamines, oxy. Does not feel as though he has a substance abuse problem, is offered but declines substance abuse treatment.

## 2020-06-17 NOTE — PROGRESS NOTE ADULT - SUBJECTIVE AND OBJECTIVE BOX
VASCULAR SURGERY PROGRESS NOTE    INTERVAL EVENTS:  - R Fem Shiley placed, R IJ Shiley removed due to decreased flow during HD  - OOB to chair yesterday    SUBJECTIVE:  Pain well-controlled, no change in LLE sensation / no parestheisias. Still with continued left leg pain, tightness, heaviness (unchanged). Denies fevers, chills, nausea, vomiting, chest pain, and shortness of breath.       OBJECTIVE:   Vital Signs:  ICU Vital Signs Last 24 Hrs  T(C): 37.5 (17 Jun 2020 08:00), Max: 37.7 (17 Jun 2020 04:00)  T(F): 99.5 (17 Jun 2020 08:00), Max: 99.8 (17 Jun 2020 04:00)  HR: 114 (17 Jun 2020 08:00) (98 - 119)  BP: 147/88 (16 Jun 2020 20:00) (147/88 - 175/110)  BP(mean): 101 (16 Jun 2020 20:00) (101 - 124)  ABP: 147/82 (17 Jun 2020 08:00) (133/73 - 200/113)  ABP(mean): 92 (17 Jun 2020 07:00) (92 - 144)  RR: 19 (17 Jun 2020 08:00) (13 - 23)  SpO2: 95% (17 Jun 2020 08:00) (94% - 98%)    I&O's Detail  16 Jun 2020 07:01  -  17 Jun 2020 07:00  --------------------------------------------------------  IN:    Oral Fluid: 940 mL    Other: 900 mL  Total IN: 1840 mL    OUT:    Other: 899 mL    VAC (Vacuum Assisted Closure) System: 300 mL    VAC (Vacuum Assisted Closure) System: 800 mL    Voided: 550 mL  Total OUT: 2549 mL    Total NET: -709 mL    17 Jun 2020 07:01  -  17 Jun 2020 08:22  --------------------------------------------------------  IN:  Total IN: 0 mL    OUT:  Total OUT: 0 mL  Total NET: 0 mL      Physical Exam:  General: NAD  Respiratory: no increased work of breathing   Abdomen: soft, nontender, nondistended  Extremities: dressing cdi, LLE fasciotomies with vacs (x2) in place apparently functioning muscle all viable   LLE gross motor intact from hip to toes, no signs of LLE arterial insufficiency       Laboratories:                        9.6    17.47 )-----------( 317      ( 17 Jun 2020 01:50 )             28.0     06-17    124<L>  |  89<L>  |  102<H>  ----------------------------<  116<H>  5.4<H>   |  18<L>  |  8.03<H>    Ca    7.1<L>      17 Jun 2020 01:50  Phos  8.5     06-17  Mg     2.4     06-17    TPro  4.9<L>  /  Alb  2.3<L>  /  TBili  0.4  /  DBili  x   /  AST  85<H>  /  ALT  11  /  AlkPhos  111  06-16    PT/INR - ( 16 Jun 2020 01:30 )   PT: 11.1 SEC;   INR: 0.97     PTT - ( 16 Jun 2020 01:30 )  PTT:29.7 SEC    CARDIAC MARKERS ( 16 Jun 2020 07:40 )  x     / x     / 1961 u/L / x     / x          MEDICATIONS  (STANDING):  acetaminophen   Tablet .. 650 milliGRAM(s) Oral every 6 hours  alteplase for catheter clearance 2 milliGRAM(s) Catheter once  alteplase for catheter clearance 2 milliGRAM(s) Catheter once  chlorhexidine 4% Liquid 1 Application(s) Topical <User Schedule>  gabapentin 100 milliGRAM(s) Oral three times a day  heparin   Injectable 5000 Unit(s) SubCutaneous every 8 hours  HYDROmorphone PCA (1 mG/mL) 30 milliLiter(s) PCA Continuous PCA Continuous  niCARdipine 30 milliGRAM(s) Oral every 8 hours  polyethylene glycol 3350 17 Gram(s) Oral two times a day  senna 2 Tablet(s) Oral at bedtime  sevelamer carbonate 800 milliGRAM(s) Oral every 8 hours    MEDICATIONS  (PRN):  ALPRAZolam 1 milliGRAM(s) Oral three times a day PRN Anxiety  cyclobenzaprine 5 milliGRAM(s) Oral three times a day PRN Muscle Spasm  HYDROmorphone PCA (1 mG/mL) Rescue Clinician Bolus 1 milliGRAM(s) IV Push every 15 minutes PRN for Pain Scale GREATER THAN 6  naloxone Injectable 0.1 milliGRAM(s) IV Push every 3 minutes PRN For ANY of the following changes in patient status:  A. RR LESS THAN 10 breaths per minute, B. Oxygen saturation LESS THAN 90%, C. Sedation score of 6  ondansetron Injectable 4 milliGRAM(s) IV Push every 6 hours PRN Nausea  sodium chloride 0.9% lock flush 10 milliLiter(s) IV Push every 1 hour PRN Pre/post blood products, medications, blood draw, and to maintain line patency VASCULAR SURGERY PROGRESS NOTE    INTERVAL EVENTS:  - R Fem Shiley placed, R IJ Shiley removed due to decreased flow during HD  - OOB to chair yesterday    SUBJECTIVE:  Pain well-controlled, no change in LLE sensation / no parestheisias. Still with continued left leg pain, tightness, heaviness (unchanged). Denies fevers, chills, nausea, vomiting, chest pain, and shortness of breath.       OBJECTIVE:   Vital Signs:  ICU Vital Signs Last 24 Hrs  T(C): 37.5 (17 Jun 2020 08:00), Max: 37.7 (17 Jun 2020 04:00)  T(F): 99.5 (17 Jun 2020 08:00), Max: 99.8 (17 Jun 2020 04:00)  HR: 114 (17 Jun 2020 08:00) (98 - 119)  BP: 147/88 (16 Jun 2020 20:00) (147/88 - 175/110)  BP(mean): 101 (16 Jun 2020 20:00) (101 - 124)  ABP: 147/82 (17 Jun 2020 08:00) (133/73 - 200/113)  ABP(mean): 92 (17 Jun 2020 07:00) (92 - 144)  RR: 19 (17 Jun 2020 08:00) (13 - 23)  SpO2: 95% (17 Jun 2020 08:00) (94% - 98%)    I&O's Detail  16 Jun 2020 07:01  -  17 Jun 2020 07:00  --------------------------------------------------------  IN:    Oral Fluid: 940 mL    Other: 900 mL  Total IN: 1840 mL    OUT:    Other: 899 mL    VAC (Vacuum Assisted Closure) System: 300 mL    VAC (Vacuum Assisted Closure) System: 800 mL    Voided: 550 mL  Total OUT: 2549 mL    Total NET: -709 mL    17 Jun 2020 07:01  -  17 Jun 2020 08:22  --------------------------------------------------------  IN:  Total IN: 0 mL    OUT:  Total OUT: 0 mL  Total NET: 0 mL      Physical Exam:  General: NAD  Respiratory: no increased work of breathing   Abdomen: soft, nontender, nondistended  Extremities: dressing cdi, LLE fasciotomies with vacs (x2) in place apparently functioning muscle all viable   LLE gross motor intact from hip to toes, no signs of LLE arterial insufficiency   lle sensory deficit on left foot dorsum unchanged       Laboratories:                        9.6    17.47 )-----------( 317      ( 17 Jun 2020 01:50 )             28.0     06-17    124<L>  |  89<L>  |  102<H>  ----------------------------<  116<H>  5.4<H>   |  18<L>  |  8.03<H>    Ca    7.1<L>      17 Jun 2020 01:50  Phos  8.5     06-17  Mg     2.4     06-17    TPro  4.9<L>  /  Alb  2.3<L>  /  TBili  0.4  /  DBili  x   /  AST  85<H>  /  ALT  11  /  AlkPhos  111  06-16    PT/INR - ( 16 Jun 2020 01:30 )   PT: 11.1 SEC;   INR: 0.97     PTT - ( 16 Jun 2020 01:30 )  PTT:29.7 SEC    CARDIAC MARKERS ( 16 Jun 2020 07:40 )  x     / x     / 1961 u/L / x     / x          MEDICATIONS  (STANDING):  acetaminophen   Tablet .. 650 milliGRAM(s) Oral every 6 hours  alteplase for catheter clearance 2 milliGRAM(s) Catheter once  alteplase for catheter clearance 2 milliGRAM(s) Catheter once  chlorhexidine 4% Liquid 1 Application(s) Topical <User Schedule>  gabapentin 100 milliGRAM(s) Oral three times a day  heparin   Injectable 5000 Unit(s) SubCutaneous every 8 hours  HYDROmorphone PCA (1 mG/mL) 30 milliLiter(s) PCA Continuous PCA Continuous  niCARdipine 30 milliGRAM(s) Oral every 8 hours  polyethylene glycol 3350 17 Gram(s) Oral two times a day  senna 2 Tablet(s) Oral at bedtime  sevelamer carbonate 800 milliGRAM(s) Oral every 8 hours    MEDICATIONS  (PRN):  ALPRAZolam 1 milliGRAM(s) Oral three times a day PRN Anxiety  cyclobenzaprine 5 milliGRAM(s) Oral three times a day PRN Muscle Spasm  HYDROmorphone PCA (1 mG/mL) Rescue Clinician Bolus 1 milliGRAM(s) IV Push every 15 minutes PRN for Pain Scale GREATER THAN 6  naloxone Injectable 0.1 milliGRAM(s) IV Push every 3 minutes PRN For ANY of the following changes in patient status:  A. RR LESS THAN 10 breaths per minute, B. Oxygen saturation LESS THAN 90%, C. Sedation score of 6  ondansetron Injectable 4 milliGRAM(s) IV Push every 6 hours PRN Nausea  sodium chloride 0.9% lock flush 10 milliLiter(s) IV Push every 1 hour PRN Pre/post blood products, medications, blood draw, and to maintain line patency

## 2020-06-17 NOTE — PROGRESS NOTE BEHAVIORAL HEALTH - NSBHFUPINTERVALHXFT_PSY_A_CORE
Psych reconsulted to comment on substance abuse treatment.   Patient seen at bedside with parents, undergoing dialysis. Patient is sullen, states he feels "fine," denies depressed mood, denies sxs siria, states his appetite is good although he wasn't allowed to eat till this afternoon for a procedure. States he has been in some pain from the wound vac change today, says they're planning on closing his leg on Friday and he is happy that things are moving in a positive direction. Says his pain has been generally under control. Admits that being in the hospital dealing with his acute medical issues has been difficult but does not elaborate. He denies suicidal ideation. States his anxiety has been under control, slightly worse at night, but says xanax has been helping. States everyone has been taking very good care of him and there's nothing else that he can think of him to make his hospital stay more comfortable. Still continues to say he has no idea what happened at home, denies taking any illicit substances that day. Patient states he has used cocaine in the past, uses it 3-4x/year, last used "a while back." States he uses oxycodone for really bad headaches, which parents also corroborate. States he rarely drinks alcohol. Denies any other drug use. Denies abusing any prescription medications. States he has never had treatment for substance use, and states he does not have a substance use problem. Is offered but declines referral to substance abuse clinic.

## 2020-06-17 NOTE — PROGRESS NOTE ADULT - SUBJECTIVE AND OBJECTIVE BOX
Anesthesia Pain Management Service    SUBJECTIVE: Pump helps out with pain     Pain Scale Score	At rest: _7__ 	With Activity: ___ 	[X ] Refer to charted pain scores    THERAPY:    [ ] IV PCA Morphine		[ ] 5 mg/mL	[ ] 1 mg/mL  [X ] IV PCA Hydromorphone	[ ] 5 mg/mL	[X ] 1 mg/mL  [ ] IV PCA Fentanyl		[ ] 50 micrograms/mL    Demand dose __0.2_ lockout __6_ (minutes) Continuous Rate _0__ Total: ___   mg used (in past 24 hrs)      MEDICATIONS  (STANDING):  acetaminophen   Tablet .. 650 milliGRAM(s) Oral every 6 hours  chlorhexidine 4% Liquid 1 Application(s) Topical <User Schedule>  gabapentin 100 milliGRAM(s) Oral three times a day  heparin   Injectable 5000 Unit(s) SubCutaneous every 8 hours  HYDROmorphone PCA (1 mG/mL) 30 milliLiter(s) PCA Continuous PCA Continuous  niCARdipine 30 milliGRAM(s) Oral every 8 hours  polyethylene glycol 3350 17 Gram(s) Oral two times a day  senna 2 Tablet(s) Oral at bedtime  sevelamer carbonate 800 milliGRAM(s) Oral every 8 hours    MEDICATIONS  (PRN):  ALPRAZolam 1 milliGRAM(s) Oral three times a day PRN Anxiety  HYDROmorphone PCA (1 mG/mL) Rescue Clinician Bolus 1 milliGRAM(s) IV Push every 15 minutes PRN for Pain Scale GREATER THAN 6  naloxone Injectable 0.1 milliGRAM(s) IV Push every 3 minutes PRN For ANY of the following changes in patient status:  A. RR LESS THAN 10 breaths per minute, B. Oxygen saturation LESS THAN 90%, C. Sedation score of 6  ondansetron Injectable 4 milliGRAM(s) IV Push every 6 hours PRN Nausea  sodium chloride 0.9% lock flush 10 milliLiter(s) IV Push every 1 hour PRN Pre/post blood products, medications, blood draw, and to maintain line patency      OBJECTIVE: laying in bed     Sedation Score:	[ X] Alert	[ ] Drowsy 	[ ] Arousable	[ ] Asleep	[ ] Unresponsive    Side Effects:	[X ] None	[ ] Nausea	[ ] Vomiting	[ ] Pruritus  		[ ] Other:    Vital Signs Last 24 Hrs  T(C): 37.5 (17 Jun 2020 08:00), Max: 37.7 (17 Jun 2020 04:00)  T(F): 99.5 (17 Jun 2020 08:00), Max: 99.8 (17 Jun 2020 04:00)  HR: 108 (17 Jun 2020 10:00) (98 - 119)  BP: 147/88 (16 Jun 2020 20:00) (147/88 - 175/110)  BP(mean): 101 (16 Jun 2020 20:00) (101 - 124)  RR: 18 (17 Jun 2020 10:00) (13 - 23)  SpO2: 96% (17 Jun 2020 10:00) (94% - 98%)    ASSESSMENT/ PLAN    Therapy to  be:	[ ] Continue   [ X] Discontinued   [X ] Change to prn Analgesics    Documentation and Verification of current medications:   [X] Done	[ ] Not done, not elligible    Comments: Discussed with patient  and will f/u later. PRN Oral/IV opioids and/or Adjuvant medication to be ordered at this point.     Progress Note written now but Patient was seen earlier. Anesthesia Pain Management Service    SUBJECTIVE: Pump helps out with pain     Pain Scale Score	At rest: _7__ 	With Activity: ___ 	[X ] Refer to charted pain scores    THERAPY:    [ ] IV PCA Morphine		[ ] 5 mg/mL	[ ] 1 mg/mL  [X ] IV PCA Hydromorphone	[ ] 5 mg/mL	[X ] 1 mg/mL  [ ] IV PCA Fentanyl		[ ] 50 micrograms/mL    Demand dose __0.2_ lockout __6_ (minutes) Continuous Rate _0__ Total: _24.5__   mg used (in past 24 hrs)      MEDICATIONS  (STANDING):  acetaminophen   Tablet .. 650 milliGRAM(s) Oral every 6 hours  chlorhexidine 4% Liquid 1 Application(s) Topical <User Schedule>  gabapentin 100 milliGRAM(s) Oral three times a day  heparin   Injectable 5000 Unit(s) SubCutaneous every 8 hours  HYDROmorphone PCA (1 mG/mL) 30 milliLiter(s) PCA Continuous PCA Continuous  niCARdipine 30 milliGRAM(s) Oral every 8 hours  polyethylene glycol 3350 17 Gram(s) Oral two times a day  senna 2 Tablet(s) Oral at bedtime  sevelamer carbonate 800 milliGRAM(s) Oral every 8 hours    MEDICATIONS  (PRN):  ALPRAZolam 1 milliGRAM(s) Oral three times a day PRN Anxiety  HYDROmorphone PCA (1 mG/mL) Rescue Clinician Bolus 1 milliGRAM(s) IV Push every 15 minutes PRN for Pain Scale GREATER THAN 6  naloxone Injectable 0.1 milliGRAM(s) IV Push every 3 minutes PRN For ANY of the following changes in patient status:  A. RR LESS THAN 10 breaths per minute, B. Oxygen saturation LESS THAN 90%, C. Sedation score of 6  ondansetron Injectable 4 milliGRAM(s) IV Push every 6 hours PRN Nausea  sodium chloride 0.9% lock flush 10 milliLiter(s) IV Push every 1 hour PRN Pre/post blood products, medications, blood draw, and to maintain line patency      OBJECTIVE: laying in bed     Sedation Score:	[ X] Alert	[ ] Drowsy 	[ ] Arousable	[ ] Asleep	[ ] Unresponsive    Side Effects:	[X ] None	[ ] Nausea	[ ] Vomiting	[ ] Pruritus  		[ ] Other:    Vital Signs Last 24 Hrs  T(C): 37.5 (17 Jun 2020 08:00), Max: 37.7 (17 Jun 2020 04:00)  T(F): 99.5 (17 Jun 2020 08:00), Max: 99.8 (17 Jun 2020 04:00)  HR: 108 (17 Jun 2020 10:00) (98 - 119)  BP: 147/88 (16 Jun 2020 20:00) (147/88 - 175/110)  BP(mean): 101 (16 Jun 2020 20:00) (101 - 124)  RR: 18 (17 Jun 2020 10:00) (13 - 23)  SpO2: 96% (17 Jun 2020 10:00) (94% - 98%)    ASSESSMENT/ PLAN    Therapy to  be:	[ ] Continue   [ X] Discontinued   [X ] Change to prn Analgesics    Documentation and Verification of current medications:   [X] Done	[ ] Not done, not elligible    Comments: Discussed with patient  and will f/u later. PRN Oral/IV opioids and/or Adjuvant medication to be ordered at this point.     Progress Note written now but Patient was seen earlier.

## 2020-06-17 NOTE — PROGRESS NOTE ADULT - ASSESSMENT
ASSESSMENT  29M w/ PMH of bipolar depression, s/p found down at home, transferred from [facility] for further management of acute renal failure due to rhabdomyolysis, found to have compartment syndrome on the LLE, now s/p four compartment fasciotomy and lateral/medial thigh fasciotomies    PLAN  Neuro hx of bipolar depression  - A&O x 3  - pain control w/ dilaudid PCA, c/w gabapentin 100 tid, flexeril 5 q3 PRN  - Xanax 1 mg q8 PRN  - Psych following, no plan to restart home lithium; also rec clondine 0.1 x 1 dose for next HTN episode     Resp  - b/l B lines on POCUS  - Continue to wean from supplemental O2   - continue HD as needed for fluid overload  - Mobilize, OOB to chair    CVS  - Appropriate mental status, appears well perfused   - cleviprex infusion off  - nicardipine 30mg POx1 overnight, started on 30 mg PO q8 hrs     GI  - NPO at midnight for permacath tomorrow if sodium > 128  - Bowel regimen with miralax 17g BID and senna 2 tabs qHS      - ANTOINETTE in setting of rhabdomyolysis  - IV locked  - Remains hypervolemic, oliguric but UOP on an upward trend - now ~25 cc/hr  - CK down to <6000   - HD overnight for only 1.5hrs (kept net even), improvement in respiratory symptoms, oxygen requirement, and hypertension  - Hypervolemic hyponatremia, given Ure-Na per nephrology, c/w fluid restriction, and HD as needed  - Nephrology following  - Serial bladder scans as needed     Heme  - stable CBC  - SQH for dvt ppx    ID  - Ancef discontinued on 6/15  - Has been afebrile, leukocytosis of 15-20    Endo  - monitor FGS    Lines  - RIJ HD catheter  - Right radial a line  - LLE wound vac    Dispo  - SICU ASSESSMENT  29M w/ PMH of bipolar depression, s/p found down at home, transferred from [facility] for further management of acute renal failure due to rhabdomyolysis, found to have compartment syndrome on the LLE, now s/p four compartment fasciotomy and lateral/medial thigh fasciotomies    PLAN  Neuro hx of bipolar depression  - A&O x 3  - pain control w/ dilaudid PCA, c/w gabapentin 100 tid, flexeril 5 q3 PRN  - Xanax 1 mg q8 PRN  - Psych following, no plan to restart home lithium; also rec clondine 0.1 x 1 dose for next HTN episode     Resp  - b/l B lines on POCUS  - Continue to wean from supplemental O2   - continue HD as needed for fluid overload  - Mobilize, OOB to chair    CVS  - Appropriate mental status, appears well perfused   - cleviprex infusion off  - nicardipine 30mg POx1 overnight, started on 30 mg PO q8 hrs     GI  - NPO at midnight for permacath tomorrow if sodium > 128  - Bowel regimen with miralax 17g BID and senna 2 tabs qHS      - ANTOINETTE in setting of rhabdomyolysis  - IV locked  - Remains hypervolemic, oliguric but UOP on an upward trend - now ~25 cc/hr  - CK down to <6000   - HD overnight for only 1.5hrs (kept net even), improvement in respiratory symptoms, oxygen requirement, and hypertension  - Hypervolemic hyponatremia, given Ure-Na per nephrology, c/w fluid restriction, and HD as needed  - Nephrology following  - Serial bladder scans as needed     Heme  - stable CBC  - SQH for dvt ppx    ID  - Ancef discontinued on 6/15  - Has been afebrile, leukocytosis of 15-20    Endo  - monitor FGS    Lines  - Rfem HD catheter  - Right radial a line  - LLE wound vac    Dispo  - SICU

## 2020-06-17 NOTE — PROGRESS NOTE ADULT - ATTENDING COMMENTS
ASHTYN Sauer MD have seen and examined the patient today and agree with  the  evaluation, assessment and plan of the surgical house officer  ASHTYN Sauer MD have personally seen and examined the patient at bedside today at  11am

## 2020-06-17 NOTE — PROGRESS NOTE ADULT - PROBLEM SELECTOR PLAN 1
Pt with ANTOINETTE in the setting of rhabdomyolysis. Upon lab review, Scr was 1.11 on 8/23/2017. Scr at [facility] this AM was 3.06. Scr today is 8.30. Pt with oliguric ANTOINETTE secondary to rhabdomyolysis. Pt. noted to have improved urine output from midnight on. Attempted HD yesterday but catheter malfunctioned, replaced by surgery team. Labs reviewed, will arrange for HD with [facility] again today. Will assess daily for HD needs. Monitor labs and urine output. Avoid potential nephrotoxins.

## 2020-06-17 NOTE — PROGRESS NOTE ADULT - ASSESSMENT
29M with PMH of bipolar depression transferred from [facility] for further management of acute renal failure due to rhabdomyolysis, Taken to the OR for LLE compartment syndrome. Now s/p five compartment fasciotomy of the left lower leg. HD after surgery. Doing well today.    Plan  - pt continues to clinically improve from a vasc surg standpoint   - pain control PRN  - diet as tolerated  - c/w vac dressings, due for change today  - continue HD as per Renal  - Permacath today with IR   - DVT ppx   - OOB to chair, PT consult for mobilization        Vascular surgery / C Team   [id number] 29M with PMH of bipolar depression transferred from [facility] for further management of acute renal failure due to rhabdomyolysis, Taken to the OR for LLE compartment syndrome. Now s/p five compartment fasciotomy of the left lower leg. HD after surgery. Doing well today.    Plan  - please obtain b/l renal artery duplex   - pt continues to clinically improve from a vasc surg standpoint   - pain control PRN  - diet as tolerated  - c/w vac dressings, due for change today  - continue HD as per Renal  - Permacath today with IR   - DVT ppx   - OOB to chair, PT consult for mobilization        Vascular surgery / C Team   [id number] 29M with PMH of bipolar depression transferred from [facility] for further management of acute renal failure due to rhabdomyolysis, Taken to the OR for LLE compartment syndrome. Now s/p four compartment fasciotomy of the left lower leg. HD after surgery. Doing well today.    Plan  - please obtain b/l renal artery duplex   - pt continues to clinically improve from a vasc surg standpoint   - pain control PRN  - diet as tolerated  - c/w vac dressings, due for change today  - continue HD as per Renal  - Permacath today with IR   - DVT ppx   - OOB to chair, PT consult for mobilization        Vascular surgery / C Team   [id number] 29M with PMH of bipolar depression transferred from [facility] for further management of acute renal failure due to rhabdomyolysis, Taken to the OR for LLE compartment syndrome. Now s/p four compartment fasciotomy of the left lower leg. HD after surgery. Doing well today.    Plan  - please obtain b/l renal artery duplex  s/o ra dissection vs thrombosis   - pt continues to clinically improve from a vasc surg standpoint   - pain control PRN  - diet as tolerated  - c/w vac dressings, due for change today  - continue HD as per Renal  - Permacath today with IR   - DVT ppx   - OOB to chair, PT consult for mobilization  plastics to eval for possible wound closure

## 2020-06-17 NOTE — CHART NOTE - NSCHARTNOTEFT_GEN_A_CORE
Pt seen and examined with covering attending, Dr. De Leon.    LLE VACs removed, wound beds all healthy/viable muscle  Able to close down wounds and place VAC sponges    Plan to RTOR Friday 6/20 for closure of wounds and VAC change  - NPO p MN for 6/19  - Pain control  - DVT ppx  - Optimize medical care/clearance as needed  - Appreciate SICU care    Dionte Harvey  PGY-5  Plastic Surgery  [id number]

## 2020-06-17 NOTE — PROGRESS NOTE ADULT - SUBJECTIVE AND OBJECTIVE BOX
SICU DAILY PROGRESS NOTE    29M with PMH of bipolar depression transferred from [facility] for further management of acute renal failure due to rhabdomyolysis. Pt was found unresponsive at home, lying on his L side. He states he has not been taking his Lithium for at least 2 wks. At the OSH, he was treated for hyperkalemia. He received D5, insulin, calcium gluconate, Kayexalate Last BM was on 6/9. For L leg pain he was given lidocaine patch, dilaudid 1mg prn and tramadol 50mg. Hydralazine 20mg given for HTN. From 1 to 7 am only 100cc of UOP. Urine was tea colored. 500cc bolus of NS was given. CT cervical spine at OSH was (-). CT Head (-). CTAP and CXR were (-). CK on admission there was 177,000.     Pt was seen by surgery team in MICU for LUE swelling and compartment syndrome was ruled out. Surgery team was re-consulted today due to new symptoms of L foot drop and numbness. Pt was found to be highly suspicious for compartment syndrome in the upper thigh, and was emergently taken to OR.     Pt is now s/p four compartment fasciotomy of LLE, and left lateral and medial thigh fasciotomies. SICU consulted for q1h neurovascular checks and hemodynamic monitoring    INTERVAL EVENTS  - Dc'd Cleviprex, c/w cardene 30 Q8  - missed 2pm cardene dose, became HTN 190s, stat dose give  - HD started but, pt only received 1.5 hrs due to reduced pressure  - Nephrology notified, recommended giving Ure-Na to help stimulate voiding and increase sodium level  - Called anesthesia, ok with sodium > 128 for permacath  - f/u AM labs       SUBJECTIVE/ROS:  [X] A ten-point review of systems was otherwise negative except as noted.  [ ] Due to altered mental status/intubation, subjective information were not able to be obtained from the patient. History was obtained, to the extent possible, from review of the chart and collateral sources of information.    NEURO  Exam: awake, alert, oriented x4, motor and sensory intact in all 4 extremities   Meds: acetaminophen   Tablet .. 650 milliGRAM(s) Oral every 6 hours  ALPRAZolam 1 milliGRAM(s) Oral three times a day PRN Anxiety  cyclobenzaprine 5 milliGRAM(s) Oral three times a day PRN Muscle Spasm  gabapentin 100 milliGRAM(s) Oral three times a day  HYDROmorphone PCA (1 mG/mL) 30 milliLiter(s) PCA Continuous PCA Continuous  HYDROmorphone PCA (1 mG/mL) Rescue Clinician Bolus 1 milliGRAM(s) IV Push every 15 minutes PRN for Pain Scale GREATER THAN 6  ondansetron Injectable 4 milliGRAM(s) IV Push every 6 hours PRN Nausea    [x] Adequacy of sedation and pain control has been assessed and adjusted    RESPIRATORY  RR: 16 (16 Jun 2020 23:00) (13 - 23)  SpO2: 96% (16 Jun 2020 23:00) (93% - 99%)  Exam: nonlabored breathing on 2L NC    CARDIOVASCULAR  HR: 112 (16 Jun 2020 23:00) (98 - 117)  BP: 147/88 (16 Jun 2020 20:00) (147/88 - 175/110)  BP(mean): 101 (16 Jun 2020 20:00) (101 - 124)  ABP: 182/97 (16 Jun 2020 23:00) (131/74 - 200/113)  ABP(mean): 127 (16 Jun 2020 23:00) (90 - 144)        Exam: regular rate and rhythm  Cardiac Rhythm: sinus  Perfusion     [x]Adequate   [ ]Inadequate  Mentation   [x]Normal       [ ]Reduced  Extremities  [x]Warm         [ ]Cool  Volume Status [ ]Hypervolemic [x]Euvolemic [ ]Hypovolemic  Meds: clevidipine Infusion 5 mG/Hr IV Continuous <Continuous>    GI/NUTRITION  Exam: soft, nontender, nondistended  Diet: NPOds: polyethylene glycol 3350 17 Gram(s) Oral daily  senna 2 Tablet(s) Oral at bedtime    GENITOURINARY  I&O's Detail    15 Ramírez 2020 07:01  -  16 Jun 2020 07:00  --------------------------------------------------------  IN:    clevidipine Infusion: 60 mL    clevidipine Infusion: 40 mL    clevidipine Infusion: 30 mL    IV PiggyBack: 150 mL    Oral Fluid: 1440 mL    Other: 400 mL  Total IN: 2120 mL    OUT:    Indwelling Catheter - Urethral: 95 mL    Other: 2900 mL    VAC (Vacuum Assisted Closure) System: 50 mL    VAC (Vacuum Assisted Closure) System: 600 mL    Voided: 250 mL  Total OUT: 3895 mL    Total NET: -1775 mL      16 Jun 2020 07:01  -  17 Jun 2020 01:01  --------------------------------------------------------  IN:    Oral Fluid: 880 mL    Other: 900 mL  Total IN: 1780 mL    OUT:    Other: 899 mL    VAC (Vacuum Assisted Closure) System: 200 mL    VAC (Vacuum Assisted Closure) System: 400 mL    Voided: 370 mL  Total OUT: 1869 mL    Total NET: -89 mL      BMP (06-16 @ 07:40)             122<L>  |  85<L>   |  76<H> 		Ca++ --      Ca 7.3<L>             ---------------------------------( 125<H>		Mg 2.4                4.8     |  19<L>   |  7.30<H>			Ph 8.3<H>  BMP (06-16 @ 01:30)             124<L>  |  88<L>   |  71<H> 		Ca++ --      Ca 7.4<L>             ---------------------------------( 115<H>		Mg 2.4                5.0     |  20<L>   |  6.87<H>			Ph 7.8<H>    LFTs (06-16 @ 01:30)      TPro 4.9<L> / Alb 2.3<L> / TBili 0.4 / DBili -- / AST 85<H> / ALT 11 / AlkPhos 111    Coags (06-16 @ 01:30)  aPTT 29.7 / INR 0.97 / PT 11.1    Cardiac Markers (06-16 @ 07:40)     Trop: -- -- / CKMB: -- / CK: 1961      [ ] Motta catheter, indication: removed 6/15  Meds: sodium chloride 0.9% lock flush 10 milliLiter(s) IV Push every 1 hour PRN Pre/post blood products, medications, blood draw, and to maintain line patency    HEMATOLOGIC  Meds: heparin   Injectable 5000 Unit(s) SubCutaneous every 8 hours    [x] VTE Prophylaxis                                     9.3    18.22 )-----------( 247      ( 16 Jun 2020 01:30 )             27.0     Transfusion     [ ] PRBC   [ ] Platelets   [ ] FFP   [ ] Cryoprecipitate    INFECTIOUS DISEASES  WBC Count: 18.01 K/uL (06-15 @ 00:50)    ACCESS DEVICES:  [X] Peripheral IV  [X] Central Venous HD Line	[X] R	[ ] L	[X] IJ	[ ] Fem	[ ] SC	Placed:   [X] Arterial Line		[ ] R	[ ] L	[ ] Fem	[ ] Rad	[ ] Ax	Placed:   [ ] PICC:					[ ] Mediport  [ ] Urinary Catheter, Date Placed:   [x] Necessity of urinary, arterial, and venous catheters discussed    OTHER MEDICATIONS:  chlorhexidine 4% Liquid 1 Application(s) Topical <User Schedule>  naloxone Injectable 0.1 milliGRAM(s) IV Push every 3 minutes PRN SICU DAILY PROGRESS NOTE    29M with PMH of bipolar depression transferred from [facility] for further management of acute renal failure due to rhabdomyolysis. Pt was found unresponsive at home, lying on his L side. He states he has not been taking his Lithium for at least 2 wks. At the OSH, he was treated for hyperkalemia. He received D5, insulin, calcium gluconate, Kayexalate Last BM was on 6/9. For L leg pain he was given lidocaine patch, dilaudid 1mg prn and tramadol 50mg. Hydralazine 20mg given for HTN. From 1 to 7 am only 100cc of UOP. Urine was tea colored. 500cc bolus of NS was given. CT cervical spine at OSH was (-). CT Head (-). CTAP and CXR were (-). CK on admission there was 177,000.     Pt was seen by surgery team in MICU for LUE swelling and compartment syndrome was ruled out. Surgery team was re-consulted today due to new symptoms of L foot drop and numbness. Pt was found to be highly suspicious for compartment syndrome in the upper thigh, and was emergently taken to OR.     Pt is now s/p four compartment fasciotomy of LLE, and left lateral and medial thigh fasciotomies. SICU consulted for q1h neurovascular checks and hemodynamic monitoring    INTERVAL EVENTS  - Dc'd Cleviprex, c/w cardene 30 Q8  - missed 2pm cardene dose, became HTN 190s, stat dose give  - HD started but, pt only received 1.5 hrs due to reduced pressure  - Nephrology notified, recommended giving Ure-Na to help stimulate voiding and increase sodium level  - Called anesthesia, ok with sodium > 128 for permacath  - AM BMP w/ Na of 124 but worsened K, BUN/Cr      SUBJECTIVE/ROS:  [X] A ten-point review of systems was otherwise negative except as noted.  [ ] Due to altered mental status/intubation, subjective information were not able to be obtained from the patient. History was obtained, to the extent possible, from review of the chart and collateral sources of information.    NEURO  Exam: awake, alert, oriented x4, motor and sensory intact in all 4 extremities   Meds: acetaminophen   Tablet .. 650 milliGRAM(s) Oral every 6 hours  ALPRAZolam 1 milliGRAM(s) Oral three times a day PRN Anxiety  cyclobenzaprine 5 milliGRAM(s) Oral three times a day PRN Muscle Spasm  gabapentin 100 milliGRAM(s) Oral three times a day  HYDROmorphone PCA (1 mG/mL) 30 milliLiter(s) PCA Continuous PCA Continuous  HYDROmorphone PCA (1 mG/mL) Rescue Clinician Bolus 1 milliGRAM(s) IV Push every 15 minutes PRN for Pain Scale GREATER THAN 6  ondansetron Injectable 4 milliGRAM(s) IV Push every 6 hours PRN Nausea    [x] Adequacy of sedation and pain control has been assessed and adjusted    RESPIRATORY  RR: 16 (16 Jun 2020 23:00) (13 - 23)  SpO2: 96% (16 Jun 2020 23:00) (93% - 99%)  Exam: nonlabored breathing on 2L NC    CARDIOVASCULAR  HR: 112 (16 Jun 2020 23:00) (98 - 117)  BP: 147/88 (16 Jun 2020 20:00) (147/88 - 175/110)  BP(mean): 101 (16 Jun 2020 20:00) (101 - 124)  ABP: 182/97 (16 Jun 2020 23:00) (131/74 - 200/113)  ABP(mean): 127 (16 Jun 2020 23:00) (90 - 144)        Exam: regular rate and rhythm  Cardiac Rhythm: sinus  Perfusion     [x]Adequate   [ ]Inadequate  Mentation   [x]Normal       [ ]Reduced  Extremities  [x]Warm         [ ]Cool  Volume Status [ ]Hypervolemic [x]Euvolemic [ ]Hypovolemic  Meds: clevidipine Infusion 5 mG/Hr IV Continuous <Continuous>    GI/NUTRITION  Exam: soft, nontender, nondistended  Diet: NPOds: polyethylene glycol 3350 17 Gram(s) Oral daily  senna 2 Tablet(s) Oral at bedtime    GENITOURINARY  I&O's Detail    15 Ramírez 2020 07:01  -  16 Jun 2020 07:00  --------------------------------------------------------  IN:    clevidipine Infusion: 60 mL    clevidipine Infusion: 40 mL    clevidipine Infusion: 30 mL    IV PiggyBack: 150 mL    Oral Fluid: 1440 mL    Other: 400 mL  Total IN: 2120 mL    OUT:    Indwelling Catheter - Urethral: 95 mL    Other: 2900 mL    VAC (Vacuum Assisted Closure) System: 50 mL    VAC (Vacuum Assisted Closure) System: 600 mL    Voided: 250 mL  Total OUT: 3895 mL    Total NET: -1775 mL      16 Jun 2020 07:01  -  17 Jun 2020 01:01  --------------------------------------------------------  IN:    Oral Fluid: 880 mL    Other: 900 mL  Total IN: 1780 mL    OUT:    Other: 899 mL    VAC (Vacuum Assisted Closure) System: 200 mL    VAC (Vacuum Assisted Closure) System: 400 mL    Voided: 370 mL  Total OUT: 1869 mL    Total NET: -89 mL      BMP (06-16 @ 07:40)             122<L>  |  85<L>   |  76<H> 		Ca++ --      Ca 7.3<L>             ---------------------------------( 125<H>		Mg 2.4                4.8     |  19<L>   |  7.30<H>			Ph 8.3<H>  BMP (06-16 @ 01:30)             124<L>  |  88<L>   |  71<H> 		Ca++ --      Ca 7.4<L>             ---------------------------------( 115<H>		Mg 2.4                5.0     |  20<L>   |  6.87<H>			Ph 7.8<H>    LFTs (06-16 @ 01:30)      TPro 4.9<L> / Alb 2.3<L> / TBili 0.4 / DBili -- / AST 85<H> / ALT 11 / AlkPhos 111    Coags (06-16 @ 01:30)  aPTT 29.7 / INR 0.97 / PT 11.1    Cardiac Markers (06-16 @ 07:40)     Trop: -- -- / CKMB: -- / CK: 1961      [ ] Motta catheter, indication: removed 6/15  Meds: sodium chloride 0.9% lock flush 10 milliLiter(s) IV Push every 1 hour PRN Pre/post blood products, medications, blood draw, and to maintain line patency    HEMATOLOGIC  Meds: heparin   Injectable 5000 Unit(s) SubCutaneous every 8 hours    [x] VTE Prophylaxis                                     9.3    18.22 )-----------( 247      ( 16 Jun 2020 01:30 )             27.0     Transfusion     [ ] PRBC   [ ] Platelets   [ ] FFP   [ ] Cryoprecipitate    INFECTIOUS DISEASES  WBC Count: 18.01 K/uL (06-15 @ 00:50)    ACCESS DEVICES:  [X] Peripheral IV  [X] Central Venous HD Line	[X] R	[ ] L	[X] IJ	[ ] Fem	[ ] SC	Placed:   [X] Arterial Line		[ ] R	[ ] L	[ ] Fem	[ ] Rad	[ ] Ax	Placed:   [ ] PICC:					[ ] Mediport  [ ] Urinary Catheter, Date Placed:   [x] Necessity of urinary, arterial, and venous catheters discussed    OTHER MEDICATIONS:  chlorhexidine 4% Liquid 1 Application(s) Topical <User Schedule>  naloxone Injectable 0.1 milliGRAM(s) IV Push every 3 minutes PRN SICU DAILY PROGRESS NOTE    29M with PMH of bipolar depression transferred from [facility] for further management of acute renal failure due to rhabdomyolysis. Pt was found unresponsive at home, lying on his L side. He states he has not been taking his Lithium for at least 2 wks. At the OSH, he was treated for hyperkalemia. He received D5, insulin, calcium gluconate, Kayexalate Last BM was on 6/9. For L leg pain he was given lidocaine patch, dilaudid 1mg prn and tramadol 50mg. Hydralazine 20mg given for HTN. From 1 to 7 am only 100cc of UOP. Urine was tea colored. 500cc bolus of NS was given. CT cervical spine at OSH was (-). CT Head (-). CTAP and CXR were (-). CK on admission there was 177,000.     Pt was seen by surgery team in MICU for LUE swelling and compartment syndrome was ruled out. Surgery team was re-consulted today due to new symptoms of L foot drop and numbness. Pt was found to be highly suspicious for compartment syndrome in the upper thigh, and was emergently taken to OR.     Pt is now s/p four compartment fasciotomy of LLE, and left lateral and medial thigh fasciotomies. SICU consulted for q1h neurovascular checks and hemodynamic monitoring    INTERVAL EVENTS  - Dc'd Cleviprex, c/w cardene 30 Q8  - missed 2pm cardene dose, became HTN 190s, stat dose give  - HD started but, pt only received 1.5 hrs due to reduced pressure  - Nephrology notified, recommended giving Ure-Na to help stimulate voiding and increase sodium level  - Called anesthesia, ok with sodium > 128 for permacath  - AM BMP w/ Na of 124 but worsened K, BUN/Cr  - R radial arterial line infiltrated w/ active oozing at the insertion site. Line was pulled, pressure held for 10 min, and new R radial arterial line was placed proximal to the previous insertion site    SUBJECTIVE/ROS:  [X] A ten-point review of systems was otherwise negative except as noted.  [ ] Due to altered mental status/intubation, subjective information were not able to be obtained from the patient. History was obtained, to the extent possible, from review of the chart and collateral sources of information.    NEURO  Exam: awake, alert, oriented x4, motor and sensory intact in all 4 extremities   Meds: acetaminophen   Tablet .. 650 milliGRAM(s) Oral every 6 hours  ALPRAZolam 1 milliGRAM(s) Oral three times a day PRN Anxiety  cyclobenzaprine 5 milliGRAM(s) Oral three times a day PRN Muscle Spasm  gabapentin 100 milliGRAM(s) Oral three times a day  HYDROmorphone PCA (1 mG/mL) 30 milliLiter(s) PCA Continuous PCA Continuous  HYDROmorphone PCA (1 mG/mL) Rescue Clinician Bolus 1 milliGRAM(s) IV Push every 15 minutes PRN for Pain Scale GREATER THAN 6  ondansetron Injectable 4 milliGRAM(s) IV Push every 6 hours PRN Nausea    [x] Adequacy of sedation and pain control has been assessed and adjusted    RESPIRATORY  RR: 16 (16 Jun 2020 23:00) (13 - 23)  SpO2: 96% (16 Jun 2020 23:00) (93% - 99%)  Exam: nonlabored breathing on 2L NC    CARDIOVASCULAR  HR: 112 (16 Jun 2020 23:00) (98 - 117)  BP: 147/88 (16 Jun 2020 20:00) (147/88 - 175/110)  BP(mean): 101 (16 Jun 2020 20:00) (101 - 124)  ABP: 182/97 (16 Jun 2020 23:00) (131/74 - 200/113)  ABP(mean): 127 (16 Jun 2020 23:00) (90 - 144)        Exam: regular rate and rhythm  Cardiac Rhythm: sinus  Perfusion     [x]Adequate   [ ]Inadequate  Mentation   [x]Normal       [ ]Reduced  Extremities  [x]Warm         [ ]Cool  Volume Status [ ]Hypervolemic [x]Euvolemic [ ]Hypovolemic  Meds: clevidipine Infusion 5 mG/Hr IV Continuous <Continuous>    GI/NUTRITION  Exam: soft, nontender, nondistended  Diet: NPOds: polyethylene glycol 3350 17 Gram(s) Oral daily  senna 2 Tablet(s) Oral at bedtime    GENITOURINARY  I&O's Detail    15 Ramírez 2020 07:01  -  16 Jun 2020 07:00  --------------------------------------------------------  IN:    clevidipine Infusion: 60 mL    clevidipine Infusion: 40 mL    clevidipine Infusion: 30 mL    IV PiggyBack: 150 mL    Oral Fluid: 1440 mL    Other: 400 mL  Total IN: 2120 mL    OUT:    Indwelling Catheter - Urethral: 95 mL    Other: 2900 mL    VAC (Vacuum Assisted Closure) System: 50 mL    VAC (Vacuum Assisted Closure) System: 600 mL    Voided: 250 mL  Total OUT: 3895 mL    Total NET: -1775 mL      16 Jun 2020 07:01  -  17 Jun 2020 01:01  --------------------------------------------------------  IN:    Oral Fluid: 880 mL    Other: 900 mL  Total IN: 1780 mL    OUT:    Other: 899 mL    VAC (Vacuum Assisted Closure) System: 200 mL    VAC (Vacuum Assisted Closure) System: 400 mL    Voided: 370 mL  Total OUT: 1869 mL    Total NET: -89 mL      BMP (06-16 @ 07:40)             122<L>  |  85<L>   |  76<H> 		Ca++ --      Ca 7.3<L>             ---------------------------------( 125<H>		Mg 2.4                4.8     |  19<L>   |  7.30<H>			Ph 8.3<H>  BMP (06-16 @ 01:30)             124<L>  |  88<L>   |  71<H> 		Ca++ --      Ca 7.4<L>             ---------------------------------( 115<H>		Mg 2.4                5.0     |  20<L>   |  6.87<H>			Ph 7.8<H>    LFTs (06-16 @ 01:30)      TPro 4.9<L> / Alb 2.3<L> / TBili 0.4 / DBili -- / AST 85<H> / ALT 11 / AlkPhos 111    Coags (06-16 @ 01:30)  aPTT 29.7 / INR 0.97 / PT 11.1    Cardiac Markers (06-16 @ 07:40)     Trop: -- -- / CKMB: -- / CK: 1961      [ ] Motta catheter, indication: removed 6/15  Meds: sodium chloride 0.9% lock flush 10 milliLiter(s) IV Push every 1 hour PRN Pre/post blood products, medications, blood draw, and to maintain line patency    HEMATOLOGIC  Meds: heparin   Injectable 5000 Unit(s) SubCutaneous every 8 hours    [x] VTE Prophylaxis                                     9.3    18.22 )-----------( 247      ( 16 Jun 2020 01:30 )             27.0     Transfusion     [ ] PRBC   [ ] Platelets   [ ] FFP   [ ] Cryoprecipitate    INFECTIOUS DISEASES  WBC Count: 18.01 K/uL (06-15 @ 00:50)    ACCESS DEVICES:  [X] Peripheral IV  [X] Central Venous HD Line	[X] R	[ ] L	[X] IJ	[ ] Fem	[ ] SC	Placed:   [X] Arterial Line		[ ] R	[ ] L	[ ] Fem	[ ] Rad	[ ] Ax	Placed:   [ ] PICC:					[ ] Mediport  [ ] Urinary Catheter, Date Placed:   [x] Necessity of urinary, arterial, and venous catheters discussed    OTHER MEDICATIONS:  chlorhexidine 4% Liquid 1 Application(s) Topical <User Schedule>  naloxone Injectable 0.1 milliGRAM(s) IV Push every 3 minutes PRN

## 2020-06-18 ENCOUNTER — TRANSCRIPTION ENCOUNTER (OUTPATIENT)
Age: 30
End: 2020-06-18

## 2020-06-18 LAB
ALBUMIN SERPL ELPH-MCNC: 2.2 G/DL — LOW (ref 3.3–5)
ALP SERPL-CCNC: 104 U/L — SIGNIFICANT CHANGE UP (ref 40–120)
ALT FLD-CCNC: 7 U/L — SIGNIFICANT CHANGE UP (ref 4–41)
ANION GAP SERPL CALC-SCNC: 13 MMO/L — SIGNIFICANT CHANGE UP (ref 7–14)
ANION GAP SERPL CALC-SCNC: 15 MMO/L — HIGH (ref 7–14)
AST SERPL-CCNC: 44 U/L — HIGH (ref 4–40)
BASE EXCESS BLDA CALC-SCNC: -0.4 MMOL/L — SIGNIFICANT CHANGE UP
BILIRUB SERPL-MCNC: 0.3 MG/DL — SIGNIFICANT CHANGE UP (ref 0.2–1.2)
BLOOD GAS ARTERIAL - FIO2: 21 — SIGNIFICANT CHANGE UP
BUN SERPL-MCNC: 56 MG/DL — HIGH (ref 7–23)
BUN SERPL-MCNC: 88 MG/DL — HIGH (ref 7–23)
CA-I BLDA-SCNC: 0.98 MMOL/L — LOW (ref 1.15–1.29)
CALCIUM SERPL-MCNC: 7.1 MG/DL — LOW (ref 8.4–10.5)
CALCIUM SERPL-MCNC: 7.8 MG/DL — LOW (ref 8.4–10.5)
CHLORIDE SERPL-SCNC: 90 MMOL/L — LOW (ref 98–107)
CHLORIDE SERPL-SCNC: 94 MMOL/L — LOW (ref 98–107)
CO2 SERPL-SCNC: 21 MMOL/L — LOW (ref 22–31)
CO2 SERPL-SCNC: 25 MMOL/L — SIGNIFICANT CHANGE UP (ref 22–31)
CREAT SERPL-MCNC: 5.05 MG/DL — HIGH (ref 0.5–1.3)
CREAT SERPL-MCNC: 7.22 MG/DL — HIGH (ref 0.5–1.3)
GLUCOSE BLDA-MCNC: 120 MG/DL — HIGH (ref 70–99)
GLUCOSE SERPL-MCNC: 122 MG/DL — HIGH (ref 70–99)
GLUCOSE SERPL-MCNC: 140 MG/DL — HIGH (ref 70–99)
HCO3 BLDA-SCNC: 24 MMOL/L — SIGNIFICANT CHANGE UP (ref 22–26)
HCT VFR BLD CALC: 24.5 % — LOW (ref 39–50)
HCT VFR BLDA CALC: 28 % — LOW (ref 39–51)
HGB BLD-MCNC: 8.6 G/DL — LOW (ref 13–17)
HGB BLDA-MCNC: 9 G/DL — LOW (ref 13–17)
LACTATE BLDA-SCNC: 1.2 MMOL/L — SIGNIFICANT CHANGE UP (ref 0.5–2)
MAGNESIUM SERPL-MCNC: 2 MG/DL — SIGNIFICANT CHANGE UP (ref 1.6–2.6)
MAGNESIUM SERPL-MCNC: 2.4 MG/DL — SIGNIFICANT CHANGE UP (ref 1.6–2.6)
MCHC RBC-ENTMCNC: 28.2 PG — SIGNIFICANT CHANGE UP (ref 27–34)
MCHC RBC-ENTMCNC: 35.1 % — SIGNIFICANT CHANGE UP (ref 32–36)
MCV RBC AUTO: 80.3 FL — SIGNIFICANT CHANGE UP (ref 80–100)
NRBC # FLD: 0 K/UL — SIGNIFICANT CHANGE UP (ref 0–0)
PCO2 BLDA: 35 MMHG — SIGNIFICANT CHANGE UP (ref 35–48)
PH BLDA: 7.44 PH — SIGNIFICANT CHANGE UP (ref 7.35–7.45)
PHOSPHATE SERPL-MCNC: 5 MG/DL — HIGH (ref 2.5–4.5)
PHOSPHATE SERPL-MCNC: 7.2 MG/DL — HIGH (ref 2.5–4.5)
PLATELET # BLD AUTO: 315 K/UL — SIGNIFICANT CHANGE UP (ref 150–400)
PMV BLD: 9.9 FL — SIGNIFICANT CHANGE UP (ref 7–13)
PO2 BLDA: 87 MMHG — SIGNIFICANT CHANGE UP (ref 83–108)
POTASSIUM BLDA-SCNC: 5.2 MMOL/L — HIGH (ref 3.4–4.5)
POTASSIUM SERPL-MCNC: 4.6 MMOL/L — SIGNIFICANT CHANGE UP (ref 3.5–5.3)
POTASSIUM SERPL-MCNC: 5.7 MMOL/L — HIGH (ref 3.5–5.3)
POTASSIUM SERPL-SCNC: 4.6 MMOL/L — SIGNIFICANT CHANGE UP (ref 3.5–5.3)
POTASSIUM SERPL-SCNC: 5.7 MMOL/L — HIGH (ref 3.5–5.3)
PROT SERPL-MCNC: 4.5 G/DL — LOW (ref 6–8.3)
RBC # BLD: 3.05 M/UL — LOW (ref 4.2–5.8)
RBC # FLD: 12.8 % — SIGNIFICANT CHANGE UP (ref 10.3–14.5)
SAO2 % BLDA: 97.5 % — SIGNIFICANT CHANGE UP (ref 95–99)
SODIUM BLDA-SCNC: 123 MMOL/L — LOW (ref 136–146)
SODIUM SERPL-SCNC: 126 MMOL/L — LOW (ref 135–145)
SODIUM SERPL-SCNC: 132 MMOL/L — LOW (ref 135–145)
WBC # BLD: 19.19 K/UL — HIGH (ref 3.8–10.5)
WBC # FLD AUTO: 19.19 K/UL — HIGH (ref 3.8–10.5)

## 2020-06-18 PROCEDURE — 99252 IP/OBS CONSLTJ NEW/EST SF 35: CPT | Mod: GC

## 2020-06-18 PROCEDURE — 99232 SBSQ HOSP IP/OBS MODERATE 35: CPT

## 2020-06-18 PROCEDURE — 93976 VASCULAR STUDY: CPT | Mod: 26

## 2020-06-18 PROCEDURE — 99233 SBSQ HOSP IP/OBS HIGH 50: CPT | Mod: GC

## 2020-06-18 RX ORDER — KETAMINE HYDROCHLORIDE 100 MG/ML
0.25 INJECTION INTRAMUSCULAR; INTRAVENOUS
Qty: 1000 | Refills: 0 | Status: DISCONTINUED | OUTPATIENT
Start: 2020-06-18 | End: 2020-06-18

## 2020-06-18 RX ORDER — DEXMEDETOMIDINE HYDROCHLORIDE IN 0.9% SODIUM CHLORIDE 4 UG/ML
0.2 INJECTION INTRAVENOUS
Qty: 400 | Refills: 0 | Status: DISCONTINUED | OUTPATIENT
Start: 2020-06-18 | End: 2020-06-22

## 2020-06-18 RX ORDER — KETAMINE HYDROCHLORIDE 100 MG/ML
30 INJECTION INTRAMUSCULAR; INTRAVENOUS ONCE
Refills: 0 | Status: DISCONTINUED | OUTPATIENT
Start: 2020-06-18 | End: 2020-06-18

## 2020-06-18 RX ORDER — KETAMINE HYDROCHLORIDE 100 MG/ML
0.1 INJECTION INTRAMUSCULAR; INTRAVENOUS
Qty: 200 | Refills: 0 | Status: DISCONTINUED | OUTPATIENT
Start: 2020-06-18 | End: 2020-06-18

## 2020-06-18 RX ORDER — HYDROMORPHONE HYDROCHLORIDE 2 MG/ML
1.5 INJECTION INTRAMUSCULAR; INTRAVENOUS; SUBCUTANEOUS
Refills: 0 | Status: DISCONTINUED | OUTPATIENT
Start: 2020-06-18 | End: 2020-06-19

## 2020-06-18 RX ORDER — KETAMINE HYDROCHLORIDE 100 MG/ML
0.1 INJECTION INTRAMUSCULAR; INTRAVENOUS
Qty: 1000 | Refills: 0 | Status: DISCONTINUED | OUTPATIENT
Start: 2020-06-18 | End: 2020-06-20

## 2020-06-18 RX ORDER — KETAMINE HYDROCHLORIDE 100 MG/ML
0.5 INJECTION INTRAMUSCULAR; INTRAVENOUS
Qty: 200 | Refills: 0 | Status: DISCONTINUED | OUTPATIENT
Start: 2020-06-18 | End: 2020-06-18

## 2020-06-18 RX ORDER — QUETIAPINE FUMARATE 200 MG/1
25 TABLET, FILM COATED ORAL AT BEDTIME
Refills: 0 | Status: DISCONTINUED | OUTPATIENT
Start: 2020-06-18 | End: 2020-06-29

## 2020-06-18 RX ORDER — TIZANIDINE 4 MG/1
1 TABLET ORAL EVERY 6 HOURS
Refills: 0 | Status: DISCONTINUED | OUTPATIENT
Start: 2020-06-18 | End: 2020-06-29

## 2020-06-18 RX ADMIN — TIZANIDINE 1 MILLIGRAM(S): 4 TABLET ORAL at 21:13

## 2020-06-18 RX ADMIN — OXYCODONE HYDROCHLORIDE 10 MILLIGRAM(S): 5 TABLET ORAL at 11:40

## 2020-06-18 RX ADMIN — HYDROMORPHONE HYDROCHLORIDE 1.5 MILLIGRAM(S): 2 INJECTION INTRAMUSCULAR; INTRAVENOUS; SUBCUTANEOUS at 20:30

## 2020-06-18 RX ADMIN — HYDROMORPHONE HYDROCHLORIDE 1.5 MILLIGRAM(S): 2 INJECTION INTRAMUSCULAR; INTRAVENOUS; SUBCUTANEOUS at 12:49

## 2020-06-18 RX ADMIN — KETAMINE HYDROCHLORIDE 1.05 MG/KG/HR: 100 INJECTION INTRAMUSCULAR; INTRAVENOUS at 19:15

## 2020-06-18 RX ADMIN — Medication 1 MILLIGRAM(S): at 19:32

## 2020-06-18 RX ADMIN — HEPARIN SODIUM 5000 UNIT(S): 5000 INJECTION INTRAVENOUS; SUBCUTANEOUS at 13:16

## 2020-06-18 RX ADMIN — Medication 650 MILLIGRAM(S): at 13:16

## 2020-06-18 RX ADMIN — DEXMEDETOMIDINE HYDROCHLORIDE IN 0.9% SODIUM CHLORIDE 5.24 MICROGRAM(S)/KG/HR: 4 INJECTION INTRAVENOUS at 22:46

## 2020-06-18 RX ADMIN — SEVELAMER CARBONATE 800 MILLIGRAM(S): 2400 POWDER, FOR SUSPENSION ORAL at 11:30

## 2020-06-18 RX ADMIN — NICARDIPINE HYDROCHLORIDE 30 MILLIGRAM(S): 30 CAPSULE, EXTENDED RELEASE ORAL at 05:19

## 2020-06-18 RX ADMIN — Medication 650 MILLIGRAM(S): at 19:14

## 2020-06-18 RX ADMIN — HEPARIN SODIUM 5000 UNIT(S): 5000 INJECTION INTRAVENOUS; SUBCUTANEOUS at 21:14

## 2020-06-18 RX ADMIN — SEVELAMER CARBONATE 800 MILLIGRAM(S): 2400 POWDER, FOR SUSPENSION ORAL at 21:13

## 2020-06-18 RX ADMIN — CHLORHEXIDINE GLUCONATE 1 APPLICATION(S): 213 SOLUTION TOPICAL at 07:19

## 2020-06-18 RX ADMIN — Medication 650 MILLIGRAM(S): at 08:07

## 2020-06-18 RX ADMIN — HYDROMORPHONE HYDROCHLORIDE 1 MILLIGRAM(S): 2 INJECTION INTRAMUSCULAR; INTRAVENOUS; SUBCUTANEOUS at 03:35

## 2020-06-18 RX ADMIN — TIZANIDINE 1 MILLIGRAM(S): 4 TABLET ORAL at 15:48

## 2020-06-18 RX ADMIN — KETAMINE HYDROCHLORIDE 30 MILLIGRAM(S): 100 INJECTION INTRAMUSCULAR; INTRAVENOUS at 05:31

## 2020-06-18 RX ADMIN — HYDROMORPHONE HYDROCHLORIDE 1.5 MILLIGRAM(S): 2 INJECTION INTRAMUSCULAR; INTRAVENOUS; SUBCUTANEOUS at 17:32

## 2020-06-18 RX ADMIN — OXYCODONE HYDROCHLORIDE 15 MILLIGRAM(S): 5 TABLET ORAL at 19:14

## 2020-06-18 RX ADMIN — LACTULOSE 10 GRAM(S): 10 SOLUTION ORAL at 05:20

## 2020-06-18 RX ADMIN — GABAPENTIN 100 MILLIGRAM(S): 400 CAPSULE ORAL at 05:19

## 2020-06-18 RX ADMIN — NICARDIPINE HYDROCHLORIDE 30 MILLIGRAM(S): 30 CAPSULE, EXTENDED RELEASE ORAL at 21:13

## 2020-06-18 RX ADMIN — HYDROMORPHONE HYDROCHLORIDE 1 MILLIGRAM(S): 2 INJECTION INTRAMUSCULAR; INTRAVENOUS; SUBCUTANEOUS at 06:37

## 2020-06-18 RX ADMIN — Medication 650 MILLIGRAM(S): at 01:41

## 2020-06-18 RX ADMIN — OXYCODONE HYDROCHLORIDE 15 MILLIGRAM(S): 5 TABLET ORAL at 04:43

## 2020-06-18 RX ADMIN — OXYCODONE HYDROCHLORIDE 15 MILLIGRAM(S): 5 TABLET ORAL at 14:56

## 2020-06-18 RX ADMIN — HEPARIN SODIUM 5000 UNIT(S): 5000 INJECTION INTRAVENOUS; SUBCUTANEOUS at 05:19

## 2020-06-18 RX ADMIN — GABAPENTIN 100 MILLIGRAM(S): 400 CAPSULE ORAL at 21:13

## 2020-06-18 RX ADMIN — OXYCODONE HYDROCHLORIDE 15 MILLIGRAM(S): 5 TABLET ORAL at 01:40

## 2020-06-18 RX ADMIN — SEVELAMER CARBONATE 800 MILLIGRAM(S): 2400 POWDER, FOR SUSPENSION ORAL at 05:19

## 2020-06-18 RX ADMIN — NICARDIPINE HYDROCHLORIDE 30 MILLIGRAM(S): 30 CAPSULE, EXTENDED RELEASE ORAL at 13:16

## 2020-06-18 RX ADMIN — GABAPENTIN 100 MILLIGRAM(S): 400 CAPSULE ORAL at 14:08

## 2020-06-18 RX ADMIN — KETAMINE HYDROCHLORIDE 1.05 MG/KG/HR: 100 INJECTION INTRAMUSCULAR; INTRAVENOUS at 08:29

## 2020-06-18 NOTE — CONSULT NOTE ADULT - CONSULT REASON
Anuric ANTOINETTE
Compartment Syndrome
L arm swelling, r/o compartment syndrome
hemodynamic monitoring, q1h neurovascular checks
Rehab evaluation

## 2020-06-18 NOTE — PROGRESS NOTE ADULT - ASSESSMENT
29M with PMH of bipolar depression transferred from [facility] for further management of acute renal failure due to rhabdomyolysis, Taken to the OR for LLE compartment syndrome. Now s/p four compartment fasciotomy of the left lower leg 6/11. HD after surgery. Doing well today.    Plan  - Pre op today for plastics to close tomorrow  - F/u b/l renal artery duplex  s/o ra dissection vs thrombosis   - pt continues to clinically improve from a vasc surg standpoint   - pain control PRN  - diet as tolerated  - c/w vac dressings  - continue HD as per Renal  - DVT ppx   - OOB to chair, PT consult for mobilization  - Appreciate SICU care    C Team Surgery  [id number] 29M with PMH of bipolar depression transferred from [facility] for further management of acute renal failure due to rhabdomyolysis, Taken to the OR for LLE compartment syndrome. Now s/p four compartment fasciotomy of the left lower leg 6/11. HD after surgery. Doing well today.    Plan  - Permacath place with IR today  - Pre op today for plastics to close tomorrow  - F/u b/l renal artery duplex  s/o ra dissection vs thrombosis   - pt continues to clinically improve from a vasc surg standpoint   - pain control PRN  - diet as tolerated  - c/w vac dressings  - continue HD as per Renal  - DVT ppx   - OOB to chair, PT consult for mobilization  - Appreciate SICU care    C Team Surgery  [id number] 29M with PMH of bipolar depression transferred from [facility] for further management of acute renal failure due to rhabdomyolysis, Taken to the OR for LLE compartment syndrome. Now s/p four compartment fasciotomy of the left lower leg 6/11. HD after surgery. Doing well today.    Plan  - Permacath place with IR today  - Pre op today for plastics to close tomorrow  - F/u b/l renal artery duplex  s/o ra dissection vs thrombosis  pending   - pt continues to clinically improve from a vasc surg standpoint   - pain control PRN  - diet as tolerated  - c/w vac dressings  - continue HD as per Renal  - DVT ppx   - OOB to chair, PT consult for mobilization  - Appreciate SICU care    C Team Surgery  [id number]

## 2020-06-18 NOTE — PROGRESS NOTE ADULT - SUBJECTIVE AND OBJECTIVE BOX
Vascular Progress Note    24 HOUR EVENTS:  - R femoral Shiley placed   - Plan for permacath w IR 06/18, keep NPO   - Dialysed x 1 with persistent hyperkalemia, renal plan to dialyze am 06/18  - plan to close fasciotomy Friday 06/19  - Discontinued PCA, oxy q3 w dilaudid breakhtrough.  - o/n pain difficult to control and was given   - 50 fentanyl, 6 dilaudid and 2 versed given for dressing change, pain recs 10 ketamine if needs furhter dressing changes      S: Patient seen and examined. No acute events overnight. Could not walk/OOb yesterday due to pain. Will attempt today. Denies other issues.     O:  Physical Exam:  General: NAD  Respiratory: no increased work of breathing   Abdomen: soft, nontender, nondistended  Extremities: dressing cdi, LLE fasciotomies with vacs (x2) in place apparently functioning muscle all viable   LLE gross motor intact from hip to toes, no signs of LLE arterial insufficiency   lle sensory deficit on left foot dorsum unchanged       Vital Signs Last 24 Hrs  T(C): 37.8 (18 Jun 2020 04:00), Max: 38.8 (17 Jun 2020 12:00)  T(F): 100 (18 Jun 2020 04:00), Max: 101.9 (17 Jun 2020 12:00)  HR: 110 (18 Jun 2020 06:00) (105 - 121)  BP: --  BP(mean): --  RR: 11 (18 Jun 2020 06:00) (11 - 22)  SpO2: 96% (18 Jun 2020 06:00) (95% - 100%)    I&O's Detail    17 Jun 2020 07:01  -  18 Jun 2020 07:00  --------------------------------------------------------  IN:    Oral Fluid: 1005 mL    Other: 400 mL  Total IN: 1405 mL    OUT:    Other: 2900 mL    VAC (Vacuum Assisted Closure) System: 950 mL    VAC (Vacuum Assisted Closure) System: 650 mL    Voided: 400 mL  Total OUT: 4900 mL    Total NET: -3495 mL                                8.6    19.19 )-----------( 315      ( 18 Jun 2020 00:45 )             24.5       06-18    126<L>  |  90<L>  |  88<H>  ----------------------------<  122<H>  5.7<H>   |  21<L>  |  7.22<H>    Ca    7.1<L>      18 Jun 2020 00:45  Phos  7.2     06-18  Mg     2.4     06-18    TPro  4.5<L>  /  Alb  2.2<L>  /  TBili  0.3  /  DBili  x   /  AST  44<H>  /  ALT  7   /  AlkPhos  104  06-18 Vascular Progress Note    24 HOUR EVENTS:  - R femoral Shiley placed   - Plan for permacath w IR 06/18, keep NPO   - Dialysed x 1 with persistent hyperkalemia, renal plan to dialyze am 06/18  - plan to close fasciotomy Friday 06/19  - Discontinued PCA, oxy q3 w dilaudid breakhtrough.  - o/n pain difficult to control and was given   - 50 fentanyl, 6 dilaudid and 2 versed given for dressing change, pain recs 10 ketamine if needs furhter dressing changes      S: Patient seen and examined. No acute events overnight. Could not walk/OOb yesterday due to pain. Will attempt today. Denies other issues.   pt states left foot dorsum sensation is now about 50% of normal     O:  Physical Exam:  General: NAD  Respiratory: no increased work of breathing   Abdomen: soft, nontender, nondistended  Extremities: dressing cdi, LLE fasciotomies with vacs (x2) in place apparently functioning muscle all viable   LLE gross motor intact from hip to toes, no signs of LLE arterial insufficiency   lle sensory deficit on left foot dorsum remains as stated above     Vital Signs Last 24 Hrs  T(C): 37.8 (18 Jun 2020 04:00), Max: 38.8 (17 Jun 2020 12:00)  T(F): 100 (18 Jun 2020 04:00), Max: 101.9 (17 Jun 2020 12:00)  HR: 110 (18 Jun 2020 06:00) (105 - 121)  BP: --  BP(mean): --  RR: 11 (18 Jun 2020 06:00) (11 - 22)  SpO2: 96% (18 Jun 2020 06:00) (95% - 100%)    I&O's Detail    17 Jun 2020 07:01  -  18 Jun 2020 07:00  --------------------------------------------------------  IN:    Oral Fluid: 1005 mL    Other: 400 mL  Total IN: 1405 mL    OUT:    Other: 2900 mL    VAC (Vacuum Assisted Closure) System: 950 mL    VAC (Vacuum Assisted Closure) System: 650 mL    Voided: 400 mL  Total OUT: 4900 mL    Total NET: -3495 mL                              8.6    19.19 )-----------( 315      ( 18 Jun 2020 00:45 )             24.5       06-18    126<L>  |  90<L>  |  88<H>  ----------------------------<  122<H>  5.7<H>   |  21<L>  |  7.22<H>    Ca    7.1<L>      18 Jun 2020 00:45  Phos  7.2     06-18  Mg     2.4     06-18    TPro  4.5<L>  /  Alb  2.2<L>  /  TBili  0.3  /  DBili  x   /  AST  44<H>  /  ALT  7   /  AlkPhos  104  06-18

## 2020-06-18 NOTE — PROGRESS NOTE ADULT - ASSESSMENT
29M w/ PMH of bipolar depression, s/p found down at home, transferred from [facility] for further management of acute renal failure due to rhabdomyolysis, found to have compartment syndrome on the LLE, now s/p four compartment fasciotomy and lateral/medial thigh fasciotomies    Neuro hx of bipolar depression  - A&O x 3  - oxy q3 hrs and tylenol  q6 standing; dilaudid IV breakthrough, gabapentin 100 tid, flexeril 5 q3 PRN  - Xanax 1 mg q8 PRN  - Psych following, no plan to restart home lithium  - Pain following    Resp  - RA   - continue HD as needed for fluid overload  - Mobilize, OOB to chair    CVS  - Appropriate mental status, appears well perfused   - nicardipine 30 mg PO q8 hrs   - if HTN again, rec'd clonidine .1mg by addiction psych  - cleviprex infusion off    GI  - NPO at midnight for permacath 06/18  - Regular diet with fluid restriction 1L for hyponatremia   - Bowel regimen senna 2 tabs qHS  - Patient declining miralax, last BM 06/17 prior ot that > 1w      - ANTOINETTE in setting of rhabdomyolysis  - CK down to <6000   - Remains hypervolemic, oliguric   - IV locked  - Hypervolemic hyponatremia, R fem chyley, plan for permacath 06/18  - Next dialysis am 06/18 for hyperkalemia  - Nephrology following    Heme  - stable CBC  - SQH for dvt ppx    ID  - Ancef discontinued on 6/15  - Has been afebrile, leukocytosis of 15-20    Endo  - monitor FGS    Lines  - Rfem HD catheter  - Right radial a line  - LLE wound vac

## 2020-06-18 NOTE — PROGRESS NOTE ADULT - ATTENDING COMMENTS
Agree with notes of health care providers on my service (PAs, Residents and House Staff).  The patient is in SICU with diagnosis mentioned in the note.    The SICU team has a constant risk benefit analyzes discussion with the primary team, all consultants, House Staff and PA's.  29M w/ PMH of bipolar depression, s/p found down at home, transferred from [facility] for further management of acute renal failure due to rhabdomyolysis, s/p four compartment fasciotomy and lateral/medial thigh fasciotomies with ANTOINETTE on HD.  I have personally examined the patient, reviewed data and laboratory tests/x-rays and all pertinent electronic images.    NEURO  Exam: awake, alert, oriented x4, motor and sensory intact in all 4 extremities   RESPIRATORY  RR: 15   SpO2: 95%   Exam: , clear to auscultation bilaterally  CARDIOVASCULAR  HR: 118   ABP: 165/93   Exam:  Cardiac Rhythm:  RR  GI/NUTRITION  Exam: Abd soft NT/ND  Diet: Regular diet   GENITOURINARY  HD. increasing Uo  The assessment and plan is specified below:  Neuro hx of bipolar depression  - A&O x 3  - oxy q3 hrs and tylenol  q6 standing; dilaudid IV breakthrough, gabapentin 100 tid, flexeril 5 q3 PRN  - Xanax 1 mg q8 PRN  - Psych following, no plan to restart home lithium  - Pain following    Resp  - RA   - continue HD as needed for fluid overload  - Mobilize, OOB to chair    CVS  - Appropriate mental status, appears well perfused   - nicardipine 30 mg PO q8 hrs   - if HTN again, rec'd clonidine .1mg by addiction psych  - cleviprex infusion off    GI  - NPO at midnight for permacath 06/18  - Regular diet with fluid restriction 1L for hyponatremia   - Bowel regimen senna 2 tabs qHS  - Patient declining miralax, last BM 06/17 prior ot that > 1w      - ANTOINETTE in setting of rhabdomyolysis  - CK down to <6000   - Remains hypervolemic, oliguric   - IV locked  - Hypervolemic hyponatremia, R fem chyley, plan for permacath 06/18  - Next dialysis am 06/18 for hyperkalemia  - Nephrology following    Heme  - stable CBC  - SQH for dvt ppx    ID  - Ancef discontinued on 6/15  - Has been afebrile, leukocytosis of 15-20    Endo  - monitor FGS    Lines  - Rfem HD catheter  - Right radial a line  - LLE wound vac

## 2020-06-18 NOTE — CHART NOTE - NSCHARTNOTEFT_GEN_A_CORE
IR Pre-Procedure Note    Patient:  29M with PMH of bipolar depression transferred from [facility] for further management of acute renal failure due to rhabdomyolysis. Pt was found unresponsive at home, lying on his L side. He states he has not been taking his Lithium for at least 2 wks. At the OSH, he was treated for hyperkalemia. He received D5, insulin, calcium gluconate, Kayexalate Last BM was on 6/9. For L leg pain he was given lidocaine patch, dilaudid 1mg prn and tramadol 50mg. Hydralazine 20mg given for HTN. From 1 to 7 am only 100cc of UOP. Urine was tea colored. 500cc bolus of NS was given. CT cervical spine at OSH was (-). CT Head (-). CTAP and CXR were (-). CK on admission there was 177,000.     Pt was seen by surgery team in MICU for LUE swelling and compartment syndrome was ruled out. Surgery team was re-consulted today due to new symptoms of L foot drop and numbness. Pt was found to be highly suspicious for compartment syndrome in the upper thigh, and was emergently taken to OR.     Pt is now s/p four compartment fasciotomy of LLE, and left lateral and medial thigh fasciotomies. SICU consulted for q1h neurovascular checks and hemodynamic monitoring      Patient Gender:   Male    Procedure (including site / side if known): tunneled cath placement    Diagnosis / Indication: Patient is a 29y old  Male who presents with a chief complaint of Compartment syndrome c/b rhabdomyolysis (18 Jun 2020 00:27)      Interventional Radiology Attending Physician: Dr. Jose Alfredo Mendez    Ordering Attending Physician: Dr. Rajesh Palacio    PAST MEDICAL & SURGICAL HISTORY:  Bipolar depression  Migraine  Displaced fracture of proximal phalanx of left little finger  Difficulty sleeping  ADD (attention deficit disorder)  History of facial surgery: Fadi-Facial Realignment Surgery in 2008       Pertinent Labs:   CBC Full  -  ( 18 Jun 2020 00:45 )  WBC Count : 19.19 K/uL  RBC Count : 3.05 M/uL  Hemoglobin : 8.6 g/dL  Hematocrit : 24.5 %  Platelet Count - Automated : 315 K/uL  Mean Cell Volume : 80.3 fL  Mean Cell Hemoglobin : 28.2 pg  Mean Cell Hemoglobin Concentration : 35.1 %  Auto Neutrophil # : x  Auto Lymphocyte # : x  Auto Monocyte # : x  Auto Eosinophil # : x  Auto Basophil # : x  Auto Neutrophil % : x  Auto Lymphocyte % : x  Auto Monocyte % : x  Auto Eosinophil % : x  Auto Basophil % : x    06-18    126<L>  |  90<L>  |  88<H>  ----------------------------<  122<H>  5.7<H>   |  21<L>  |  7.22<H>    Ca    7.1<L>      18 Jun 2020 00:45  Phos  7.2     06-18  Mg     2.4     06-18    TPro  4.5<L>  /  Alb  2.2<L>  /  TBili  0.3  /  DBili  x   /  AST  44<H>  /  ALT  7   /  AlkPhos  104  06-18        Patient / Family aware of procedure:   [  ] Y   [  ] N    Leticia Hurley MD  [id number] IR Pre-Procedure Note    Patient:  29M with PMH of bipolar depression transferred from [facility] for further management of acute renal failure due to rhabdomyolysis. Pt was found unresponsive at home, lying on his L side. He states he has not been taking his Lithium for at least 2 wks. At the OSH, he was treated for hyperkalemia. He received D5, insulin, calcium gluconate, Kayexalate Last BM was on 6/9. For L leg pain he was given lidocaine patch, dilaudid 1mg prn and tramadol 50mg. Hydralazine 20mg given for HTN. From 1 to 7 am only 100cc of UOP. Urine was tea colored. 500cc bolus of NS was given. CT cervical spine at OSH was (-). CT Head (-). CTAP and CXR were (-). CK on admission there was 177,000.     Pt was seen by surgery team in MICU for LUE swelling and compartment syndrome was ruled out. Surgery team was re-consulted today due to new symptoms of L foot drop and numbness. Pt was found to be highly suspicious for compartment syndrome in the upper thigh, and was emergently taken to OR.     Pt is now s/p four compartment fasciotomy of LLE, and left lateral and medial thigh fasciotomies. SICU consulted for q1h neurovascular checks and hemodynamic monitoring      Patient Gender:   Male    Procedure (including site / side if known): tunneled dialysis catheter placement    Diagnosis / Indication: Patient is a 29y old  Male who presents with a chief complaint of Compartment syndrome c/b rhabdomyolysis (18 Jun 2020 00:27)      Interventional Radiology Attending Physician: Dr. Jose Alfredo Mendez    Ordering Attending Physician: Dr. Rajesh Palacio    PAST MEDICAL & SURGICAL HISTORY:  Bipolar depression  Migraine  Displaced fracture of proximal phalanx of left little finger  Difficulty sleeping  ADD (attention deficit disorder)  History of facial surgery: Fadi-Facial Realignment Surgery in 2008       Pertinent Labs:   CBC Full  -  ( 18 Jun 2020 00:45 )  WBC Count : 19.19 K/uL  RBC Count : 3.05 M/uL  Hemoglobin : 8.6 g/dL  Hematocrit : 24.5 %  Platelet Count - Automated : 315 K/uL  Mean Cell Volume : 80.3 fL  Mean Cell Hemoglobin : 28.2 pg  Mean Cell Hemoglobin Concentration : 35.1 %  Auto Neutrophil # : x  Auto Lymphocyte # : x  Auto Monocyte # : x  Auto Eosinophil # : x  Auto Basophil # : x  Auto Neutrophil % : x  Auto Lymphocyte % : x  Auto Monocyte % : x  Auto Eosinophil % : x  Auto Basophil % : x    06-18    126<L>  |  90<L>  |  88<H>  ----------------------------<  122<H>  5.7<H>   |  21<L>  |  7.22<H>    Ca    7.1<L>      18 Jun 2020 00:45  Phos  7.2     06-18  Mg     2.4     06-18    TPro  4.5<L>  /  Alb  2.2<L>  /  TBili  0.3  /  DBili  x   /  AST  44<H>  /  ALT  7   /  AlkPhos  104  06-18        Patient / Family aware of procedure:   [  ] Y   [  ] N    Leticia Hurley MD  [id number]

## 2020-06-18 NOTE — PROGRESS NOTE ADULT - PROBLEM SELECTOR PLAN 1
Pt with ANTOINETTE in the setting of rhabdomyolysis. Upon lab review, Scr was 1.11 on 8/23/2017. Scr at [facility] this AM was 3.06. Scr today is 7.22. Pt with oliguric ANTOINETTE secondary to rhabdomyolysis. Pt. noted to have mild improvement urine output. Labs reviewed, will arrange for HD with [facility] again today. Pt. awaiting tunneled catheter placement by IR team. Will assess daily for HD needs. Monitor labs and urine output. Avoid potential nephrotoxins.

## 2020-06-18 NOTE — CONSULT NOTE ADULT - ATTENDING COMMENTS
Full consult note as above; discussed with surgery resident and vascular fellow.   He fell and laid on his LT side for many hours. He developed acute renal failure due to rhabdomyolysis. He had some LT upper extremity swelling, but his compartments were soft. He had normal motor functions of his arm and leg and had no pain with passive motion. There was thus no clear evidence of compartment syndrome. He had normal capillary refill of his extremities.
Critical Care Dx  T79.6XXA Traumatic rhabdomyolysis, initial encounter   -fluid resuscitation balanced with ARF causing fluid retention   -electrolytes stable, monitor sodium  N17.9 Acute renal failure, unspecified acute renal failure type   -appreciate nephrology recs, will give trial of bumex  -temper diuresis with continued rhabdo  T79.A29A Compartment syndrome of lower leg   -s/p fasciotomies, neuro function intact, perfused   E87.1 Hyponatremia   -trend sodium, keep >130, should increase with diuresis. If continued overload will consider dialysis     The patient is a critical care patient with life threatening hemodynamic and respiratory instability in SICU.  I have personally interviewed and examined the patient, reviewed data and laboratory tests/x-rays and all pertinent electronic images.  The SICU team has a constant risk benefit analyzes discussion with the primary team, all consultants, House Staff and PA's on all decisions.   Time involved in performance of separately billable procedures was not counted toward my critical care time. There is no overlap.    I have personally provided 60 minutes ([id number]) of critical care time concurrently with the resident/fellow. This time excludes time spent on separate procedures and time spent teaching. I have reviewed the resident's/fellow's documentation and agree with the assessment and plan of care.  I was physically present for the key portions of the evaluation and management (E/M) service provided.      Petar Pulliam MD  Acute and Critical Care Surgery
agree with note and plan above  patient given dilaudid IV by nurse during exam  reports 9/10 pain  plan for fasciotomy closure tomorrow  s/p dialysis today
Suspect rhambdo related Acute tubular necrosis presently anuric.  Point of care ultrasound performed by me at bedside showing no bilateral hydronephrosis.  IVC not collapseable, no B lines on lung ultrasound.  Combination of worsening hypocalcemia and rising hyperkalemia is concerning and would recommend hemodialysis with no ultrafiltration if the patient does not respond to furosemide challenge.
ASHTYN Sauer MD performed a history and physical exam of the patient and discussed  the findings and plan with the house officer. I reviewed the resident note and agree with the findings and plan   I Adam Sauer MD have personally seen and examined the patient at bedside today at 5  pm    ASHTYN Sauer MD explained the indications risks and benefits of  left  lef calf, thigh and buttock fasciotomy to patient who understands and consents   Additional discussion  took place with the patient that due to the condition and the current findings there is a significant risk of lle  art insuff, permanent neurologic damage and lle limb loss   Pt is aware and accepts this   this is a surg emergency

## 2020-06-18 NOTE — PROGRESS NOTE ADULT - ATTENDING COMMENTS
ASHTYN Sauer MD have seen and examined the patient today and agree with  the  evaluation, assessment and plan of the surgical house officer  ASHTYN Sauer MD have personally seen and examined the patient at bedside today at 1  pm

## 2020-06-18 NOTE — CONSULT NOTE ADULT - SUBJECTIVE AND OBJECTIVE BOX
Physical Medicine & Rehabilitation Initial Consult:  06-10  Patient is a 29y old  Male who presents with a chief complaint of Compartment syndrome c/b rhabdomyolysis (2020 00:27)    HPI:  29 y M with PMH of bipolar depression transferred from [facility] for further management of acute renal failure due to rhabdomyolysis. Pt was found unresponsive at home, lying on his L side. He states he has not been taking his Lithium for at least 2 wks. At the OSH, he was treated for hyperkalemia. He received D5, insulin, calcium gluconate, Kayexalate Last BM was on . For L leg pain he was given lidocaine patch, dilaudid 1mg prn and tramadol 50mg. Hydralazine 20mg given for HTN. From 1 to 7 am only 100cc of UOP. Urine was tea colored. 500cc bolus of NS was given. CT cervical spine at OSH was (-). CT Head (-). CTAP and CXR were (-). CK on admission there was 177,000.     Nephrology consulted, suspected rhabdo related to acute tubular necrosis with anuria. POC US performed at bedside with no bilateral hydro. Recommended HD if pt did not respond to lasix challenge.     Pt was seen by surgery team in MICU for LUE swelling and compartment syndrome was ruled out. Surgery team was re-consulted  due to new symptoms of L foot drop and numbness. Pt was found to be highly suspicious for compartment syndrome in the upper thigh, and was emergently taken to OR. Pt is now s/p four compartment fasciotomy of LLE, and left lateral and medial thigh fasciotomies. SICU consulted for q1h neurovascular checks and hemodynamic monitoring. Planned for fasciotomy closure .       REVIEW OF SYSTEMS  Constitutional - No fever, No weight loss, No fatigue  HEENT - No eye pain, No visual disturbances, No difficulty hearing, No tinnitus, No vertigo, No neck pain  Respiratory - No cough, No wheezing, No shortness of breath  Cardiovascular - No chest pain, No palpitations  Gastrointestinal - No abdominal pain, No nausea, No vomiting, No diarrhea, No constipation  Genitourinary - No dysuria, No frequency, No hematuria, No incontinence  Neurological - No headaches, No memory loss, No loss of strength, No numbness, No tremors  Skin - No itching, No rashes, No lesions   Endocrine - No temperature intolerance  Musculoskeletal - No joint pain, No joint swelling, No muscle pain  Psychiatric - No depression, No anxiety    PAST MEDICAL & SURGICAL HISTORY  Bipolar depression  Migraine  Displaced fracture of proximal phalanx of left little finger  Difficulty sleeping  ADD (attention deficit disorder)  History of facial surgery    SOCIAL HISTORY  Smoking - Denied  EtOH - Denied   Drugs - Denied    FUNCTIONAL HISTORY  Lives   Independent    CURRENT FUNCTIONAL STATUS    FAMILY HISTORY   Family history of diabetes mellitus in grandmother (Grandparent)  Family history of heart disease (Grandparent)    ALLERGIES  No Known Allergies    ----------------------------------------------------------------------------------------  T(C): 37.8 (20 @ 04:00), Max: 38.8 (20 @ 12:00)  HR: 110 (20 @ 06:00) (105 - 121)  BP: --  RR: 11 (20 @ 06:00) (11 - 22)  SpO2: 96% (20 @ 06:00) (95% - 100%)    PHYSICAL EXAM  Constitutional: NAD, Comfortable  HEENT: NCAT, EOMI  Neck: Supple, No limited ROM  Chest: Breathing comfortably, No wheezing  Cardiovascular: S1S2   Abdomen: Soft   Extremities: No C/C/E, No calf tenderness   Neurologic Exam:                    Cognitive: Awake, Alert, AAO to self, place, date, year, situation     Communication: Fluent, No dysarthria     Cranial Nerves: CN 2-12 intact     Motor:                    LEFT    UE - ShAB 5/5, EF 5/5, EE 5/5, WE 5/5,  5/5                    RIGHT UE - ShAB 5/5, EF 5/5, EE 5/5, WE 5/5,  5/5                    LEFT    LE - HF 5/5, KE 5/5, DF 5/5, PF 5/5                    RIGHT LE - HF 5/5, KE 5/5, DF 5/5, PF 5/5        Sensory: Intact to LT     Reflexes: DTR Intact, No primitive reflex     Coordination: FTN intact     OculoVestibular: No saccades, No nystagmus, VOR         Balance: WNL Static  Psychiatric: Mood stable, Affect WNL  ----------------------------------------------------------------------------------------  Labs/Imagin.6    19. )-----------( 315      ( 2020 00:45 )             24.5   06-18    126<L>  |  90<L>  |  88<H>  ----------------------------<  122<H>  5.7<H>   |  21<L>  |  7.22<H>    Ca    7.1<L>      2020 00:45  Phos  7.2       Mg     2.4         TPro  4.5<L>  /  Alb  2.2<L>  /  TBili  0.3  /  DBili  x   /  AST  44<H>  /  ALT  7   /  AlkPhos  104    ----------------------------------------------------------------------------------------  Medications:  acetaminophen   Tablet .. 650 milliGRAM(s) Oral every 6 hours  ALPRAZolam 1 milliGRAM(s) Oral three times a day PRN  chlorhexidine 4% Liquid 1 Application(s) Topical <User Schedule>  gabapentin 100 milliGRAM(s) Oral three times a day  heparin   Injectable 5000 Unit(s) SubCutaneous every 8 hours  HYDROmorphone  Injectable 1 milliGRAM(s) IV Push every 3 hours PRN  HYDROmorphone  Injectable 2 milliGRAM(s) IV Push daily  ketamine Infusion. 0.25 mG/kG/Hr IV Continuous <Continuous>  niCARdipine 30 milliGRAM(s) Oral every 8 hours  oxyCODONE    IR 15 milliGRAM(s) Oral every 3 hours PRN  oxyCODONE    IR 10 milliGRAM(s) Oral every 3 hours PRN  polyethylene glycol 3350 17 Gram(s) Oral two times a day  senna 2 Tablet(s) Oral at bedtime  sevelamer carbonate 800 milliGRAM(s) Oral every 8 hours  sodium chloride 0.9% lock flush 10 milliLiter(s) IV Push every 1 hour PRN    ----------------------------------------------------------------------------------------  ASSESSMENT/PLAN  29y Male with functional deficits after  Pain: Tylenol  DVT PPX: SCDs    *** Incomplete, Please wait for final recommendations and attending cosign *** Physical Medicine & Rehabilitation Initial Consult:  06-10  Patient is a 29y old  Male who presents with a chief complaint of Compartment syndrome c/b rhabdomyolysis (2020 00:27)    HPI:  29 y M with PMH of bipolar depression transferred from [facility] for further management of acute renal failure due to rhabdomyolysis. Pt was found unresponsive at home, lying on his L side. He states he has not been taking his Lithium for at least 2 wks. At the OSH, he was treated for hyperkalemia. He received D5, insulin, calcium gluconate, Kayexalate Last BM was on . For L leg pain he was given lidocaine patch, dilaudid 1mg prn and tramadol 50mg. Hydralazine 20mg given for HTN. From 1 to 7 am only 100cc of UOP. Urine was tea colored. 500cc bolus of NS was given. CT cervical spine at OSH was (-). CT Head (-). CTAP and CXR were (-). CK on admission there was 177,000. Utox + cocaine, benzo, amphetamines, oxy.    Nephrology consulted, suspected rhabdo related to acute tubular necrosis with anuria. POC US performed at bedside with no bilateral hydro. Did not respond to lasix challenge so pt was started on HD.  RIJ removed  due to decreased flow, R fem shiley placed. IR consulted for tunneled dialysis catheter    Pt was seen by surgery team in MICU for LUE swelling and compartment syndrome was ruled out. Surgery team was re-consulted the next day due to new symptoms of L foot drop and numbness. Pt was found to be highly suspicious for compartment syndrome in the upper thigh, and was emergently taken to OR. Pt is now s/p four compartment fasciotomy of LLE, and left lateral and medial thigh fasciotomies on . Planned for fasciotomy closure .     Behavior Health consulted, deemed not to be a suicide risk. Patient denies that incident was a suicide attempt, does not recall how he ended up on the floor. Follows with an outpatient psychiatrist in [place] regularly who had recommended to discontinue lithium 2 weeks prior to admission due to an elevated serum level.       REVIEW OF SYSTEMS  Constitutional - No fever, No weight loss, No fatigue  HEENT - No eye pain, No visual disturbances, No difficulty hearing, No tinnitus, No vertigo, No neck pain  Respiratory - No cough, No wheezing, No shortness of breath  Cardiovascular - No chest pain, No palpitations  Gastrointestinal - No abdominal pain, No nausea, No vomiting, No diarrhea, No constipation  Genitourinary - No dysuria, No frequency, No hematuria, No incontinence  Neurological - No headaches, No memory loss, No loss of strength, No numbness, No tremors  Skin - No itching, No rashes, No lesions   Endocrine - No temperature intolerance  Musculoskeletal - No joint pain, No joint swelling, No muscle pain  Psychiatric - No depression, No anxiety    PAST MEDICAL & SURGICAL HISTORY  Bipolar depression  Migraine  Displaced fracture of proximal phalanx of left little finger  Difficulty sleeping  ADD (attention deficit disorder)  History of facial surgery    SOCIAL HISTORY  Smoking - Denied  EtOH - Denied   Drugs - Denied    FUNCTIONAL HISTORY  Lives alone in family's second home on [address]    CURRENT FUNCTIONAL STATUS  PT :  Bed Mobility  Bed Mobility Training Rehab Potential: good, to achieve stated therapy goals  Bed Mobility Training Symptoms Noted During/After Treatment: none  Bed Mobility Training Sit-to-Supine: not performed, left seated in chair  Bed Mobility Training Supine-to-Sit: moderate assist (50% patient effort);  2 person assist;  verbal cues  Bed Mobility Training Limitations: impaired balance;  decreased strength;  decreased ROM;  pain    Sit-Stand Transfer Training  Sit-to-Stand Transfer Training Rehab Potential: good, to achieve stated therapy goals  Sit-to-Stand Transfer Training Symptoms Noted During/After Treatment: none  Transfer Training Sit-to-Stand Transfer: moderate assist (50% patient effort);  2 person assist;  verbal cues;  weight-bearing as tolerated   hand held;  +1 person managing equipment   Transfer Training Stand-to-Sit Transfer: moderate assist (50% patient effort);  2 person assist;  verbal cues;  weight-bearing as tolerated   +1 person managing equipment ;  bilateral UE support  Sit-to-Stand Transfer Training Transfer Safety Analysis: decreased strength;  impaired balance;  pain;  decreased ROM;  +1 person managing equipment     Gait Training  Gait Training Rehab Potential: good, to achieve stated therapy goals  Gait Training Symptoms Noted During/After Treatment: none  Gait Training: moderate assist (50% patient effort);  2 person assist;  verbal cues;  weight-bearing as tolerated   +1 person managing equipment ;  bilateral UE support;  bed to chair  Gait Analysis: swing-to gait   increased time in double stance;  decreased hip/knee flexion;  decreased reena;  decreased step length;  decreased strength;  impaired balance;  decreased ROM;  pain;  Bed to Chair;  bilateral UE support    FAMILY HISTORY   Family history of diabetes mellitus in grandmother (Grandparent)  Family history of heart disease (Grandparent)    ALLERGIES  No Known Allergies    ----------------------------------------------------------------------------------------  T(C): 37.8 (20 @ 04:00), Max: 38.8 (20 @ 12:00)  HR: 110 (20 @ 06:00) (105 - 121)  BP: --  RR: 11 (20 @ 06:00) (11 - 22)  SpO2: 96% (20 @ 06:00) (95% - 100%)    PHYSICAL EXAM  Constitutional: NAD, Comfortable  HEENT: NCAT, EOMI  Neck: Supple, No limited ROM  Chest: Breathing comfortably, No wheezing  Cardiovascular: S1S2   Abdomen: Soft   Extremities: No C/C/E, No calf tenderness   Neurologic Exam:                    Cognitive: Awake, Alert, AAO to self, place, date, year, situation     Communication: Fluent, No dysarthria     Cranial Nerves: CN 2-12 intact     Motor:                    LEFT    UE - ShAB 5/5, EF 5/5, EE 5/5, WE 5/5,  5/5                    RIGHT UE - ShAB 5/5, EF 5/5, EE 5/5, WE 5/5,  5/5                    LEFT    LE - HF 5/5, KE 5/5, DF 5/5, PF 5/5                    RIGHT LE - HF 5/5, KE 5/5, DF 5/5, PF 5/5        Sensory: Intact to LT     Reflexes: DTR Intact, No primitive reflex     Coordination: FTN intact     OculoVestibular: No saccades, No nystagmus, VOR         Balance: WNL Static  Psychiatric: Mood stable, Affect WNL  ----------------------------------------------------------------------------------------  Labs/Imagin.6    . )-----------( 315      ( 2020 00:45 )             24.5   18    126<L>  |  90<L>  |  88<H>  ----------------------------<  122<H>  5.7<H>   |  21<L>  |  7.22<H>    Ca    7.1<L>      2020 00:45  Phos  7.2       Mg     2.4         TPro  4.5<L>  /  Alb  2.2<L>  /  TBili  0.3  /  DBili  x   /  AST  44<H>  /  ALT  7   /  AlkPhos  104  18  ----------------------------------------------------------------------------------------  Medications:  acetaminophen   Tablet .. 650 milliGRAM(s) Oral every 6 hours  ALPRAZolam 1 milliGRAM(s) Oral three times a day PRN  chlorhexidine 4% Liquid 1 Application(s) Topical <User Schedule>  gabapentin 100 milliGRAM(s) Oral three times a day  heparin   Injectable 5000 Unit(s) SubCutaneous every 8 hours  HYDROmorphone  Injectable 1 milliGRAM(s) IV Push every 3 hours PRN  HYDROmorphone  Injectable 2 milliGRAM(s) IV Push daily  ketamine Infusion. 0.25 mG/kG/Hr IV Continuous <Continuous>  niCARdipine 30 milliGRAM(s) Oral every 8 hours  oxyCODONE    IR 15 milliGRAM(s) Oral every 3 hours PRN  oxyCODONE    IR 10 milliGRAM(s) Oral every 3 hours PRN  polyethylene glycol 3350 17 Gram(s) Oral two times a day  senna 2 Tablet(s) Oral at bedtime  sevelamer carbonate 800 milliGRAM(s) Oral every 8 hours  sodium chloride 0.9% lock flush 10 milliLiter(s) IV Push every 1 hour PRN    ----------------------------------------------------------------------------------------  ASSESSMENT/PLAN  29y Male with functional deficits after ATN due to rhabdomyolysis c/b LLE compartment syndrome s/p four compartment fasciotomy.  LLE compartment syndrome s/p fasciotomy: pending RTOR for closure  ATN 2/2 Rhabdo: Renal following, receiving HD  Bipolar DIsorder: Psych following. On xanax, adderall and lithium held.   HTN: Nicardipine  Pain: Tylenol, Gabapentin, Oxycodone, Dilaudid IV PRN  DVT PPX: Heparin    *** Incomplete, Please wait for final recommendations and attending cosign *** Physical Medicine & Rehabilitation Initial Consult:  06-10  Patient is a 29y old  Male who presents with a chief complaint of Compartment syndrome c/b rhabdomyolysis (2020 00:27)    HPI:  29 y M with PMH of bipolar depression transferred from [facility] for further management of acute renal failure due to rhabdomyolysis. Pt was found unresponsive at home, lying on his L side. He states he has not been taking his Lithium for at least 2 wks. At the OSH, he was treated for hyperkalemia. He received D5, insulin, calcium gluconate, Kayexalate Last BM was on . For L leg pain he was given lidocaine patch, dilaudid 1mg prn and tramadol 50mg. Hydralazine 20mg given for HTN. From 1 to 7 am only 100cc of UOP. Urine was tea colored. 500cc bolus of NS was given. CT cervical spine at OSH was (-). CT Head (-). CTAP and CXR were (-). CK on admission there was 177,000. Utox at [facility] + cocaine, benzo, amphetamines, oxy.    Nephrology consulted, suspected rhabdo related to acute tubular necrosis with anuria. POC US performed at bedside with no bilateral hydro. Did not respond to lasix challenge so pt was started on HD. RIJ removed  due to decreased flow, R fem shiley placed. IR consulted for tunneled dialysis catheter.    Pt was seen by surgery team in MICU for LUE swelling and compartment syndrome was ruled out on 6/10. Surgery team was re-consulted the next day due to new symptoms of L foot drop and numbness. Pt was found to be highly suspicious for compartment syndrome in the upper thigh, and was emergently taken to OR. Pt is now s/p four compartment fasciotomy of LLE, and left lateral and medial thigh fasciotomies on . Planned for fasciotomy closure .     Behavior Health consulted, deemed not to be a suicide risk. Patient denies that incident was a suicide attempt, does not recall how he ended up on the floor. Follows with an outpatient psychiatrist in [place] regularly who had recommended to discontinue lithium 2 weeks prior to admission due to an elevated serum level.     Patient seen and examined, reports pain in LLE currently 9/10. Became tearful during interview regarding current hospitalization and concern for long term deficits. Participating with PT but currently feels limited by pain.     REVIEW OF SYSTEMS  Constitutional - No fever, No weight loss, No fatigue  HEENT - No eye pain, No visual disturbances, No difficulty hearing, No tinnitus, No vertigo, No neck pain  Respiratory - No cough, No wheezing, No shortness of breath  Cardiovascular - No chest pain, No palpitations  Gastrointestinal - No abdominal pain, No nausea, No vomiting, No diarrhea, No constipation  Genitourinary - No dysuria, No frequency, No hematuria, No incontinence  Neurological - No headaches, No memory loss, No loss of strength, No numbness, No tremors  Skin - No itching, No rashes, No lesions   Endocrine - No temperature intolerance  Musculoskeletal - LUE swelling, LLE pain and swelling  Psychiatric - h/o bipolar disorder    PAST MEDICAL & SURGICAL HISTORY  Bipolar depression  Migraine  Displaced fracture of proximal phalanx of left little finger  Difficulty sleeping  ADD (attention deficit disorder)  History of facial surgery    SOCIAL HISTORY  Smoking - Denied  EtOH - Denied   Drugs - Denied    FUNCTIONAL HISTORY  Patient has apartment in [place] - all one level and house on [address], was recently staying with family in [place] due to COVID, house with 1 step to enter, no stairs inside  Works in pharmaceutical marketing  Previously independent    CURRENT FUNCTIONAL STATUS  PT :  Bed Mobility  Bed Mobility Training Rehab Potential: good, to achieve stated therapy goals  Bed Mobility Training Symptoms Noted During/After Treatment: none  Bed Mobility Training Sit-to-Supine: not performed, left seated in chair  Bed Mobility Training Supine-to-Sit: moderate assist (50% patient effort);  2 person assist;  verbal cues  Bed Mobility Training Limitations: impaired balance;  decreased strength;  decreased ROM;  pain    Sit-Stand Transfer Training  Sit-to-Stand Transfer Training Rehab Potential: good, to achieve stated therapy goals  Sit-to-Stand Transfer Training Symptoms Noted During/After Treatment: none  Transfer Training Sit-to-Stand Transfer: moderate assist (50% patient effort);  2 person assist;  verbal cues;  weight-bearing as tolerated   hand held;  +1 person managing equipment   Transfer Training Stand-to-Sit Transfer: moderate assist (50% patient effort);  2 person assist;  verbal cues;  weight-bearing as tolerated   +1 person managing equipment ;  bilateral UE support  Sit-to-Stand Transfer Training Transfer Safety Analysis: decreased strength;  impaired balance;  pain;  decreased ROM;  +1 person managing equipment     Gait Training  Gait Training Rehab Potential: good, to achieve stated therapy goals  Gait Training Symptoms Noted During/After Treatment: none  Gait Training: moderate assist (50% patient effort);  2 person assist;  verbal cues;  weight-bearing as tolerated   +1 person managing equipment ;  bilateral UE support;  bed to chair  Gait Analysis: swing-to gait   increased time in double stance;  decreased hip/knee flexion;  decreased reena;  decreased step length;  decreased strength;  impaired balance;  decreased ROM;  pain;  Bed to Chair;  bilateral UE support    FAMILY HISTORY   Family history of diabetes mellitus in grandmother (Grandparent)  Family history of heart disease (Grandparent)    ALLERGIES  No Known Allergies    ----------------------------------------------------------------------------------------  T(C): 37.8 (20 @ 04:00), Max: 38.8 (20 @ 12:00)  HR: 110 (20 @ 06:00) (105 - 121)  BP: --  RR: 11 (20 @ 06:00) (11 - 22)  SpO2: 96% (20 @ 06:00) (95% - 100%)    PHYSICAL EXAM  Constitutional: NAD, Comfortable  HEENT: NCAT, EOMI  Neck: Supple, No limited ROM  Chest: Breathing comfortably, No wheezing  Cardiovascular: S1S2   Abdomen: Soft   Extremities: LUE swelling   Neurologic Exam:                    Cognitive: Awake, Alert, AAO to self, place, date, year, situation     Communication: Fluent, No dysarthria     Cranial Nerves: CN 2-12 intact     Motor:                    LEFT    UE - ShAB 5/5, EF 5/5, EE 5/5, WE 5/5,  5/5                    RIGHT UE - ShAB 5/5, EF 5/5, EE 5/5, WE 5/5,  5/5                    LEFT    LE - Minimal movement limited by pain and swelling, + wound vacs in place.                    RIGHT LE - HF 5/5, KE 5/5, DF 5/5, PF 5/5        Sensory: Intact to LT     Reflexes: DTR Intact, No primitive reflex     Coordination: FTN intact  Psychiatric: Mood stable, Affect WNL  ----------------------------------------------------------------------------------------  Labs/Imagin.6    19. )-----------( 315      ( 2020 00:45 )             24.5   06-18    126<L>  |  90<L>  |  88<H>  ----------------------------<  122<H>  5.7<H>   |  21<L>  |  7.22<H>    Ca    7.1<L>      2020 00:45  Phos  7.2       Mg     2.4         TPro  4.5<L>  /  Alb  2.2<L>  /  TBili  0.3  /  DBili  x   /  AST  44<H>  /  ALT  7   /  AlkPhos  104  18  ----------------------------------------------------------------------------------------  Medications:  acetaminophen   Tablet .. 650 milliGRAM(s) Oral every 6 hours  ALPRAZolam 1 milliGRAM(s) Oral three times a day PRN  chlorhexidine 4% Liquid 1 Application(s) Topical <User Schedule>  gabapentin 100 milliGRAM(s) Oral three times a day  heparin   Injectable 5000 Unit(s) SubCutaneous every 8 hours  HYDROmorphone  Injectable 1 milliGRAM(s) IV Push every 3 hours PRN  HYDROmorphone  Injectable 2 milliGRAM(s) IV Push daily  ketamine Infusion. 0.25 mG/kG/Hr IV Continuous <Continuous>  niCARdipine 30 milliGRAM(s) Oral every 8 hours  oxyCODONE    IR 15 milliGRAM(s) Oral every 3 hours PRN  oxyCODONE    IR 10 milliGRAM(s) Oral every 3 hours PRN  polyethylene glycol 3350 17 Gram(s) Oral two times a day  senna 2 Tablet(s) Oral at bedtime  sevelamer carbonate 800 milliGRAM(s) Oral every 8 hours  sodium chloride 0.9% lock flush 10 milliLiter(s) IV Push every 1 hour PRN    ----------------------------------------------------------------------------------------  ASSESSMENT/PLAN  29y Male with functional deficits after ATN due to rhabdomyolysis c/b LLE compartment syndrome s/p four compartment fasciotomy.  LLE compartment syndrome s/p fasciotomy: pending RTOR for closure  ATN 2/2 Rhabdo: Renal following, receiving HD. Pending tunneled cathether placement by IR  Hyponatremia: Fluid restriction  Hypocalcemia: Renal following, receiving HD  Bipolar DIsorder: Psych following. On xanax, adderall and lithium held.   HTN: Nicardipine  Pain: Tylenol, Gabapentin, Oxycodone, Dilaudid IV PRN  DVT PPX: Heparin    Rehab:   - LUE swelling: Recommend arm elevation while in bed, continue PT for ROM  - Continue PT for mobilization, OOB to chair, likely needs pain medication prior to sessions to increase tolerability, discussed this with nurse and patient.   - Will continue to follow for ongoing rehab needs and recommendations.   - Rehab disposition pending progress in therapy after fasciotomy closure, if patient able to complete crutch training will consider discharge home with outpatient PT. Physical Medicine & Rehabilitation Initial Consult:  06-10  Patient is a 29y old  Male who presents with a chief complaint of Compartment syndrome c/b rhabdomyolysis (2020 00:27)    HPI:  29 y M with PMH of bipolar depression transferred from [facility] for further management of acute renal failure due to rhabdomyolysis. Pt was found unresponsive at home, lying on his L side. He states he has not been taking his Lithium for at least 2 wks. At the OSH, he was treated for hyperkalemia. He received D5, insulin, calcium gluconate, Kayexalate Last BM was on . For L leg pain he was given lidocaine patch, dilaudid 1mg prn and tramadol 50mg. Hydralazine 20mg given for HTN. From 1 to 7 am only 100cc of UOP. Urine was tea colored. 500cc bolus of NS was given. CT cervical spine at OSH was (-). CT Head (-). CTAP and CXR were (-). CK on admission there was 177,000. Utox at [facility] + cocaine, benzo, amphetamines, oxy.    Nephrology consulted, suspected rhabdo related to acute tubular necrosis with anuria. POC US performed at bedside with no bilateral hydro. Did not respond to lasix challenge so pt was started on HD. RIJ removed  due to decreased flow, R fem shiley placed. IR consulted for tunneled dialysis catheter.    Pt was seen by surgery team in MICU for LUE swelling and compartment syndrome was ruled out on 6/10. Surgery team was re-consulted the next day due to new symptoms of L foot drop and numbness. Pt was found to be highly suspicious for compartment syndrome in the upper thigh, and was emergently taken to OR. Pt is now s/p four compartment fasciotomy of LLE, and left lateral and medial thigh fasciotomies on . Planned for fasciotomy closure .     Behavior Health consulted, deemed not to be a suicide risk. Patient denies that incident was a suicide attempt, does not recall how he ended up on the floor. Follows with an outpatient psychiatrist in [place] regularly who had recommended to discontinue lithium 2 weeks prior to admission due to an elevated serum level.     Patient seen and examined, reports pain in LLE currently 9/10. Became tearful during interview regarding current hospitalization and concern for long term deficits. Participating with PT but currently feels limited by pain.     REVIEW OF SYSTEMS  Constitutional - No fever, No weight loss, No fatigue  HEENT - No eye pain, No visual disturbances, No difficulty hearing, No tinnitus, No vertigo, No neck pain  Respiratory - No cough, No wheezing, No shortness of breath  Cardiovascular - No chest pain, No palpitations  Gastrointestinal - No abdominal pain, No nausea, No vomiting, No diarrhea, No constipation  Genitourinary - No dysuria, No frequency, No hematuria, No incontinence  Neurological - No headaches, No memory loss, No loss of strength, No numbness, No tremors  Skin - No itching, No rashes, No lesions   Endocrine - No temperature intolerance  Musculoskeletal - LUE swelling, LLE pain and swelling  Psychiatric - h/o bipolar disorder    PAST MEDICAL & SURGICAL HISTORY  Bipolar depression  Migraine  Displaced fracture of proximal phalanx of left little finger  Difficulty sleeping  ADD (attention deficit disorder)  History of facial surgery    SOCIAL HISTORY  Smoking - Denied  EtOH - Denied   Drugs - per chart tox screen at [facility] + for cocaine, amphetamine, opioid, benzos (pt has prescriptions for adderal, xanax and oxycodone per chart).      FUNCTIONAL HISTORY  Patient has apartment in [place] - all one level and house on [address], was recently staying with family in [place] due to COVID, house with 1 step to enter, no stairs inside  Works in pharmaceutical marketing  Previously independent    CURRENT FUNCTIONAL STATUS  PT :  Bed Mobility  Bed Mobility Training Rehab Potential: good, to achieve stated therapy goals  Bed Mobility Training Symptoms Noted During/After Treatment: none  Bed Mobility Training Sit-to-Supine: not performed, left seated in chair  Bed Mobility Training Supine-to-Sit: moderate assist (50% patient effort);  2 person assist;  verbal cues  Bed Mobility Training Limitations: impaired balance;  decreased strength;  decreased ROM;  pain    Sit-Stand Transfer Training  Sit-to-Stand Transfer Training Rehab Potential: good, to achieve stated therapy goals  Sit-to-Stand Transfer Training Symptoms Noted During/After Treatment: none  Transfer Training Sit-to-Stand Transfer: moderate assist (50% patient effort);  2 person assist;  verbal cues;  weight-bearing as tolerated   hand held;  +1 person managing equipment   Transfer Training Stand-to-Sit Transfer: moderate assist (50% patient effort);  2 person assist;  verbal cues;  weight-bearing as tolerated   +1 person managing equipment ;  bilateral UE support  Sit-to-Stand Transfer Training Transfer Safety Analysis: decreased strength;  impaired balance;  pain;  decreased ROM;  +1 person managing equipment     Gait Training  Gait Training Rehab Potential: good, to achieve stated therapy goals  Gait Training Symptoms Noted During/After Treatment: none  Gait Training: moderate assist (50% patient effort);  2 person assist;  verbal cues;  weight-bearing as tolerated   +1 person managing equipment ;  bilateral UE support;  bed to chair  Gait Analysis: swing-to gait   increased time in double stance;  decreased hip/knee flexion;  decreased reena;  decreased step length;  decreased strength;  impaired balance;  decreased ROM;  pain;  Bed to Chair;  bilateral UE support    FAMILY HISTORY   Family history of diabetes mellitus in grandmother (Grandparent)  Family history of heart disease (Grandparent)    ALLERGIES  No Known Allergies    ----------------------------------------------------------------------------------------  T(C): 37.8 (20 @ 04:00), Max: 38.8 (20 @ 12:00)  HR: 110 (20 @ 06:00) (105 - 121)  BP: --  RR: 11 (20 @ 06:00) (11 - 22)  SpO2: 96% (20 @ 06:00) (95% - 100%)    PHYSICAL EXAM  Constitutional: NAD, Comfortable  HEENT: NCAT, EOMI  Neck: Supple, No limited ROM  Chest: Breathing comfortably, No wheezing  Cardiovascular: S1S2   Abdomen: Soft   Extremities: LUE swelling   Neurologic Exam:                    Cognitive: Awake, Alert, AAO to self, place, date, year, situation     Communication: Fluent, No dysarthria     Cranial Nerves: CN 2-12 intact     Motor:                    LEFT    UE - ShAB 5/5, EF 5/5, EE 5/5, WE 5/5,  5/5                    RIGHT UE - ShAB 5/5, EF 5/5, EE 5/5, WE 5/5,  5/5                    LEFT    LE - Minimal movement limited by pain and swelling, + wound vacs in place.                    RIGHT LE - HF , KE , DF , PF         Sensory: Intact to LT     Reflexes: DTR Intact, No primitive reflex     Coordination: FTN intact  Psychiatric: Mood stable, Affect WNL  ----------------------------------------------------------------------------------------  Labs/Imagin.6    19.19 )-----------( 315      ( 2020 00:45 )             24.5   06-18    126<L>  |  90<L>  |  88<H>  ----------------------------<  122<H>  5.7<H>   |  21<L>  |  7.22<H>    Ca    7.1<L>      2020 00:45  Phos  7.2       Mg     2.4         TPro  4.5<L>  /  Alb  2.2<L>  /  TBili  0.3  /  DBili  x   /  AST  44<H>  /  ALT  7   /  AlkPhos  104  18  ----------------------------------------------------------------------------------------  Medications:  acetaminophen   Tablet .. 650 milliGRAM(s) Oral every 6 hours  ALPRAZolam 1 milliGRAM(s) Oral three times a day PRN  chlorhexidine 4% Liquid 1 Application(s) Topical <User Schedule>  gabapentin 100 milliGRAM(s) Oral three times a day  heparin   Injectable 5000 Unit(s) SubCutaneous every 8 hours  HYDROmorphone  Injectable 1 milliGRAM(s) IV Push every 3 hours PRN  HYDROmorphone  Injectable 2 milliGRAM(s) IV Push daily  ketamine Infusion. 0.25 mG/kG/Hr IV Continuous <Continuous>  niCARdipine 30 milliGRAM(s) Oral every 8 hours  oxyCODONE    IR 15 milliGRAM(s) Oral every 3 hours PRN  oxyCODONE    IR 10 milliGRAM(s) Oral every 3 hours PRN  polyethylene glycol 3350 17 Gram(s) Oral two times a day  senna 2 Tablet(s) Oral at bedtime  sevelamer carbonate 800 milliGRAM(s) Oral every 8 hours  sodium chloride 0.9% lock flush 10 milliLiter(s) IV Push every 1 hour PRN    ----------------------------------------------------------------------------------------  ASSESSMENT/PLAN  29y Male with functional deficits after ATN due to rhabdomyolysis c/b LLE compartment syndrome s/p four compartment fasciotomy.  LLE compartment syndrome s/p fasciotomy: pending RTOR for closure  ATN 2/2 Rhabdo: Renal following, receiving HD. Pending tunneled cathether placement by IR  Hyponatremia: Fluid restriction  Hypocalcemia: Renal following, receiving HD  Bipolar DIsorder: Psych following. On xanax, adderall and lithium held.   HTN: Nicardipine  Pain: Tylenol, Gabapentin, Oxycodone, Dilaudid IV PRN  DVT PPX: Heparin    Rehab:   - LUE swelling: Recommend arm elevation while in bed, continue PT for ROM  - Continue PT for mobilization, OOB to chair, likely needs pain medication prior to sessions to increase tolerability, discussed this with nurse and patient.   - Will continue to follow for ongoing rehab needs and recommendations.   - Rehab disposition pending progress in therapy after fasciotomy closure, if patient able to complete crutch training will consider discharge home with outpatient PT.

## 2020-06-18 NOTE — PROGRESS NOTE ADULT - SUBJECTIVE AND OBJECTIVE BOX
HISTORY  29M with PMH of bipolar depression transferred from [facility] for further management of acute renal failure due to rhabdomyolysis. Pt was found unresponsive at home, lying on his L side. He states he has not been taking his Lithium for at least 2 wks. At the OSH, he was treated for hyperkalemia. He received D5, insulin, calcium gluconate, Kayexalate Last BM was on 6/9. For L leg pain he was given lidocaine patch, dilaudid 1mg prn and tramadol 50mg. Hydralazine 20mg given for HTN. From 1 to 7 am only 100cc of UOP. Urine was tea colored. 500cc bolus of NS was given. CT cervical spine at OSH was (-). CT Head (-). CTAP and CXR were (-). CK on admission there was 177,000.     Pt was seen by surgery team in MICU for LUE swelling and compartment syndrome was ruled out. Surgery team was re-consulted today due to new symptoms of L foot drop and numbness. Pt was found to be highly suspicious for compartment syndrome in the upper thigh, and was emergently taken to OR.     Pt is now s/p four compartment fasciotomy of LLE, and left lateral and medial thigh fasciotomies. SICU consulted for q1h neurovascular checks and hemodynamic monitoring    24 HOUR EVENTS:  - R femoral chyley placed   - Plan for permacath w IR 06/18, keep NPO   - Dialysed x 1 with persistent hyperkalemia, renal plan to dialyze am 06/18  - plan to close fasciotomy Friday 06/19  - Discontinued PCA, oxy q3 w dilaudid breakhtrough.  - o/n pain difficult to control and was given   - 50 fentanyl, 6 dilaudid and 2 versed given for dressing change, pain recs 10 ketamine if needs furhter dressing changes      SUBJECTIVE/ROS:  [x] A ten-point review of systems was otherwise negative except as noted.  [ ] Due to altered mental status/intubation, subjective information were not able to be obtained from the patient. History was obtained, to the extent possible, from review of the chart and collateral sources of information.      NEURO  Exam: awake, alert, oriented x4, motor and sensory intact in all 4 extremities   Meds: acetaminophen   Tablet .. 650 milliGRAM(s) Oral every 6 hours  ALPRAZolam 1 milliGRAM(s) Oral three times a day PRN Anxiety  gabapentin 100 milliGRAM(s) Oral three times a day  HYDROmorphone  Injectable 1 milliGRAM(s) IV Push every 3 hours PRN Severe Pain Break Through  HYDROmorphone  Injectable 2 milliGRAM(s) IV Push daily  ketamine Injectable 30 milliGRAM(s) IV Push once PRN see special instructions  oxyCODONE    IR 15 milliGRAM(s) Oral every 3 hours PRN Severe Pain (7 - 10)  oxyCODONE    IR 10 milliGRAM(s) Oral every 3 hours PRN Moderate Pain (4 - 6)    [x] Adequacy of sedation and pain control has been assessed and adjusted      RESPIRATORY  RR: 15 (06-17-20 @ 23:00) (14 - 22)  SpO2: 95% (06-17-20 @ 23:00) (94% - 100%)  Wt(kg): --  Exam: unlabored, clear to auscultation bilaterally      CARDIOVASCULAR  HR: 118 (06-17-20 @ 23:00) (105 - 119)  ABP: 165/93 (06-17-20 @ 23:00) (130/98 - 195/102)  ABP(mean): 119 (06-17-20 @ 23:00) (87 - 137)        Exam:  Cardiac Rhythm:  Perfusion     [x]Adequate   [ ]Inadequate  Mentation   [x]Normal       [ ]Reduced  Extremities  [x]Warm         [ ]Cool  Volume Status [x]Hypervolemic [ ]Euvolemic [ ]Hypovolemic  Meds: niCARdipine 30 milliGRAM(s) Oral every 8 hours        GI/NUTRITION  Exam: Abd soft NT/ND  Diet: Regular diet   Meds: lactulose Syrup 10 Gram(s) Oral once  polyethylene glycol 3350 17 Gram(s) Oral two times a day  senna 2 Tablet(s) Oral at bedtime      GENITOURINARY  I&O's Detail    06-16 @ 07:01  -  06-17 @ 07:00  --------------------------------------------------------  IN:    Oral Fluid: 940 mL    Other: 900 mL  Total IN: 1840 mL    OUT:    Other: 899 mL    VAC (Vacuum Assisted Closure) System: 300 mL    VAC (Vacuum Assisted Closure) System: 800 mL    Voided: 550 mL  Total OUT: 2549 mL    Total NET: -709 mL      06-17 @ 07:01  -  06-18 @ 00:29  --------------------------------------------------------  IN:    Oral Fluid: 960 mL    Other: 400 mL  Total IN: 1360 mL    OUT:    Other: 2900 mL    VAC (Vacuum Assisted Closure) System: 450 mL    VAC (Vacuum Assisted Closure) System: 450 mL    Voided: 200 mL  Total OUT: 4000 mL    Total NET: -2640 mL          06-17    127<L>  |  90<L>  |  74<H>  ----------------------------<  142<H>  5.7<H>   |  23  |  6.67<H>    Ca    7.2<L>      17 Jun 2020 18:25  Phos  6.1     06-17  Mg     2.3     06-17    TPro  4.9<L>  /  Alb  2.3<L>  /  TBili  0.4  /  DBili  x   /  AST  85<H>  /  ALT  11  /  AlkPhos  111  06-16    [ ] Motta catheter, indication: N/A  Meds: sodium chloride 0.9% lock flush 10 milliLiter(s) IV Push every 1 hour PRN Pre/post blood products, medications, blood draw, and to maintain line patency        HEMATOLOGIC  Meds: heparin   Injectable 5000 Unit(s) SubCutaneous every 8 hours    [x] VTE Prophylaxis                        9.6    17.47 )-----------( 317      ( 17 Jun 2020 01:50 )             28.0     PT/INR - ( 16 Jun 2020 01:30 )   PT: 11.1 SEC;   INR: 0.97          PTT - ( 16 Jun 2020 01:30 )  PTT:29.7 SEC      INFECTIOUS DISEASES  T(C): 38.5 (06-17-20 @ 20:00), Max: 38.8 (06-17-20 @ 12:00)  Wt(kg): --  WBC Count: 17.47 K/uL (06-17 @ 01:50)      ENDOCRINE  no issues    ACCESS DEVICES:  [ ] Peripheral IV  [x] Central Venous Line	[x] R	[ ] L	[ ] IJ	[x] Fem	[ ] SC	Placed: 06/18/2020   [ ] Arterial Line		[ ] R	[ ] L	[ ] Fem	[ ] Rad	[ ] Ax	Placed:   [ ] PICC:					[ ] Mediport  [ ] Urinary Catheter, Date Placed:   [ ] Necessity of urinary, arterial, and venous catheters discussed    OTHER MEDICATIONS:  chlorhexidine 4% Liquid 1 Application(s) Topical <User Schedule>  sevelamer carbonate 800 milliGRAM(s) Oral every 8 hours

## 2020-06-18 NOTE — PROGRESS NOTE ADULT - SUBJECTIVE AND OBJECTIVE BOX
[facility] DIVISION OF KIDNEY DISEASES AND HYPERTENSION -- FOLLOW UP NOTE  --------------------------------------------------------------------------------  HPI: 29-year-old male, with history bipolar depression was transferred from [facility] to [facility] for severe rhabdomyolysis. As per paper chart, pt was found to have cocaine and amphetamines in urine drug screen. Pt also with an elevated CPK ~ 180K. Nephrology team consulted for ANTOINETTE and rhabdomyolyiss. Upon review of labs on [facility], Scr was 1.11 on 8/23/2017. Pt. underwent fasciotomy on 6/11/20 for LLE compartement syndrome. Pt. had wound vac placed on  6/15/20. Last HD 06/17/20 for volume optimization. Pt. had femoral non tunneled HD catheter placed by surgery team and tolerated HD.    Pt. evaluated at bedside, in no distress. Pt. awaiting tunneled catheter placement by IR team.    PAST HISTORY  --------------------------------------------------------------------------------  No significant changes to PMH, PSH, FHx, SHx, unless otherwise noted    ALLERGIES & MEDICATIONS  --------------------------------------------------------------------------------  Allergies    No Known Allergies    Intolerances    Standing Inpatient Medications  acetaminophen   Tablet .. 650 milliGRAM(s) Oral every 6 hours  chlorhexidine 4% Liquid 1 Application(s) Topical <User Schedule>  gabapentin 100 milliGRAM(s) Oral three times a day  heparin   Injectable 5000 Unit(s) SubCutaneous every 8 hours  HYDROmorphone  Injectable 2 milliGRAM(s) IV Push daily  ketamine Infusion. 0.1 mG/kG/Hr IV Continuous <Continuous>  niCARdipine 30 milliGRAM(s) Oral every 8 hours  polyethylene glycol 3350 17 Gram(s) Oral two times a day  senna 2 Tablet(s) Oral at bedtime  sevelamer carbonate 800 milliGRAM(s) Oral every 8 hours    REVIEW OF SYSTEMS  --------------------------------------------------------------------------------  Gen: no fatigue  Respiratory: no dyspnea  CV: No chest pain  GI: No abdominal pain  MSK: L leg pain  Neuro: No dizziness  Heme: No bleeding    All other systems were reviewed and are negative, except as noted.    VITALS/PHYSICAL EXAM  --------------------------------------------------------------------------------  T(C): 37.6 (06-18-20 @ 08:41), Max: 38.8 (06-17-20 @ 12:00)  HR: 113 (06-18-20 @ 09:00) (105 - 121)  BP: 163/76 (06-18-20 @ 08:13) (163/76 - 163/76)  RR: 13 (06-18-20 @ 09:00) (11 - 22)  SpO2: 99% (06-18-20 @ 09:00) (95% - 100%)  Wt(kg): --    06-17-20 @ 07:01  -  06-18-20 @ 07:00  --------------------------------------------------------  IN: 1405 mL / OUT: 4900 mL / NET: -3495 mL    06-18-20 @ 07:01  -  06-18-20 @ 09:55  --------------------------------------------------------  IN: 242 mL / OUT: 0 mL / NET: 242 mL    Physical Exam:  	Gen: NAD  	HEENT: MMM on on NC  	Pulm: CTA B/L  	CV: S1S2  	Abd: Soft, +BS               Ext: left lower extremity in wound vac  	Neuro: Awake  	Skin: Warm and dry  	Vascular access: R femoral non tunneled HD catheter    LABS/STUDIES  --------------------------------------------------------------------------------              8.6    19.19 >-----------<  315      [06-18-20 @ 00:45]              24.5     126  |  90  |  88  ----------------------------<  122      [06-18-20 @ 00:45]  5.7   |  21  |  7.22        Ca     7.1     [06-18-20 @ 00:45]      Mg     2.4     [06-18-20 @ 00:45]      Phos  7.2     [06-18-20 @ 00:45]    Serum Osmolality 275      [06-16-20 @ 10:43]    Creatinine Trend:  SCr 7.22 [06-18 @ 00:45]  SCr 6.67 [06-17 @ 18:25]  SCr 8.30 [06-17 @ 07:29]  SCr 8.03 [06-17 @ 01:50]  SCr 7.30 [06-16 @ 07:40]    Urine Osmolality 234      [06-16-20 @ 10:43]

## 2020-06-19 LAB
ALBUMIN SERPL ELPH-MCNC: 2 G/DL — LOW (ref 3.3–5)
ALP SERPL-CCNC: 77 U/L — SIGNIFICANT CHANGE UP (ref 40–120)
ALT FLD-CCNC: 15 U/L — SIGNIFICANT CHANGE UP (ref 4–41)
ANION GAP SERPL CALC-SCNC: 11 MMO/L — SIGNIFICANT CHANGE UP (ref 7–14)
ANION GAP SERPL CALC-SCNC: 12 MMO/L — SIGNIFICANT CHANGE UP (ref 7–14)
ANION GAP SERPL CALC-SCNC: 13 MMO/L — SIGNIFICANT CHANGE UP (ref 7–14)
ANION GAP SERPL CALC-SCNC: 13 MMO/L — SIGNIFICANT CHANGE UP (ref 7–14)
ANISOCYTOSIS BLD QL: SLIGHT — SIGNIFICANT CHANGE UP
APTT BLD: 28.5 SEC — SIGNIFICANT CHANGE UP (ref 27.5–36.3)
AST SERPL-CCNC: 40 U/L — SIGNIFICANT CHANGE UP (ref 4–40)
BASE EXCESS BLDA CALC-SCNC: -2 MMOL/L — SIGNIFICANT CHANGE UP
BASOPHILS # BLD AUTO: 0.08 K/UL — SIGNIFICANT CHANGE UP (ref 0–0.2)
BASOPHILS NFR BLD AUTO: 0.5 % — SIGNIFICANT CHANGE UP (ref 0–2)
BASOPHILS NFR SPEC: 0 % — SIGNIFICANT CHANGE UP (ref 0–2)
BILIRUB SERPL-MCNC: 0.3 MG/DL — SIGNIFICANT CHANGE UP (ref 0.2–1.2)
BLASTS # FLD: 0 % — SIGNIFICANT CHANGE UP (ref 0–0)
BLD GP AB SCN SERPL QL: NEGATIVE — SIGNIFICANT CHANGE UP
BUN SERPL-MCNC: 57 MG/DL — HIGH (ref 7–23)
BUN SERPL-MCNC: 72 MG/DL — HIGH (ref 7–23)
BUN SERPL-MCNC: 80 MG/DL — HIGH (ref 7–23)
BUN SERPL-MCNC: 89 MG/DL — HIGH (ref 7–23)
CALCIUM SERPL-MCNC: 7.4 MG/DL — LOW (ref 8.4–10.5)
CALCIUM SERPL-MCNC: 7.4 MG/DL — LOW (ref 8.4–10.5)
CALCIUM SERPL-MCNC: 7.6 MG/DL — LOW (ref 8.4–10.5)
CALCIUM SERPL-MCNC: 7.9 MG/DL — LOW (ref 8.4–10.5)
CHLORIDE BLDA-SCNC: 98 MMOL/L — SIGNIFICANT CHANGE UP (ref 96–108)
CHLORIDE SERPL-SCNC: 94 MMOL/L — LOW (ref 98–107)
CHLORIDE SERPL-SCNC: 94 MMOL/L — LOW (ref 98–107)
CHLORIDE SERPL-SCNC: 95 MMOL/L — LOW (ref 98–107)
CHLORIDE SERPL-SCNC: 96 MMOL/L — LOW (ref 98–107)
CO2 SERPL-SCNC: 22 MMOL/L — SIGNIFICANT CHANGE UP (ref 22–31)
CO2 SERPL-SCNC: 23 MMOL/L — SIGNIFICANT CHANGE UP (ref 22–31)
CO2 SERPL-SCNC: 24 MMOL/L — SIGNIFICANT CHANGE UP (ref 22–31)
CO2 SERPL-SCNC: 24 MMOL/L — SIGNIFICANT CHANGE UP (ref 22–31)
CREAT SERPL-MCNC: 5.16 MG/DL — HIGH (ref 0.5–1.3)
CREAT SERPL-MCNC: 6.25 MG/DL — HIGH (ref 0.5–1.3)
CREAT SERPL-MCNC: 6.63 MG/DL — HIGH (ref 0.5–1.3)
CREAT SERPL-MCNC: 7.13 MG/DL — HIGH (ref 0.5–1.3)
EOSINOPHIL # BLD AUTO: 0.58 K/UL — HIGH (ref 0–0.5)
EOSINOPHIL NFR BLD AUTO: 3.4 % — SIGNIFICANT CHANGE UP (ref 0–6)
EOSINOPHIL NFR FLD: 2.6 % — SIGNIFICANT CHANGE UP (ref 0–6)
GIANT PLATELETS BLD QL SMEAR: PRESENT — SIGNIFICANT CHANGE UP
GLUCOSE BLDA-MCNC: 100 MG/DL — HIGH (ref 70–99)
GLUCOSE SERPL-MCNC: 104 MG/DL — HIGH (ref 70–99)
GLUCOSE SERPL-MCNC: 108 MG/DL — HIGH (ref 70–99)
GLUCOSE SERPL-MCNC: 118 MG/DL — HIGH (ref 70–99)
GLUCOSE SERPL-MCNC: 149 MG/DL — HIGH (ref 70–99)
HCO3 BLDA-SCNC: 23 MMOL/L — SIGNIFICANT CHANGE UP (ref 22–26)
HCT VFR BLD CALC: 20.7 % — CRITICAL LOW (ref 39–50)
HCT VFR BLD CALC: 20.7 % — CRITICAL LOW (ref 39–50)
HCT VFR BLD CALC: 21 % — CRITICAL LOW (ref 39–50)
HCT VFR BLD CALC: 29.8 % — LOW (ref 39–50)
HCT VFR BLDA CALC: 21.6 % — LOW (ref 39–51)
HGB BLD-MCNC: 7 G/DL — CRITICAL LOW (ref 13–17)
HGB BLD-MCNC: 7.1 G/DL — LOW (ref 13–17)
HGB BLD-MCNC: 7.2 G/DL — LOW (ref 13–17)
HGB BLD-MCNC: 9.9 G/DL — LOW (ref 13–17)
HGB BLDA-MCNC: 6.9 G/DL — CRITICAL LOW (ref 13–17)
IMM GRANULOCYTES NFR BLD AUTO: 6.2 % — HIGH (ref 0–1.5)
INR BLD: 1 — SIGNIFICANT CHANGE UP (ref 0.88–1.17)
LACTATE BLDA-SCNC: 0.8 MMOL/L — SIGNIFICANT CHANGE UP (ref 0.5–2)
LYMPHOCYTES # BLD AUTO: 1.56 K/UL — SIGNIFICANT CHANGE UP (ref 1–3.3)
LYMPHOCYTES # BLD AUTO: 9.2 % — LOW (ref 13–44)
LYMPHOCYTES NFR SPEC AUTO: 6.1 % — LOW (ref 13–44)
MAGNESIUM SERPL-MCNC: 1.8 MG/DL — SIGNIFICANT CHANGE UP (ref 1.6–2.6)
MAGNESIUM SERPL-MCNC: 2.2 MG/DL — SIGNIFICANT CHANGE UP (ref 1.6–2.6)
MAGNESIUM SERPL-MCNC: 2.4 MG/DL — SIGNIFICANT CHANGE UP (ref 1.6–2.6)
MCHC RBC-ENTMCNC: 28 PG — SIGNIFICANT CHANGE UP (ref 27–34)
MCHC RBC-ENTMCNC: 28.5 PG — SIGNIFICANT CHANGE UP (ref 27–34)
MCHC RBC-ENTMCNC: 28.6 PG — SIGNIFICANT CHANGE UP (ref 27–34)
MCHC RBC-ENTMCNC: 28.9 PG — SIGNIFICANT CHANGE UP (ref 27–34)
MCHC RBC-ENTMCNC: 33.2 % — SIGNIFICANT CHANGE UP (ref 32–36)
MCHC RBC-ENTMCNC: 33.8 % — SIGNIFICANT CHANGE UP (ref 32–36)
MCHC RBC-ENTMCNC: 33.8 % — SIGNIFICANT CHANGE UP (ref 32–36)
MCHC RBC-ENTMCNC: 34.8 % — SIGNIFICANT CHANGE UP (ref 32–36)
MCV RBC AUTO: 83.1 FL — SIGNIFICANT CHANGE UP (ref 80–100)
MCV RBC AUTO: 84.2 FL — SIGNIFICANT CHANGE UP (ref 80–100)
MCV RBC AUTO: 84.3 FL — SIGNIFICANT CHANGE UP (ref 80–100)
MCV RBC AUTO: 84.5 FL — SIGNIFICANT CHANGE UP (ref 80–100)
METAMYELOCYTES # FLD: 0.9 % — SIGNIFICANT CHANGE UP (ref 0–1)
MICROCYTES BLD QL: SLIGHT — SIGNIFICANT CHANGE UP
MONOCYTES # BLD AUTO: 1.73 K/UL — HIGH (ref 0–0.9)
MONOCYTES NFR BLD AUTO: 10.2 % — SIGNIFICANT CHANGE UP (ref 2–14)
MONOCYTES NFR BLD: 7 % — SIGNIFICANT CHANGE UP (ref 2–9)
MYELOCYTES NFR BLD: 3.5 % — HIGH (ref 0–0)
NEUTROPHIL AB SER-ACNC: 76.5 % — SIGNIFICANT CHANGE UP (ref 43–77)
NEUTROPHILS # BLD AUTO: 11.97 K/UL — HIGH (ref 1.8–7.4)
NEUTROPHILS NFR BLD AUTO: 70.5 % — SIGNIFICANT CHANGE UP (ref 43–77)
NEUTS BAND # BLD: 1.7 % — SIGNIFICANT CHANGE UP (ref 0–6)
NRBC # BLD: 1 /100WBC — SIGNIFICANT CHANGE UP
NRBC # FLD: 0 K/UL — SIGNIFICANT CHANGE UP (ref 0–0)
NRBC # FLD: 0.02 K/UL — SIGNIFICANT CHANGE UP (ref 0–0)
OTHER - HEMATOLOGY %: 0 — SIGNIFICANT CHANGE UP
PCO2 BLDA: 42 MMHG — SIGNIFICANT CHANGE UP (ref 35–48)
PH BLDA: 7.35 PH — SIGNIFICANT CHANGE UP (ref 7.35–7.45)
PHOSPHATE SERPL-MCNC: 4.9 MG/DL — HIGH (ref 2.5–4.5)
PHOSPHATE SERPL-MCNC: 6.7 MG/DL — HIGH (ref 2.5–4.5)
PHOSPHATE SERPL-MCNC: 6.8 MG/DL — HIGH (ref 2.5–4.5)
PLATELET # BLD AUTO: 280 K/UL — SIGNIFICANT CHANGE UP (ref 150–400)
PLATELET # BLD AUTO: 303 K/UL — SIGNIFICANT CHANGE UP (ref 150–400)
PLATELET # BLD AUTO: 332 K/UL — SIGNIFICANT CHANGE UP (ref 150–400)
PLATELET # BLD AUTO: 425 K/UL — HIGH (ref 150–400)
PLATELET COUNT - ESTIMATE: NORMAL — SIGNIFICANT CHANGE UP
PMV BLD: 10.1 FL — SIGNIFICANT CHANGE UP (ref 7–13)
PMV BLD: 9.7 FL — SIGNIFICANT CHANGE UP (ref 7–13)
PMV BLD: 9.8 FL — SIGNIFICANT CHANGE UP (ref 7–13)
PMV BLD: 9.9 FL — SIGNIFICANT CHANGE UP (ref 7–13)
PO2 BLDA: 138 MMHG — HIGH (ref 83–108)
POLYCHROMASIA BLD QL SMEAR: SLIGHT — SIGNIFICANT CHANGE UP
POTASSIUM BLDA-SCNC: 5.2 MMOL/L — HIGH (ref 3.4–4.5)
POTASSIUM SERPL-MCNC: 5 MMOL/L — SIGNIFICANT CHANGE UP (ref 3.5–5.3)
POTASSIUM SERPL-MCNC: 5.1 MMOL/L — SIGNIFICANT CHANGE UP (ref 3.5–5.3)
POTASSIUM SERPL-MCNC: 5.7 MMOL/L — HIGH (ref 3.5–5.3)
POTASSIUM SERPL-MCNC: 5.7 MMOL/L — HIGH (ref 3.5–5.3)
POTASSIUM SERPL-SCNC: 5 MMOL/L — SIGNIFICANT CHANGE UP (ref 3.5–5.3)
POTASSIUM SERPL-SCNC: 5.1 MMOL/L — SIGNIFICANT CHANGE UP (ref 3.5–5.3)
POTASSIUM SERPL-SCNC: 5.7 MMOL/L — HIGH (ref 3.5–5.3)
POTASSIUM SERPL-SCNC: 5.7 MMOL/L — HIGH (ref 3.5–5.3)
PROMYELOCYTES # FLD: 0 % — SIGNIFICANT CHANGE UP (ref 0–0)
PROT SERPL-MCNC: 4.2 G/DL — LOW (ref 6–8.3)
PROTHROM AB SERPL-ACNC: 11.4 SEC — SIGNIFICANT CHANGE UP (ref 9.8–13.1)
RBC # BLD: 2.45 M/UL — LOW (ref 4.2–5.8)
RBC # BLD: 2.49 M/UL — LOW (ref 4.2–5.8)
RBC # BLD: 2.49 M/UL — LOW (ref 4.2–5.8)
RBC # BLD: 3.54 M/UL — LOW (ref 4.2–5.8)
RBC # FLD: 13.1 % — SIGNIFICANT CHANGE UP (ref 10.3–14.5)
RBC # FLD: 13.1 % — SIGNIFICANT CHANGE UP (ref 10.3–14.5)
RBC # FLD: 13.2 % — SIGNIFICANT CHANGE UP (ref 10.3–14.5)
RBC # FLD: 13.7 % — SIGNIFICANT CHANGE UP (ref 10.3–14.5)
REVIEW TO FOLLOW: YES — SIGNIFICANT CHANGE UP
RH IG SCN BLD-IMP: POSITIVE — SIGNIFICANT CHANGE UP
SAO2 % BLDA: 99.3 % — HIGH (ref 95–99)
SARS-COV-2 RNA SPEC QL NAA+PROBE: SIGNIFICANT CHANGE UP
SODIUM BLDA-SCNC: 128 MMOL/L — LOW (ref 136–146)
SODIUM SERPL-SCNC: 129 MMOL/L — LOW (ref 135–145)
SODIUM SERPL-SCNC: 129 MMOL/L — LOW (ref 135–145)
SODIUM SERPL-SCNC: 131 MMOL/L — LOW (ref 135–145)
SODIUM SERPL-SCNC: 132 MMOL/L — LOW (ref 135–145)
VARIANT LYMPHS # BLD: 1.7 % — SIGNIFICANT CHANGE UP
WBC # BLD: 16.03 K/UL — HIGH (ref 3.8–10.5)
WBC # BLD: 16.97 K/UL — HIGH (ref 3.8–10.5)
WBC # BLD: 17.18 K/UL — HIGH (ref 3.8–10.5)
WBC # BLD: 27.01 K/UL — HIGH (ref 3.8–10.5)
WBC # FLD AUTO: 16.03 K/UL — HIGH (ref 3.8–10.5)
WBC # FLD AUTO: 16.97 K/UL — HIGH (ref 3.8–10.5)
WBC # FLD AUTO: 17.18 K/UL — HIGH (ref 3.8–10.5)
WBC # FLD AUTO: 27.01 K/UL — HIGH (ref 3.8–10.5)

## 2020-06-19 PROCEDURE — 99233 SBSQ HOSP IP/OBS HIGH 50: CPT | Mod: GC

## 2020-06-19 PROCEDURE — 99232 SBSQ HOSP IP/OBS MODERATE 35: CPT

## 2020-06-19 RX ORDER — CALCIUM GLUCONATE 100 MG/ML
1 VIAL (ML) INTRAVENOUS ONCE
Refills: 0 | Status: COMPLETED | OUTPATIENT
Start: 2020-06-19 | End: 2020-06-19

## 2020-06-19 RX ORDER — CEFAZOLIN SODIUM 1 G
1000 VIAL (EA) INJECTION EVERY 24 HOURS
Refills: 0 | Status: DISCONTINUED | OUTPATIENT
Start: 2020-06-19 | End: 2020-06-20

## 2020-06-19 RX ORDER — HYDROMORPHONE HYDROCHLORIDE 2 MG/ML
1 INJECTION INTRAMUSCULAR; INTRAVENOUS; SUBCUTANEOUS
Refills: 0 | Status: DISCONTINUED | OUTPATIENT
Start: 2020-06-19 | End: 2020-06-22

## 2020-06-19 RX ORDER — HYDROMORPHONE HYDROCHLORIDE 2 MG/ML
2 INJECTION INTRAMUSCULAR; INTRAVENOUS; SUBCUTANEOUS ONCE
Refills: 0 | Status: DISCONTINUED | OUTPATIENT
Start: 2020-06-19 | End: 2020-06-19

## 2020-06-19 RX ORDER — CEFAZOLIN SODIUM 1 G
2000 VIAL (EA) INJECTION EVERY 8 HOURS
Refills: 0 | Status: DISCONTINUED | OUTPATIENT
Start: 2020-06-19 | End: 2020-06-19

## 2020-06-19 RX ORDER — INSULIN HUMAN 100 [IU]/ML
10 INJECTION, SOLUTION SUBCUTANEOUS ONCE
Refills: 0 | Status: DISCONTINUED | OUTPATIENT
Start: 2020-06-19 | End: 2020-06-19

## 2020-06-19 RX ORDER — NALOXONE HYDROCHLORIDE 4 MG/.1ML
0.1 SPRAY NASAL
Refills: 0 | Status: DISCONTINUED | OUTPATIENT
Start: 2020-06-19 | End: 2020-06-22

## 2020-06-19 RX ORDER — INSULIN HUMAN 100 [IU]/ML
10 INJECTION, SOLUTION SUBCUTANEOUS ONCE
Refills: 0 | Status: COMPLETED | OUTPATIENT
Start: 2020-06-19 | End: 2020-06-19

## 2020-06-19 RX ORDER — ONDANSETRON 8 MG/1
4 TABLET, FILM COATED ORAL EVERY 6 HOURS
Refills: 0 | Status: DISCONTINUED | OUTPATIENT
Start: 2020-06-19 | End: 2020-06-22

## 2020-06-19 RX ORDER — DEXTROSE 50 % IN WATER 50 %
25 SYRINGE (ML) INTRAVENOUS ONCE
Refills: 0 | Status: COMPLETED | OUTPATIENT
Start: 2020-06-19 | End: 2020-06-19

## 2020-06-19 RX ORDER — HYDROMORPHONE HYDROCHLORIDE 2 MG/ML
30 INJECTION INTRAMUSCULAR; INTRAVENOUS; SUBCUTANEOUS
Refills: 0 | Status: DISCONTINUED | OUTPATIENT
Start: 2020-06-19 | End: 2020-06-20

## 2020-06-19 RX ADMIN — GABAPENTIN 100 MILLIGRAM(S): 400 CAPSULE ORAL at 22:04

## 2020-06-19 RX ADMIN — POLYETHYLENE GLYCOL 3350 17 GRAM(S): 17 POWDER, FOR SOLUTION ORAL at 18:13

## 2020-06-19 RX ADMIN — GABAPENTIN 100 MILLIGRAM(S): 400 CAPSULE ORAL at 13:00

## 2020-06-19 RX ADMIN — HEPARIN SODIUM 5000 UNIT(S): 5000 INJECTION INTRAVENOUS; SUBCUTANEOUS at 05:59

## 2020-06-19 RX ADMIN — Medication 100 GRAM(S): at 03:17

## 2020-06-19 RX ADMIN — NICARDIPINE HYDROCHLORIDE 30 MILLIGRAM(S): 30 CAPSULE, EXTENDED RELEASE ORAL at 05:59

## 2020-06-19 RX ADMIN — HYDROMORPHONE HYDROCHLORIDE 30 MILLILITER(S): 2 INJECTION INTRAMUSCULAR; INTRAVENOUS; SUBCUTANEOUS at 19:31

## 2020-06-19 RX ADMIN — INSULIN HUMAN 10 UNIT(S): 100 INJECTION, SOLUTION SUBCUTANEOUS at 03:19

## 2020-06-19 RX ADMIN — QUETIAPINE FUMARATE 25 MILLIGRAM(S): 200 TABLET, FILM COATED ORAL at 22:08

## 2020-06-19 RX ADMIN — HYDROMORPHONE HYDROCHLORIDE 1 MILLIGRAM(S): 2 INJECTION INTRAMUSCULAR; INTRAVENOUS; SUBCUTANEOUS at 15:48

## 2020-06-19 RX ADMIN — HYDROMORPHONE HYDROCHLORIDE 2 MILLIGRAM(S): 2 INJECTION INTRAMUSCULAR; INTRAVENOUS; SUBCUTANEOUS at 03:17

## 2020-06-19 RX ADMIN — Medication 25 MILLILITER(S): at 03:18

## 2020-06-19 RX ADMIN — Medication 650 MILLIGRAM(S): at 02:28

## 2020-06-19 RX ADMIN — OXYCODONE HYDROCHLORIDE 15 MILLIGRAM(S): 5 TABLET ORAL at 13:00

## 2020-06-19 RX ADMIN — GABAPENTIN 100 MILLIGRAM(S): 400 CAPSULE ORAL at 05:59

## 2020-06-19 RX ADMIN — KETAMINE HYDROCHLORIDE 1.05 MG/KG/HR: 100 INJECTION INTRAMUSCULAR; INTRAVENOUS at 11:46

## 2020-06-19 RX ADMIN — NICARDIPINE HYDROCHLORIDE 30 MILLIGRAM(S): 30 CAPSULE, EXTENDED RELEASE ORAL at 13:02

## 2020-06-19 RX ADMIN — HYDROMORPHONE HYDROCHLORIDE 1 MILLIGRAM(S): 2 INJECTION INTRAMUSCULAR; INTRAVENOUS; SUBCUTANEOUS at 21:44

## 2020-06-19 RX ADMIN — DEXMEDETOMIDINE HYDROCHLORIDE IN 0.9% SODIUM CHLORIDE 5.24 MICROGRAM(S)/KG/HR: 4 INJECTION INTRAVENOUS at 19:33

## 2020-06-19 RX ADMIN — HYDROMORPHONE HYDROCHLORIDE 30 MILLILITER(S): 2 INJECTION INTRAMUSCULAR; INTRAVENOUS; SUBCUTANEOUS at 14:53

## 2020-06-19 RX ADMIN — HYDROMORPHONE HYDROCHLORIDE 1.5 MILLIGRAM(S): 2 INJECTION INTRAMUSCULAR; INTRAVENOUS; SUBCUTANEOUS at 00:39

## 2020-06-19 RX ADMIN — HEPARIN SODIUM 5000 UNIT(S): 5000 INJECTION INTRAVENOUS; SUBCUTANEOUS at 22:04

## 2020-06-19 RX ADMIN — SEVELAMER CARBONATE 800 MILLIGRAM(S): 2400 POWDER, FOR SUSPENSION ORAL at 05:59

## 2020-06-19 RX ADMIN — HYDROMORPHONE HYDROCHLORIDE 30 MILLILITER(S): 2 INJECTION INTRAMUSCULAR; INTRAVENOUS; SUBCUTANEOUS at 18:14

## 2020-06-19 RX ADMIN — OXYCODONE HYDROCHLORIDE 15 MILLIGRAM(S): 5 TABLET ORAL at 03:17

## 2020-06-19 RX ADMIN — NICARDIPINE HYDROCHLORIDE 30 MILLIGRAM(S): 30 CAPSULE, EXTENDED RELEASE ORAL at 22:04

## 2020-06-19 RX ADMIN — DEXMEDETOMIDINE HYDROCHLORIDE IN 0.9% SODIUM CHLORIDE 5.24 MICROGRAM(S)/KG/HR: 4 INJECTION INTRAVENOUS at 11:46

## 2020-06-19 RX ADMIN — SENNA PLUS 2 TABLET(S): 8.6 TABLET ORAL at 22:04

## 2020-06-19 RX ADMIN — HYDROMORPHONE HYDROCHLORIDE 1.5 MILLIGRAM(S): 2 INJECTION INTRAMUSCULAR; INTRAVENOUS; SUBCUTANEOUS at 11:40

## 2020-06-19 RX ADMIN — TIZANIDINE 1 MILLIGRAM(S): 4 TABLET ORAL at 16:42

## 2020-06-19 RX ADMIN — HEPARIN SODIUM 5000 UNIT(S): 5000 INJECTION INTRAVENOUS; SUBCUTANEOUS at 13:02

## 2020-06-19 RX ADMIN — HYDROMORPHONE HYDROCHLORIDE 1 MILLIGRAM(S): 2 INJECTION INTRAMUSCULAR; INTRAVENOUS; SUBCUTANEOUS at 18:40

## 2020-06-19 RX ADMIN — Medication 650 MILLIGRAM(S): at 19:44

## 2020-06-19 RX ADMIN — Medication 1 MILLIGRAM(S): at 20:38

## 2020-06-19 RX ADMIN — KETAMINE HYDROCHLORIDE 1.05 MG/KG/HR: 100 INJECTION INTRAMUSCULAR; INTRAVENOUS at 19:33

## 2020-06-19 RX ADMIN — SEVELAMER CARBONATE 800 MILLIGRAM(S): 2400 POWDER, FOR SUSPENSION ORAL at 13:02

## 2020-06-19 RX ADMIN — SEVELAMER CARBONATE 800 MILLIGRAM(S): 2400 POWDER, FOR SUSPENSION ORAL at 22:04

## 2020-06-19 NOTE — PROGRESS NOTE ADULT - ASSESSMENT
29M with PMH of bipolar depression transferred from [facility] for further management of acute renal failure due to rhabdomyolysis, Taken to the OR for LLE compartment syndrome. Now s/p four compartment fasciotomy of the left lower leg 6/11. Plan for OR today for partial closure with plastics.    Plan  - OR w/ plastic to close today-- consent in chart  - pt continues to clinically improve from a vasc surg standpoint   - pain control PRN  - diet as tolerated  - c/w vac dressings  - continue HD as per Renal  - DVT ppx   - OOB to chair as tolerated, PT consult for mobilization  - Appreciate SICU care    C Team Surgery  [id number] 29M with PMH of bipolar depression transferred from [facility] for further management of acute renal failure due to rhabdomyolysis, Taken to the OR for LLE compartment syndrome. Now s/p four compartment fasciotomy of the left lower leg 6/11. Plan for OR today for partial closure with plastics.    Plan  - OR w/ plastic to close today-- consent in chart  - pt continues to clinically improve from a vasc surg standpoint   - pain control PRN  - diet as tolerated  - c/w vac dressings  - continue HD as per Renal  - DVT ppx   - OOB to chair as tolerated, PT consult for mobilization  pt has low grade fevers would recommend  TTE s/o endocarditis   - Appreciate SICU care  will follow    C Team Surgery  [id number]

## 2020-06-19 NOTE — PROVIDER CONTACT NOTE (OTHER) - SITUATION
Pt s/p fasciotomy with wound vac placement. Wound vac have been alarming "blockage alarm". There is no kink in the tubing. Dressing is clean, dry and intact.

## 2020-06-19 NOTE — CHART NOTE - NSCHARTNOTEFT_GEN_A_CORE
Post Operative Note  Patient: WHITLEY RICHEY 29y (1990) Male   MRN: [id number]  Location: [place] [address]  Visit: 06-10-20 Inpatient  Date: 06-19-20 @ 16:22    Procedure: S/P fasciotomy closure with plastic surgery    Subjective: Patient reports that he's feeling well after surgery. Reports that he continues to experience pain however it improves with PCA. Denies nausea, vomiting, fever, chills.    Objective:  Vitals: T(F): 99.1 (06-19-20 @ 14:00), Max: 101 (06-18-20 @ 20:00)  HR: 109 (06-19-20 @ 15:00)  BP: 119/57 (06-19-20 @ 04:16) (119/57 - 119/57)  RR: 13 (06-19-20 @ 15:00)  SpO2: 98% (06-19-20 @ 15:00)  Vent Settings:     In:   06-18-20 @ 07:01  -  06-19-20 @ 07:00  --------------------------------------------------------  IN: 1435 mL    06-19-20 @ 07:01  -  06-19-20 @ 16:22  --------------------------------------------------------  IN: 0 mL      IV Fluids:     Out:   06-18-20 @ 07:01  -  06-19-20 @ 07:00  --------------------------------------------------------  OUT: 4085 mL    06-19-20 @ 07:01  -  06-19-20 @ 16:22  --------------------------------------------------------  OUT: 50 mL      EBL:     Voided Urine:   06-18-20 @ 07:01  -  06-19-20 @ 07:00  --------------------------------------------------------  OUT: 4085 mL    06-19-20 @ 07:01  -  06-19-20 @ 16:22  --------------------------------------------------------  OUT: 50 mL      [place] Catheter: yes no   Drains:   MEÑO:   06-18-20 @ 07:01  -  06-19-20 @ 07:00  --------------------------------------------------------  OUT: 750 mL     ,   Chest Tube:      NG Tube:       Physical Examination:  General: NAD, resting comfortably in bed.  Respiratory: nonlabored respirations, symmetric CW expansion  Cardiovascular: RRR, s1s2  Extremities: LLE in ace bandage, medial and lateral provena vacs in place, 5 MEÑO drains with ss output, dressing is c/d/i. Patient able to move toes and minimally dorsiflex.       Medications: [Standing]  acetaminophen   Tablet .. 650 milliGRAM(s) Oral every 6 hours  ALPRAZolam 1 milliGRAM(s) Oral three times a day PRN  chlorhexidine 4% Liquid 1 Application(s) Topical <User Schedule>  dexMEDEtomidine Infusion 0.2 MICROgram(s)/kG/Hr IV Continuous <Continuous>  gabapentin 100 milliGRAM(s) Oral three times a day  heparin   Injectable 5000 Unit(s) SubCutaneous every 8 hours  HYDROmorphone PCA (1 mG/mL) 30 milliLiter(s) PCA Continuous PCA Continuous  HYDROmorphone PCA (1 mG/mL) Rescue Clinician Bolus 1 milliGRAM(s) IV Push every 15 minutes PRN  ketamine Infusion. 0.1 mG/kG/Hr IV Continuous <Continuous>  naloxone Injectable 0.1 milliGRAM(s) IV Push every 3 minutes PRN  niCARdipine 30 milliGRAM(s) Oral every 8 hours  ondansetron Injectable 4 milliGRAM(s) IV Push every 6 hours PRN  polyethylene glycol 3350 17 Gram(s) Oral two times a day  QUEtiapine 25 milliGRAM(s) Oral at bedtime  senna 2 Tablet(s) Oral at bedtime  sevelamer carbonate 800 milliGRAM(s) Oral every 8 hours  sodium chloride 0.9% lock flush 10 milliLiter(s) IV Push every 1 hour PRN  tiZANidine 1 milliGRAM(s) Oral every 6 hours PRN    Medications: [PRN]  acetaminophen   Tablet .. 650 milliGRAM(s) Oral every 6 hours  ALPRAZolam 1 milliGRAM(s) Oral three times a day PRN  chlorhexidine 4% Liquid 1 Application(s) Topical <User Schedule>  dexMEDEtomidine Infusion 0.2 MICROgram(s)/kG/Hr IV Continuous <Continuous>  gabapentin 100 milliGRAM(s) Oral three times a day  heparin   Injectable 5000 Unit(s) SubCutaneous every 8 hours  HYDROmorphone PCA (1 mG/mL) 30 milliLiter(s) PCA Continuous PCA Continuous  HYDROmorphone PCA (1 mG/mL) Rescue Clinician Bolus 1 milliGRAM(s) IV Push every 15 minutes PRN  ketamine Infusion. 0.1 mG/kG/Hr IV Continuous <Continuous>  naloxone Injectable 0.1 milliGRAM(s) IV Push every 3 minutes PRN  niCARdipine 30 milliGRAM(s) Oral every 8 hours  ondansetron Injectable 4 milliGRAM(s) IV Push every 6 hours PRN  polyethylene glycol 3350 17 Gram(s) Oral two times a day  QUEtiapine 25 milliGRAM(s) Oral at bedtime  senna 2 Tablet(s) Oral at bedtime  sevelamer carbonate 800 milliGRAM(s) Oral every 8 hours  sodium chloride 0.9% lock flush 10 milliLiter(s) IV Push every 1 hour PRN  tiZANidine 1 milliGRAM(s) Oral every 6 hours PRN    Labs:                        7.0    16.97 )-----------( 332      ( 19 Jun 2020 12:20 )             20.7     06-19    129<L>  |  94<L>  |  89<H>  ----------------------------<  108<H>  5.7<H>   |  22  |  7.13<H>    Ca    7.6<L>      19 Jun 2020 12:20  Phos  6.8     06-19  Mg     2.4     06-19    TPro  4.2<L>  /  Alb  2.0<L>  /  TBili  0.3  /  DBili  x   /  AST  40  /  ALT  15  /  AlkPhos  77  06-19    PT/INR - ( 19 Jun 2020 00:15 )   PT: 11.4 SEC;   INR: 1.00          PTT - ( 19 Jun 2020 00:15 )  PTT:28.5 SEC      Imaging:  No post-op imaging studies    Assessment:  29M with PMH of bipolar depression transferred from [facility] for further management of acute renal failure due to rhabdomyolysis, Taken to the OR for LLE compartment syndrome. Now s/p four compartment fasciotomy of the left lower leg 6/11. Patient now s/p closure of fasciotomy sites with provena vac placement.    Plan:  - please transfuse 1U PRBC postoperatively.  - pt continues to clinically improve from a vasc surg standpoint   - monitor drain output  - pain control PRN  - diet as tolerated  - c/w vac dressings  - continue HD as per Renal  - DVT ppx   - OOB to chair as tolerated, PT consult for mobilization  pt has low grade fevers would recommend  TTE s/o endocarditis   - Appreciate SICU care  will follow    Date/Time: 06-19-20 @ 16:22

## 2020-06-19 NOTE — CHART NOTE - NSCHARTNOTEFT_GEN_A_CORE
Source:  nursing  & EMR    Diet : Regular w/ 1000 ml Fluid Restriction     Patient s/p OR , was NPO , diet advanced , was taking > 75% of the meals , wt. hx. indicative of wt. gain reflective of 3+ L arm edema .     Current Weight: - 114.1 kg on 6/19/2020; Admit wt. - 104.7 kg ; IBW - 78 kg     Pertinent Medications: MEDICATIONS  (STANDING):  acetaminophen   Tablet .. 650 milliGRAM(s) Oral every 6 hours  chlorhexidine 4% Liquid 1 Application(s) Topical <User Schedule>  dexMEDEtomidine Infusion 0.2 MICROgram(s)/kG/Hr (5.24 mL/Hr) IV Continuous <Continuous>  gabapentin 100 milliGRAM(s) Oral three times a day  heparin   Injectable 5000 Unit(s) SubCutaneous every 8 hours  HYDROmorphone PCA (1 mG/mL) 30 milliLiter(s) PCA Continuous PCA Continuous  ketamine Infusion. 0.1 mG/kG/Hr (1.05 mL/Hr) IV Continuous <Continuous>  niCARdipine 30 milliGRAM(s) Oral every 8 hours  polyethylene glycol 3350 17 Gram(s) Oral two times a day  QUEtiapine 25 milliGRAM(s) Oral at bedtime  senna 2 Tablet(s) Oral at bedtime  sevelamer carbonate 800 milliGRAM(s) Oral every 8 hours    MEDICATIONS  (PRN):  ALPRAZolam 1 milliGRAM(s) Oral three times a day PRN Anxiety  HYDROmorphone PCA (1 mG/mL) Rescue Clinician Bolus 1 milliGRAM(s) IV Push every 15 minutes PRN for Pain Scale GREATER THAN 6  naloxone Injectable 0.1 milliGRAM(s) IV Push every 3 minutes PRN For ANY of the following changes in patient status:  A. RR LESS THAN 10 breaths per minute, B. Oxygen saturation LESS THAN 90%, C. Sedation score of 6  ondansetron Injectable 4 milliGRAM(s) IV Push every 6 hours PRN Nausea  sodium chloride 0.9% lock flush 10 milliLiter(s) IV Push every 1 hour PRN Pre/post blood products, medications, blood draw, and to maintain line patency  tiZANidine 1 milliGRAM(s) Oral every 6 hours PRN muscle spasm and pain    Pertinent Labs:  06-19 Na129 mmol/L<L> Glu 108 mg/dL<H> K+ 5.7 mmol/L<H> Cr  7.13 mg/dL<H> BUN 89 mg/dL<H> 06-19 Phos 6.8 mg/dL<H> 06-19 Alb 2.0 g/dL<L>      Skin: intact    Estimated Needs:   no change since previous assessment    Recommend to continue PO diet     Monitoring and Evaluation:  PO intake , Tolerance to diet prescription , weights & follow up per protocol

## 2020-06-19 NOTE — BRIEF OPERATIVE NOTE - NSICDXBRIEFPREOP_GEN_ALL_CORE_FT
PRE-OP DIAGNOSIS:  Compartment syndrome, nontraumatic 11-Jun-2020 20:26:02  Alexis Valentine
PRE-OP DIAGNOSIS:  History of fasciotomy 19-Jun-2020 11:47:05  Virgen Stuart

## 2020-06-19 NOTE — PROGRESS NOTE ADULT - SUBJECTIVE AND OBJECTIVE BOX
Vascular Progress Note    24 HOUR EVENTS:  - S/p permacath w IR  - o/n pain difficult to control and was given   - s/p b/l renal artery duplex  s/o ra dissection vs thrombosis-->Unable to accurately assess for proximal right and left, renal artery stenosis (not well visualized).   - 50 fentanyl, 6 dilaudid and 2 versed given for dressing change, pain recs 10 ketamine if needs furhter dressing changes      S: Patient seen and examined. No acute events overnight. Now on Ketamine due to pain, endorses better pain control. No N/V. Aware of procedure today.     O:  Physical Exam:  General: NAD  Respiratory: no increased work of breathing   Abdomen: soft, nontender, nondistended  Extremities: dressing cdi, LLE fasciotomies with vacs (x2) in place apparently functioning muscle all viable   LLE gross motor intact from hip to toes, no signs of LLE arterial insufficiency   lle sensory deficit on left foot dorsum remains as stated above     Vital Signs Last 24 Hrs  T(C): 37.8 (18 Jun 2020 04:00), Max: 38.8 (17 Jun 2020 12:00)  T(F): 100 (18 Jun 2020 04:00), Max: 101.9 (17 Jun 2020 12:00)  HR: 110 (18 Jun 2020 06:00) (105 - 121)  BP: --  BP(mean): --  RR: 11 (18 Jun 2020 06:00) (11 - 22)  SpO2: 96% (18 Jun 2020 06:00) (95% - 100%)    I&O's Detail    17 Jun 2020 07:01  -  18 Jun 2020 07:00  --------------------------------------------------------  IN:    Oral Fluid: 1005 mL    Other: 400 mL  Total IN: 1405 mL    OUT:    Other: 2900 mL    VAC (Vacuum Assisted Closure) System: 950 mL    VAC (Vacuum Assisted Closure) System: 650 mL    Voided: 400 mL  Total OUT: 4900 mL    Total NET: -3495 mL                              8.6    19.19 )-----------( 315      ( 18 Jun 2020 00:45 )             24.5       06-18    126<L>  |  90<L>  |  88<H>  ----------------------------<  122<H>  5.7<H>   |  21<L>  |  7.22<H>    Ca    7.1<L>      18 Jun 2020 00:45  Phos  7.2     06-18  Mg     2.4     06-18    TPro  4.5<L>  /  Alb  2.2<L>  /  TBili  0.3  /  DBili  x   /  AST  44<H>  /  ALT  7   /  AlkPhos  104  06-18

## 2020-06-19 NOTE — PROGRESS NOTE ADULT - ASSESSMENT
29M w/ PMH of bipolar depression, s/p found down at home, transferred from [facility] for further management of acute renal failure due to rhabdomyolysis, s/p four compartment fasciotomy and lateral/medial thigh fasciotomies with ANTOINETTE on HD.    Neuro hx of bipolar depression  - A&O x 3  - dilaudid PCA   - precedex gtt   - ketamine gtt  - Xanax 1 mg q8 PRN  - Psych following, no plan to restart home lithium  - Pain following    Resp  - RA   - continue HD as needed for fluid overload  - Mobilize, OOB to chair    CVS  - Appropriate mental status, appears well perfused   - nicardipine 30mg POx1 overnight, started on 30 mg PO q8 hrs   - if HTN again, rec'd clonidine .1mg by addiction psych  - cleviprex infusion off    GI  - NPO at midnight for permacath 06/18  - Regular diet with fluid restriction 1L for hyponatremia   - Bowel regimen senna 2 tabs qHS  - Patient declining miralax, last BM 06/17 prior ot that > 1w      - ANTOINETTE in setting of rhabdomyolysis  - CK down to <6000   - Remains hypervolemic, oliguric but UOP on an upward trend - now ~25 cc/hr  - IV locked  - Hypervolemic hyponatremia, R fem chyley, plan for permacath 06/18  - Nephrology following    Heme  - stable CBC  - SQH for dvt ppx    ID  - Ancef discontinued on 6/15  - Has been afebrile, leukocytosis of 15-20    Endo  - monitor FGS    Lines  - Rfem HD catheter  - Right radial a line

## 2020-06-19 NOTE — PROGRESS NOTE ADULT - SUBJECTIVE AND OBJECTIVE BOX
[facility] DIVISION OF KIDNEY DISEASES AND HYPERTENSION -- FOLLOW UP NOTE  --------------------------------------------------------------------------------  HPI: 29-year-old male, with history bipolar depression was transferred from [facility] to [facility] for severe rhabdomyolysis. As per paper chart, pt was found to have cocaine and amphetamines in urine drug screen. Pt also with an elevated CPK ~ 180K. Nephrology team consulted for ANTOINETTE and rhabdomyolyiss. Upon review of labs on [facility], Scr was 1.11 on 8/23/2017. Pt. underwent fasciotomy on 6/11/20 for LLE compartement syndrome. Pt. had wound vac placed on  6/15/20. Last HD 06/18/20 for volume optimization. Pt. had femoral non tunneled HD catheter placed by surgery team and tolerated HD.    Pt. went for partial closure of fasciotomy today with plastic surgery.    PAST HISTORY  --------------------------------------------------------------------------------  No significant changes to PMH, PSH, FHx, SHx, unless otherwise noted    ALLERGIES & MEDICATIONS  --------------------------------------------------------------------------------  Allergies    No Known Allergies    Intolerances    Standing Inpatient Medications  acetaminophen   Tablet .. 650 milliGRAM(s) Oral every 6 hours  chlorhexidine 4% Liquid 1 Application(s) Topical <User Schedule>  dexMEDEtomidine Infusion 0.2 MICROgram(s)/kG/Hr IV Continuous <Continuous>  gabapentin 100 milliGRAM(s) Oral three times a day  heparin   Injectable 5000 Unit(s) SubCutaneous every 8 hours  HYDROmorphone  Injectable 2 milliGRAM(s) IV Push daily  ketamine Infusion. 0.1 mG/kG/Hr IV Continuous <Continuous>  niCARdipine 30 milliGRAM(s) Oral every 8 hours  polyethylene glycol 3350 17 Gram(s) Oral two times a day  QUEtiapine 25 milliGRAM(s) Oral at bedtime  senna 2 Tablet(s) Oral at bedtime  sevelamer carbonate 800 milliGRAM(s) Oral every 8 hours    REVIEW OF SYSTEMS  --------------------------------------------------------------------------------  Gen: no fatigue  Respiratory: no dyspnea  CV: No chest pain  GI: No abdominal pain  MSK: L leg pain  Neuro: No dizziness  Heme: No bleeding    All other systems were reviewed and are negative, except as noted.    VITALS/PHYSICAL EXAM  --------------------------------------------------------------------------------  T(C): 37.8 (06-19-20 @ 04:00), Max: 38.3 (06-18-20 @ 20:00)  HR: 91 (06-19-20 @ 07:00) (85 - 123)  BP: 119/57 (06-19-20 @ 04:16) (119/57 - 119/57)  RR: 15 (06-19-20 @ 07:00) (12 - 25)  SpO2: 96% (06-19-20 @ 07:00) (94% - 100%)  Wt(kg): --    06-18-20 @ 07:01  -  06-19-20 @ 07:00  --------------------------------------------------------  IN: 1435 mL / OUT: 4085 mL / NET: -2650 mL    Physical Exam:  	Gen: NAD  	HEENT: MMM on on NC  	Pulm: CTA B/L  	CV: S1S2  	Abd: Soft, +BS               Ext: left lower extremity in wound vac  	Neuro: Awake  	Skin: Warm and dry  	Vascular access: R femoral non tunneled HD catheter    LABS/STUDIES  --------------------------------------------------------------------------------              7.2    17.18 >-----------<  303      [06-19-20 @ 04:20]              20.7     129  |  94  |  80  ----------------------------<  104      [06-19-20 @ 04:20]  5.1   |  24  |  6.63        Ca     7.9     [06-19-20 @ 04:20]      Mg     2.4     [06-19-20 @ 04:20]      Phos  6.8     [06-19-20 @ 04:20]    Creatinine Trend:  SCr 6.63 [06-19 @ 04:20]  SCr 6.25 [06-19 @ 00:15]  SCr 5.05 [06-18 @ 13:02]  SCr 7.22 [06-18 @ 00:45]  SCr 6.67 [06-17 @ 18:25]    HBsAb 9.2      [06-11-20 @ 03:40]  HBsAb Nonreactive      [06-10-20 @ 21:47]  HBsAg NEGATIVE      [06-11-20 @ 03:40]  HCV 0.07, Nonreactive [06-11-20 @ 03:40]  HIV Nonreactive  [06-10-20 @ 14:15]

## 2020-06-19 NOTE — BRIEF OPERATIVE NOTE - NSICDXBRIEFPROCEDURE_GEN_ALL_CORE_FT
PROCEDURES:  Decompression fasciotomy, lower extremity 11-Jun-2020 20:25:39  Alexis Valentine  Gluteal-iliotibial fasciotomy 11-Jun-2020 20:25:23  Alexis Valentine  Fasciotomy of left hip 11-Jun-2020 20:24:46  Alexis Valentine  Fasciotomy, thigh 11-Jun-2020 20:24:26  Alexis Valentine
PROCEDURES:  Closure, wound, delayed 19-Jun-2020 11:46:48  Virgen Stuart

## 2020-06-19 NOTE — BRIEF OPERATIVE NOTE - OPERATION/FINDINGS
Fasciotomy sites to L LE primarily closed. Prevena incisional vac placed to all four sites.
Four compartment fasciotomy of the left lower leg performed. All muscle appeared viable. Left lateral and medial thigh fasciotomies performed with bulging muscle along lateral site, but with viable muscle. Left gluteal fasciotomy performed. Left gluteus geoff not twitching to stimulus with electrocaudary. Strongly palpable DP & PT at the end of the case.

## 2020-06-19 NOTE — BRIEF OPERATIVE NOTE - NSICDXBRIEFOPLAUNCH_GEN_ALL_CORE
<--- Click to Launch ICDx for PreOp, PostOp and Procedure
<--- Click to Launch ICDx for PreOp, PostOp and Procedure
DISPLAY PLAN FREE TEXT

## 2020-06-19 NOTE — BRIEF OPERATIVE NOTE - NSICDXBRIEFPOSTOP_GEN_ALL_CORE_FT
POST-OP DIAGNOSIS:  Compartment syndrome, nontraumatic, lower extremity 11-Jun-2020 20:26:27  Alexis Valentine
POST-OP DIAGNOSIS:  History of fasciotomy 19-Jun-2020 11:47:16  Virgen Stuart

## 2020-06-19 NOTE — PROGRESS NOTE ADULT - ATTENDING COMMENTS
ASHTYN Sauer MD have seen and examined the patient today and agree with  the  evaluation, assessment and plan of the surgical house officer  ASHTYN Sauer MD have personally seen and examined the patient at bedside today at 1  pm I Adam Sauer MD have seen and examined the patient today and agree with  the  evaluation, assessment and plan of the surgical house officer  ASHTYN Sauer MD have personally seen and examined the patient at bedside today at 8am

## 2020-06-19 NOTE — PROVIDER CONTACT NOTE (OTHER) - BACKGROUND
Pt status post 4 quadrant left leg fasciotomy with lateral and medial upper and lower leg wound vac placement.

## 2020-06-19 NOTE — PROGRESS NOTE ADULT - PROBLEM SELECTOR PLAN 1
Pt with ANTOINETTE in the setting of rhabdomyolysis. Upon lab review, Scr was 1.11 on 8/23/2017. Scr at [facility] this AM was 3.06. Scr today is 6.33. Pt with oliguric ANTOINETTE secondary to rhabdomyolysis. Pt. noted to have mild improvement urine output. Labs reviewed, will arrange for HD with [facility] again today. Pt. awaiting tunneled catheter placement by IR team. Will assess daily for HD needs. Monitor labs and urine output. Avoid potential nephrotoxins.

## 2020-06-19 NOTE — PROGRESS NOTE ADULT - SUBJECTIVE AND OBJECTIVE BOX
HISTORY  29M with PMH of bipolar depression transferred from [facility] for further management of acute renal failure due to rhabdomyolysis. Pt was found unresponsive at home, lying on his L side. He states he has not been taking his Lithium for at least 2 wks. At the OSH, he was treated for hyperkalemia. He received D5, insulin, calcium gluconate, Kayexalate Last BM was on 6/9. For L leg pain he was given lidocaine patch, dilaudid 1mg prn and tramadol 50mg. Hydralazine 20mg given for HTN. From 1 to 7 am only 100cc of UOP. Urine was tea colored. 500cc bolus of NS was given. CT cervical spine at OSH was (-). CT Head (-). CTAP and CXR were (-). CK on admission there was 177,000.     Pt was seen by surgery team in MICU for LUE swelling and compartment syndrome was ruled out. Surgery team was re-consulted today due to new symptoms of L foot drop and numbness. Pt was found to be highly suspicious for compartment syndrome in the upper thigh, and was emergently taken to OR.     Pt is now s/p four compartment fasciotomy of LLE, and left lateral and medial thigh fasciotomies. SICU consulted for q1h neurovascular checks and hemodynamic monitoring    24 HOUR EVENTS:  Precedex started   VANDANA o/n     SUBJECTIVE/ROS:  [x] A ten-point review of systems was otherwise negative except as noted.  [ ] Due to altered mental status/intubation, subjective information were not able to be obtained from the patient. History was obtained, to the extent possible, from review of the chart and collateral sources of information.      NEURO  Exam: Awake, alert  Meds: acetaminophen   Tablet .. 650 milliGRAM(s) Oral every 6 hours  ALPRAZolam 1 milliGRAM(s) Oral three times a day PRN Anxiety  dexMEDEtomidine Infusion 0.2 MICROgram(s)/kG/Hr IV Continuous <Continuous>  gabapentin 100 milliGRAM(s) Oral three times a day  HYDROmorphone PCA (1 mG/mL) 30 milliLiter(s) PCA Continuous PCA Continuous  HYDROmorphone PCA (1 mG/mL) Rescue Clinician Bolus 1 milliGRAM(s) IV Push every 15 minutes PRN for Pain Scale GREATER THAN 6  ketamine Infusion. 0.1 mG/kG/Hr IV Continuous <Continuous>  ondansetron Injectable 4 milliGRAM(s) IV Push every 6 hours PRN Nausea  QUEtiapine 25 milliGRAM(s) Oral at bedtime  tiZANidine 1 milliGRAM(s) Oral every 6 hours PRN muscle spasm and pain    [x] Adequacy of sedation and pain control has been assessed and adjusted      RESPIRATORY  RR: 13 (06-19-20 @ 15:00) (4 - 19)  SpO2: 98% (06-19-20 @ 15:00) (94% - 100%)  Wt(kg): --  Exam: unlabored, clear to auscultation bilaterally  Mechanical Ventilation:   ABG - ( 19 Jun 2020 12:20 )  pH: 7.35  /  pCO2: 42    /  pO2: 138   / HCO3: 23    / Base Excess: -2.0  /  SaO2: 99.3    Lactate: 0.8          CARDIOVASCULAR  HR: 109 (06-19-20 @ 15:00) (78 - 122)  BP: 119/57 (06-19-20 @ 04:16) (119/57 - 119/57)  BP(mean): --  ABP: 185/83 (06-19-20 @ 15:00) (101/77 - 185/83)  ABP(mean): 113 (06-19-20 @ 15:00) (88 - 121)  Wt(kg): --  CVP(cm H2O): --      Exam: Normal rate and rhythm  Perfusion     [x]Adequate   [ ]Inadequate  Mentation   [x]Normal       [ ]Reduced  Extremities  [x]Warm         [ ]Cool  Volume Status [ ]Hypervolemic [ ]Euvolemic [ ]Hypovolemic  Meds: niCARdipine 30 milliGRAM(s) Oral every 8 hours        GI/NUTRITION  Exam: Soft NT/ND  Diet: Regular diet with fluid restriction  Meds: polyethylene glycol 3350 17 Gram(s) Oral two times a day  senna 2 Tablet(s) Oral at bedtime      GENITOURINARY  I&O's Detail    06-18 @ 07:01  -  06-19 @ 07:00  --------------------------------------------------------  IN:    dexmedetomidine Infusion: 143 mL    IV PiggyBack: 50 mL    ketamine Infusion.: 22 mL    Oral Fluid: 720 mL    Other: 500 mL  Total IN: 1435 mL    OUT:    Other: 2900 mL    VAC (Vacuum Assisted Closure) System: 350 mL    VAC (Vacuum Assisted Closure) System: 400 mL    Voided: 435 mL  Total OUT: 4085 mL    Total NET: -2650 mL      06-19 @ 07:01  -  06-19 @ 16:18  --------------------------------------------------------  IN:  Total IN: 0 mL    OUT:    Voided: 50 mL  Total OUT: 50 mL    Total NET: -50 mL          06-19    129<L>  |  94<L>  |  89<H>  ----------------------------<  108<H>  5.7<H>   |  22  |  7.13<H>    Ca    7.6<L>      19 Jun 2020 12:20  Phos  6.8     06-19  Mg     2.4     06-19    TPro  4.2<L>  /  Alb  2.0<L>  /  TBili  0.3  /  DBili  x   /  AST  40  /  ALT  15  /  AlkPhos  77  06-19    [ ] Motta catheter, indication: N/A  Meds: sodium chloride 0.9% lock flush 10 milliLiter(s) IV Push every 1 hour PRN Pre/post blood products, medications, blood draw, and to maintain line patency        HEMATOLOGIC  Meds: heparin   Injectable 5000 Unit(s) SubCutaneous every 8 hours    [x] VTE Prophylaxis                        7.0    16.97 )-----------( 332      ( 19 Jun 2020 12:20 )             20.7     PT/INR - ( 19 Jun 2020 00:15 )   PT: 11.4 SEC;   INR: 1.00          PTT - ( 19 Jun 2020 00:15 )  PTT:28.5 SEC  Transfusion     [ ] PRBC   [ ] Platelets   [ ] FFP   [ ] Cryoprecipitate      INFECTIOUS DISEASES  T(C): 37.3 (06-19-20 @ 14:00), Max: 38.3 (06-18-20 @ 20:00)  Wt(kg): --  WBC Count: 16.97 K/uL (06-19 @ 12:20)  WBC Count: 17.18 K/uL (06-19 @ 04:20)  WBC Count: 16.03 K/uL (06-19 @ 00:15)    Recent Cultures:    Meds: ceFAZolin   IVPB 2000 milliGRAM(s) IV Intermittent every 8 hours        ENDOCRINE  Capillary Blood Glucose      ACCESS DEVICES:  [x] Peripheral IV  [x] Central Venous Line	[x] R	[ ] L	[ ] IJ	[x] Fem	[ ] SC	Placed:   [ ] Arterial Line		[ ] R	[ ] L	[ ] Fem	[ ] Rad	[ ] Ax	Placed:   [ ] PICC:					[ ] Mediport  [ ] Urinary Catheter, Date Placed:   [ ] Necessity of urinary, arterial, and venous catheters discussed    OTHER MEDICATIONS:  chlorhexidine 4% Liquid 1 Application(s) Topical <User Schedule>  naloxone Injectable 0.1 milliGRAM(s) IV Push every 3 minutes PRN  sevelamer carbonate 800 milliGRAM(s) Oral every 8 hours

## 2020-06-19 NOTE — PROGRESS NOTE ADULT - SUBJECTIVE AND OBJECTIVE BOX
Chief Complaint:    HPI:  29 y M with PMH of bipolar depression transferred from [facility] for further management of acute renal failure due to rhabdomyolysis. Pt was found unresponsive at home, lying on his L side. He states he has not been taking his Lithium for at least 2 wks. At the OSH, he was treated for hyperkalemia. He received D5, insulin, calcium gluconate, Kayexalate Last BM was on 6/9. For L leg pain he was given lidocaine patch, dilaudid 1mg prn and tramadol 50mg. Hydralazine 20mg given for HTN. From 1 to 7 am only 100cc of UOP. Urine was tea colored. 500cc bolus of NS was given. CT cervical spine at OSH was (-). CT Head (-). CTAP and CXR were (-). CK on admission there was 177,000. (10 Ramírez 2020 13:14)      PAST MEDICAL & SURGICAL HISTORY:  Bipolar depression  Migraine  Displaced fracture of proximal phalanx of left little finger  Difficulty sleeping  ADD (attention deficit disorder)  History of facial surgery: Fadi-Facial Realignment Surgery in 2008      FAMILY HISTORY:  Family history of diabetes mellitus in grandmother (Grandparent)  Family history of heart disease (Grandparent)      SOCIAL HISTORY:  [ ] Denies Smoking, Alcohol, or Drug Use    Allergies    No Known Allergies    Intolerances        PAIN MEDICATIONS:  acetaminophen   Tablet .. 650 milliGRAM(s) Oral every 6 hours  ALPRAZolam 1 milliGRAM(s) Oral three times a day PRN  dexMEDEtomidine Infusion 0.2 MICROgram(s)/kG/Hr IV Continuous <Continuous>  gabapentin 100 milliGRAM(s) Oral three times a day  HYDROmorphone PCA (1 mG/mL) 30 milliLiter(s) PCA Continuous PCA Continuous  HYDROmorphone PCA (1 mG/mL) Rescue Clinician Bolus 1 milliGRAM(s) IV Push every 15 minutes PRN  ketamine Infusion. 0.1 mG/kG/Hr IV Continuous <Continuous>  ondansetron Injectable 4 milliGRAM(s) IV Push every 6 hours PRN  QUEtiapine 25 milliGRAM(s) Oral at bedtime  tiZANidine 1 milliGRAM(s) Oral every 6 hours PRN    Heme:  heparin   Injectable 5000 Unit(s) SubCutaneous every 8 hours    Antibiotics:    Cardiovascular:  niCARdipine 30 milliGRAM(s) Oral every 8 hours    GI:  polyethylene glycol 3350 17 Gram(s) Oral two times a day  senna 2 Tablet(s) Oral at bedtime    Endocrine:    All Other Medications:  chlorhexidine 4% Liquid 1 Application(s) Topical <User Schedule>  naloxone Injectable 0.1 milliGRAM(s) IV Push every 3 minutes PRN  sevelamer carbonate 800 milliGRAM(s) Oral every 8 hours  sodium chloride 0.9% lock flush 10 milliLiter(s) IV Push every 1 hour PRN      Vital Signs Last 24 Hrs  T(C): 36.3 (19 Jun 2020 12:00), Max: 38.3 (18 Jun 2020 20:00)  T(F): 97.4 (19 Jun 2020 12:00), Max: 101 (18 Jun 2020 20:00)  HR: 80 (19 Jun 2020 13:00) (78 - 123)  BP: 119/57 (19 Jun 2020 04:16) (119/57 - 119/57)  BP(mean): --  RR: 13 (19 Jun 2020 13:00) (4 - 25)  SpO2: 100% (19 Jun 2020 13:00) (94% - 100%)    PAIN SCORE:        see chart           Physical Exam: A & O x 3 in some discomfort    LABS:                          7.0    16.97 )-----------( 332      ( 19 Jun 2020 12:20 )             20.7     06-19    129<L>  |  94<L>  |  89<H>  ----------------------------<  108<H>  5.7<H>   |  22  |  7.13<H>    Ca    7.6<L>      19 Jun 2020 12:20  Phos  6.8     06-19  Mg     2.4     06-19    TPro  4.2<L>  /  Alb  2.0<L>  /  TBili  0.3  /  DBili  x   /  AST  40  /  ALT  15  /  AlkPhos  77  06-19    PT/INR - ( 19 Jun 2020 00:15 )   PT: 11.4 SEC;   INR: 1.00          PTT - ( 19 Jun 2020 00:15 )  PTT:28.5 SEC      Drug Screen:        [ ]  [place]  Reviewed and Copied to Chart    Impression/Plan: Pain not adequately relieved with current opioid treatment regimen. Discussed with team and patient risk and benefits of restarting IVPCA. Agree with plan to begin Hydromorphone IV PCA. Pain Service recommending psych follow for anxiety and mood. Consider changing Tizanidine to standing and increasing gabapentin to 200mg BID. Will reevaluate by Pain Service tomorrow.    Patient was seen 06-19-20 @ 13:33

## 2020-06-20 LAB
ANION GAP SERPL CALC-SCNC: 12 MMO/L — SIGNIFICANT CHANGE UP (ref 7–14)
ANION GAP SERPL CALC-SCNC: 14 MMO/L — SIGNIFICANT CHANGE UP (ref 7–14)
BUN SERPL-MCNC: 63 MG/DL — HIGH (ref 7–23)
BUN SERPL-MCNC: 69 MG/DL — HIGH (ref 7–23)
CALCIUM SERPL-MCNC: 7.5 MG/DL — LOW (ref 8.4–10.5)
CALCIUM SERPL-MCNC: 7.6 MG/DL — LOW (ref 8.4–10.5)
CHLORIDE SERPL-SCNC: 93 MMOL/L — LOW (ref 98–107)
CHLORIDE SERPL-SCNC: 94 MMOL/L — LOW (ref 98–107)
CO2 SERPL-SCNC: 24 MMOL/L — SIGNIFICANT CHANGE UP (ref 22–31)
CO2 SERPL-SCNC: 24 MMOL/L — SIGNIFICANT CHANGE UP (ref 22–31)
CREAT SERPL-MCNC: 5.65 MG/DL — HIGH (ref 0.5–1.3)
CREAT SERPL-MCNC: 6.27 MG/DL — HIGH (ref 0.5–1.3)
GLUCOSE SERPL-MCNC: 113 MG/DL — HIGH (ref 70–99)
GLUCOSE SERPL-MCNC: 126 MG/DL — HIGH (ref 70–99)
HCT VFR BLD CALC: 22 % — LOW (ref 39–50)
HCT VFR BLD CALC: 22.6 % — LOW (ref 39–50)
HGB BLD-MCNC: 7.4 G/DL — LOW (ref 13–17)
HGB BLD-MCNC: 7.7 G/DL — LOW (ref 13–17)
MAGNESIUM SERPL-MCNC: 2.1 MG/DL — SIGNIFICANT CHANGE UP (ref 1.6–2.6)
MAGNESIUM SERPL-MCNC: 2.1 MG/DL — SIGNIFICANT CHANGE UP (ref 1.6–2.6)
MCHC RBC-ENTMCNC: 28.5 PG — SIGNIFICANT CHANGE UP (ref 27–34)
MCHC RBC-ENTMCNC: 28.5 PG — SIGNIFICANT CHANGE UP (ref 27–34)
MCHC RBC-ENTMCNC: 33.6 % — SIGNIFICANT CHANGE UP (ref 32–36)
MCHC RBC-ENTMCNC: 34.1 % — SIGNIFICANT CHANGE UP (ref 32–36)
MCV RBC AUTO: 83.7 FL — SIGNIFICANT CHANGE UP (ref 80–100)
MCV RBC AUTO: 84.6 FL — SIGNIFICANT CHANGE UP (ref 80–100)
NRBC # FLD: 0 K/UL — SIGNIFICANT CHANGE UP (ref 0–0)
NRBC # FLD: 0 K/UL — SIGNIFICANT CHANGE UP (ref 0–0)
PHOSPHATE SERPL-MCNC: 5.9 MG/DL — HIGH (ref 2.5–4.5)
PHOSPHATE SERPL-MCNC: 6.5 MG/DL — HIGH (ref 2.5–4.5)
PLATELET # BLD AUTO: 296 K/UL — SIGNIFICANT CHANGE UP (ref 150–400)
PLATELET # BLD AUTO: 309 K/UL — SIGNIFICANT CHANGE UP (ref 150–400)
PMV BLD: 10.1 FL — SIGNIFICANT CHANGE UP (ref 7–13)
PMV BLD: 9.8 FL — SIGNIFICANT CHANGE UP (ref 7–13)
POTASSIUM SERPL-MCNC: 5.3 MMOL/L — SIGNIFICANT CHANGE UP (ref 3.5–5.3)
POTASSIUM SERPL-MCNC: 5.5 MMOL/L — HIGH (ref 3.5–5.3)
POTASSIUM SERPL-SCNC: 5.3 MMOL/L — SIGNIFICANT CHANGE UP (ref 3.5–5.3)
POTASSIUM SERPL-SCNC: 5.5 MMOL/L — HIGH (ref 3.5–5.3)
RBC # BLD: 2.6 M/UL — LOW (ref 4.2–5.8)
RBC # BLD: 2.7 M/UL — LOW (ref 4.2–5.8)
RBC # FLD: 14.1 % — SIGNIFICANT CHANGE UP (ref 10.3–14.5)
RBC # FLD: 14.3 % — SIGNIFICANT CHANGE UP (ref 10.3–14.5)
SODIUM SERPL-SCNC: 130 MMOL/L — LOW (ref 135–145)
SODIUM SERPL-SCNC: 131 MMOL/L — LOW (ref 135–145)
WBC # BLD: 20.78 K/UL — HIGH (ref 3.8–10.5)
WBC # BLD: 21.31 K/UL — HIGH (ref 3.8–10.5)
WBC # FLD AUTO: 20.78 K/UL — HIGH (ref 3.8–10.5)
WBC # FLD AUTO: 21.31 K/UL — HIGH (ref 3.8–10.5)

## 2020-06-20 PROCEDURE — 71045 X-RAY EXAM CHEST 1 VIEW: CPT | Mod: 26

## 2020-06-20 PROCEDURE — 99232 SBSQ HOSP IP/OBS MODERATE 35: CPT | Mod: GC

## 2020-06-20 PROCEDURE — 93970 EXTREMITY STUDY: CPT | Mod: 26

## 2020-06-20 RX ORDER — METHADONE HYDROCHLORIDE 40 MG/1
5 TABLET ORAL
Refills: 0 | Status: DISCONTINUED | OUTPATIENT
Start: 2020-06-20 | End: 2020-06-20

## 2020-06-20 RX ORDER — SODIUM ZIRCONIUM CYCLOSILICATE 10 G/10G
10 POWDER, FOR SUSPENSION ORAL ONCE
Refills: 0 | Status: COMPLETED | OUTPATIENT
Start: 2020-06-20 | End: 2020-06-20

## 2020-06-20 RX ORDER — ALTEPLASE 100 MG
2 KIT INTRAVENOUS ONCE
Refills: 0 | Status: COMPLETED | OUTPATIENT
Start: 2020-06-20 | End: 2020-06-20

## 2020-06-20 RX ORDER — ALPRAZOLAM 0.25 MG
1 TABLET ORAL EVERY 8 HOURS
Refills: 0 | Status: DISCONTINUED | OUTPATIENT
Start: 2020-06-20 | End: 2020-06-27

## 2020-06-20 RX ORDER — HYDROMORPHONE HYDROCHLORIDE 2 MG/ML
30 INJECTION INTRAMUSCULAR; INTRAVENOUS; SUBCUTANEOUS
Refills: 0 | Status: DISCONTINUED | OUTPATIENT
Start: 2020-06-20 | End: 2020-06-21

## 2020-06-20 RX ORDER — METHADONE HYDROCHLORIDE 40 MG/1
5 TABLET ORAL
Refills: 0 | Status: DISCONTINUED | OUTPATIENT
Start: 2020-06-20 | End: 2020-06-27

## 2020-06-20 RX ORDER — GABAPENTIN 400 MG/1
200 CAPSULE ORAL THREE TIMES A DAY
Refills: 0 | Status: DISCONTINUED | OUTPATIENT
Start: 2020-06-20 | End: 2020-06-29

## 2020-06-20 RX ADMIN — POLYETHYLENE GLYCOL 3350 17 GRAM(S): 17 POWDER, FOR SOLUTION ORAL at 06:12

## 2020-06-20 RX ADMIN — SEVELAMER CARBONATE 800 MILLIGRAM(S): 2400 POWDER, FOR SUSPENSION ORAL at 22:14

## 2020-06-20 RX ADMIN — HYDROMORPHONE HYDROCHLORIDE 1 MILLIGRAM(S): 2 INJECTION INTRAMUSCULAR; INTRAVENOUS; SUBCUTANEOUS at 06:51

## 2020-06-20 RX ADMIN — NICARDIPINE HYDROCHLORIDE 30 MILLIGRAM(S): 30 CAPSULE, EXTENDED RELEASE ORAL at 06:12

## 2020-06-20 RX ADMIN — SEVELAMER CARBONATE 800 MILLIGRAM(S): 2400 POWDER, FOR SUSPENSION ORAL at 13:54

## 2020-06-20 RX ADMIN — GABAPENTIN 200 MILLIGRAM(S): 400 CAPSULE ORAL at 22:15

## 2020-06-20 RX ADMIN — HYDROMORPHONE HYDROCHLORIDE 1 MILLIGRAM(S): 2 INJECTION INTRAMUSCULAR; INTRAVENOUS; SUBCUTANEOUS at 08:10

## 2020-06-20 RX ADMIN — GABAPENTIN 200 MILLIGRAM(S): 400 CAPSULE ORAL at 13:54

## 2020-06-20 RX ADMIN — HYDROMORPHONE HYDROCHLORIDE 1 MILLIGRAM(S): 2 INJECTION INTRAMUSCULAR; INTRAVENOUS; SUBCUTANEOUS at 19:37

## 2020-06-20 RX ADMIN — HYDROMORPHONE HYDROCHLORIDE 1 MILLIGRAM(S): 2 INJECTION INTRAMUSCULAR; INTRAVENOUS; SUBCUTANEOUS at 04:11

## 2020-06-20 RX ADMIN — HYDROMORPHONE HYDROCHLORIDE 30 MILLILITER(S): 2 INJECTION INTRAMUSCULAR; INTRAVENOUS; SUBCUTANEOUS at 19:19

## 2020-06-20 RX ADMIN — SENNA PLUS 2 TABLET(S): 8.6 TABLET ORAL at 22:14

## 2020-06-20 RX ADMIN — QUETIAPINE FUMARATE 25 MILLIGRAM(S): 200 TABLET, FILM COATED ORAL at 22:14

## 2020-06-20 RX ADMIN — Medication 1 MILLIGRAM(S): at 22:15

## 2020-06-20 RX ADMIN — NICARDIPINE HYDROCHLORIDE 30 MILLIGRAM(S): 30 CAPSULE, EXTENDED RELEASE ORAL at 22:13

## 2020-06-20 RX ADMIN — HEPARIN SODIUM 5000 UNIT(S): 5000 INJECTION INTRAVENOUS; SUBCUTANEOUS at 06:12

## 2020-06-20 RX ADMIN — ALTEPLASE 2 MILLIGRAM(S): KIT at 09:10

## 2020-06-20 RX ADMIN — HYDROMORPHONE HYDROCHLORIDE 30 MILLILITER(S): 2 INJECTION INTRAMUSCULAR; INTRAVENOUS; SUBCUTANEOUS at 08:11

## 2020-06-20 RX ADMIN — Medication 650 MILLIGRAM(S): at 13:54

## 2020-06-20 RX ADMIN — POLYETHYLENE GLYCOL 3350 17 GRAM(S): 17 POWDER, FOR SOLUTION ORAL at 17:42

## 2020-06-20 RX ADMIN — METHADONE HYDROCHLORIDE 5 MILLIGRAM(S): 40 TABLET ORAL at 12:26

## 2020-06-20 RX ADMIN — Medication 650 MILLIGRAM(S): at 01:52

## 2020-06-20 RX ADMIN — DEXMEDETOMIDINE HYDROCHLORIDE IN 0.9% SODIUM CHLORIDE 5.24 MICROGRAM(S)/KG/HR: 4 INJECTION INTRAVENOUS at 19:40

## 2020-06-20 RX ADMIN — HYDROMORPHONE HYDROCHLORIDE 1 MILLIGRAM(S): 2 INJECTION INTRAMUSCULAR; INTRAVENOUS; SUBCUTANEOUS at 22:00

## 2020-06-20 RX ADMIN — SODIUM ZIRCONIUM CYCLOSILICATE 10 GRAM(S): 10 POWDER, FOR SUSPENSION ORAL at 03:56

## 2020-06-20 RX ADMIN — Medication 100 MILLIGRAM(S): at 08:21

## 2020-06-20 RX ADMIN — HEPARIN SODIUM 5000 UNIT(S): 5000 INJECTION INTRAVENOUS; SUBCUTANEOUS at 13:55

## 2020-06-20 RX ADMIN — Medication 650 MILLIGRAM(S): at 08:22

## 2020-06-20 RX ADMIN — METHADONE HYDROCHLORIDE 5 MILLIGRAM(S): 40 TABLET ORAL at 17:42

## 2020-06-20 RX ADMIN — SEVELAMER CARBONATE 800 MILLIGRAM(S): 2400 POWDER, FOR SUSPENSION ORAL at 06:12

## 2020-06-20 RX ADMIN — ALTEPLASE 2 MILLIGRAM(S): KIT at 09:15

## 2020-06-20 RX ADMIN — NICARDIPINE HYDROCHLORIDE 30 MILLIGRAM(S): 30 CAPSULE, EXTENDED RELEASE ORAL at 13:54

## 2020-06-20 RX ADMIN — HYDROMORPHONE HYDROCHLORIDE 1 MILLIGRAM(S): 2 INJECTION INTRAMUSCULAR; INTRAVENOUS; SUBCUTANEOUS at 01:53

## 2020-06-20 RX ADMIN — HEPARIN SODIUM 5000 UNIT(S): 5000 INJECTION INTRAVENOUS; SUBCUTANEOUS at 22:16

## 2020-06-20 RX ADMIN — GABAPENTIN 200 MILLIGRAM(S): 400 CAPSULE ORAL at 06:12

## 2020-06-20 RX ADMIN — Medication 650 MILLIGRAM(S): at 19:36

## 2020-06-20 RX ADMIN — CHLORHEXIDINE GLUCONATE 1 APPLICATION(S): 213 SOLUTION TOPICAL at 06:12

## 2020-06-20 NOTE — PROGRESS NOTE ADULT - SUBJECTIVE AND OBJECTIVE BOX
[facility] DIVISION OF KIDNEY DISEASES AND HYPERTENSION -- FOLLOW UP NOTE  --------------------------------------------------------------------------------  HPI: 29-year-old male, with history bipolar depression was transferred from [facility] to [facility] for severe rhabdomyolysis. As per paper chart, pt was found to have cocaine and amphetamines in urine drug screen. Pt also with an elevated CPK ~ 180K. Nephrology team consulted for ANTOINETTE and rhabdomyolyiss. Upon review of labs on [facility], Scr was 1.11 on 8/23/2017. Pt. underwent fasciotomy on 6/11/20 for LLE compartement syndrome. Pt. had wound vac placed on  6/15/20. Last HD 06/19/20 for volume optimization. Pt. underwent partial closure of fasciotomy by plastic surgery team yesterday.    Pt. evaluated at bedside, reports improvement in pain.     PAST HISTORY  --------------------------------------------------------------------------------  No significant changes to PMH, PSH, FHx, SHx, unless otherwise noted    ALLERGIES & MEDICATIONS  --------------------------------------------------------------------------------  Allergies    No Known Allergies    Intolerances      Standing Inpatient Medications  acetaminophen   Tablet .. 650 milliGRAM(s) Oral every 6 hours  alteplase for catheter clearance 2 milliGRAM(s) Catheter once  alteplase for catheter clearance 2 milliGRAM(s) Catheter once  chlorhexidine 4% Liquid 1 Application(s) Topical <User Schedule>  dexMEDEtomidine Infusion 0.2 MICROgram(s)/kG/Hr IV Continuous <Continuous>  gabapentin 200 milliGRAM(s) Oral three times a day  heparin   Injectable 5000 Unit(s) SubCutaneous every 8 hours  HYDROmorphone PCA (1 mG/mL) 30 milliLiter(s) PCA Continuous PCA Continuous  ketamine Infusion. 0.1 mG/kG/Hr IV Continuous <Continuous>  niCARdipine 30 milliGRAM(s) Oral every 8 hours  polyethylene glycol 3350 17 Gram(s) Oral two times a day  QUEtiapine 25 milliGRAM(s) Oral at bedtime  senna 2 Tablet(s) Oral at bedtime  sevelamer carbonate 800 milliGRAM(s) Oral every 8 hours    REVIEW OF SYSTEMS  --------------------------------------------------------------------------------  Gen: no lethargy  Respiratory: No dyspnea  CV: No chest pain  GI: No abdominal pain  MSK: + LE edema  Neuro: No dizziness  Heme: No bleeding    All other systems were reviewed and are negative, except as noted.     VITALS/PHYSICAL EXAM  --------------------------------------------------------------------------------  T(C): 37.7 (06-20-20 @ 08:00), Max: 38.6 (06-19-20 @ 23:42)  HR: 107 (06-20-20 @ 09:00) (78 - 118)  BP: --  RR: 17 (06-20-20 @ 09:00) (4 - 19)  SpO2: 97% (06-20-20 @ 09:00) (94% - 100%)  Wt(kg): --    06-19-20 @ 07:01  -  06-20-20 @ 07:00  --------------------------------------------------------  IN: 1823.3 mL / OUT: 4185 mL / NET: -2361.7 mL    06-20-20 @ 07:01  -  06-20-20 @ 09:50  --------------------------------------------------------  IN: 358.5 mL / OUT: 85 mL / NET: 273.5 mL    Physical Exam:  	Gen: NAD  	HEENT: MMM   	Pulm: CTA B/L  	CV: S1S2  	Abd: Soft, +BS               Ext: left lower extremity in bandage with multiple drains  	Neuro: Awake  	Skin: Warm and dry  	Vascular access: R femoral non tunneled HD catheter    LABS/STUDIES  --------------------------------------------------------------------------------              7.4    20.78 >-----------<  309      [06-20-20 @ 05:30]              22.0     131  |  93  |  69  ----------------------------<  113      [06-20-20 @ 07:43]  5.3   |  24  |  6.27        Ca     7.6     [06-20-20 @ 07:43]      Mg     2.1     [06-20-20 @ 07:43]      Phos  6.5     [06-20-20 @ 07:43]    Creatinine Trend:  SCr 6.27 [06-20 @ 07:43]  SCr 5.65 [06-20 @ 00:26]  SCr 5.16 [06-19 @ 21:00]  SCr 7.13 [06-19 @ 12:20]  SCr 6.63 [06-19 @ 04:20]

## 2020-06-20 NOTE — PROGRESS NOTE ADULT - SUBJECTIVE AND OBJECTIVE BOX
Anesthesia Pain Management Service    SUBJECTIVE: Pt doing well with IV PCA without problems reported.    Therapy:	  [ X] IV PCA	   [ ] Epidural           [ ] s/p Spinal Opoid              [ ] Postpartum infusion	  [ ] Patient controlled regional anesthesia (PCRA)    [ ] prn Analgesics    Allergies    No Known Allergies    Intolerances      MEDICATIONS  (STANDING):  acetaminophen   Tablet .. 650 milliGRAM(s) Oral every 6 hours  alteplase for catheter clearance 2 milliGRAM(s) Catheter once  ceFAZolin   IVPB 1000 milliGRAM(s) IV Intermittent every 24 hours  chlorhexidine 4% Liquid 1 Application(s) Topical <User Schedule>  dexMEDEtomidine Infusion 0.2 MICROgram(s)/kG/Hr (5.24 mL/Hr) IV Continuous <Continuous>  gabapentin 200 milliGRAM(s) Oral three times a day  heparin   Injectable 5000 Unit(s) SubCutaneous every 8 hours  HYDROmorphone PCA (1 mG/mL) 30 milliLiter(s) PCA Continuous PCA Continuous  ketamine Infusion. 0.1 mG/kG/Hr (1.05 mL/Hr) IV Continuous <Continuous>  niCARdipine 30 milliGRAM(s) Oral every 8 hours  polyethylene glycol 3350 17 Gram(s) Oral two times a day  QUEtiapine 25 milliGRAM(s) Oral at bedtime  senna 2 Tablet(s) Oral at bedtime  sevelamer carbonate 800 milliGRAM(s) Oral every 8 hours    MEDICATIONS  (PRN):  HYDROmorphone PCA (1 mG/mL) Rescue Clinician Bolus 1 milliGRAM(s) IV Push every 15 minutes PRN for Pain Scale GREATER THAN 6  naloxone Injectable 0.1 milliGRAM(s) IV Push every 3 minutes PRN For ANY of the following changes in patient status:  A. RR LESS THAN 10 breaths per minute, B. Oxygen saturation LESS THAN 90%, C. Sedation score of 6  ondansetron Injectable 4 milliGRAM(s) IV Push every 6 hours PRN Nausea  sodium chloride 0.9% lock flush 10 milliLiter(s) IV Push every 1 hour PRN Pre/post blood products, medications, blood draw, and to maintain line patency  tiZANidine 1 milliGRAM(s) Oral every 6 hours PRN muscle spasm and pain      OBJECTIVE:   [X] No new signs     [ ] Other:    Side Effects:  [X ] None			[ ] Other:    Assessment of Catheter Site:		[ ] Intact		[ ] Other:    ASSESSMENT/PLAN  [ X] Continue current therapy    [ ] Therapy changed to:    [ ] IV PCA       [ ] Epidural     [ ] prn Analgesics     Comments:

## 2020-06-20 NOTE — PROGRESS NOTE ADULT - ASSESSMENT
29M with PMH of bipolar depression transferred from [facility] for further management of acute renal failure due to rhabdomyolysis, Taken to the OR for LLE compartment syndrome. Now s/p four compartment fasciotomy of the left lower leg 6/11. s/p closure with plastics on 6/19 .    Plan  - pt stable from vascular surgery prespective  - pain control PRN  - diet as tolerated  - c/w vac dressings  - continue HD as per Renal - Permacath when afebrile   - DVT ppx   - OOB to chair as tolerated, PT consult for mobilization  - Appreciate SICU care  will follow    C Team Surgery  [id number]

## 2020-06-20 NOTE — PROGRESS NOTE ADULT - SUBJECTIVE AND OBJECTIVE BOX
Anesthesia Pain Management Service    SUBJECTIVE: Patient states his pain is managed well with IV Dilaudid PCA.   Pain Scale Score	At rest: ___8/10 	With Activity: ___ 	[X ] Refer to charted pain scores    THERAPY:    [ ] IV PCA Morphine		[ ] 5 mg/mL	[ ] 1 mg/mL  [X ] IV PCA Hydromorphone	[ ] 5 mg/mL	[X ] 1 mg/mL  [ ] IV PCA Fentanyl		[ ] 50 micrograms/mL    Demand dose __0.4_ lockout __6_ (minutes) Continuous Rate _0__ Total: __24.6_  mg used (in past 24 hours)      MEDICATIONS  (STANDING):  acetaminophen   Tablet .. 650 milliGRAM(s) Oral every 6 hours  alteplase for catheter clearance 2 milliGRAM(s) Catheter once  alteplase for catheter clearance 2 milliGRAM(s) Catheter once  chlorhexidine 4% Liquid 1 Application(s) Topical <User Schedule>  dexMEDEtomidine Infusion 0.2 MICROgram(s)/kG/Hr (5.24 mL/Hr) IV Continuous <Continuous>  gabapentin 200 milliGRAM(s) Oral three times a day  heparin   Injectable 5000 Unit(s) SubCutaneous every 8 hours  HYDROmorphone PCA (1 mG/mL) 30 milliLiter(s) PCA Continuous PCA Continuous  ketamine Infusion. 0.1 mG/kG/Hr (1.05 mL/Hr) IV Continuous <Continuous>  niCARdipine 30 milliGRAM(s) Oral every 8 hours  polyethylene glycol 3350 17 Gram(s) Oral two times a day  QUEtiapine 25 milliGRAM(s) Oral at bedtime  senna 2 Tablet(s) Oral at bedtime  sevelamer carbonate 800 milliGRAM(s) Oral every 8 hours    MEDICATIONS  (PRN):  HYDROmorphone PCA (1 mG/mL) Rescue Clinician Bolus 1 milliGRAM(s) IV Push every 15 minutes PRN for Pain Scale GREATER THAN 6  naloxone Injectable 0.1 milliGRAM(s) IV Push every 3 minutes PRN For ANY of the following changes in patient status:  A. RR LESS THAN 10 breaths per minute, B. Oxygen saturation LESS THAN 90%, C. Sedation score of 6  ondansetron Injectable 4 milliGRAM(s) IV Push every 6 hours PRN Nausea  sodium chloride 0.9% lock flush 10 milliLiter(s) IV Push every 1 hour PRN Pre/post blood products, medications, blood draw, and to maintain line patency  tiZANidine 1 milliGRAM(s) Oral every 6 hours PRN muscle spasm and pain      OBJECTIVE: Patient sitting up in bed. Provena vacs on left leg.    Sedation Score:	[ X] Alert	[ ] Drowsy 	[ ] Arousable	[ ] Asleep	[ ] Unresponsive    Side Effects:	[X ] None	[ ] Nausea	[ ] Vomiting	[ ] Pruritus  		[ ] Other:    Vital Signs Last 24 Hrs  T(C): 37.7 (20 Jun 2020 08:00), Max: 38.6 (19 Jun 2020 23:42)  T(F): 99.9 (20 Jun 2020 08:00), Max: 101.5 (19 Jun 2020 23:42)  HR: 107 (20 Jun 2020 09:00) (78 - 118)  BP: --  BP(mean): --  RR: 17 (20 Jun 2020 09:00) (4 - 19)  SpO2: 97% (20 Jun 2020 09:00) (94% - 100%)    ASSESSMENT/ PLAN    Therapy to  be:	[ X] Continue   [ ] Discontinued   [ ] Change to prn Analgesics    Documentation and Verification of current medications:   [X] Done	[ ] Not done, not elligible    Comments: Recommend non-opioid adjuvant analgesics to be used when possible and when allowed by primary surgical team.    Progress Note written now but Patient was seen earlier. Anesthesia Pain Management Service    SUBJECTIVE: Patient states his pain is managed well with IV Dilaudid PCA.   Pain Scale Score	At rest: ___8/10 	With Activity: ___ 	[X ] Refer to charted pain scores    THERAPY:    [ ] IV PCA Morphine		[ ] 5 mg/mL	[ ] 1 mg/mL  [X ] IV PCA Hydromorphone	[ ] 5 mg/mL	[X ] 1 mg/mL  [ ] IV PCA Fentanyl		[ ] 50 micrograms/mL    Demand dose __0.4_ lockout __6_ (minutes) Continuous Rate _0__ Total: __24.6_  mg used (in past 24 hours)      MEDICATIONS  (STANDING):  acetaminophen   Tablet .. 650 milliGRAM(s) Oral every 6 hours  alteplase for catheter clearance 2 milliGRAM(s) Catheter once  alteplase for catheter clearance 2 milliGRAM(s) Catheter once  chlorhexidine 4% Liquid 1 Application(s) Topical <User Schedule>  dexMEDEtomidine Infusion 0.2 MICROgram(s)/kG/Hr (5.24 mL/Hr) IV Continuous <Continuous>  gabapentin 200 milliGRAM(s) Oral three times a day  heparin   Injectable 5000 Unit(s) SubCutaneous every 8 hours  HYDROmorphone PCA (1 mG/mL) 30 milliLiter(s) PCA Continuous PCA Continuous  ketamine Infusion. 0.1 mG/kG/Hr (1.05 mL/Hr) IV Continuous <Continuous>  niCARdipine 30 milliGRAM(s) Oral every 8 hours  polyethylene glycol 3350 17 Gram(s) Oral two times a day  QUEtiapine 25 milliGRAM(s) Oral at bedtime  senna 2 Tablet(s) Oral at bedtime  sevelamer carbonate 800 milliGRAM(s) Oral every 8 hours    MEDICATIONS  (PRN):  HYDROmorphone PCA (1 mG/mL) Rescue Clinician Bolus 1 milliGRAM(s) IV Push every 15 minutes PRN for Pain Scale GREATER THAN 6  naloxone Injectable 0.1 milliGRAM(s) IV Push every 3 minutes PRN For ANY of the following changes in patient status:  A. RR LESS THAN 10 breaths per minute, B. Oxygen saturation LESS THAN 90%, C. Sedation score of 6  ondansetron Injectable 4 milliGRAM(s) IV Push every 6 hours PRN Nausea  sodium chloride 0.9% lock flush 10 milliLiter(s) IV Push every 1 hour PRN Pre/post blood products, medications, blood draw, and to maintain line patency  tiZANidine 1 milliGRAM(s) Oral every 6 hours PRN muscle spasm and pain      OBJECTIVE: Patient sitting up in bed. Provena vacs on left leg.    Sedation Score:	[ X] Alert	[ ] Drowsy 	[ ] Arousable	[ ] Asleep	[ ] Unresponsive    Side Effects:	[X ] None	[ ] Nausea	[ ] Vomiting	[ ] Pruritus  		[ ] Other:    Vital Signs Last 24 Hrs  T(C): 37.7 (20 Jun 2020 08:00), Max: 38.6 (19 Jun 2020 23:42)  T(F): 99.9 (20 Jun 2020 08:00), Max: 101.5 (19 Jun 2020 23:42)  HR: 107 (20 Jun 2020 09:00) (78 - 118)  BP: --  BP(mean): --  RR: 17 (20 Jun 2020 09:00) (4 - 19)  SpO2: 97% (20 Jun 2020 09:00) (94% - 100%)    ASSESSMENT/ PLAN    Therapy to  be:	[ X] Continue   [ ] Discontinued   [ ] Change to prn Analgesics    Documentation and Verification of current medications:   [X] Done	[ ] Not done, not elligible    Comments: Discussed patient with Anesthesia attendings and ok to discontinue IV Ketamine infusion and start patient on PO Methadone 5mg BID. IV Dilaudid PCA lock out increased to every 8 minutes. Plan is to transition patient off IV PCA tomorrow to oral pain medications with IV breakthrough tomorrow. Recommend Psych follow up when patient off Precedex for reordering PO Xanax and any other Psych medications needed. May consult chronic pain Monday if further recs needed. Definitely recommending patient be followed with chronic pain MD/Rehab/ [facility]AEH[facility] clinic  as recommended by Psych upon discharge. Discussed patient with SICU team and they are made aware. Non-opioid adjuvant analgesics to be used when possible and when allowed by primary surgical team.

## 2020-06-20 NOTE — PROGRESS NOTE ADULT - ATTENDING COMMENTS
The patient was seen and examined, chart and notes reviewed.  The current diagnosis, plan of care and alternatives have been discussed with the patient.  All questions have been answered and updates have been discussed.  The case was discussed with SICU team residents/PA's and medical students at SICU rounds and throughout the course of the day.    Acute kidney Injury secondary to rhabdomyolysis  a.  On IHD this am  b.  Remains oliguric, suggest lasix 80mg    Acute posthemorrhagic anemia  a. s/p 1 unit pRBC  b.  Hct response adequate, transfuse prn    Acute on chronic pain  a.  Continue dilaudid PCA  b.  Suggest methadone, discuss with pain service  c.  Wean ketamine, precedex gtt as tolerated    Hypertension  a.  On nicardipine  b.  Discontinue arterial line    At risk for malnutrition  a.  Diet as tolerated

## 2020-06-20 NOTE — PROGRESS NOTE ADULT - ATTENDING COMMENTS
Patient examined and ROS reviewed. A case of ANTOINETTE secondary to rhabdomyolysis (s/p compartment syndrome in the setting cocaine ingestion) continues to be uremic and fluid overloaded. ANTOINETTE is a consequence of ATN. Advised HD today.

## 2020-06-20 NOTE — PROGRESS NOTE ADULT - ASSESSMENT
29M w/ PMH of bipolar depression, s/p found down at home, transferred from [facility] for further management of acute renal failure due to rhabdomyolysis, s/p four compartment fasciotomy and lateral/medial thigh fasciotomies with ANTOINETTE on HD. Now s/p fasciotomy closure POD 1.    Neuro hx of bipolar depression  - A&O x 3  - Pain Control: dilaudid PCA, precedex gtt, ketamine gtt  - Xanax 1 mg q8 PRN   - Seroquel QHS  - Increased gabapentin, standing tizanidine per Pain service  - Psych following, no plan to restart home lithium  - Pain following, appreciate recommendations    Resp  - RA   - continue HD as needed for fluid overload  - Mobilize, OOB to chair    CVS  - Appropriate mental status, appears well perfused   - c/w nicardipine 30 mg PO q8 hrs   - if HTN again, rec'd clonidine .1mg by addiction psych  - cleviprex infusion off    GI  - Regular diet with fluid restriction 1L for hyponatremia   - Bowel regimen senna 2 tabs qHS  - Patient declining miralax, last BM 06/17 prior to that > 1w      - ANTOINETTE in setting of rhabdomyolysis  - CK down to <2000  - Remains hypervolemic, oliguric but UOP on an upward trend   - IV locked  - Hypervolemic hyponatremia, R fem Shiley  - Plan for permacath once afebrile  - Nephrology following    Heme  - stable CBC  - SQH for dvt ppx    ID  - Febrile to TMax 101.5, Ancef restarted  - Leukocytosis of 21  - Continue to monitor fever curve    Endo  - monitor FGS    Lines  - Rfem HD catheter  - Right radial a line 29M w/ PMH of bipolar depression, s/p found down at home, transferred from [facility] for further management of acute renal failure due to rhabdomyolysis, s/p four compartment fasciotomy and lateral/medial thigh fasciotomies with ANTOINETTE on HD. Now s/p fasciotomy closure POD 1.    Neuro hx of bipolar depression  - A&O x 3  - Pain Control: dilaudid PCA, precedex gtt, ketamine gtt  - Xanax 1 mg q8 PRN   - Seroquel QHS  - Increased gabapentin, standing tizanidine per Pain service  - Psych following, no plan to restart home lithium  - Pain following, appreciate recommendations    Resp  - RA   - continue HD as needed for fluid overload  - Mobilize, OOB to chair    CVS  - Appropriate mental status, appears well perfused   - c/w nicardipine 30 mg PO q8 hrs   - if HTN again, rec'd clonidine .1mg by addiction psych  - cleviprex infusion off    GI  - Regular diet with fluid restriction 1L for hyponatremia   - Bowel regimen senna 2 tabs qHS  - Patient declining miralax, last BM 06/17 prior to that > 1w      - ANTOINETTE in setting of rhabdomyolysis  - CK down to <2000  - Remains hypervolemic, oliguric but UOP on an upward trend   - IV locked  - Hypervolemic hyponatremia, R fem Shiley  - Plan for permacath once afebrile  - Nephrology following    Heme  - s/p 1upRBC for H/H 7.0/20.7, responded to 9.9/29.8  - Repeat 7.7/22 overnight, f/u repeat.  - SQH for dvt ppx    ID  - Febrile to TMax 101.5, Ancef restarted  - Leukocytosis of 21  - Continue to monitor fever curve    Endo  - monitor FGS    Lines  - Rfem HD catheter  - Right radial a line 29M w/ PMH of bipolar depression, s/p found down at home, transferred from [facility] for further management of acute renal failure due to rhabdomyolysis, s/p four compartment fasciotomy and lateral/medial thigh fasciotomies with ANTOINETTE on HD. Now s/p fasciotomy closure POD 1.    Neuro hx of bipolar depression  - A&O x 3  - Pain Control: dilaudid PCA, precedex gtt, ketamine gtt  - Pain consult to remove ketamine gtt  - Xanax 1 mg q8 PRN   - Seroquel QHS  - Increased gabapentin, standing tizanidine per Pain service  - Psych following, no plan to restart home lithium  - Pain following, appreciate recommendations    Resp  - RA   - Mobilize, OOB to chair  - Obtain Chest XR    CVS  - Appropriate mental status, appears well perfused   - c/w nicardipine 30 mg PO q8 hrs   - if HTN again, rec'd clonidine .1mg by addiction psych    GI  - Regular diet with fluid restriction 1L for hyponatremia   - Bowel regimen senna 2 tabs qHS  - Patient declining miralax, last BM 06/17 prior to that > 1w      - ANTOINETTE in setting of rhabdomyolysis  - CK down to <2000  - Remains hypervolemic, oliguric but UOP on an upward trend   - IV locked  - Hypervolemic hyponatremia, R fem Shibrittney, remove today after HD  - Plan for permacath once afebrile  - Nephrology following    Heme  - s/p 1upRBC for H/H 7.0/20.7  - Repeat 7.7/22 overnight, f/u repeat.  - SQH for dvt ppx    ID  - Febrile to TMax 101.5  - Leukocytosis of 21  - Continue to monitor fever curve  - Obtain blood cultures    Endo  - monitor FGS    Lines  - Rfem HD catheter  - Right radial a line

## 2020-06-20 NOTE — PROGRESS NOTE ADULT - PROBLEM SELECTOR PLAN 1
Pt with ANTOINETTE in the setting of rhabdomyolysis. Upon lab review, Scr was 1.11 on 8/23/2017. Scr at [facility] this AM was 3.06. Scr today is 6.27. Pt with oliguric ANTOINETTE secondary to rhabdomyolysis. Labs reviewed, will arrange for HD with [facility] again today. Pt. awaiting tunneled catheter placement by IR team. Will assess daily for HD needs. Monitor labs and urine output. Avoid potential nephrotoxins.

## 2020-06-20 NOTE — PROGRESS NOTE ADULT - SUBJECTIVE AND OBJECTIVE BOX
SICU Daily Progress Note  =====================================================  Interval/Overnight Events:     - Patient RTOR for LLE fasciotomy closure x4, with JPx5 and preveena vacs in place  - PCA added for pain control  - Tolerated HD net -3L  - Febrile to TMax 101.5, on Ancef    POD #   9/1       	SICU Day #  9    HPI:  29M with PMH of bipolar depression transferred from [facility] for further management of acute renal failure due to rhabdomyolysis. Pt was found unresponsive at home, lying on his L side. He states he has not been taking his Lithium for at least 2 wks. At the OSH, he was treated for hyperkalemia. He received D5, insulin, calcium gluconate, Kayexalate Last BM was on 6/9. For L leg pain he was given lidocaine patch, dilaudid 1mg prn and tramadol 50mg. Hydralazine 20mg given for HTN. From 1 to 7 am only 100cc of UOP. Urine was tea colored. 500cc bolus of NS was given. CT cervical spine at OSH was (-). CT Head (-). CTAP and CXR were (-). CK on admission there was 177,000.     Pt was seen by surgery team in MICU for LUE swelling and compartment syndrome was ruled out. Surgery team was re-consulted today due to new symptoms of L foot drop and numbness. Pt was found to be highly suspicious for compartment syndrome in the upper thigh, and was emergently taken to OR.     Pt is now s/p four compartment fasciotomy of LLE, and left lateral and medial thigh fasciotomies. SICU consulted for q1h neurovascular checks and hemodynamic monitoring    Allergies: No Known Allergies      MEDICATIONS:   --------------------------------------------------------------------------------------  Neurologic Medications  acetaminophen   Tablet .. 650 milliGRAM(s) Oral every 6 hours  ALPRAZolam 1 milliGRAM(s) Oral three times a day PRN Anxiety  dexMEDEtomidine Infusion 0.2 MICROgram(s)/kG/Hr IV Continuous <Continuous>  gabapentin 100 milliGRAM(s) Oral three times a day  HYDROmorphone PCA (1 mG/mL) 30 milliLiter(s) PCA Continuous PCA Continuous  HYDROmorphone PCA (1 mG/mL) Rescue Clinician Bolus 1 milliGRAM(s) IV Push every 15 minutes PRN for Pain Scale GREATER THAN 6  ketamine Infusion. 0.1 mG/kG/Hr IV Continuous <Continuous>  ondansetron Injectable 4 milliGRAM(s) IV Push every 6 hours PRN Nausea  QUEtiapine 25 milliGRAM(s) Oral at bedtime  tiZANidine 1 milliGRAM(s) Oral every 6 hours PRN muscle spasm and pain    Respiratory Medications    Cardiovascular Medications  niCARdipine 30 milliGRAM(s) Oral every 8 hours    Gastrointestinal Medications  polyethylene glycol 3350 17 Gram(s) Oral two times a day  senna 2 Tablet(s) Oral at bedtime  sodium chloride 0.9% lock flush 10 milliLiter(s) IV Push every 1 hour PRN Pre/post blood products, medications, blood draw, and to maintain line patency    Genitourinary Medications    Hematologic/Oncologic Medications  heparin   Injectable 5000 Unit(s) SubCutaneous every 8 hours    Antimicrobial/Immunologic Medications  ceFAZolin   IVPB 1000 milliGRAM(s) IV Intermittent every 24 hours    Endocrine/Metabolic Medications    Topical/Other Medications  chlorhexidine 4% Liquid 1 Application(s) Topical <User Schedule>  naloxone Injectable 0.1 milliGRAM(s) IV Push every 3 minutes PRN For ANY of the following changes in patient status:  A. RR LESS THAN 10 breaths per minute, B. Oxygen saturation LESS THAN 90%, C. Sedation score of 6  sevelamer carbonate 800 milliGRAM(s) Oral every 8 hours  sodium zirconium cyclosilicate 10 Gram(s) Oral once    --------------------------------------------------------------------------------------  VITAL SIGNS, INS/OUTS (last 24 hours):  --------------------------------------------------------------------------------------  T(C): 38.1 (06-20-20 @ 01:00), Max: 38.6 (06-19-20 @ 23:42)  HR: 110 (06-20-20 @ 01:00) (78 - 118)  BP: 119/57 (06-19-20 @ 04:16) (119/57 - 119/57)  BP(mean): --  ABP: 131/69 (06-20-20 @ 01:00) (101/77 - 191/89)  ABP(mean): 89 (06-20-20 @ 01:00) (85 - 120)  RR: 14 (06-20-20 @ 01:00) (4 - 16)  SpO2: 94% (06-20-20 @ 01:00) (94% - 100%)  Wt(kg): --  CVP(mm Hg): --  CI: --  CAPILLARY BLOOD GLUCOSE       N/A      06-18 @ 07:01  -  06-19 @ 07:00  --------------------------------------------------------  IN:    dexmedetomidine Infusion: 156 mL    IV PiggyBack: 50 mL    ketamine Infusion.: 23 mL    Oral Fluid: 720 mL    Other: 500 mL  Total IN: 1449 mL    OUT:    Other: 2900 mL    VAC (Vacuum Assisted Closure) System: 350 mL    VAC (Vacuum Assisted Closure) System: 400 mL    Voided: 435 mL  Total OUT: 4085 mL    Total NET: -2636 mL      06-19 @ 07:01  -  06-20 @ 01:56  --------------------------------------------------------  IN:    dexmedetomidine Infusion: 198.2 mL    ketamine Infusion.: 24.1 mL    Oral Fluid: 1054 mL    Other: 400 mL  Total IN: 1676.3 mL    OUT:    Drain: 30 mL    Drain: 50 mL    Drain: 20 mL    Drain: 90 mL    Drain: 40 mL    Other: 3400 mL    Voided: 300 mL  Total OUT: 3930 mL    Total NET: -2253.7 mL        --------------------------------------------------------------------------------------    EXAM  NEUROLOGY    Exam: Normal, NAD, alert, oriented x3, no focal deficits.     HEENT  Exam: Normocephalic, atraumatic, EOMI.     RESPIRATORY  Exam: Lungs clear to auscultation, Normal expansion/effort.    CARDIOVASCULAR  Exam: S1, S2.  Regular rate and rhythm.     GI/NUTRITION  Exam: Abdomen soft, Non-tender, Non-distended.   Current Diet: Regular    EXTREMITIES:  Exam: Extremities warm, pink, well-perfused. 2+ DP pulses palpable b/l. LLE with ACE dressing C/D/I, preveena x 2 in place with good suction. MEÑO x5 with SS output.    MUSCULOSKELETAL  Exam: All extremities moving spontaneously without limitations.     SKIN  Exam: Good skin turgor, no skin breakdown.     METABOLIC/FLUIDS/ELECTROLYTES      HEMATOLOGIC  [x] VTE Prophylaxis: heparin   Injectable 5000 Unit(s) SubCutaneous every 8 hours    Transfusions:	[] PRBC	[] Platelets		[] FFP	[] Cryoprecipitate    INFECTIOUS DISEASE  Antimicrobials/Immunologic Medications:  ceFAZolin   IVPB 1000 milliGRAM(s) IV Intermittent every 24 hours    Day #   1   of  Ancef    Tubes/Lines/Drains    [x] Peripheral IV  [] Central Venous Line     	[] R	[] L	[] IJ	[] Fem	[] SC	Date Placed:   [] Arterial Line		[] R	[] L	[] Fem	[] Rad	[] Ax	Date Placed:   [] PICC		[] Midline		[] Mediport  [] Urinary Catheter		Date Placed:   [x] Necessity of urinary, arterial, and venous catheters discussed    LABS  --------------------------------------------------------------------------------------                        7.7    21.31 )-----------( 296      ( 20 Jun 2020 00:26 )             22.6   06-20    130<L>  |  94<L>  |  63<H>  ----------------------------<  126<H>  5.5<H>   |  24  |  5.65<H>    Ca    7.5<L>      20 Jun 2020 00:26  Phos  5.9     06-20  Mg     2.1     06-20    TPro  4.2<L>  /  Alb  2.0<L>  /  TBili  0.3  /  DBili  x   /  AST  40  /  ALT  15  /  AlkPhos  77  06-19    -------------------------------------------------------------------------------------- SICU Daily Progress Note  =====================================================  Interval/Overnight Events:     - Patient RTOR for LLE fasciotomy closure x4, with JPx5 and preveena vacs in place  - 1uPRBC given for H/H 7.0/20.7 responded to 9.9/29.8.   - PCA added for pain control  - Tolerated HD net -3L  - Febrile to TMax 101.5, on Ancef  - Repeat 7.7/22 overnight. JPs collectively ~230 in output, SS in quality. Dressings clean, dry. Pt mildly tachy to 100s, normotensive.    POD #   9/1       	SICU Day #  9    HPI:  29M with PMH of bipolar depression transferred from [facility] for further management of acute renal failure due to rhabdomyolysis. Pt was found unresponsive at home, lying on his L side. He states he has not been taking his Lithium for at least 2 wks. At the OSH, he was treated for hyperkalemia. He received D5, insulin, calcium gluconate, Kayexalate Last BM was on 6/9. For L leg pain he was given lidocaine patch, dilaudid 1mg prn and tramadol 50mg. Hydralazine 20mg given for HTN. From 1 to 7 am only 100cc of UOP. Urine was tea colored. 500cc bolus of NS was given. CT cervical spine at OSH was (-). CT Head (-). CTAP and CXR were (-). CK on admission there was 177,000.     Pt was seen by surgery team in MICU for LUE swelling and compartment syndrome was ruled out. Surgery team was re-consulted today due to new symptoms of L foot drop and numbness. Pt was found to be highly suspicious for compartment syndrome in the upper thigh, and was emergently taken to OR.     Pt is now s/p four compartment fasciotomy of LLE, and left lateral and medial thigh fasciotomies. SICU consulted for q1h neurovascular checks and hemodynamic monitoring    Allergies: No Known Allergies      MEDICATIONS:   --------------------------------------------------------------------------------------  Neurologic Medications  acetaminophen   Tablet .. 650 milliGRAM(s) Oral every 6 hours  ALPRAZolam 1 milliGRAM(s) Oral three times a day PRN Anxiety  dexMEDEtomidine Infusion 0.2 MICROgram(s)/kG/Hr IV Continuous <Continuous>  gabapentin 100 milliGRAM(s) Oral three times a day  HYDROmorphone PCA (1 mG/mL) 30 milliLiter(s) PCA Continuous PCA Continuous  HYDROmorphone PCA (1 mG/mL) Rescue Clinician Bolus 1 milliGRAM(s) IV Push every 15 minutes PRN for Pain Scale GREATER THAN 6  ketamine Infusion. 0.1 mG/kG/Hr IV Continuous <Continuous>  ondansetron Injectable 4 milliGRAM(s) IV Push every 6 hours PRN Nausea  QUEtiapine 25 milliGRAM(s) Oral at bedtime  tiZANidine 1 milliGRAM(s) Oral every 6 hours PRN muscle spasm and pain    Respiratory Medications    Cardiovascular Medications  niCARdipine 30 milliGRAM(s) Oral every 8 hours    Gastrointestinal Medications  polyethylene glycol 3350 17 Gram(s) Oral two times a day  senna 2 Tablet(s) Oral at bedtime  sodium chloride 0.9% lock flush 10 milliLiter(s) IV Push every 1 hour PRN Pre/post blood products, medications, blood draw, and to maintain line patency    Genitourinary Medications    Hematologic/Oncologic Medications  heparin   Injectable 5000 Unit(s) SubCutaneous every 8 hours    Antimicrobial/Immunologic Medications  ceFAZolin   IVPB 1000 milliGRAM(s) IV Intermittent every 24 hours    Endocrine/Metabolic Medications    Topical/Other Medications  chlorhexidine 4% Liquid 1 Application(s) Topical <User Schedule>  naloxone Injectable 0.1 milliGRAM(s) IV Push every 3 minutes PRN For ANY of the following changes in patient status:  A. RR LESS THAN 10 breaths per minute, B. Oxygen saturation LESS THAN 90%, C. Sedation score of 6  sevelamer carbonate 800 milliGRAM(s) Oral every 8 hours  sodium zirconium cyclosilicate 10 Gram(s) Oral once    --------------------------------------------------------------------------------------  VITAL SIGNS, INS/OUTS (last 24 hours):  --------------------------------------------------------------------------------------  T(C): 38.1 (06-20-20 @ 01:00), Max: 38.6 (06-19-20 @ 23:42)  HR: 110 (06-20-20 @ 01:00) (78 - 118)  BP: 119/57 (06-19-20 @ 04:16) (119/57 - 119/57)  BP(mean): --  ABP: 131/69 (06-20-20 @ 01:00) (101/77 - 191/89)  ABP(mean): 89 (06-20-20 @ 01:00) (85 - 120)  RR: 14 (06-20-20 @ 01:00) (4 - 16)  SpO2: 94% (06-20-20 @ 01:00) (94% - 100%)  Wt(kg): --  CVP(mm Hg): --  CI: --  CAPILLARY BLOOD GLUCOSE       N/A      06-18 @ 07:01  -  06-19 @ 07:00  --------------------------------------------------------  IN:    dexmedetomidine Infusion: 156 mL    IV PiggyBack: 50 mL    ketamine Infusion.: 23 mL    Oral Fluid: 720 mL    Other: 500 mL  Total IN: 1449 mL    OUT:    Other: 2900 mL    VAC (Vacuum Assisted Closure) System: 350 mL    VAC (Vacuum Assisted Closure) System: 400 mL    Voided: 435 mL  Total OUT: 4085 mL    Total NET: -2636 mL      06-19 @ 07:01  - 06-20 @ 01:56  --------------------------------------------------------  IN:    dexmedetomidine Infusion: 198.2 mL    ketamine Infusion.: 24.1 mL    Oral Fluid: 1054 mL    Other: 400 mL  Total IN: 1676.3 mL    OUT:    Drain: 30 mL    Drain: 50 mL    Drain: 20 mL    Drain: 90 mL    Drain: 40 mL    Other: 3400 mL    Voided: 300 mL  Total OUT: 3930 mL    Total NET: -2253.7 mL        --------------------------------------------------------------------------------------    EXAM  NEUROLOGY    Exam: Normal, NAD, alert, oriented x3, no focal deficits.     HEENT  Exam: Normocephalic, atraumatic, EOMI.     RESPIRATORY  Exam: Lungs clear to auscultation, Normal expansion/effort.    CARDIOVASCULAR  Exam: S1, S2.  Regular rate and rhythm.     GI/NUTRITION  Exam: Abdomen soft, Non-tender, Non-distended.   Current Diet: Regular    EXTREMITIES:  Exam: Extremities warm, pink, well-perfused. 2+ DP pulses palpable b/l. LLE with ACE dressing C/D/I, preveena x 2 in place with good suction. MEÑO x5 with SS output.    MUSCULOSKELETAL  Exam: All extremities moving spontaneously without limitations.     SKIN  Exam: Good skin turgor, no skin breakdown.     METABOLIC/FLUIDS/ELECTROLYTES      HEMATOLOGIC  [x] VTE Prophylaxis: heparin   Injectable 5000 Unit(s) SubCutaneous every 8 hours    Transfusions:	[] PRBC	[] Platelets		[] FFP	[] Cryoprecipitate    INFECTIOUS DISEASE  Antimicrobials/Immunologic Medications:  ceFAZolin   IVPB 1000 milliGRAM(s) IV Intermittent every 24 hours    Day #   1   of  Ancef    Tubes/Lines/Drains    [x] Peripheral IV  [] Central Venous Line     	[] R	[] L	[] IJ	[] Fem	[] SC	Date Placed:   [] Arterial Line		[] R	[] L	[] Fem	[] Rad	[] Ax	Date Placed:   [] PICC		[] Midline		[] Mediport  [] Urinary Catheter		Date Placed:   [x] Necessity of urinary, arterial, and venous catheters discussed    LABS  --------------------------------------------------------------------------------------                        7.7    21.31 )-----------( 296      ( 20 Jun 2020 00:26 )             22.6   06-20    130<L>  |  94<L>  |  63<H>  ----------------------------<  126<H>  5.5<H>   |  24  |  5.65<H>    Ca    7.5<L>      20 Jun 2020 00:26  Phos  5.9     06-20  Mg     2.1     06-20    TPro  4.2<L>  /  Alb  2.0<L>  /  TBili  0.3  /  DBili  x   /  AST  40  /  ALT  15  /  AlkPhos  77  06-19    --------------------------------------------------------------------------------------

## 2020-06-20 NOTE — PROGRESS NOTE ADULT - SUBJECTIVE AND OBJECTIVE BOX
WHITLEY HELD  [id number]    Subjective:    Patient seen and examined in SICU. Incisional vac in place holding suction.   MEÑO SS. Febrile overnight, pain well controlled.        Objective:  T(C): 37.7 (06-20-20 @ 08:00), Max: 38.6 (06-19-20 @ 23:42)  HR: 109 (06-20-20 @ 08:00) (78 - 118)  BP: --  RR: 11 (06-20-20 @ 08:00) (4 - 19)  SpO2: 95% (06-20-20 @ 08:00) (94% - 100%)  Wt(kg): --   06-20    131<L>  |  93<L>  |  69<H>  ----------------------------<  113<H>  5.3   |  24  |  6.27<H>    Ca    7.6<L>      20 Jun 2020 07:43  Phos  6.5     06-20  Mg     2.1     06-20    TPro  4.2<L>  /  Alb  2.0<L>  /  TBili  0.3  /  DBili  x   /  AST  40  /  ALT  15  /  AlkPhos  77  06-19                        7.4    20.78 )-----------( 309      ( 20 Jun 2020 05:30 )             22.0       06-19 @ 07:01  -  06-20 @ 07:00  --------------------------------------------------------  IN: 1823.3 mL / OUT: 4185 mL / NET: -2361.7 mL    06-20 @ 07:01  -  06-20 @ 09:16  --------------------------------------------------------  IN: 339 mL / OUT: 85 mL / NET: 254 mL      PHYSICAL EXAM:    General:            MEDICATIONS  (STANDING):  acetaminophen   Tablet .. 650 milliGRAM(s) Oral every 6 hours  chlorhexidine 4% Liquid 1 Application(s) Topical <User Schedule>  dexMEDEtomidine Infusion 0.2 MICROgram(s)/kG/Hr (5.24 mL/Hr) IV Continuous <Continuous>  gabapentin 200 milliGRAM(s) Oral three times a day  heparin   Injectable 5000 Unit(s) SubCutaneous every 8 hours  HYDROmorphone PCA (1 mG/mL) 30 milliLiter(s) PCA Continuous PCA Continuous  ketamine Infusion. 0.1 mG/kG/Hr (1.05 mL/Hr) IV Continuous <Continuous>  niCARdipine 30 milliGRAM(s) Oral every 8 hours  polyethylene glycol 3350 17 Gram(s) Oral two times a day  QUEtiapine 25 milliGRAM(s) Oral at bedtime  senna 2 Tablet(s) Oral at bedtime  sevelamer carbonate 800 milliGRAM(s) Oral every 8 hours    MEDICATIONS  (PRN):  HYDROmorphone PCA (1 mG/mL) Rescue Clinician Bolus 1 milliGRAM(s) IV Push every 15 minutes PRN for Pain Scale GREATER THAN 6  naloxone Injectable 0.1 milliGRAM(s) IV Push every 3 minutes PRN For ANY of the following changes in patient status:  A. RR LESS THAN 10 breaths per minute, B. Oxygen saturation LESS THAN 90%, C. Sedation score of 6  ondansetron Injectable 4 milliGRAM(s) IV Push every 6 hours PRN Nausea  sodium chloride 0.9% lock flush 10 milliLiter(s) IV Push every 1 hour PRN Pre/post blood products, medications, blood draw, and to maintain line patency  tiZANidine 1 milliGRAM(s) Oral every 6 hours PRN muscle spasm and pain WHITLEY HELD  [id number]    Subjective:    Patient seen and examined in SICU. Incisional vac in place holding suction.   MEÑO SS. Febrile overnight, pain well controlled.        Objective:  T(C): 37.7 (06-20-20 @ 08:00), Max: 38.6 (06-19-20 @ 23:42)  HR: 109 (06-20-20 @ 08:00) (78 - 118)  BP: --  RR: 11 (06-20-20 @ 08:00) (4 - 19)  SpO2: 95% (06-20-20 @ 08:00) (94% - 100%)  Wt(kg): --   06-20    131<L>  |  93<L>  |  69<H>  ----------------------------<  113<H>  5.3   |  24  |  6.27<H>    Ca    7.6<L>      20 Jun 2020 07:43  Phos  6.5     06-20  Mg     2.1     06-20    TPro  4.2<L>  /  Alb  2.0<L>  /  TBili  0.3  /  DBili  x   /  AST  40  /  ALT  15  /  AlkPhos  77  06-19                        7.4    20.78 )-----------( 309      ( 20 Jun 2020 05:30 )             22.0       06-19 @ 07:01  -  06-20 @ 07:00  --------------------------------------------------------  IN: 1823.3 mL / OUT: 4185 mL / NET: -2361.7 mL    06-20 @ 07:01  -  06-20 @ 09:16  --------------------------------------------------------  IN: 339 mL / OUT: 85 mL / NET: 254 mL      PHYSICAL EXAM:    General: NAD   LE: Prevena vacs in place and holding suction, LE with ace wrap in place no strikethrough. MEÑO SS.             MEDICATIONS  (STANDING):  acetaminophen   Tablet .. 650 milliGRAM(s) Oral every 6 hours  chlorhexidine 4% Liquid 1 Application(s) Topical <User Schedule>  dexMEDEtomidine Infusion 0.2 MICROgram(s)/kG/Hr (5.24 mL/Hr) IV Continuous <Continuous>  gabapentin 200 milliGRAM(s) Oral three times a day  heparin   Injectable 5000 Unit(s) SubCutaneous every 8 hours  HYDROmorphone PCA (1 mG/mL) 30 milliLiter(s) PCA Continuous PCA Continuous  ketamine Infusion. 0.1 mG/kG/Hr (1.05 mL/Hr) IV Continuous <Continuous>  niCARdipine 30 milliGRAM(s) Oral every 8 hours  polyethylene glycol 3350 17 Gram(s) Oral two times a day  QUEtiapine 25 milliGRAM(s) Oral at bedtime  senna 2 Tablet(s) Oral at bedtime  sevelamer carbonate 800 milliGRAM(s) Oral every 8 hours    MEDICATIONS  (PRN):  HYDROmorphone PCA (1 mG/mL) Rescue Clinician Bolus 1 milliGRAM(s) IV Push every 15 minutes PRN for Pain Scale GREATER THAN 6  naloxone Injectable 0.1 milliGRAM(s) IV Push every 3 minutes PRN For ANY of the following changes in patient status:  A. RR LESS THAN 10 breaths per minute, B. Oxygen saturation LESS THAN 90%, C. Sedation score of 6  ondansetron Injectable 4 milliGRAM(s) IV Push every 6 hours PRN Nausea  sodium chloride 0.9% lock flush 10 milliLiter(s) IV Push every 1 hour PRN Pre/post blood products, medications, blood draw, and to maintain line patency  tiZANidine 1 milliGRAM(s) Oral every 6 hours PRN muscle spasm and pain

## 2020-06-20 NOTE — PROGRESS NOTE ADULT - ASSESSMENT
A/P:     28 y/o M in SICU following found down, presenting with rhabdomyolysis/ compartment syndrome with fasciotomies to L LE X 4 now POD 1 from fasciotomy site closures:     - Continue Prevena wound vacs to incisions   - Continue MEÑO drains, monitor output   - Pain control   - LE elevated   - Management per SICU     Plastic Surgery   [id number] A/P:     30 y/o M in SICU following found down, presenting with rhabdomyolysis/ compartment syndrome with fasciotomies to L LE X 4 now POD 1 from fasciotomy site closures:     - Continue Prevena wound vacs to incisions, vacs will stay in place for approximately 10 days  - Continue MEÑO drains, monitor output   - Pain control   - LE elevated   - Management per SICU     Plastic Surgery   [id number]

## 2020-06-20 NOTE — PROGRESS NOTE ADULT - SUBJECTIVE AND OBJECTIVE BOX
Surgery Progress Note    S: Patient seen and examined. No acute events overnight. Reports tolerating diet without nausea, vomiting, passing flatus, having bowel movements, voiding without issues, have been ambulating and out of bed. Denies fever, chills, SOB, chest pain.     O:  Physical Exam:  General: NAD  Respiratory: no increased work of breathing   Abdomen: soft, nontender, nondistended  Extremities: Prevena vacs in place, LE ACE wrap in place no strikethrough. MEÑO SS  LLE gross motor intact from hip to toes, no signs of LLE arterial insufficiency   lle sensory deficit on left foot dorsum remains as stated above     Vital Signs Last 24 Hrs  T(C): 37.7 (20 Jun 2020 08:00), Max: 38.6 (19 Jun 2020 23:42)  T(F): 99.9 (20 Jun 2020 08:00), Max: 101.5 (19 Jun 2020 23:42)  HR: 109 (20 Jun 2020 08:00) (78 - 118)  BP: --  BP(mean): --  RR: 11 (20 Jun 2020 08:00) (4 - 19)  SpO2: 95% (20 Jun 2020 08:00) (94% - 100%)    I&O's Detail    19 Jun 2020 07:01  -  20 Jun 2020 07:00  --------------------------------------------------------  IN:    dexmedetomidine Infusion: 276.7 mL    ketamine Infusion.: 42.6 mL    Oral Fluid: 1104 mL    Other: 400 mL  Total IN: 1823.3 mL    OUT:    Drain: 110 mL    Drain: 50 mL    Drain: 40 mL    Drain: 80 mL    Drain: 30 mL    Other: 3400 mL    Voided: 475 mL  Total OUT: 4185 mL    Total NET: -2361.7 mL      20 Jun 2020 07:01  -  20 Jun 2020 09:45  --------------------------------------------------------  IN:    dexmedetomidine Infusion: 31.6 mL    IV PiggyBack: 100 mL    ketamine Infusion.: 7.4 mL    Oral Fluid: 200 mL  Total IN: 339 mL    OUT:    Drain: 10 mL    Drain: 15 mL    Drain: 15 mL    Drain: 20 mL    Drain: 25 mL  Total OUT: 85 mL    Total NET: 254 mL                                7.4    20.78 )-----------( 309      ( 20 Jun 2020 05:30 )             22.0       06-20    131<L>  |  93<L>  |  69<H>  ----------------------------<  113<H>  5.3   |  24  |  6.27<H>    Ca    7.6<L>      20 Jun 2020 07:43  Phos  6.5     06-20  Mg     2.1     06-20    TPro  4.2<L>  /  Alb  2.0<L>  /  TBili  0.3  /  DBili  x   /  AST  40  /  ALT  15  /  AlkPhos  77  06-19

## 2020-06-21 LAB
ANION GAP SERPL CALC-SCNC: 14 MMO/L — SIGNIFICANT CHANGE UP (ref 7–14)
ANION GAP SERPL CALC-SCNC: 15 MMO/L — HIGH (ref 7–14)
BUN SERPL-MCNC: 69 MG/DL — HIGH (ref 7–23)
BUN SERPL-MCNC: 86 MG/DL — HIGH (ref 7–23)
CALCIUM SERPL-MCNC: 7.9 MG/DL — LOW (ref 8.4–10.5)
CALCIUM SERPL-MCNC: 8 MG/DL — LOW (ref 8.4–10.5)
CHLORIDE SERPL-SCNC: 91 MMOL/L — LOW (ref 98–107)
CHLORIDE SERPL-SCNC: 95 MMOL/L — LOW (ref 98–107)
CO2 SERPL-SCNC: 23 MMOL/L — SIGNIFICANT CHANGE UP (ref 22–31)
CO2 SERPL-SCNC: 23 MMOL/L — SIGNIFICANT CHANGE UP (ref 22–31)
CREAT SERPL-MCNC: 6.05 MG/DL — HIGH (ref 0.5–1.3)
CREAT SERPL-MCNC: 6.86 MG/DL — HIGH (ref 0.5–1.3)
GLUCOSE BLDC GLUCOMTR-MCNC: 114 MG/DL — HIGH (ref 70–99)
GLUCOSE BLDC GLUCOMTR-MCNC: 138 MG/DL — HIGH (ref 70–99)
GLUCOSE SERPL-MCNC: 108 MG/DL — HIGH (ref 70–99)
GLUCOSE SERPL-MCNC: 117 MG/DL — HIGH (ref 70–99)
HCT VFR BLD CALC: 22.3 % — LOW (ref 39–50)
HGB BLD-MCNC: 7.3 G/DL — LOW (ref 13–17)
MAGNESIUM SERPL-MCNC: 2.2 MG/DL — SIGNIFICANT CHANGE UP (ref 1.6–2.6)
MAGNESIUM SERPL-MCNC: 2.2 MG/DL — SIGNIFICANT CHANGE UP (ref 1.6–2.6)
MCHC RBC-ENTMCNC: 27.7 PG — SIGNIFICANT CHANGE UP (ref 27–34)
MCHC RBC-ENTMCNC: 32.7 % — SIGNIFICANT CHANGE UP (ref 32–36)
MCV RBC AUTO: 84.5 FL — SIGNIFICANT CHANGE UP (ref 80–100)
NRBC # FLD: 0 K/UL — SIGNIFICANT CHANGE UP (ref 0–0)
PHOSPHATE SERPL-MCNC: 6.3 MG/DL — HIGH (ref 2.5–4.5)
PHOSPHATE SERPL-MCNC: 7.6 MG/DL — HIGH (ref 2.5–4.5)
PLATELET # BLD AUTO: 334 K/UL — SIGNIFICANT CHANGE UP (ref 150–400)
PMV BLD: 9.5 FL — SIGNIFICANT CHANGE UP (ref 7–13)
POTASSIUM SERPL-MCNC: 5.7 MMOL/L — HIGH (ref 3.5–5.3)
POTASSIUM SERPL-MCNC: 6.8 MMOL/L — CRITICAL HIGH (ref 3.5–5.3)
POTASSIUM SERPL-SCNC: 5.7 MMOL/L — HIGH (ref 3.5–5.3)
POTASSIUM SERPL-SCNC: 6.8 MMOL/L — CRITICAL HIGH (ref 3.5–5.3)
RBC # BLD: 2.64 M/UL — LOW (ref 4.2–5.8)
RBC # FLD: 14.1 % — SIGNIFICANT CHANGE UP (ref 10.3–14.5)
SODIUM SERPL-SCNC: 129 MMOL/L — LOW (ref 135–145)
SODIUM SERPL-SCNC: 132 MMOL/L — LOW (ref 135–145)
WBC # BLD: 24.18 K/UL — HIGH (ref 3.8–10.5)
WBC # FLD AUTO: 24.18 K/UL — HIGH (ref 3.8–10.5)

## 2020-06-21 PROCEDURE — 99233 SBSQ HOSP IP/OBS HIGH 50: CPT | Mod: GC

## 2020-06-21 RX ORDER — HYDROMORPHONE HYDROCHLORIDE 2 MG/ML
1 INJECTION INTRAMUSCULAR; INTRAVENOUS; SUBCUTANEOUS ONCE
Refills: 0 | Status: DISCONTINUED | OUTPATIENT
Start: 2020-06-21 | End: 2020-06-21

## 2020-06-21 RX ORDER — HYDROMORPHONE HYDROCHLORIDE 2 MG/ML
30 INJECTION INTRAMUSCULAR; INTRAVENOUS; SUBCUTANEOUS
Refills: 0 | Status: DISCONTINUED | OUTPATIENT
Start: 2020-06-21 | End: 2020-06-22

## 2020-06-21 RX ORDER — FUROSEMIDE 40 MG
80 TABLET ORAL ONCE
Refills: 0 | Status: DISCONTINUED | OUTPATIENT
Start: 2020-06-21 | End: 2020-06-21

## 2020-06-21 RX ORDER — CALCIUM GLUCONATE 100 MG/ML
1 VIAL (ML) INTRAVENOUS ONCE
Refills: 0 | Status: COMPLETED | OUTPATIENT
Start: 2020-06-21 | End: 2020-06-21

## 2020-06-21 RX ORDER — SODIUM ZIRCONIUM CYCLOSILICATE 10 G/10G
10 POWDER, FOR SUSPENSION ORAL THREE TIMES A DAY
Refills: 0 | Status: DISCONTINUED | OUTPATIENT
Start: 2020-06-21 | End: 2020-06-22

## 2020-06-21 RX ORDER — LACTULOSE 10 G/15ML
30 SOLUTION ORAL ONCE
Refills: 0 | Status: COMPLETED | OUTPATIENT
Start: 2020-06-21 | End: 2020-06-21

## 2020-06-21 RX ORDER — BUMETANIDE 0.25 MG/ML
4 INJECTION INTRAMUSCULAR; INTRAVENOUS ONCE
Refills: 0 | Status: COMPLETED | OUTPATIENT
Start: 2020-06-21 | End: 2020-06-21

## 2020-06-21 RX ORDER — INSULIN HUMAN 100 [IU]/ML
10 INJECTION, SOLUTION SUBCUTANEOUS ONCE
Refills: 0 | Status: COMPLETED | OUTPATIENT
Start: 2020-06-21 | End: 2020-06-21

## 2020-06-21 RX ORDER — SODIUM ZIRCONIUM CYCLOSILICATE 10 G/10G
5 POWDER, FOR SUSPENSION ORAL ONCE
Refills: 0 | Status: DISCONTINUED | OUTPATIENT
Start: 2020-06-21 | End: 2020-06-21

## 2020-06-21 RX ORDER — ALBUTEROL 90 UG/1
10 AEROSOL, METERED ORAL ONCE
Refills: 0 | Status: COMPLETED | OUTPATIENT
Start: 2020-06-21 | End: 2020-06-22

## 2020-06-21 RX ORDER — DEXTROSE 50 % IN WATER 50 %
50 SYRINGE (ML) INTRAVENOUS ONCE
Refills: 0 | Status: COMPLETED | OUTPATIENT
Start: 2020-06-21 | End: 2020-06-21

## 2020-06-21 RX ADMIN — SEVELAMER CARBONATE 800 MILLIGRAM(S): 2400 POWDER, FOR SUSPENSION ORAL at 14:03

## 2020-06-21 RX ADMIN — Medication 1 MILLIGRAM(S): at 20:27

## 2020-06-21 RX ADMIN — GABAPENTIN 200 MILLIGRAM(S): 400 CAPSULE ORAL at 14:04

## 2020-06-21 RX ADMIN — HYDROMORPHONE HYDROCHLORIDE 1 MILLIGRAM(S): 2 INJECTION INTRAMUSCULAR; INTRAVENOUS; SUBCUTANEOUS at 17:22

## 2020-06-21 RX ADMIN — Medication 100 GRAM(S): at 21:45

## 2020-06-21 RX ADMIN — HYDROMORPHONE HYDROCHLORIDE 1 MILLIGRAM(S): 2 INJECTION INTRAMUSCULAR; INTRAVENOUS; SUBCUTANEOUS at 11:34

## 2020-06-21 RX ADMIN — Medication 50 MILLILITER(S): at 21:45

## 2020-06-21 RX ADMIN — TIZANIDINE 1 MILLIGRAM(S): 4 TABLET ORAL at 08:03

## 2020-06-21 RX ADMIN — SEVELAMER CARBONATE 800 MILLIGRAM(S): 2400 POWDER, FOR SUSPENSION ORAL at 22:49

## 2020-06-21 RX ADMIN — INSULIN HUMAN 10 UNIT(S): 100 INJECTION, SOLUTION SUBCUTANEOUS at 21:45

## 2020-06-21 RX ADMIN — NICARDIPINE HYDROCHLORIDE 30 MILLIGRAM(S): 30 CAPSULE, EXTENDED RELEASE ORAL at 05:29

## 2020-06-21 RX ADMIN — Medication 650 MILLIGRAM(S): at 14:03

## 2020-06-21 RX ADMIN — METHADONE HYDROCHLORIDE 5 MILLIGRAM(S): 40 TABLET ORAL at 18:19

## 2020-06-21 RX ADMIN — LACTULOSE 30 GRAM(S): 10 SOLUTION ORAL at 10:07

## 2020-06-21 RX ADMIN — TIZANIDINE 1 MILLIGRAM(S): 4 TABLET ORAL at 14:03

## 2020-06-21 RX ADMIN — POLYETHYLENE GLYCOL 3350 17 GRAM(S): 17 POWDER, FOR SOLUTION ORAL at 18:19

## 2020-06-21 RX ADMIN — HYDROMORPHONE HYDROCHLORIDE 30 MILLILITER(S): 2 INJECTION INTRAMUSCULAR; INTRAVENOUS; SUBCUTANEOUS at 16:38

## 2020-06-21 RX ADMIN — SODIUM ZIRCONIUM CYCLOSILICATE 10 GRAM(S): 10 POWDER, FOR SUSPENSION ORAL at 23:03

## 2020-06-21 RX ADMIN — Medication 650 MILLIGRAM(S): at 08:02

## 2020-06-21 RX ADMIN — HYDROMORPHONE HYDROCHLORIDE 30 MILLILITER(S): 2 INJECTION INTRAMUSCULAR; INTRAVENOUS; SUBCUTANEOUS at 10:20

## 2020-06-21 RX ADMIN — Medication 650 MILLIGRAM(S): at 20:27

## 2020-06-21 RX ADMIN — HYDROMORPHONE HYDROCHLORIDE 1 MILLIGRAM(S): 2 INJECTION INTRAMUSCULAR; INTRAVENOUS; SUBCUTANEOUS at 22:43

## 2020-06-21 RX ADMIN — HYDROMORPHONE HYDROCHLORIDE 1 MILLIGRAM(S): 2 INJECTION INTRAMUSCULAR; INTRAVENOUS; SUBCUTANEOUS at 19:00

## 2020-06-21 RX ADMIN — TIZANIDINE 1 MILLIGRAM(S): 4 TABLET ORAL at 23:04

## 2020-06-21 RX ADMIN — NICARDIPINE HYDROCHLORIDE 30 MILLIGRAM(S): 30 CAPSULE, EXTENDED RELEASE ORAL at 14:02

## 2020-06-21 RX ADMIN — HYDROMORPHONE HYDROCHLORIDE 30 MILLILITER(S): 2 INJECTION INTRAMUSCULAR; INTRAVENOUS; SUBCUTANEOUS at 07:26

## 2020-06-21 RX ADMIN — SEVELAMER CARBONATE 800 MILLIGRAM(S): 2400 POWDER, FOR SUSPENSION ORAL at 05:28

## 2020-06-21 RX ADMIN — HEPARIN SODIUM 5000 UNIT(S): 5000 INJECTION INTRAVENOUS; SUBCUTANEOUS at 05:28

## 2020-06-21 RX ADMIN — BUMETANIDE 132 MILLIGRAM(S): 0.25 INJECTION INTRAMUSCULAR; INTRAVENOUS at 12:05

## 2020-06-21 RX ADMIN — SENNA PLUS 2 TABLET(S): 8.6 TABLET ORAL at 22:50

## 2020-06-21 RX ADMIN — CHLORHEXIDINE GLUCONATE 1 APPLICATION(S): 213 SOLUTION TOPICAL at 14:03

## 2020-06-21 RX ADMIN — GABAPENTIN 200 MILLIGRAM(S): 400 CAPSULE ORAL at 05:29

## 2020-06-21 RX ADMIN — QUETIAPINE FUMARATE 25 MILLIGRAM(S): 200 TABLET, FILM COATED ORAL at 22:50

## 2020-06-21 RX ADMIN — HYDROMORPHONE HYDROCHLORIDE 1 MILLIGRAM(S): 2 INJECTION INTRAMUSCULAR; INTRAVENOUS; SUBCUTANEOUS at 15:37

## 2020-06-21 RX ADMIN — HYDROMORPHONE HYDROCHLORIDE 1 MILLIGRAM(S): 2 INJECTION INTRAMUSCULAR; INTRAVENOUS; SUBCUTANEOUS at 18:17

## 2020-06-21 RX ADMIN — NICARDIPINE HYDROCHLORIDE 30 MILLIGRAM(S): 30 CAPSULE, EXTENDED RELEASE ORAL at 20:28

## 2020-06-21 RX ADMIN — HEPARIN SODIUM 5000 UNIT(S): 5000 INJECTION INTRAVENOUS; SUBCUTANEOUS at 14:06

## 2020-06-21 RX ADMIN — METHADONE HYDROCHLORIDE 5 MILLIGRAM(S): 40 TABLET ORAL at 05:28

## 2020-06-21 RX ADMIN — GABAPENTIN 200 MILLIGRAM(S): 400 CAPSULE ORAL at 22:50

## 2020-06-21 RX ADMIN — HEPARIN SODIUM 5000 UNIT(S): 5000 INJECTION INTRAVENOUS; SUBCUTANEOUS at 22:49

## 2020-06-21 RX ADMIN — Medication 1 MILLIGRAM(S): at 12:13

## 2020-06-21 RX ADMIN — HYDROMORPHONE HYDROCHLORIDE 1 MILLIGRAM(S): 2 INJECTION INTRAMUSCULAR; INTRAVENOUS; SUBCUTANEOUS at 08:03

## 2020-06-21 RX ADMIN — SODIUM ZIRCONIUM CYCLOSILICATE 10 GRAM(S): 10 POWDER, FOR SUSPENSION ORAL at 16:46

## 2020-06-21 RX ADMIN — HYDROMORPHONE HYDROCHLORIDE 30 MILLILITER(S): 2 INJECTION INTRAMUSCULAR; INTRAVENOUS; SUBCUTANEOUS at 19:05

## 2020-06-21 NOTE — PROGRESS NOTE ADULT - ATTENDING COMMENTS
grossly volume overloaded and hyperkalemia. making more urine. a trial of IV Bumex and Lokelma as above

## 2020-06-21 NOTE — PROGRESS NOTE ADULT - SUBJECTIVE AND OBJECTIVE BOX
WHITLEY HELD  [id number]    Subjective:    Patient seen and examined, doing well, pain controlled.   Vacs holding well.        Objective:  T(C): 37.9 (06-21-20 @ 04:00), Max: 38 (06-20-20 @ 12:45)  HR: 119 (06-21-20 @ 08:00) (96 - 119)  BP: 143/70 (06-21-20 @ 08:00) (133/74 - 161/70)  RR: 18 (06-21-20 @ 08:00) (9 - 18)  SpO2: 97% (06-21-20 @ 08:00) (96% - 100%)  Wt(kg): --   06-21    132<L>  |  95<L>  |  69<H>  ----------------------------<  108<H>  5.7<H>   |  23  |  6.05<H>    Ca    7.9<L>      21 Jun 2020 04:45  Phos  6.3     06-21  Mg     2.2     06-21    TPro  4.2<L>  /  Alb  2.0<L>  /  TBili  0.3  /  DBili  x   /  AST  40  /  ALT  15  /  AlkPhos  77  06-19                        7.3    24.18 )-----------( 334      ( 21 Jun 2020 04:45 )             22.3       06-20 @ 07:01  -  06-21 @ 07:00  --------------------------------------------------------  IN: 1970.7 mL / OUT: 4015 mL / NET: -2044.3 mL    06-21 @ 07:01  -  06-21 @ 08:59  --------------------------------------------------------  IN: 210.5 mL / OUT: 0 mL / NET: 210.5 mL      Physical Exam:     General: NAD   LE: Prevena vacs in place and holding suction, LE with ace wrap in place no strikethrough. MEÑO SS.             MEDICATIONS  (STANDING):  acetaminophen   Tablet .. 650 milliGRAM(s) Oral every 6 hours  chlorhexidine 4% Liquid 1 Application(s) Topical <User Schedule>  dexMEDEtomidine Infusion 0.2 MICROgram(s)/kG/Hr (5.24 mL/Hr) IV Continuous <Continuous>  gabapentin 200 milliGRAM(s) Oral three times a day  heparin   Injectable 5000 Unit(s) SubCutaneous every 8 hours  HYDROmorphone PCA (1 mG/mL) 30 milliLiter(s) PCA Continuous PCA Continuous  lactulose Syrup 30 Gram(s) Oral once  methadone    Tablet 5 milliGRAM(s) Oral two times a day  niCARdipine 30 milliGRAM(s) Oral every 8 hours  polyethylene glycol 3350 17 Gram(s) Oral two times a day  QUEtiapine 25 milliGRAM(s) Oral at bedtime  senna 2 Tablet(s) Oral at bedtime  sevelamer carbonate 800 milliGRAM(s) Oral every 8 hours  sodium zirconium cyclosilicate 5 Gram(s) Oral once    MEDICATIONS  (PRN):  ALPRAZolam 1 milliGRAM(s) Oral every 8 hours PRN anxiety  HYDROmorphone PCA (1 mG/mL) Rescue Clinician Bolus 1 milliGRAM(s) IV Push every 15 minutes PRN for Pain Scale GREATER THAN 6  naloxone Injectable 0.1 milliGRAM(s) IV Push every 3 minutes PRN For ANY of the following changes in patient status:  A. RR LESS THAN 10 breaths per minute, B. Oxygen saturation LESS THAN 90%, C. Sedation score of 6  ondansetron Injectable 4 milliGRAM(s) IV Push every 6 hours PRN Nausea  sodium chloride 0.9% lock flush 10 milliLiter(s) IV Push every 1 hour PRN Pre/post blood products, medications, blood draw, and to maintain line patency  tiZANidine 1 milliGRAM(s) Oral every 6 hours PRN muscle spasm and pain

## 2020-06-21 NOTE — PROGRESS NOTE ADULT - PROBLEM SELECTOR PLAN 1
Pt with ANTOINETTE in the setting of rhabdomyolysis. Upon lab review, Scr was 1.11 on 8/23/2017. Scr at [facility] this AM was 3.06. Scr today is 6.05. Pt with oliguric ANTOINETTE secondary to rhabdomyolysis. Pt. without HD access, HD catheter removed in AM. Pt. awaiting tunneled catheter placement by IR team. Give Bumex 4 mg IV push. Labs reviewed, no plans for HD today. Will assess daily for HD needs. Monitor labs and urine output. Avoid potential nephrotoxins.

## 2020-06-21 NOTE — PROGRESS NOTE ADULT - ASSESSMENT
A/P:     30 y/o M in SICU following found down, presenting with rhabdomyolysis/ compartment syndrome with fasciotomies to L LE X 4 now POD 3 from fasciotomy site closures:     - Continue Prevena wound vacs to incisions, vacs will stay in place for approximately 10 days  - Continue MEÑO drains, monitor output   - Pain control   - LE elevated   - Management per SICU     Plastic Surgery   [id number]

## 2020-06-21 NOTE — PROGRESS NOTE ADULT - ASSESSMENT
29M with PMH of bipolar depression transferred from [facility] for further management of acute renal failure due to rhabdomyolysis, Taken to the OR for LLE compartment syndrome. Now s/p four compartment fasciotomy of the left lower leg 6/11. s/p closure with plastics on 6/19. Doing well today.     Plan  - pt stable from vascular surgery perspective  - pain control PRN  - diet as tolerated  - c/w vac dressings  - continue HD as per Renal - Permacath when afebrile   - DVT ppx   - OOB to chair as tolerated, PT consult for mobilization  - Appreciate SICU care  will follow    C Team Surgery  [id number]

## 2020-06-21 NOTE — PROGRESS NOTE ADULT - SUBJECTIVE AND OBJECTIVE BOX
[facility] DIVISION OF KIDNEY DISEASES AND HYPERTENSION -- FOLLOW UP NOTE  --------------------------------------------------------------------------------  HPI: 29-year-old male, with history bipolar depression was transferred from [facility] to [facility] for severe rhabdomyolysis. As per paper chart, pt was found to have cocaine and amphetamines in urine drug screen. Pt also with an elevated CPK ~ 180K. Nephrology team consulted for ANTOINETTE and rhabdomyolyiss. Upon review of labs on [facility], Scr was 1.11 on 8/23/2017. Pt. underwent fasciotomy on 6/11/20 for LLE compartement syndrome. Pt. had wound vac placed on  6/15/20.  Pt. underwent partial closure of fasciotomy by plastic surgery team yesterday. Last HD 06/20/20 for volume optimization.    Pt. evaluated at bedside, had femoral HD catheter removed. Urine output 475 ml yesterday.    PAST HISTORY  --------------------------------------------------------------------------------  No significant changes to PMH, PSH, FHx, SHx, unless otherwise noted    ALLERGIES & MEDICATIONS  --------------------------------------------------------------------------------  Allergies    No Known Allergies    Intolerances      Standing Inpatient Medications  acetaminophen   Tablet .. 650 milliGRAM(s) Oral every 6 hours  buMETAnide IVPB 4 milliGRAM(s) IV Intermittent once  chlorhexidine 4% Liquid 1 Application(s) Topical <User Schedule>  dexMEDEtomidine Infusion 0.2 MICROgram(s)/kG/Hr IV Continuous <Continuous>  gabapentin 200 milliGRAM(s) Oral three times a day  heparin   Injectable 5000 Unit(s) SubCutaneous every 8 hours  HYDROmorphone PCA (1 mG/mL) 30 milliLiter(s) PCA Continuous PCA Continuous  methadone    Tablet 5 milliGRAM(s) Oral two times a day  niCARdipine 30 milliGRAM(s) Oral every 8 hours  polyethylene glycol 3350 17 Gram(s) Oral two times a day  QUEtiapine 25 milliGRAM(s) Oral at bedtime  senna 2 Tablet(s) Oral at bedtime  sevelamer carbonate 800 milliGRAM(s) Oral every 8 hours  sodium zirconium cyclosilicate 10 Gram(s) Oral three times a day    REVIEW OF SYSTEMS  --------------------------------------------------------------------------------  Gen: no lethargy  Respiratory: No dyspnea  CV: No chest pain  GI: No abdominal pain  MSK: + LE edema, scrotal edema  Neuro: No dizziness  Heme: No bleeding    All other systems were reviewed and are negative, except as noted.    VITALS/PHYSICAL EXAM  --------------------------------------------------------------------------------  T(C): 37.9 (06-21-20 @ 04:00), Max: 38 (06-20-20 @ 12:45)  HR: 111 (06-21-20 @ 09:00) (99 - 119)  BP: 153/84 (06-21-20 @ 09:00) (133/74 - 161/70)  RR: 13 (06-21-20 @ 09:00) (9 - 18)  SpO2: 96% (06-21-20 @ 09:00) (96% - 100%)  Wt(kg): --    06-20-20 @ 07:01  -  06-21-20 @ 07:00  --------------------------------------------------------  IN: 1970.7 mL / OUT: 4015 mL / NET: -2044.3 mL    06-21-20 @ 07:01  -  06-21-20 @ 11:51  --------------------------------------------------------  IN: 210.5 mL / OUT: 0 mL / NET: 210.5 mL    Physical Exam:  	Gen: NAD  	HEENT: MMM   	Pulm: CTA B/L  	CV: S1S2  	Abd: Soft, +BS               Ext: left lower extremity in bandage with multiple drains  	Neuro: Awake  	Skin: Warm and dry  	Vascular access: none    LABS/STUDIES  --------------------------------------------------------------------------------              7.3    24.18 >-----------<  334      [06-21-20 @ 04:45]              22.3     132  |  95  |  69  ----------------------------<  108      [06-21-20 @ 04:45]  5.7   |  23  |  6.05        Ca     7.9     [06-21-20 @ 04:45]      Mg     2.2     [06-21-20 @ 04:45]      Phos  6.3     [06-21-20 @ 04:45]    Creatinine Trend:  SCr 6.05 [06-21 @ 04:45]  SCr 6.27 [06-20 @ 07:43]  SCr 5.65 [06-20 @ 00:26]  SCr 5.16 [06-19 @ 21:00]  SCr 7.13 [06-19 @ 12:20]

## 2020-06-21 NOTE — PROCEDURE NOTE - NSPROCDETAILS_GEN_ALL_CORE
ultrasound guidance/sterile dressing applied/guidewire recovered/lumen(s) aspirated and flushed
lumen(s) aspirated and flushed/guidewire recovered/sterile technique, catheter placed/ultrasound guidance/sterile dressing applied
sterile technique, catheter placed/guidewire recovered/lumen(s) aspirated and flushed/sterile dressing applied/ultrasound guidance
all materials/supplies accounted for at end of procedure/sutured in place/positive blood return obtained via catheter/location identified, draped/prepped, sterile technique used, needle inserted/introduced/connected to a pressurized flush line/hemostasis with direct pressure, dressing applied/Seldinger technique

## 2020-06-21 NOTE — PROGRESS NOTE ADULT - SUBJECTIVE AND OBJECTIVE BOX
Anesthesia Pain Management Service    SUBJECTIVE: Patient states his pain is managed well with IV PCA.    Pain Scale Score	At rest: __8/10_ 	With Activity: ___ 	[X ] Refer to charted pain scores    THERAPY:    [ ] IV PCA Morphine		[ ] 5 mg/mL	[ ] 1 mg/mL  [X ] IV PCA Hydromorphone	[ ] 5 mg/mL	[X ] 1 mg/mL  [ ] IV PCA Fentanyl		[ ] 50 micrograms/mL    Demand dose __0.4 lockout __8_ (minutes) Continuous Rate _0__ Total: _17.5__  mg used (in past 24 hours)      MEDICATIONS  (STANDING):  acetaminophen   Tablet .. 650 milliGRAM(s) Oral every 6 hours  chlorhexidine 4% Liquid 1 Application(s) Topical <User Schedule>  dexMEDEtomidine Infusion 0.2 MICROgram(s)/kG/Hr (5.24 mL/Hr) IV Continuous <Continuous>  gabapentin 200 milliGRAM(s) Oral three times a day  heparin   Injectable 5000 Unit(s) SubCutaneous every 8 hours  HYDROmorphone PCA (1 mG/mL) 30 milliLiter(s) PCA Continuous PCA Continuous  lactulose Syrup 30 Gram(s) Oral once  methadone    Tablet 5 milliGRAM(s) Oral two times a day  niCARdipine 30 milliGRAM(s) Oral every 8 hours  polyethylene glycol 3350 17 Gram(s) Oral two times a day  QUEtiapine 25 milliGRAM(s) Oral at bedtime  senna 2 Tablet(s) Oral at bedtime  sevelamer carbonate 800 milliGRAM(s) Oral every 8 hours  sodium zirconium cyclosilicate 5 Gram(s) Oral once    MEDICATIONS  (PRN):  ALPRAZolam 1 milliGRAM(s) Oral every 8 hours PRN anxiety  HYDROmorphone PCA (1 mG/mL) Rescue Clinician Bolus 1 milliGRAM(s) IV Push every 15 minutes PRN for Pain Scale GREATER THAN 6  naloxone Injectable 0.1 milliGRAM(s) IV Push every 3 minutes PRN For ANY of the following changes in patient status:  A. RR LESS THAN 10 breaths per minute, B. Oxygen saturation LESS THAN 90%, C. Sedation score of 6  ondansetron Injectable 4 milliGRAM(s) IV Push every 6 hours PRN Nausea  sodium chloride 0.9% lock flush 10 milliLiter(s) IV Push every 1 hour PRN Pre/post blood products, medications, blood draw, and to maintain line patency  tiZANidine 1 milliGRAM(s) Oral every 6 hours PRN muscle spasm and pain      OBJECTIVE: Patient sitting up in bed watching TV, eating breakfast.    Sedation Score:	[ X] Alert	[ ] Drowsy 	[ ] Arousable	[ ] Asleep	[ ] Unresponsive    Side Effects:	[X ] None	[ ] Nausea	[ ] Vomiting	[ ] Pruritus  		[ ] Other:    Vital Signs Last 24 Hrs  T(C): 37.9 (21 Jun 2020 04:00), Max: 38 (20 Jun 2020 12:45)  T(F): 100.3 (21 Jun 2020 04:00), Max: 100.4 (20 Jun 2020 12:45)  HR: 119 (21 Jun 2020 08:00) (96 - 119)  BP: 143/70 (21 Jun 2020 08:00) (133/74 - 161/70)  BP(mean): 85 (21 Jun 2020 08:00) (81 - 122)  RR: 18 (21 Jun 2020 08:00) (9 - 18)  SpO2: 97% (21 Jun 2020 08:00) (96% - 100%)    ASSESSMENT/ PLAN    Therapy to  be:	[ X] Continue   [ ] Discontinued   [ ] Change to prn Analgesics    Documentation and Verification of current medications:   [X] Done	[ ] Not done, not elligible    Comments: Continue IV Dilaudid PCA. Recommend non-opioid adjuvant analgesics to be used when possible and when allowed by primary surgical team.

## 2020-06-21 NOTE — PROGRESS NOTE ADULT - ATTENDING COMMENTS
The patient was seen and examined, chart and notes reviewed.  The current diagnosis, plan of care and alternatives have been discussed with the patient.  All questions have been answered and updates have been discussed.  The case was discussed with SICU team residents/PA's and medical students at morning SICU rounds and throughout the course of the day.    Acute kidney injury  a.  For PermCath placement milad    Hyperkalemia  a. Lokelma as per nephrology    Chronic pain  a.  Wean precedex  b.  Continue methadone, gabapentin, xanaflex.

## 2020-06-21 NOTE — PROGRESS NOTE ADULT - ASSESSMENT
29M w/ PMH of bipolar depression, s/p found down at home, transferred from [facility] for further management of acute renal failure due to rhabdomyolysis, s/p four compartment fasciotomy and lateral/medial thigh fasciotomies with ANTOINETTE on HD. Now s/p fasciotomy closure POD 1.    Neuro hx of bipolar depression  - A&O x 3  - Pain Control: dilaudid PCA, precedex gtt, off katemine gtt  - Xanax 1 mg q8 PRN   - Seroquel QHS  - Increased gabapentin, standing tizanidine per Pain service  - Psych following, no plan to restart home lithium  - Pain following, appreciate recommendations    Resp  - RA   - Mobilize, OOB to chair  - Obtain Chest XR    CVS  - Appropriate mental status, appears well perfused   - c/w nicardipine 30 mg PO q8 hrs   - if HTN again, rec'd clonidine .1mg by addiction psych    GI  - Regular diet with fluid restriction 1L for hyponatremia   - Bowel regimen senna 2 tabs qHS  - Patient declining miralax, last BM 06/17 prior to that > 1w      - ANTOINETTE in setting of rhabdomyolysis  - CK down to <2000  - Remains hypervolemic, oliguric but UOP on an upward trend  - IV locked   - 6/20: s/p 3L off from HD  - Hypervolemic hyponatremia, R fem Selvin, remove today after HD  - plan to replace selvin Monday  - Plan for permacath once afebrile  - Nephrology following    Heme  - H&H 7.4/22; received 1u PRBC for hemoglobin 7 on 6/19  - SQH for dvt ppx    ID  - Febrile to TMax 101.5  - Leukocytosis of 21  - Continue to monitor fever curve    Endo  - monitor FGS    Lines  - Right radial a line

## 2020-06-21 NOTE — PROGRESS NOTE ADULT - SUBJECTIVE AND OBJECTIVE BOX
Anesthesia Pain Management Service    SUBJECTIVE: Pt doing well with IV PCA without problems reported.    Therapy:	  [ X] IV PCA	   [ ] Epidural           [ ] s/p Spinal Opoid              [ ] Postpartum infusion	  [ ] Patient controlled regional anesthesia (PCRA)    [ ] prn Analgesics    Allergies    No Known Allergies    Intolerances      MEDICATIONS  (STANDING):  acetaminophen   Tablet .. 650 milliGRAM(s) Oral every 6 hours  chlorhexidine 4% Liquid 1 Application(s) Topical <User Schedule>  dexMEDEtomidine Infusion 0.2 MICROgram(s)/kG/Hr (5.24 mL/Hr) IV Continuous <Continuous>  gabapentin 200 milliGRAM(s) Oral three times a day  heparin   Injectable 5000 Unit(s) SubCutaneous every 8 hours  HYDROmorphone PCA (1 mG/mL) 30 milliLiter(s) PCA Continuous PCA Continuous  lactulose Syrup 30 Gram(s) Oral once  methadone    Tablet 5 milliGRAM(s) Oral two times a day  niCARdipine 30 milliGRAM(s) Oral every 8 hours  polyethylene glycol 3350 17 Gram(s) Oral two times a day  QUEtiapine 25 milliGRAM(s) Oral at bedtime  senna 2 Tablet(s) Oral at bedtime  sevelamer carbonate 800 milliGRAM(s) Oral every 8 hours  sodium zirconium cyclosilicate 5 Gram(s) Oral once    MEDICATIONS  (PRN):  ALPRAZolam 1 milliGRAM(s) Oral every 8 hours PRN anxiety  HYDROmorphone PCA (1 mG/mL) Rescue Clinician Bolus 1 milliGRAM(s) IV Push every 15 minutes PRN for Pain Scale GREATER THAN 6  naloxone Injectable 0.1 milliGRAM(s) IV Push every 3 minutes PRN For ANY of the following changes in patient status:  A. RR LESS THAN 10 breaths per minute, B. Oxygen saturation LESS THAN 90%, C. Sedation score of 6  ondansetron Injectable 4 milliGRAM(s) IV Push every 6 hours PRN Nausea  sodium chloride 0.9% lock flush 10 milliLiter(s) IV Push every 1 hour PRN Pre/post blood products, medications, blood draw, and to maintain line patency  tiZANidine 1 milliGRAM(s) Oral every 6 hours PRN muscle spasm and pain      OBJECTIVE:   [X] No new signs     [ ] Other:    Side Effects:  [X ] None			[ ] Other:    Assessment of Catheter Site:		[ ] Intact		[ ] Other:    ASSESSMENT/PLAN  [ X] Continue current therapy    [ ] Therapy changed to:    [ ] IV PCA       [ ] Epidural     [ ] prn Analgesics     Comments:

## 2020-06-21 NOTE — PROGRESS NOTE ADULT - SUBJECTIVE AND OBJECTIVE BOX
SICU Daily Progress Note  =====================================================  Interval/Overnight Events:     - Patient RTOR for LLE fasciotomy closure x4, with JPx5 and preveena vacs in place  - Tolerated HD net -3L  - received xanax 1mg overnight      POD #   10/2       	SICU Day #  10    HPI:  29M with PMH of bipolar depression transferred from [facility] for further management of acute renal failure due to rhabdomyolysis. Pt was found unresponsive at home, lying on his L side. He states he has not been taking his Lithium for at least 2 wks. At the OSH, he was treated for hyperkalemia. He received D5, insulin, calcium gluconate, Kayexalate Last BM was on 6/9. For L leg pain he was given lidocaine patch, dilaudid 1mg prn and tramadol 50mg. Hydralazine 20mg given for HTN. From 1 to 7 am only 100cc of UOP. Urine was tea colored. 500cc bolus of NS was given. CT cervical spine at OSH was (-). CT Head (-). CTAP and CXR were (-). CK on admission there was 177,000.     Pt was seen by surgery team in MICU for LUE swelling and compartment syndrome was ruled out. Surgery team was re-consulted today due to new symptoms of L foot drop and numbness. Pt was found to be highly suspicious for compartment syndrome in the upper thigh, and was emergently taken to OR.     Pt is now s/p four compartment fasciotomy of LLE, and left lateral and medial thigh fasciotomies. SICU consulted for q1h neurovascular checks and hemodynamic monitoring    Allergies: No Known Allergies      MEDICATIONS:   --------------------------------------------------------------------------------------  Neurologic Medications  acetaminophen   Tablet .. 650 milliGRAM(s) Oral every 6 hours  ALPRAZolam 1 milliGRAM(s) Oral three times a day PRN Anxiety  dexMEDEtomidine Infusion 0.2 MICROgram(s)/kG/Hr IV Continuous <Continuous>  gabapentin 100 milliGRAM(s) Oral three times a day  HYDROmorphone PCA (1 mG/mL) 30 milliLiter(s) PCA Continuous PCA Continuous  HYDROmorphone PCA (1 mG/mL) Rescue Clinician Bolus 1 milliGRAM(s) IV Push every 15 minutes PRN for Pain Scale GREATER THAN 6  ketamine Infusion. 0.1 mG/kG/Hr IV Continuous <Continuous>  ondansetron Injectable 4 milliGRAM(s) IV Push every 6 hours PRN Nausea  QUEtiapine 25 milliGRAM(s) Oral at bedtime  tiZANidine 1 milliGRAM(s) Oral every 6 hours PRN muscle spasm and pain    Respiratory Medications    Cardiovascular Medications  niCARdipine 30 milliGRAM(s) Oral every 8 hours    Gastrointestinal Medications  polyethylene glycol 3350 17 Gram(s) Oral two times a day  senna 2 Tablet(s) Oral at bedtime  sodium chloride 0.9% lock flush 10 milliLiter(s) IV Push every 1 hour PRN Pre/post blood products, medications, blood draw, and to maintain line patency    Genitourinary Medications    Hematologic/Oncologic Medications  heparin   Injectable 5000 Unit(s) SubCutaneous every 8 hours    Antimicrobial/Immunologic Medications  ceFAZolin   IVPB 1000 milliGRAM(s) IV Intermittent every 24 hours    Endocrine/Metabolic Medications    Topical/Other Medications  chlorhexidine 4% Liquid 1 Application(s) Topical <User Schedule>  naloxone Injectable 0.1 milliGRAM(s) IV Push every 3 minutes PRN For ANY of the following changes in patient status:  A. RR LESS THAN 10 breaths per minute, B. Oxygen saturation LESS THAN 90%, C. Sedation score of 6  sevelamer carbonate 800 milliGRAM(s) Oral every 8 hours  sodium zirconium cyclosilicate 10 Gram(s) Oral once    --------------------------------------------------------------------------------------  ICU Vital Signs Last 24 Hrs  T(C): 37.7 (20 Jun 2020 20:00), Max: 38.1 (20 Jun 2020 01:00)  T(F): 99.8 (20 Jun 2020 20:00), Max: 100.6 (20 Jun 2020 01:00)  HR: 103 (20 Jun 2020 23:00) (96 - 117)  BP: 143/85 (20 Jun 2020 23:00) (133/74 - 158/86)  BP(mean): 96 (20 Jun 2020 23:00) (81 - 122)  ABP: 127/98 (20 Jun 2020 13:00) (121/93 - 159/74)  ABP(mean): 115 (20 Jun 2020 13:00) (88 - 115)  RR: 12 (20 Jun 2020 23:00) (9 - 17)  SpO2: 97% (20 Jun 2020 23:00) (94% - 100%)      I&O's Detail    19 Jun 2020 07:01  -  20 Jun 2020 07:00  --------------------------------------------------------  IN:    dexmedetomidine Infusion: 276.7 mL    ketamine Infusion.: 42.6 mL    Oral Fluid: 1104 mL    Other: 400 mL  Total IN: 1823.3 mL    OUT:    Drain: 110 mL    Drain: 50 mL    Drain: 40 mL    Drain: 80 mL    Drain: 30 mL    Other: 3400 mL    Voided: 475 mL  Total OUT: 4185 mL    Total NET: -2361.7 mL      20 Jun 2020 07:01  -  21 Jun 2020 00:43  --------------------------------------------------------  IN:    dexmedetomidine Infusion: 228.9 mL    IV PiggyBack: 100 mL    ketamine Infusion.: 16.3 mL    Oral Fluid: 1040 mL    Other: 400 mL  Total IN: 1785.2 mL    OUT:    Drain: 15 mL    Drain: 30 mL    Drain: 65 mL    Drain: 25 mL    Drain: 40 mL    Other: 3400 mL    Voided: 275 mL  Total OUT: 3850 mL    Total NET: -2064.8 mL        --------------------------------------------------------------------------------------    EXAM  NEUROLOGY    Exam: Normal, NAD, alert, oriented x3, no focal deficits.     HEENT  Exam: Normocephalic, atraumatic, EOMI.     RESPIRATORY  Exam: Lungs clear to auscultation, Normal expansion/effort.    CARDIOVASCULAR  Exam: S1, S2.  Regular rate and rhythm.     GI/NUTRITION  Exam: Abdomen soft, Non-tender, Non-distended.   Current Diet: Regular    EXTREMITIES:  Exam: Extremities warm, pink, well-perfused. 2+ DP pulses palpable b/l. LLE with ACE dressing C/D/I, preveena x 2 in place with good suction. MEÑO x5 with SS output.    MUSCULOSKELETAL  Exam: All extremities moving spontaneously without limitations.     SKIN  Exam: Good skin turgor, no skin breakdown.     METABOLIC/FLUIDS/ELECTROLYTES      HEMATOLOGIC  [x] VTE Prophylaxis: heparin   Injectable 5000 Unit(s) SubCutaneous every 8 hours    Transfusions:	[] PRBC	[] Platelets		[] FFP	[] Cryoprecipitate    INFECTIOUS DISEASE  Antimicrobials/Immunologic Medications:  ceFAZolin   IVPB 1000 milliGRAM(s) IV Intermittent every 24 hours    Tubes/Lines/Drains  MEÑO x 5, provena X 2  [x] Peripheral IV  [] Central Venous Line     	[] R	[] L	[] IJ	[] Fem	[] SC	Date Placed:   [] Arterial Line		[] R	[] L	[] Fem	[] Rad	[] Ax	Date Placed:   [] PICC		[] Midline		[] Mediport  [] Urinary Catheter		Date Placed:   [x] Necessity of urinary, arterial, and venous catheters discussed    LABS  --------------------------------------------------------------------------------------             CBC (06-20 @ 05:30)                              7.4<L>                         20.78<H>  )----------------(  309        --    % Neutrophils, --    % Lymphocytes, ANC: --                                  22.0<L>  CBC (06-20 @ 00:26)                              7.7<L>                         21.31<H>  )----------------(  296        --    % Neutrophils, --    % Lymphocytes, ANC: --                                  22.6<L>    BMP (06-20 @ 07:43)             131<L>  |  93<L>   |  69<H> 		Ca++ --      Ca 7.6<L>             ---------------------------------( 113<H>		Mg 2.1                5.3     |  24      |  6.27<H>			Ph 6.5<H>  BMP (06-20 @ 00:26)             130<L>  |  94<L>   |  63<H> 		Ca++ --      Ca 7.5<L>             ---------------------------------( 126<H>		Mg 2.1                5.5<H>  |  24      |  5.65<H>			Ph 5.9<H>                --------------------------------------------------------------------------------------

## 2020-06-21 NOTE — PROGRESS NOTE ADULT - SUBJECTIVE AND OBJECTIVE BOX
Vascular Progress Note    S: Patient seen and examined. No acute events overnight. Reports tolerating diet without nausea, vomiting, passing flatus, having bowel movements, voiding without issues. Denies fever, chills, SOB, chest pain.     O:  Physical Exam:  General: NAD  Respiratory: no increased work of breathing   Abdomen: soft, nontender, nondistended  Extremities: Prevena vacs in place, LE ACE wrap in place no strikethrough. MEÑO SS  LLE gross motor intact from hip to toes, no signs of LLE arterial insufficiency   lle sensory deficit on left foot dorsum remains as stated above    Vital Signs Last 24 Hrs  T(C): 37.9 (21 Jun 2020 04:00), Max: 38 (20 Jun 2020 12:45)  T(F): 100.3 (21 Jun 2020 04:00), Max: 100.4 (20 Jun 2020 12:45)  HR: 111 (21 Jun 2020 09:00) (96 - 119)  BP: 153/84 (21 Jun 2020 09:00) (133/74 - 161/70)  BP(mean): 99 (21 Jun 2020 09:00) (81 - 122)  RR: 13 (21 Jun 2020 09:00) (9 - 18)  SpO2: 96% (21 Jun 2020 09:00) (96% - 100%)    I&O's Detail    20 Jun 2020 07:01  -  21 Jun 2020 07:00  --------------------------------------------------------  IN:    dexmedetomidine Infusion: 294.4 mL    IV PiggyBack: 100 mL    ketamine Infusion.: 16.3 mL    Oral Fluid: 1160 mL    Other: 400 mL  Total IN: 1970.7 mL    OUT:    Drain: 105 mL    Drain: 30 mL    Drain: 25 mL    Drain: 35 mL    Drain: 45 mL    Other: 3400 mL    Voided: 375 mL  Total OUT: 4015 mL    Total NET: -2044.3 mL      21 Jun 2020 07:01  -  21 Jun 2020 09:43  --------------------------------------------------------  IN:    dexmedetomidine Infusion: 10.5 mL    Oral Fluid: 200 mL  Total IN: 210.5 mL    OUT:  Total OUT: 0 mL    Total NET: 210.5 mL                                7.3    24.18 )-----------( 334      ( 21 Jun 2020 04:45 )             22.3       06-21    132<L>  |  95<L>  |  69<H>  ----------------------------<  108<H>  5.7<H>   |  23  |  6.05<H>    Ca    7.9<L>      21 Jun 2020 04:45  Phos  6.3     06-21  Mg     2.2     06-21    TPro  4.2<L>  /  Alb  2.0<L>  /  TBili  0.3  /  DBili  x   /  AST  40  /  ALT  15  /  AlkPhos  77  06-19

## 2020-06-21 NOTE — PROCEDURE NOTE - NSINFORMCONSENT_GEN_A_CORE
This was an emergent procedure.
Benefits, risks, and possible complications of procedure explained to patient/caregiver who verbalized understanding and gave written consent.
Benefits, risks, and possible complications of procedure explained to patient/caregiver who verbalized understanding and gave verbal consent.
Benefits, risks, and possible complications of procedure explained to patient/caregiver who verbalized understanding and gave written consent.

## 2020-06-22 LAB
ANION GAP SERPL CALC-SCNC: 14 MMO/L — SIGNIFICANT CHANGE UP (ref 7–14)
ANION GAP SERPL CALC-SCNC: 14 MMO/L — SIGNIFICANT CHANGE UP (ref 7–14)
APPEARANCE UR: CLEAR — SIGNIFICANT CHANGE UP
APTT BLD: 28.4 SEC — SIGNIFICANT CHANGE UP (ref 27.5–36.3)
BACTERIA # UR AUTO: SIGNIFICANT CHANGE UP
BILIRUB UR-MCNC: NEGATIVE — SIGNIFICANT CHANGE UP
BLD GP AB SCN SERPL QL: NEGATIVE — SIGNIFICANT CHANGE UP
BLOOD UR QL VISUAL: HIGH
BUN SERPL-MCNC: 56 MG/DL — HIGH (ref 7–23)
BUN SERPL-MCNC: 69 MG/DL — HIGH (ref 7–23)
CALCIUM SERPL-MCNC: 8.1 MG/DL — LOW (ref 8.4–10.5)
CALCIUM SERPL-MCNC: 8.1 MG/DL — LOW (ref 8.4–10.5)
CHLORIDE SERPL-SCNC: 91 MMOL/L — LOW (ref 98–107)
CHLORIDE SERPL-SCNC: 92 MMOL/L — LOW (ref 98–107)
CO2 SERPL-SCNC: 24 MMOL/L — SIGNIFICANT CHANGE UP (ref 22–31)
CO2 SERPL-SCNC: 26 MMOL/L — SIGNIFICANT CHANGE UP (ref 22–31)
COLOR SPEC: SIGNIFICANT CHANGE UP
CREAT SERPL-MCNC: 4.92 MG/DL — HIGH (ref 0.5–1.3)
CREAT SERPL-MCNC: 5.66 MG/DL — HIGH (ref 0.5–1.3)
GLUCOSE SERPL-MCNC: 124 MG/DL — HIGH (ref 70–99)
GLUCOSE SERPL-MCNC: 144 MG/DL — HIGH (ref 70–99)
GLUCOSE UR-MCNC: NEGATIVE — SIGNIFICANT CHANGE UP
HCT VFR BLD CALC: 24.3 % — LOW (ref 39–50)
HGB BLD-MCNC: 8.1 G/DL — LOW (ref 13–17)
INR BLD: 1.03 — SIGNIFICANT CHANGE UP (ref 0.88–1.17)
KETONES UR-MCNC: NEGATIVE — SIGNIFICANT CHANGE UP
LEUKOCYTE ESTERASE UR-ACNC: NEGATIVE — SIGNIFICANT CHANGE UP
MAGNESIUM SERPL-MCNC: 1.9 MG/DL — SIGNIFICANT CHANGE UP (ref 1.6–2.6)
MAGNESIUM SERPL-MCNC: 2.1 MG/DL — SIGNIFICANT CHANGE UP (ref 1.6–2.6)
MCHC RBC-ENTMCNC: 27.8 PG — SIGNIFICANT CHANGE UP (ref 27–34)
MCHC RBC-ENTMCNC: 33.3 % — SIGNIFICANT CHANGE UP (ref 32–36)
MCV RBC AUTO: 83.5 FL — SIGNIFICANT CHANGE UP (ref 80–100)
NITRITE UR-MCNC: NEGATIVE — SIGNIFICANT CHANGE UP
NRBC # FLD: 0 K/UL — SIGNIFICANT CHANGE UP (ref 0–0)
PH UR: 7.5 — SIGNIFICANT CHANGE UP (ref 5–8)
PHOSPHATE SERPL-MCNC: 5 MG/DL — HIGH (ref 2.5–4.5)
PHOSPHATE SERPL-MCNC: 5.9 MG/DL — HIGH (ref 2.5–4.5)
PLATELET # BLD AUTO: 413 K/UL — HIGH (ref 150–400)
PMV BLD: 9.3 FL — SIGNIFICANT CHANGE UP (ref 7–13)
POTASSIUM SERPL-MCNC: 5.5 MMOL/L — HIGH (ref 3.5–5.3)
POTASSIUM SERPL-MCNC: 5.9 MMOL/L — HIGH (ref 3.5–5.3)
POTASSIUM SERPL-SCNC: 5.5 MMOL/L — HIGH (ref 3.5–5.3)
POTASSIUM SERPL-SCNC: 5.9 MMOL/L — HIGH (ref 3.5–5.3)
PROT UR-MCNC: 200 — HIGH
PROTHROM AB SERPL-ACNC: 11.7 SEC — SIGNIFICANT CHANGE UP (ref 9.8–13.1)
RBC # BLD: 2.91 M/UL — LOW (ref 4.2–5.8)
RBC # FLD: 13.8 % — SIGNIFICANT CHANGE UP (ref 10.3–14.5)
RBC CASTS # UR COMP ASSIST: SIGNIFICANT CHANGE UP (ref 0–?)
RH IG SCN BLD-IMP: POSITIVE — SIGNIFICANT CHANGE UP
SODIUM SERPL-SCNC: 130 MMOL/L — LOW (ref 135–145)
SODIUM SERPL-SCNC: 131 MMOL/L — LOW (ref 135–145)
SP GR SPEC: 1.01 — SIGNIFICANT CHANGE UP (ref 1–1.04)
SQUAMOUS # UR AUTO: SIGNIFICANT CHANGE UP
UROBILINOGEN FLD QL: NORMAL — SIGNIFICANT CHANGE UP
WBC # BLD: 29.99 K/UL — HIGH (ref 3.8–10.5)
WBC # FLD AUTO: 29.99 K/UL — HIGH (ref 3.8–10.5)
WBC UR QL: HIGH (ref 0–?)

## 2020-06-22 PROCEDURE — 99232 SBSQ HOSP IP/OBS MODERATE 35: CPT

## 2020-06-22 PROCEDURE — 71045 X-RAY EXAM CHEST 1 VIEW: CPT | Mod: 26

## 2020-06-22 PROCEDURE — 93306 TTE W/DOPPLER COMPLETE: CPT | Mod: 26

## 2020-06-22 PROCEDURE — 93010 ELECTROCARDIOGRAM REPORT: CPT

## 2020-06-22 PROCEDURE — 99233 SBSQ HOSP IP/OBS HIGH 50: CPT | Mod: GC

## 2020-06-22 PROCEDURE — 99233 SBSQ HOSP IP/OBS HIGH 50: CPT

## 2020-06-22 RX ORDER — CALCIUM GLUCONATE 100 MG/ML
1 VIAL (ML) INTRAVENOUS ONCE
Refills: 0 | Status: DISCONTINUED | OUTPATIENT
Start: 2020-06-22 | End: 2020-06-22

## 2020-06-22 RX ORDER — HYDROMORPHONE HYDROCHLORIDE 2 MG/ML
6 INJECTION INTRAMUSCULAR; INTRAVENOUS; SUBCUTANEOUS
Refills: 0 | Status: DISCONTINUED | OUTPATIENT
Start: 2020-06-22 | End: 2020-06-29

## 2020-06-22 RX ORDER — FLUCONAZOLE 150 MG/1
50 TABLET ORAL ONCE
Refills: 0 | Status: DISCONTINUED | OUTPATIENT
Start: 2020-06-22 | End: 2020-06-23

## 2020-06-22 RX ORDER — SODIUM ZIRCONIUM CYCLOSILICATE 10 G/10G
10 POWDER, FOR SUSPENSION ORAL DAILY
Refills: 0 | Status: DISCONTINUED | OUTPATIENT
Start: 2020-06-22 | End: 2020-06-29

## 2020-06-22 RX ORDER — ALBUTEROL 90 UG/1
10 AEROSOL, METERED ORAL ONCE
Refills: 0 | Status: DISCONTINUED | OUTPATIENT
Start: 2020-06-22 | End: 2020-06-22

## 2020-06-22 RX ORDER — DEXTROSE 50 % IN WATER 50 %
50 SYRINGE (ML) INTRAVENOUS ONCE
Refills: 0 | Status: DISCONTINUED | OUTPATIENT
Start: 2020-06-22 | End: 2020-06-22

## 2020-06-22 RX ORDER — HYDROMORPHONE HYDROCHLORIDE 2 MG/ML
1.5 INJECTION INTRAMUSCULAR; INTRAVENOUS; SUBCUTANEOUS
Refills: 0 | Status: DISCONTINUED | OUTPATIENT
Start: 2020-06-22 | End: 2020-06-24

## 2020-06-22 RX ORDER — HYDROMORPHONE HYDROCHLORIDE 2 MG/ML
4 INJECTION INTRAMUSCULAR; INTRAVENOUS; SUBCUTANEOUS
Refills: 0 | Status: DISCONTINUED | OUTPATIENT
Start: 2020-06-22 | End: 2020-06-22

## 2020-06-22 RX ORDER — ACETAMINOPHEN 500 MG
1000 TABLET ORAL ONCE
Refills: 0 | Status: COMPLETED | OUTPATIENT
Start: 2020-06-22 | End: 2020-06-22

## 2020-06-22 RX ORDER — MINERAL OIL
133 OIL (ML) MISCELLANEOUS ONCE
Refills: 0 | Status: DISCONTINUED | OUTPATIENT
Start: 2020-06-22 | End: 2020-06-22

## 2020-06-22 RX ORDER — SODIUM ZIRCONIUM CYCLOSILICATE 10 G/10G
10 POWDER, FOR SUSPENSION ORAL ONCE
Refills: 0 | Status: DISCONTINUED | OUTPATIENT
Start: 2020-06-22 | End: 2020-06-22

## 2020-06-22 RX ORDER — INSULIN HUMAN 100 [IU]/ML
7 INJECTION, SOLUTION SUBCUTANEOUS ONCE
Refills: 0 | Status: DISCONTINUED | OUTPATIENT
Start: 2020-06-22 | End: 2020-06-22

## 2020-06-22 RX ORDER — CEFAZOLIN SODIUM 1 G
1000 VIAL (EA) INJECTION EVERY 24 HOURS
Refills: 0 | Status: COMPLETED | OUTPATIENT
Start: 2020-06-22 | End: 2020-06-29

## 2020-06-22 RX ADMIN — HYDROMORPHONE HYDROCHLORIDE 1 MILLIGRAM(S): 2 INJECTION INTRAMUSCULAR; INTRAVENOUS; SUBCUTANEOUS at 09:19

## 2020-06-22 RX ADMIN — POLYETHYLENE GLYCOL 3350 17 GRAM(S): 17 POWDER, FOR SOLUTION ORAL at 05:22

## 2020-06-22 RX ADMIN — HYDROMORPHONE HYDROCHLORIDE 1 MILLIGRAM(S): 2 INJECTION INTRAMUSCULAR; INTRAVENOUS; SUBCUTANEOUS at 01:49

## 2020-06-22 RX ADMIN — SODIUM ZIRCONIUM CYCLOSILICATE 10 GRAM(S): 10 POWDER, FOR SUSPENSION ORAL at 05:22

## 2020-06-22 RX ADMIN — CHLORHEXIDINE GLUCONATE 1 APPLICATION(S): 213 SOLUTION TOPICAL at 05:22

## 2020-06-22 RX ADMIN — HEPARIN SODIUM 5000 UNIT(S): 5000 INJECTION INTRAVENOUS; SUBCUTANEOUS at 21:53

## 2020-06-22 RX ADMIN — HYDROMORPHONE HYDROCHLORIDE 6 MILLIGRAM(S): 2 INJECTION INTRAMUSCULAR; INTRAVENOUS; SUBCUTANEOUS at 18:57

## 2020-06-22 RX ADMIN — HYDROMORPHONE HYDROCHLORIDE 30 MILLILITER(S): 2 INJECTION INTRAMUSCULAR; INTRAVENOUS; SUBCUTANEOUS at 07:13

## 2020-06-22 RX ADMIN — Medication 400 MILLIGRAM(S): at 20:00

## 2020-06-22 RX ADMIN — HYDROMORPHONE HYDROCHLORIDE 1.5 MILLIGRAM(S): 2 INJECTION INTRAMUSCULAR; INTRAVENOUS; SUBCUTANEOUS at 15:41

## 2020-06-22 RX ADMIN — NICARDIPINE HYDROCHLORIDE 30 MILLIGRAM(S): 30 CAPSULE, EXTENDED RELEASE ORAL at 05:22

## 2020-06-22 RX ADMIN — HYDROMORPHONE HYDROCHLORIDE 1.5 MILLIGRAM(S): 2 INJECTION INTRAMUSCULAR; INTRAVENOUS; SUBCUTANEOUS at 20:20

## 2020-06-22 RX ADMIN — GABAPENTIN 200 MILLIGRAM(S): 400 CAPSULE ORAL at 05:22

## 2020-06-22 RX ADMIN — Medication 2 MILLIGRAM(S): at 21:52

## 2020-06-22 RX ADMIN — QUETIAPINE FUMARATE 25 MILLIGRAM(S): 200 TABLET, FILM COATED ORAL at 21:53

## 2020-06-22 RX ADMIN — SEVELAMER CARBONATE 800 MILLIGRAM(S): 2400 POWDER, FOR SUSPENSION ORAL at 14:10

## 2020-06-22 RX ADMIN — Medication 650 MILLIGRAM(S): at 08:24

## 2020-06-22 RX ADMIN — SODIUM ZIRCONIUM CYCLOSILICATE 10 GRAM(S): 10 POWDER, FOR SUSPENSION ORAL at 11:58

## 2020-06-22 RX ADMIN — Medication 650 MILLIGRAM(S): at 14:11

## 2020-06-22 RX ADMIN — METHADONE HYDROCHLORIDE 5 MILLIGRAM(S): 40 TABLET ORAL at 17:56

## 2020-06-22 RX ADMIN — HYDROMORPHONE HYDROCHLORIDE 6 MILLIGRAM(S): 2 INJECTION INTRAMUSCULAR; INTRAVENOUS; SUBCUTANEOUS at 22:30

## 2020-06-22 RX ADMIN — HYDROMORPHONE HYDROCHLORIDE 1 MILLIGRAM(S): 2 INJECTION INTRAMUSCULAR; INTRAVENOUS; SUBCUTANEOUS at 04:15

## 2020-06-22 RX ADMIN — Medication 650 MILLIGRAM(S): at 02:05

## 2020-06-22 RX ADMIN — SEVELAMER CARBONATE 800 MILLIGRAM(S): 2400 POWDER, FOR SUSPENSION ORAL at 21:52

## 2020-06-22 RX ADMIN — Medication 1 MILLIGRAM(S): at 15:20

## 2020-06-22 RX ADMIN — HEPARIN SODIUM 5000 UNIT(S): 5000 INJECTION INTRAVENOUS; SUBCUTANEOUS at 15:21

## 2020-06-22 RX ADMIN — SEVELAMER CARBONATE 800 MILLIGRAM(S): 2400 POWDER, FOR SUSPENSION ORAL at 05:22

## 2020-06-22 RX ADMIN — HYDROMORPHONE HYDROCHLORIDE 1.5 MILLIGRAM(S): 2 INJECTION INTRAMUSCULAR; INTRAVENOUS; SUBCUTANEOUS at 12:22

## 2020-06-22 RX ADMIN — NICARDIPINE HYDROCHLORIDE 30 MILLIGRAM(S): 30 CAPSULE, EXTENDED RELEASE ORAL at 21:52

## 2020-06-22 RX ADMIN — HYDROMORPHONE HYDROCHLORIDE 6 MILLIGRAM(S): 2 INJECTION INTRAMUSCULAR; INTRAVENOUS; SUBCUTANEOUS at 14:10

## 2020-06-22 RX ADMIN — HEPARIN SODIUM 5000 UNIT(S): 5000 INJECTION INTRAVENOUS; SUBCUTANEOUS at 05:21

## 2020-06-22 RX ADMIN — METHADONE HYDROCHLORIDE 5 MILLIGRAM(S): 40 TABLET ORAL at 05:22

## 2020-06-22 RX ADMIN — GABAPENTIN 200 MILLIGRAM(S): 400 CAPSULE ORAL at 14:11

## 2020-06-22 RX ADMIN — NICARDIPINE HYDROCHLORIDE 30 MILLIGRAM(S): 30 CAPSULE, EXTENDED RELEASE ORAL at 14:10

## 2020-06-22 RX ADMIN — ALBUTEROL 10 MILLIGRAM(S): 90 AEROSOL, METERED ORAL at 03:52

## 2020-06-22 RX ADMIN — GABAPENTIN 200 MILLIGRAM(S): 400 CAPSULE ORAL at 21:53

## 2020-06-22 RX ADMIN — TIZANIDINE 1 MILLIGRAM(S): 4 TABLET ORAL at 15:21

## 2020-06-22 NOTE — PROGRESS NOTE ADULT - ATTENDING COMMENTS
Agree with notes of health care providers on my service (PAs, Residents and House Staff).  The patient is in SICU with diagnosis mentioned in the note.    The SICU team has a constant risk benefit analyzes discussion with the primary team, all consultants, House Staff and PA's.  29M w/ PMH of bipolar depression, s/p found down with acute renal failure due to rhabdomyolysis, s/p four compartment fasciotomy and lateral/medial thigh fasciotomies on HD. Now s/p fasciotomy closure   I have personally examined the patient, reviewed data and laboratory tests/x-rays and all pertinent electronic images.    EXAM:  NEUROLOGY  Exam: Normal, NAD, alert, oriented x3, no focal deficits. PERRLA.   Respiratory  Exam: Lungs clear to auscultation,  CARDIOVASCULAR  Exam: S1, S2.  Regular rate and rhythm.    GI/NUTRITION  Exam: Abdomen soft, Non-tender, Non-distended.    Current Diet: Regular diet  The assessment and plan is specified below:  Neuro hx of bipolar depression  - A&O x 3  - Pain Control: dilaudid PCA, precedex gtt, off katemine gtt  - Xanax 1 mg q8 PRN   - Seroquel QHS  - Increased gabapentin, standing tizanidine per Pain service  - Psych following, no plan to restart home lithium  - Pain following, appreciate recommendations    Resp  - RA   - Mobilize, OOB to chair  - Obtain Chest XR    CVS  - Appropriate mental status, appears well perfused   - c/w nicardipine 30 mg PO q8 hrs   - if HTN again, rec'd clonidine .1mg by addiction psych    GI  - Regular diet with fluid restriction 1L for hyponatremia   - Bowel regimen senna 2 tabs qHS  - Patient declining miralax, last BM 06/17 prior to that > 1w      - ANTOINETTE in setting of rhabdomyolysis  - CK down to <2000  - Remains hypervolemic, oliguric but UOP on an upward trend  - IV locked   -hyperkalemic to 6.8  -R fem shiley placed, HD  - Plan for permacath once afebrile  - Nephrology following    Heme  - H&H 7.4/22; received 1u PRBC for hemoglobin 7 on 6/19  - SQH for dvt ppx    ID  - Febrile to TMax 101.5  - Leukocytosis of 21  - Continue to monitor fever curve    Endo  - monitor FGS    Lines  - Right radial a line  -Right femoral shiley

## 2020-06-22 NOTE — PROGRESS NOTE ADULT - ATTENDING COMMENTS
ASHTYN Sauer MD have seen and examined the patient today and agree with  the  evaluation, assessment and plan of the surgical house officer  ASHTYN Sauer MD have personally seen and examined the patient at bedside today at 2pm

## 2020-06-22 NOTE — PROGRESS NOTE ADULT - SUBJECTIVE AND OBJECTIVE BOX
Anesthesia Pain Management Service    SUBJECTIVE: I'm still in pain.    Pain Scale Score	At rest: _8__ 	With Activity: ___ 	[X ] Refer to charted pain scores    THERAPY:    [ ] IV PCA Morphine		[ ] 5 mg/mL	[ ] 1 mg/mL  [X ] IV PCA Hydromorphone	[ ] 5 mg/mL	[X ] 1 mg/mL  [ ] IV PCA Fentanyl		[ ] 50 micrograms/mL    Demand dose __0.4_ lockout __10_ (minutes) Continuous Rate _0__ Total: _29.2__   mg used (in past 24 hrs)      MEDICATIONS  (STANDING):  acetaminophen   Tablet .. 650 milliGRAM(s) Oral every 6 hours  chlorhexidine 4% Liquid 1 Application(s) Topical <User Schedule>  gabapentin 200 milliGRAM(s) Oral three times a day  heparin   Injectable 5000 Unit(s) SubCutaneous every 8 hours  HYDROmorphone PCA (1 mG/mL) 30 milliLiter(s) PCA Continuous PCA Continuous  methadone    Tablet 5 milliGRAM(s) Oral two times a day  niCARdipine 30 milliGRAM(s) Oral every 8 hours  polyethylene glycol 3350 17 Gram(s) Oral two times a day  QUEtiapine 25 milliGRAM(s) Oral at bedtime  senna 2 Tablet(s) Oral at bedtime  sevelamer carbonate 800 milliGRAM(s) Oral every 8 hours  sodium zirconium cyclosilicate 10 Gram(s) Oral daily    MEDICATIONS  (PRN):  ALPRAZolam 1 milliGRAM(s) Oral every 8 hours PRN anxiety  HYDROmorphone PCA (1 mG/mL) Rescue Clinician Bolus 1 milliGRAM(s) IV Push every 15 minutes PRN for Pain Scale GREATER THAN 6  naloxone Injectable 0.1 milliGRAM(s) IV Push every 3 minutes PRN For ANY of the following changes in patient status:  A. RR LESS THAN 10 breaths per minute, B. Oxygen saturation LESS THAN 90%, C. Sedation score of 6  ondansetron Injectable 4 milliGRAM(s) IV Push every 6 hours PRN Nausea  sodium chloride 0.9% lock flush 10 milliLiter(s) IV Push every 1 hour PRN Pre/post blood products, medications, blood draw, and to maintain line patency  tiZANidine 1 milliGRAM(s) Oral every 6 hours PRN muscle spasm and pain      OBJECTIVE: sitting in chair     Sedation Score:	[ ] Alert	[X ] Drowsy 	[ ] Arousable	[ ] Asleep	[ ] Unresponsive    Side Effects:	[X ] None	[ ] Nausea	[ ] Vomiting	[ ] Pruritus  		[ ] Other:    Vital Signs Last 24 Hrs  T(C): 37.3 (22 Jun 2020 08:00), Max: 38.6 (21 Jun 2020 23:25)  T(F): 99.2 (22 Jun 2020 08:00), Max: 101.4 (21 Jun 2020 23:25)  HR: 116 (22 Jun 2020 08:00) (101 - 125)  BP: 144/70 (22 Jun 2020 08:00) (105/89 - 182/92)  BP(mean): 88 (22 Jun 2020 08:00) (75 - 113)  RR: 16 (22 Jun 2020 08:00) (11 - 22)  SpO2: 97% (22 Jun 2020 08:00) (96% - 100%)    ASSESSMENT/ PLAN    Therapy to  be:	[ ] Continue   [ X] Discontinued   [X ] Change to prn Analgesics    Documentation and Verification of current medications:   [X] Done	[ ] Not done, not elligible    Comments: Discussed with patient changing over to oral pain meds to better control pain, pt. states oxycodone didn't work for him last time even though he takes it at home for his headaches. Informed pt. that we will change to oral dilaudid as he states the IV dilaudid has been helping with his pain the most. Will d/c PCA by noon to and start on oral regimen.     Progress Note written now but Patient was seen earlier.

## 2020-06-22 NOTE — PROGRESS NOTE ADULT - SUBJECTIVE AND OBJECTIVE BOX
events noted  left leg and thigh wounds with vac surrounding skin ok no cellulitis  MEÑO drains serosang  Swelling overall improved    Cont VAC and Drains  Cont Abx  Leg Elevation  No eight bearing restrictions

## 2020-06-22 NOTE — PROGRESS NOTE ADULT - SUBJECTIVE AND OBJECTIVE BOX
Interval events: s/p primary closure LLE fasciotomy sites , Prevena incisional vac placed to all four sites. Pain management following for post-op pain control and behavioral health following for anxiety. Vascular access was removed d/t fever,  R fem HD catheter placed. Continues to receive HD.     Patient seen and examined, sitting in chair. Reports pain is 6/10, he is concerned his pain will not be well controlled off of the PCA which was recently removed.     FUNCTIONAL PROGRESS   PT:  Bed Mobility  Bed Mobility Training Supine-to-Sit: maximum assist (25% patient effort);  2 person assist  Bed Mobility Training Limitations: decreased strength    Bed-Chair Transfer Training  Transfer Training Bed-to-Chair Transfer: maximum assist (25% patient effort);  2 person assist;  verbal cues;  full weight-bearing   rolling walker  Bed-to-Chair Transfer Training Transfer Safety Analysis: decreased strength    Sit-Stand Transfer Training  Transfer Training Sit-to-Stand Transfer: maximum assist (25% patient effort);  2 person assist;  full weight-bearing   rolling walker  Transfer Training Stand-to-Sit Transfer: maximum assist (25% patient effort);  2 person assist;  verbal cues;  full weight-bearing   rolling walker  Sit-to-Stand Transfer Training Transfer Safety Analysis: decreased strength    Gait Training  Gait Training: maximum assist (25% patient effort);  2 person assist;  verbal cues;  full weight-bearing   rolling walker;  5 feet;  bed to chair  Gait Analysis: 3-point gait   decreased strength    REVIEW OF SYSTEMS  Constitutional - No fever, No weight loss, No fatigue  HEENT - No eye pain, No visual disturbances, No difficulty hearing, No tinnitus, No vertigo, No neck pain  Respiratory - No cough, No wheezing, No shortness of breath  Cardiovascular - No chest pain, No palpitations  Gastrointestinal - No abdominal pain, No nausea, No vomiting, No diarrhea, No constipation  Genitourinary - No dysuria, No frequency, No hematuria, No incontinence  Neurological - No headaches, No memory loss, No loss of strength, No numbness, No tremors  Skin - No itching, No rashes, No lesions   Endocrine - No temperature intolerance  Musculoskeletal - LUE swelling, LLE pain and swelling  Psychiatric - h/o bipolar disorder    VITALS  T(C): 37.3 (20 @ 12:00), Max: 38.6 (20 @ 23:25)  HR: 110 (20 @ 12:00) (102 - 125)  BP: 152/102 (20 @ 12:00) (120/81 - 182/92)  RR: 22 (20 @ 12:00) (11 - 22)  SpO2: 99% (20 @ 12:00) (96% - 100%)  Wt(kg): --    PHYSICAL EXAM  Constitutional: NAD, Comfortable  HEENT: NCAT, EOMI  Neck: Supple, No limited ROM  Chest: Breathing comfortably, No wheezing  Cardiovascular: S1S2   Abdomen: Soft   Extremities: LUE swelling   Neurologic Exam:                    Cognitive: Awake, Alert, AAO to self, place, date, year, situation     Communication: Fluent, No dysarthria     Cranial Nerves: CN 2-12 intact     Motor:                    LEFT    UE - ShAB 5/5, EF 5/5, EE 5/5, WE 5/5,  5/5                    RIGHT UE - ShAB 5/5, EF 5/5, EE 5/5, WE 5/5,  5/5                    LEFT    LE - Minimal movement limited by pain and swelling, + wound vacs in place with serosanguinous drainage                    RIGHT LE - HF 5/5, KE 5/5, DF 5/5, PF 5/5        Sensory: Intact to LT     Reflexes: DTR Intact, No primitive reflex     Coordination: FTN intact  Psychiatric: Mood stable, Affect WNL    RECENT LABS - Reviewed                        8.1    29.99 )-----------( 413      ( 2020 04:30 )             24.3     06-22    130<L>  |  92<L>  |  69<H>  ----------------------------<  124<H>  5.5<H>   |  24  |  5.66<H>    Ca    8.1<L>      2020 04:30  Phos  5.9     -  Mg     2.1     -    PT/INR - ( 2020 04:30 )   PT: 11.7 SEC;   INR: 1.03       PTT - ( 2020 04:30 )  PTT:28.4 SEC  Urinalysis Basic - ( 2020 10:45 )    Color: LIGHT YELLOW / Appearance: CLEAR / S.015 / pH: 7.5  Gluc: NEGATIVE / Ketone: NEGATIVE  / Bili: NEGATIVE / Urobili: NORMAL   Blood: MODERATE / Protein: 200 / Nitrite: NEGATIVE   Leuk Esterase: NEGATIVE / RBC: 3-5 / WBC 6-10   Sq Epi: FEW / Non Sq Epi: x / Bacteria: FEW    MEDICATIONS  acetaminophen   Tablet .. 650 milliGRAM(s) Oral every 6 hours  ALPRAZolam 1 milliGRAM(s) Oral every 8 hours PRN  chlorhexidine 4% Liquid 1 Application(s) Topical <User Schedule>  gabapentin 200 milliGRAM(s) Oral three times a day  heparin   Injectable 5000 Unit(s) SubCutaneous every 8 hours  HYDROmorphone   Tablet 4 milliGRAM(s) Oral every 3 hours PRN  HYDROmorphone   Tablet 6 milliGRAM(s) Oral every 3 hours PRN  HYDROmorphone  Injectable 1.5 milliGRAM(s) IV Push every 3 hours PRN  LORazepam     Tablet 1 milliGRAM(s) Oral <User Schedule>  LORazepam     Tablet 2 milliGRAM(s) Oral <User Schedule>  methadone    Tablet 5 milliGRAM(s) Oral two times a day  mineral oil enema 133 milliLiter(s) Rectal once  niCARdipine 30 milliGRAM(s) Oral every 8 hours  polyethylene glycol 3350 17 Gram(s) Oral two times a day  QUEtiapine 25 milliGRAM(s) Oral at bedtime  senna 2 Tablet(s) Oral at bedtime  sevelamer carbonate 800 milliGRAM(s) Oral every 8 hours  sodium chloride 0.9% lock flush 10 milliLiter(s) IV Push every 1 hour PRN  sodium zirconium cyclosilicate 10 Gram(s) Oral daily  tiZANidine 1 milliGRAM(s) Oral every 6 hours PRN  -------------------------------------  ASSESSMENT/PLAN  29y Male with functional deficits after ATN due to rhabdomyolysis c/b LLE compartment syndrome s/p four compartment fasciotomy.  LLE compartment syndrome s/p fasciotomy: pending RTOR for closure  ATN 2/ Rhabdo: Renal following, receiving HD. Pending tunneled cathether placement by IR  Hyponatremia: Fluid restriction  Hypocalcemia: Renal following, receiving HD  Bipolar DIsorder: Psych following. On xanax + ativan, adderall and lithium held. Seroquel QHS  HTN: Nicardipine  Pain: Tylenol, Gabapentin, Dilaudid PO + IV PRN, Methadone, tizanidine for muscle spasm  Bowel: Senna, Miralax, Mineral oil enema  DVT PPX: Heparin    Rehab:   - LUE swelling: Recommend arm elevation while in bed, continue PT for ROM  - Continue PT for mobilization, OOB to chair, likely needs pain medication prior to sessions to increase tolerability  - Will continue to follow for ongoing rehab needs and recommendations.   - Rehab disposition pending progress in therapy, if patient able to complete crutch training will consider discharge home with outpatient PT.

## 2020-06-22 NOTE — PROGRESS NOTE ADULT - ATTENDING COMMENTS
Patient seen and examined and cased discussed with resident. Agree with recommendations.   patient encouraged to stretch and move arms/shoulders when sitting in chair/bed   will continue to monitor progress with PT/OT  pain control

## 2020-06-22 NOTE — PROGRESS NOTE ADULT - ASSESSMENT
29M with PMH of bipolar depression transferred from [facility] for further management of acute renal failure due to rhabdomyolysis, Taken to the OR for LLE compartment syndrome. Now s/p four compartment fasciotomy of the left lower leg 6/11. s/p closure with plastics on 6/19. Doing well today.     Plan  - pt stable from vascular surgery perspective  - pain control PRN  - diet as tolerated  - c/w vac dressings  - continue HD as per Renal - Permacath when afebrile   - DVT ppx   - OOB to chair as tolerated, PT consult for mobilization  - IR for Permacath today  - Appreciate SICU care  will follow    C Team Surgery  [id number] 29M with PMH of bipolar depression transferred from [facility] for further management of acute renal failure due to rhabdomyolysis, Taken to the OR for LLE compartment syndrome. Now s/p four compartment fasciotomy of the left lower leg 6/11. s/p closure with plastics on 6/19. Doing well today.     Plan  - pt stable from vascular surgery perspective  - pain control PRN  - diet as tolerated  - c/w vac dressings  - continue HD as per Renal - Permacath when afebrile   - DVT ppx   - OOB to chair as tolerated, PT consult for mobilization  - IR for Permacath today  - Appreciate SICU care  pt remains w elevated wbc and fevers  recommend ID consult       C Team Surgery  [id number]

## 2020-06-22 NOTE — PROGRESS NOTE ADULT - ASSESSMENT
A/P:     28 y/o M in SICU following found down, presenting with rhabdomyolysis/ compartment syndrome with fasciotomies to L LE X 4 now POD 4 from fasciotomy site closures:     - Continue Prevena wound vacs to incisions, vacs will stay in place for approximately 10 days  - Continue MEÑO drains, monitor output   - Pain control   - LE elevated   - Management per SICU     Plastic Surgery   [id number]

## 2020-06-22 NOTE — PROGRESS NOTE ADULT - SUBJECTIVE AND OBJECTIVE BOX
WHITLEY HELD  [id number]    Subjective:    Patient seen and examined, doing well.  pain under control, states up in chair yesterday. Wound vacs holding suction.        Objective:  T(C): 37.3 (06-22-20 @ 04:00), Max: 38.6 (06-21-20 @ 23:25)  HR: 116 (06-22-20 @ 08:00) (101 - 125)  BP: 125/62 (06-22-20 @ 06:00) (105/89 - 182/92)  RR: 16 (06-22-20 @ 08:00) (11 - 22)  SpO2: 97% (06-22-20 @ 08:00) (96% - 100%)  Wt(kg): --   06-22    130<L>  |  92<L>  |  69<H>  ----------------------------<  124<H>  5.5<H>   |  24  |  5.66<H>    Ca    8.1<L>      22 Jun 2020 04:30  Phos  5.9     06-22  Mg     2.1     06-22                          8.1    29.99 )-----------( 413      ( 22 Jun 2020 04:30 )             24.3       06-21 @ 07:01  -  06-22 @ 07:00  --------------------------------------------------------  IN: 1852.3 mL / OUT: 4530 mL / NET: -2677.7 mL      PHYSICAL EXAM:    General: NAD   LE: Prevena vacs in place and holding suction, LE with ace wrap in place no strikethrough. MEÑO SS.        MEDICATIONS  (STANDING):  acetaminophen   Tablet .. 650 milliGRAM(s) Oral every 6 hours  chlorhexidine 4% Liquid 1 Application(s) Topical <User Schedule>  dexMEDEtomidine Infusion 0.2 MICROgram(s)/kG/Hr (5.24 mL/Hr) IV Continuous <Continuous>  gabapentin 200 milliGRAM(s) Oral three times a day  heparin   Injectable 5000 Unit(s) SubCutaneous every 8 hours  HYDROmorphone PCA (1 mG/mL) 30 milliLiter(s) PCA Continuous PCA Continuous  methadone    Tablet 5 milliGRAM(s) Oral two times a day  niCARdipine 30 milliGRAM(s) Oral every 8 hours  polyethylene glycol 3350 17 Gram(s) Oral two times a day  QUEtiapine 25 milliGRAM(s) Oral at bedtime  senna 2 Tablet(s) Oral at bedtime  sevelamer carbonate 800 milliGRAM(s) Oral every 8 hours  sodium zirconium cyclosilicate 10 Gram(s) Oral daily    MEDICATIONS  (PRN):  ALPRAZolam 1 milliGRAM(s) Oral every 8 hours PRN anxiety  HYDROmorphone PCA (1 mG/mL) Rescue Clinician Bolus 1 milliGRAM(s) IV Push every 15 minutes PRN for Pain Scale GREATER THAN 6  naloxone Injectable 0.1 milliGRAM(s) IV Push every 3 minutes PRN For ANY of the following changes in patient status:  A. RR LESS THAN 10 breaths per minute, B. Oxygen saturation LESS THAN 90%, C. Sedation score of 6  ondansetron Injectable 4 milliGRAM(s) IV Push every 6 hours PRN Nausea  sodium chloride 0.9% lock flush 10 milliLiter(s) IV Push every 1 hour PRN Pre/post blood products, medications, blood draw, and to maintain line patency  tiZANidine 1 milliGRAM(s) Oral every 6 hours PRN muscle spasm and pain

## 2020-06-22 NOTE — PROGRESS NOTE ADULT - SUBJECTIVE AND OBJECTIVE BOX
SICU Daily Progress Note  =====================================================  HPI:   29M with PMH of bipolar depression transferred from [facility] for further management of acute renal failure due to rhabdomyolysis. Pt was found unresponsive at home, lying on his L side. He states he has not been taking his Lithium for at least 2 wks. At the OSH, he was treated for hyperkalemia. He received D5, insulin, calcium gluconate, Kayexalate Last BM was on 6/9. For L leg pain he was given lidocaine patch, dilaudid 1mg prn and tramadol 50mg. Hydralazine 20mg given for HTN. From 1 to 7 am only 100cc of UOP. Urine was tea colored. 500cc bolus of NS was given. CT cervical spine at OSH was (-). CT Head (-). CTAP and CXR were (-). CK on admission there was 177,000.   Pt was seen by surgery team in MICU for LUE swelling and compartment syndrome was ruled out. Surgery team was re-consulted today due to new symptoms of L foot drop and numbness. Pt was found to be highly suspicious for compartment syndrome in the upper thigh, and was emergently taken to OR.   Pt is now s/p four compartment fasciotomy of LLE, and left lateral and medial thigh fasciotomies. SICU consulted for q1h neurovascular checks and hemodynamic monitoring. Now s/p closure.      Interval/Overnight Events:      -Hyperkalemic to 6.8 despite lactulose and lokelma  -R femoral shiley placed for HD  -HD overnight     HISTORY  29y Male  Allergies: No Known Allergies    PAST MEDICAL & SURGICAL HISTORY:  Bipolar depression  Migraine  Displaced fracture of proximal phalanx of left little finger  Difficulty sleeping  ADD (attention deficit disorder)  History of facial surgery: Fadi-Facial Realignment Surgery in 2008  History of fasciotomy  Compartment syndrome, nontraumatic, lower extremity  Hyponatremia  Traumatic rhabdomyolysis, sequela  Bipolar 1 disorder  Compartment syndrome of left lower extremity, initial encounter  Compartment syndrome of thigh  Compartment syndrome of buttock  CKD (chronic kidney disease) stage 5, GFR less than 15 ml/min  Hypocalcemia  Hyperkalemia  ANTOINETTE (acute kidney injury)  Insertion, catheter, dialysis, temporary  Closure, wound, delayed  Fasciotomy, thigh  Decompression fasciotomy, lower extremity  Gluteal-iliotibial fasciotomy  Fasciotomy of left hip  Fasciotomy, thigh      MEDICATIONS:   --------------------------------------------------------------------------------------  Neurologic Medications  acetaminophen   Tablet .. 650 milliGRAM(s) Oral every 6 hours  ALPRAZolam 1 milliGRAM(s) Oral every 8 hours PRN anxiety  dexMEDEtomidine Infusion 0.2 MICROgram(s)/kG/Hr IV Continuous <Continuous>  gabapentin 200 milliGRAM(s) Oral three times a day  HYDROmorphone PCA (1 mG/mL) 30 milliLiter(s) PCA Continuous PCA Continuous  HYDROmorphone PCA (1 mG/mL) Rescue Clinician Bolus 1 milliGRAM(s) IV Push every 15 minutes PRN for Pain Scale GREATER THAN 6  methadone    Tablet 5 milliGRAM(s) Oral two times a day  ondansetron Injectable 4 milliGRAM(s) IV Push every 6 hours PRN Nausea  QUEtiapine 25 milliGRAM(s) Oral at bedtime  tiZANidine 1 milliGRAM(s) Oral every 6 hours PRN muscle spasm and pain    Respiratory Medications  ALBUTerol    0.083%. 10 milliGRAM(s) Nebulizer once    Cardiovascular Medications  niCARdipine 30 milliGRAM(s) Oral every 8 hours    Gastrointestinal Medications  polyethylene glycol 3350 17 Gram(s) Oral two times a day  senna 2 Tablet(s) Oral at bedtime  sodium chloride 0.9% lock flush 10 milliLiter(s) IV Push every 1 hour PRN Pre/post blood products, medications, blood draw, and to maintain line patency    Genitourinary Medications    Hematologic/Oncologic Medications  heparin   Injectable 5000 Unit(s) SubCutaneous every 8 hours    Antimicrobial/Immunologic Medications    Endocrine/Metabolic Medications    Topical/Other Medications  chlorhexidine 4% Liquid 1 Application(s) Topical <User Schedule>  naloxone Injectable 0.1 milliGRAM(s) IV Push every 3 minutes PRN For ANY of the following changes in patient status:  A. RR LESS THAN 10 breaths per minute, B. Oxygen saturation LESS THAN 90%, C. Sedation score of 6  sevelamer carbonate 800 milliGRAM(s) Oral every 8 hours  sodium zirconium cyclosilicate 10 Gram(s) Oral three times a day    --------------------------------------------------------------------------------------    VITAL SIGNS, INS/OUTS (last 24 hours):  --------------------------------------------------------------------------------------  T(C): 38.6 (06-21-20 @ 23:25), Max: 38.6 (06-21-20 @ 23:25)  HR: 122 (06-21-20 @ 23:25) (101 - 125)  BP: 162/87 (06-21-20 @ 23:25) (105/89 - 182/92)  BP(mean): 107 (06-21-20 @ 23:00) (85 - 113)  RR: 22 (06-21-20 @ 23:25) (9 - 22)  SpO2: 97% (06-21-20 @ 23:25) (96% - 100%)  CAPILLARY BLOOD GLUCOSE      POCT Blood Glucose.: 138 mg/dL (21 Jun 2020 22:01)  POCT Blood Glucose.: 114 mg/dL (21 Jun 2020 21:36)   N/A      06-20 @ 07:01  -  06-21 @ 07:00  --------------------------------------------------------  IN:    dexmedetomidine Infusion: 294.4 mL    IV PiggyBack: 100 mL    ketamine Infusion.: 16.3 mL    Oral Fluid: 1160 mL    Other: 400 mL  Total IN: 1970.7 mL    OUT:    Drain: 105 mL    Drain: 30 mL    Drain: 25 mL    Drain: 35 mL    Drain: 45 mL    Other: 3400 mL    Voided: 375 mL  Total OUT: 4015 mL    Total NET: -2044.3 mL      06-21 @ 07:01  -  06-22 @ 00:31  --------------------------------------------------------  IN:    dexmedetomidine Infusion: 52.3 mL    IV PiggyBack: 50 mL    Oral Fluid: 1000 mL  Total IN: 1102.3 mL    OUT:    Drain: 10 mL    Drain: 10 mL    Drain: 10 mL    Drain: 50 mL    Voided: 850 mL  Total OUT: 930 mL    Total NET: 172.3 mL  --------------------------------------------------------------------------------------    EXAM:  NEUROLOGY  Exam: Normal, NAD, alert, oriented x3, no focal deficits. PERRLA.   [x] Adequacy of sedation and pain control has been assessed and adjusted    RESPIRATORY  Exam: Lungs clear to auscultation, Normal expansion/effort.  [x] Extubation Readiness Assessed    CARDIOVASCULAR  Exam: S1, S2.  Regular rate and rhythm.    Cardiac Rhythm: Normal Sinus Rhythm      GI/NUTRITION  Exam: Abdomen soft, Non-tender, Non-distended.    Current Diet: Regular diet    METABOLIC/FLUIDS/ELECTROLYTES      HEMATOLOGIC  [x] DVT Prophylaxis: heparin   Injectable 5000 Unit(s) SubCutaneous every 8 hours    Transfusions:	[] PRBC	[] Platelets		[] FFP	[] Cryoprecipitate    INFECTIOUS DISEASE  Antimicrobials/Immunologic Medications:        Tubes/Lines/Drains  [x] Peripheral IV  [] Central Venous Line     	[] R	[] L	[] IJ	[] Fem	[] SC	Date Placed:   [] Arterial Line		[] R	[] L	[] Fem	[] Rad	[] Ax	Date Placed:   [] PICC		[] Midline		[] Mediport  [] Urinary Catheter		Date Placed:   [x] Necessity of urinary, arterial, and venous catheters discussed    VASCULAR  Exam: Extremities warm, pink, well-perfused. 2+ DP pulses palpable b/l. LLE with ACE dressing C/D/I, preveena x 2 in place with good suction. MEÑO x5 with SS output.    MUSCULOSKELETAL  Exam: All extremities moving spontaneously without limitations.    SKIN  Exam: Good skin turgor, no skin breakdown.    LABS  --------------------------------------------------------------------------------------  CBC (06-21 @ 04:45)                              7.3<L>                         24.18<H>  )----------------(  334        --    % Neutrophils, --    % Lymphocytes, ANC: --                                  22.3<L>    BMP (06-21 @ 18:30)             129<L>  |  91<L>   |  86<H> 		Ca++ --      Ca 8.0<L>             ---------------------------------( 117<H>		Mg 2.2                6.8<HH>  |  23      |  6.86<H>			Ph 7.6<H>  BMP (06-21 @ 04:45)             132<L>  |  95<L>   |  69<H> 		Ca++ --      Ca 7.9<L>             ---------------------------------( 108<H>		Mg 2.2                5.7<H>  |  23      |  6.05<H>			Ph 6.3<H>    -> .Blood Blood-Peripheral Culture (06-20 @ 21:18)     NG    NG    No growth to date.    -> .Blood Blood-Venous Culture (06-20 @ 21:14)     NG    NG    No growth to date.  --------------------------------------------------------------------------------------    OTHER LABORATORY:     IMAGING STUDIES:   CXR:

## 2020-06-22 NOTE — PROGRESS NOTE BEHAVIORAL HEALTH - SUMMARY
Patient is a 30 y/o single male, no significant PMHx, PPHx bipolar disorder, no prior inpatient hospitalizations, follows deisi/ Dr. White once every 2 weeks, no prior SA, no prior NSSIB, occasional cocaine and alcohol use, transferred from [facility] after presenting with acute renal failure 2/2 rhabdomyolysis after being found unresponsive at home by neighbors. Patient has received multiple HD since admission, and is s/p fasciotomy for compartment syndrome in E. Patient seems to be minimizing symptoms, states he feels good and has been handling all procedures well, with no excess anxiety. SICU team reports increased anxiety during the night, including pt crying. Responds well to Xanax. Pt okay with starting standing Ativan with Xanax PRN.

## 2020-06-22 NOTE — PROGRESS NOTE ADULT - SUBJECTIVE AND OBJECTIVE BOX
Anesthesia Pain Management Service- Attending Addendum    SUBJECTIVE: Patient's pain control adequate    Therapy:	  [ X] IV PCA	   [ ] Epidural           [ ] s/p Spinal Opoid              [ ] Postpartum infusion	  [ ] Patient controlled regional anesthesia (PCRA)    [ ] prn Analgesics    Allergies    No Known Allergies    Intolerances      MEDICATIONS  (STANDING):  acetaminophen   Tablet .. 650 milliGRAM(s) Oral every 6 hours  chlorhexidine 4% Liquid 1 Application(s) Topical <User Schedule>  gabapentin 200 milliGRAM(s) Oral three times a day  heparin   Injectable 5000 Unit(s) SubCutaneous every 8 hours  methadone    Tablet 5 milliGRAM(s) Oral two times a day  mineral oil enema 133 milliLiter(s) Rectal once  niCARdipine 30 milliGRAM(s) Oral every 8 hours  polyethylene glycol 3350 17 Gram(s) Oral two times a day  QUEtiapine 25 milliGRAM(s) Oral at bedtime  senna 2 Tablet(s) Oral at bedtime  sevelamer carbonate 800 milliGRAM(s) Oral every 8 hours  sodium zirconium cyclosilicate 10 Gram(s) Oral daily    MEDICATIONS  (PRN):  ALPRAZolam 1 milliGRAM(s) Oral every 8 hours PRN anxiety  HYDROmorphone   Tablet 4 milliGRAM(s) Oral every 3 hours PRN Moderate Pain (4 - 6)  HYDROmorphone   Tablet 6 milliGRAM(s) Oral every 3 hours PRN Severe Pain (7 - 10)  HYDROmorphone  Injectable 1.5 milliGRAM(s) IV Push every 3 hours PRN Severe Break Through  sodium chloride 0.9% lock flush 10 milliLiter(s) IV Push every 1 hour PRN Pre/post blood products, medications, blood draw, and to maintain line patency  tiZANidine 1 milliGRAM(s) Oral every 6 hours PRN muscle spasm and pain      OBJECTIVE:   [X] No new signs     [ ] Other:    Side Effects:  [X ] None			[ ] Other:      ASSESSMENT/PLAN  -Discontinue current therapy    [ ] Therapy changed to:    [ ] IV PCA       [ ] Epidural     [ X] prn Analgesics     Comments: Pain management per primary team, APS to sign off

## 2020-06-22 NOTE — PROGRESS NOTE ADULT - SUBJECTIVE AND OBJECTIVE BOX
Vascular Progress Note    S: Patient seen and examined. No acute events overnight. Reports tolerating diet without nausea, vomiting, passing flatus, having bowel movements, voiding without issues. Denies fever, chills, SOB, chest pain. Patient can wiggle toes without issue but states that he still feels significant discomfort when moving leg. Denies any itching or burning.    O:  Physical Exam:  General: NAD  Respiratory: no increased work of breathing   Abdomen: soft, nontender, nondistended  Extremities: LE ACE wrap in place no strikethrough. MEÑO SS  LLE gross motor intact from hip to toes, no signs of LLE arterial insufficiency   lle sensory deficit on left foot dorsum remains as stated above    Vital Signs Last 24 Hrs  T(C): 37.3 (06-22-20 @ 04:00), Max: 38.6 (06-21-20 @ 23:25)  HR: 115 (06-22-20 @ 05:00) (101 - 125)  BP: 125/62 (06-22-20 @ 06:00) (105/89 - 182/92)  BP(mean): 75 (06-22-20 @ 06:00) (75 - 113)  ABP: --  ABP(mean): --  RR: 19 (06-22-20 @ 06:00) (9 - 22)  SpO2: 96% (06-22-20 @ 06:00) (96% - 100%)  Wt(kg): --  CVP(mm Hg): --  CI: --  CAPILLARY BLOOD GLUCOSE      POCT Blood Glucose.: 138 mg/dL (21 Jun 2020 22:01)  POCT Blood Glucose.: 114 mg/dL (21 Jun 2020 21:36)   N/A      06-20 @ 07:01  -  06-21 @ 07:00  --------------------------------------------------------  IN:    dexmedetomidine Infusion: 294.4 mL    IV PiggyBack: 100 mL    ketamine Infusion.: 16.3 mL    Oral Fluid: 1160 mL    Other: 400 mL  Total IN: 1970.7 mL    OUT:    Drain: 105 mL    Drain: 30 mL    Drain: 25 mL    Drain: 35 mL    Drain: 45 mL    Other: 3400 mL    Voided: 375 mL  Total OUT: 4015 mL    Total NET: -2044.3 mL      06-21 @ 07:01  -  06-22 @ 06:39  --------------------------------------------------------  IN:    dexmedetomidine Infusion: 52.3 mL    IV PiggyBack: 50 mL    Oral Fluid: 1350 mL    Other: 400 mL  Total IN: 1852.3 mL    OUT:    Drain: 10 mL    Drain: 50 mL    Drain: 10 mL    Drain: 10 mL    Other: 3400 mL    Voided: 1050 mL  Total OUT: 4530 mL    Total NET: -2677.7 mL      Labs                                          8.1                   Neurophils% (auto):   x      (06-22 @ 04:30):    29.99)-----------(413          Lymphocytes% (auto):  x                                             24.3                   Eosinphils% (auto):   x        Manual%: Neutrophils x    ; Lymphocytes x    ; Eosinophils x    ; Bands%: x    ; Blasts x          06-22    130<L>  |  92<L>  |  69<H>  ----------------------------<  124<H>  5.5<H>   |  24  |  5.66<H>    Ca    8.1<L>      22 Jun 2020 04:30  Phos  5.9     06-22  Mg     2.1     06-22      ( 06-22 @ 04:30 )   PT: 11.7 SEC;   INR: 1.03   aPTT: 28.4 SEC        RECENT CULTURES:  06-20 @ 21:18 .Blood Blood-Peripheral     No growth to date.      06-20 @ 21:14 .Blood Blood-Venous     No growth to date.            PT/INR - ( 22 Jun 2020 04:30 )   PT: 11.7 SEC;   INR: 1.03          PTT - ( 22 Jun 2020 04:30 )  PTT:28.4 SEC Vascular Progress Note    S: Patient seen and examined. No acute events overnight. Reports tolerating diet without nausea, vomiting, passing flatus, having bowel movements, voiding without issues. Denies fever, chills, SOB, chest pain. Patient can wiggle toes without issue but states that he still feels significant discomfort when moving leg. Denies any itching or burning.  pt states left foot dorsum sensation is now 50% back to normal     O:  Physical Exam:  General: NAD  Respiratory: no increased work of breathing   Abdomen: soft, nontender, nondistended  Extremities: LE ACE wrap in place no strikethrough. MEÑO SS  LLE gross motor intact from hip to toes, no signs of LLE arterial insufficiency   lle sensory deficit on left foot dorsum remains as stated above    Vital Signs Last 24 Hrs  T(C): 37.3 (06-22-20 @ 04:00), Max: 38.6 (06-21-20 @ 23:25)  HR: 115 (06-22-20 @ 05:00) (101 - 125)  BP: 125/62 (06-22-20 @ 06:00) (105/89 - 182/92)  BP(mean): 75 (06-22-20 @ 06:00) (75 - 113)  ABP: --  ABP(mean): --  RR: 19 (06-22-20 @ 06:00) (9 - 22)  SpO2: 96% (06-22-20 @ 06:00) (96% - 100%)  Wt(kg): --  CVP(mm Hg): --  CI: --  CAPILLARY BLOOD GLUCOSE      POCT Blood Glucose.: 138 mg/dL (21 Jun 2020 22:01)  POCT Blood Glucose.: 114 mg/dL (21 Jun 2020 21:36)   N/A      06-20 @ 07:01  -  06-21 @ 07:00  --------------------------------------------------------  IN:    dexmedetomidine Infusion: 294.4 mL    IV PiggyBack: 100 mL    ketamine Infusion.: 16.3 mL    Oral Fluid: 1160 mL    Other: 400 mL  Total IN: 1970.7 mL    OUT:    Drain: 105 mL    Drain: 30 mL    Drain: 25 mL    Drain: 35 mL    Drain: 45 mL    Other: 3400 mL    Voided: 375 mL  Total OUT: 4015 mL    Total NET: -2044.3 mL      06-21 @ 07:01  -  06-22 @ 06:39  --------------------------------------------------------  IN:    dexmedetomidine Infusion: 52.3 mL    IV PiggyBack: 50 mL    Oral Fluid: 1350 mL    Other: 400 mL  Total IN: 1852.3 mL    OUT:    Drain: 10 mL    Drain: 50 mL    Drain: 10 mL    Drain: 10 mL    Other: 3400 mL    Voided: 1050 mL  Total OUT: 4530 mL    Total NET: -2677.7 mL      Labs                                          8.1                   Neurophils% (auto):   x      (06-22 @ 04:30):    29.99)-----------(413          Lymphocytes% (auto):  x                                             24.3                   Eosinphils% (auto):   x        Manual%: Neutrophils x    ; Lymphocytes x    ; Eosinophils x    ; Bands%: x    ; Blasts x          06-22    130<L>  |  92<L>  |  69<H>  ----------------------------<  124<H>  5.5<H>   |  24  |  5.66<H>    Ca    8.1<L>      22 Jun 2020 04:30  Phos  5.9     06-22  Mg     2.1     06-22      ( 06-22 @ 04:30 )   PT: 11.7 SEC;   INR: 1.03   aPTT: 28.4 SEC        RECENT CULTURES:  06-20 @ 21:18 .Blood Blood-Peripheral     No growth to date.      06-20 @ 21:14 .Blood Blood-Venous     No growth to date.      PT/INR - ( 22 Jun 2020 04:30 )   PT: 11.7 SEC;   INR: 1.03          PTT - ( 22 Jun 2020 04:30 )  PTT:28.4 SEC

## 2020-06-22 NOTE — PROGRESS NOTE BEHAVIORAL HEALTH - NSBHFUPINTERVALHXFT_PSY_A_CORE
Psych reconsulted due to increased anxiety at night  Patient seen at bedside. Pt sitting comfortably in chair. Patient is doing well. Denies any additional anxiety. States that he only asks for xanax at night to sleep. He endorses feeling stressed and overwhelmed by his medical issues, but this has been manageable and constant throughout. Pt does not feel stress from work or pressure to return to work. His dad Dr. Camacho has been visiting him frequently. Pt is relieved to have central line out of his neck and moved to his groin. Pt is okay with adding Ativan standing dose.    Per team, pt is very calm during the day but becomes extremely anxious overnight, including crying. Psych reconsulted due to increased anxiety at night.    Patient seen at bedside. Pt sitting comfortably in chair. Patient is doing well. Denies any additional anxiety. States that he only asks for xanax at night to sleep. He endorses feeling stressed and overwhelmed by his medical issues, but this has been manageable and constant throughout. Pt does not feel stress from work or pressure to return to work. His dad Dr. Camacho has been visiting him frequently. Pt is relieved to have central line out of his neck and moved to his groin. Pt is okay with adding Ativan standing dose.    Per team, pt is very calm during the day but becomes extremely anxious overnight, including crying.

## 2020-06-22 NOTE — PROGRESS NOTE ADULT - SUBJECTIVE AND OBJECTIVE BOX
[facility] DIVISION OF KIDNEY DISEASES AND HYPERTENSION -- FOLLOW UP NOTE  --------------------------------------------------------------------------------  HPI: 29-year-old male, with history bipolar depression was transferred from [facility] to [facility] for severe rhabdomyolysis. As per paper chart, pt was found to have cocaine and amphetamines in urine drug screen. Pt also with an elevated CPK ~ 180K. Nephrology team consulted for ANTOINETTE and rhabdomyolyiss. Upon review of labs on [facility], Scr was 1.11 on 8/23/2017. Pt. underwent fasciotomy on 6/11/20 for LLE compartement syndrome. Pt. had wound vac placed on  6/15/20.  Pt. underwent partial closure of fasciotomy by plastic surgery team on 6/19/20.     Pt. evaluated at bedside, in no distress. Yesterday, had HD catheter removed. Serum potassium was 5.7. Attempted to treat medically but serum potassium increased to 6.8 in the evening. Had femoral HD catheter replaced and was arranged for urgent HD. Pt. noted to have improved urine output today but serum potassium remains elevated.    PAST HISTORY  --------------------------------------------------------------------------------  No significant changes to PMH, PSH, FHx, SHx, unless otherwise noted    ALLERGIES & MEDICATIONS  --------------------------------------------------------------------------------  Allergies    No Known Allergies    Intolerances    Standing Inpatient Medications  acetaminophen   Tablet .. 650 milliGRAM(s) Oral every 6 hours  chlorhexidine 4% Liquid 1 Application(s) Topical <User Schedule>  dexMEDEtomidine Infusion 0.2 MICROgram(s)/kG/Hr IV Continuous <Continuous>  gabapentin 200 milliGRAM(s) Oral three times a day  heparin   Injectable 5000 Unit(s) SubCutaneous every 8 hours  HYDROmorphone PCA (1 mG/mL) 30 milliLiter(s) PCA Continuous PCA Continuous  methadone    Tablet 5 milliGRAM(s) Oral two times a day  niCARdipine 30 milliGRAM(s) Oral every 8 hours  polyethylene glycol 3350 17 Gram(s) Oral two times a day  QUEtiapine 25 milliGRAM(s) Oral at bedtime  senna 2 Tablet(s) Oral at bedtime  sevelamer carbonate 800 milliGRAM(s) Oral every 8 hours  sodium zirconium cyclosilicate 10 Gram(s) Oral daily    REVIEW OF SYSTEMS  --------------------------------------------------------------------------------  Gen: no lethargy  Respiratory: No dyspnea  CV: No chest pain  GI: No abdominal pain  MSK: + LE edema, scrotal edema  Neuro: No dizziness  Heme: No bleeding    All other systems were reviewed and are negative, except as noted.    VITALS/PHYSICAL EXAM  --------------------------------------------------------------------------------  T(C): 37.3 (06-22-20 @ 08:00), Max: 38.6 (06-21-20 @ 23:25)  HR: 116 (06-22-20 @ 08:00) (101 - 125)  BP: 144/70 (06-22-20 @ 08:00) (105/89 - 182/92)  RR: 16 (06-22-20 @ 08:00) (11 - 22)  SpO2: 97% (06-22-20 @ 08:00) (96% - 100%)  Wt(kg): --    06-21-20 @ 07:01  -  06-22-20 @ 07:00  --------------------------------------------------------  IN: 1852.3 mL / OUT: 4530 mL / NET: -2677.7 mL    Physical Exam:  	Gen: NAD  	HEENT: MMM   	Pulm: CTA B/L  	CV: S1S2  	Abd: Soft, +BS               Ext: left lower extremity in bandage with multiple drains, lower extremity and scrotal edema  	Neuro: Awake  	Skin: Warm and dry              Vascular: R femoral non tunneled HD catheter    LABS/STUDIES  --------------------------------------------------------------------------------              8.1    29.99 >-----------<  413      [06-22-20 @ 04:30]              24.3     130  |  92  |  69  ----------------------------<  124      [06-22-20 @ 04:30]  5.5   |  24  |  5.66        Ca     8.1     [06-22-20 @ 04:30]      Mg     2.1     [06-22-20 @ 04:30]      Phos  5.9     [06-22-20 @ 04:30]    Creatinine Trend:  SCr 5.66 [06-22 @ 04:30]  SCr 6.86 [06-21 @ 18:30]  SCr 6.05 [06-21 @ 04:45]  SCr 6.27 [06-20 @ 07:43]  SCr 5.65 [06-20 @ 00:26]    Urine Osmolality 234      [06-16-20 @ 10:43]    HBsAb 9.2      [06-11-20 @ 03:40]  HBsAb Nonreactive      [06-10-20 @ 21:47]  HBsAg NEGATIVE      [06-11-20 @ 03:40]  HCV 0.07, Nonreactive    [06-11-20 @ 03:40]  HIV Nonreactive  [06-10-20 @ 14:15]

## 2020-06-22 NOTE — PROGRESS NOTE ADULT - ASSESSMENT
29M w/ PMH of bipolar depression, s/p found down at home, transferred from [facility] for further management of acute renal failure due to rhabdomyolysis, s/p four compartment fasciotomy and lateral/medial thigh fasciotomies with ANTOINETTE on HD. Now s/p fasciotomy closure POD 1.    Neuro hx of bipolar depression  - A&O x 3  - Pain Control: dilaudid PCA, precedex gtt, off katemine gtt  - Xanax 1 mg q8 PRN   - Seroquel QHS  - Increased gabapentin, standing tizanidine per Pain service  - Psych following, no plan to restart home lithium  - Pain following, appreciate recommendations    Resp  - RA   - Mobilize, OOB to chair  - Obtain Chest XR    CVS  - Appropriate mental status, appears well perfused   - c/w nicardipine 30 mg PO q8 hrs   - if HTN again, rec'd clonidine .1mg by addiction psych    GI  - Regular diet with fluid restriction 1L for hyponatremia   - Bowel regimen senna 2 tabs qHS  - Patient declining miralax, last BM 06/17 prior to that > 1w      - ANTOINETTE in setting of rhabdomyolysis  - CK down to <2000  - Remains hypervolemic, oliguric but UOP on an upward trend  - IV locked   -hyperkalemic to 6.8  -R fem shiley placed, HD  - Plan for permacath once afebrile  - Nephrology following    Heme  - H&H 7.4/22; received 1u PRBC for hemoglobin 7 on 6/19  - SQH for dvt ppx    ID  - Febrile to TMax 101.5  - Leukocytosis of 21  - Continue to monitor fever curve    Endo  - monitor FGS    Lines  - Right radial a line  -Right femoral shiley

## 2020-06-22 NOTE — PROGRESS NOTE ADULT - PROBLEM SELECTOR PLAN 1
Pt with oliguric ANTOINETTE secondary to rhabdomyolysis. Yesterday, pt. had vascular access removed due to persistent fevers but serum potassium harika to 6.8 despite medical management. Had R femoral non tunneled catheter placed and arranged for urgent HD. Labs reviewed, will arrange for HD today with UF. Will assess daily for HD needs. Monitor labs and urine output. Avoid potential nephrotoxins.

## 2020-06-23 LAB
ANION GAP SERPL CALC-SCNC: 12 MMO/L — SIGNIFICANT CHANGE UP (ref 7–14)
ANION GAP SERPL CALC-SCNC: 13 MMO/L — SIGNIFICANT CHANGE UP (ref 7–14)
BUN SERPL-MCNC: 28 MG/DL — HIGH (ref 7–23)
BUN SERPL-MCNC: 50 MG/DL — HIGH (ref 7–23)
CALCIUM SERPL-MCNC: 8.2 MG/DL — LOW (ref 8.4–10.5)
CALCIUM SERPL-MCNC: 8.4 MG/DL — SIGNIFICANT CHANGE UP (ref 8.4–10.5)
CHLORIDE SERPL-SCNC: 92 MMOL/L — LOW (ref 98–107)
CHLORIDE SERPL-SCNC: 92 MMOL/L — LOW (ref 98–107)
CK SERPL-CCNC: 184 U/L — SIGNIFICANT CHANGE UP (ref 30–200)
CO2 SERPL-SCNC: 25 MMOL/L — SIGNIFICANT CHANGE UP (ref 22–31)
CO2 SERPL-SCNC: 28 MMOL/L — SIGNIFICANT CHANGE UP (ref 22–31)
CREAT SERPL-MCNC: 2.53 MG/DL — HIGH (ref 0.5–1.3)
CREAT SERPL-MCNC: 4.16 MG/DL — HIGH (ref 0.5–1.3)
CULTURE RESULTS: SIGNIFICANT CHANGE UP
DAT C3-SP REAG RBC QL: NEGATIVE — SIGNIFICANT CHANGE UP
DAT POLY-SP REAG RBC QL: NEGATIVE — SIGNIFICANT CHANGE UP
DIRECT COOMBS IGG: NEGATIVE — SIGNIFICANT CHANGE UP
GLUCOSE SERPL-MCNC: 112 MG/DL — HIGH (ref 70–99)
GLUCOSE SERPL-MCNC: 121 MG/DL — HIGH (ref 70–99)
HAPTOGLOB SERPL-MCNC: 244 MG/DL — HIGH (ref 34–200)
HCT VFR BLD CALC: 20.2 % — CRITICAL LOW (ref 39–50)
HCT VFR BLD CALC: 21.4 % — LOW (ref 39–50)
HCT VFR BLD CALC: 22.5 % — LOW (ref 39–50)
HGB BLD-MCNC: 6.5 G/DL — CRITICAL LOW (ref 13–17)
HGB BLD-MCNC: 7 G/DL — CRITICAL LOW (ref 13–17)
HGB BLD-MCNC: 7.5 G/DL — LOW (ref 13–17)
LDH SERPL L TO P-CCNC: 322 U/L — HIGH (ref 135–225)
MAGNESIUM SERPL-MCNC: 1.6 MG/DL — SIGNIFICANT CHANGE UP (ref 1.6–2.6)
MAGNESIUM SERPL-MCNC: 1.9 MG/DL — SIGNIFICANT CHANGE UP (ref 1.6–2.6)
MCHC RBC-ENTMCNC: 26.9 PG — LOW (ref 27–34)
MCHC RBC-ENTMCNC: 27.7 PG — SIGNIFICANT CHANGE UP (ref 27–34)
MCHC RBC-ENTMCNC: 28.4 PG — SIGNIFICANT CHANGE UP (ref 27–34)
MCHC RBC-ENTMCNC: 32.2 % — SIGNIFICANT CHANGE UP (ref 32–36)
MCHC RBC-ENTMCNC: 32.7 % — SIGNIFICANT CHANGE UP (ref 32–36)
MCHC RBC-ENTMCNC: 33.3 % — SIGNIFICANT CHANGE UP (ref 32–36)
MCV RBC AUTO: 83.5 FL — SIGNIFICANT CHANGE UP (ref 80–100)
MCV RBC AUTO: 84.6 FL — SIGNIFICANT CHANGE UP (ref 80–100)
MCV RBC AUTO: 85.2 FL — SIGNIFICANT CHANGE UP (ref 80–100)
NRBC # FLD: 0 K/UL — SIGNIFICANT CHANGE UP (ref 0–0)
PHOSPHATE SERPL-MCNC: 2.6 MG/DL — SIGNIFICANT CHANGE UP (ref 2.5–4.5)
PHOSPHATE SERPL-MCNC: 4.7 MG/DL — HIGH (ref 2.5–4.5)
PLATELET # BLD AUTO: 401 K/UL — HIGH (ref 150–400)
PLATELET # BLD AUTO: 421 K/UL — HIGH (ref 150–400)
PLATELET # BLD AUTO: 434 K/UL — HIGH (ref 150–400)
PMV BLD: 9.4 FL — SIGNIFICANT CHANGE UP (ref 7–13)
PMV BLD: 9.6 FL — SIGNIFICANT CHANGE UP (ref 7–13)
PMV BLD: 9.7 FL — SIGNIFICANT CHANGE UP (ref 7–13)
POTASSIUM SERPL-MCNC: 3.6 MMOL/L — SIGNIFICANT CHANGE UP (ref 3.5–5.3)
POTASSIUM SERPL-MCNC: 4.4 MMOL/L — SIGNIFICANT CHANGE UP (ref 3.5–5.3)
POTASSIUM SERPL-SCNC: 3.6 MMOL/L — SIGNIFICANT CHANGE UP (ref 3.5–5.3)
POTASSIUM SERPL-SCNC: 4.4 MMOL/L — SIGNIFICANT CHANGE UP (ref 3.5–5.3)
RBC # BLD: 2.42 M/UL — LOW (ref 4.2–5.8)
RBC # BLD: 2.53 M/UL — LOW (ref 4.2–5.8)
RBC # BLD: 2.64 M/UL — LOW (ref 4.2–5.8)
RBC # FLD: 13.4 % — SIGNIFICANT CHANGE UP (ref 10.3–14.5)
RBC # FLD: 13.6 % — SIGNIFICANT CHANGE UP (ref 10.3–14.5)
RBC # FLD: 13.7 % — SIGNIFICANT CHANGE UP (ref 10.3–14.5)
SODIUM SERPL-SCNC: 130 MMOL/L — LOW (ref 135–145)
SODIUM SERPL-SCNC: 132 MMOL/L — LOW (ref 135–145)
SPECIMEN SOURCE: SIGNIFICANT CHANGE UP
WBC # BLD: 12.52 K/UL — HIGH (ref 3.8–10.5)
WBC # BLD: 21.57 K/UL — HIGH (ref 3.8–10.5)
WBC # BLD: 22.75 K/UL — HIGH (ref 3.8–10.5)
WBC # FLD AUTO: 12.52 K/UL — HIGH (ref 3.8–10.5)
WBC # FLD AUTO: 21.57 K/UL — HIGH (ref 3.8–10.5)
WBC # FLD AUTO: 22.75 K/UL — HIGH (ref 3.8–10.5)

## 2020-06-23 PROCEDURE — 99232 SBSQ HOSP IP/OBS MODERATE 35: CPT

## 2020-06-23 PROCEDURE — 74176 CT ABD & PELVIS W/O CONTRAST: CPT | Mod: 26

## 2020-06-23 PROCEDURE — 99233 SBSQ HOSP IP/OBS HIGH 50: CPT | Mod: GC

## 2020-06-23 RX ORDER — ALTEPLASE 100 MG
2 KIT INTRAVENOUS ONCE
Refills: 0 | Status: COMPLETED | OUTPATIENT
Start: 2020-06-23 | End: 2020-06-23

## 2020-06-23 RX ORDER — BUMETANIDE 0.25 MG/ML
4 INJECTION INTRAMUSCULAR; INTRAVENOUS ONCE
Refills: 0 | Status: COMPLETED | OUTPATIENT
Start: 2020-06-23 | End: 2020-06-23

## 2020-06-23 RX ORDER — FLUCONAZOLE 150 MG/1
400 TABLET ORAL ONCE
Refills: 0 | Status: COMPLETED | OUTPATIENT
Start: 2020-06-23 | End: 2020-06-23

## 2020-06-23 RX ORDER — SODIUM BICARBONATE 1 MEQ/ML
650 SYRINGE (ML) INTRAVENOUS
Refills: 0 | Status: DISCONTINUED | OUTPATIENT
Start: 2020-06-23 | End: 2020-06-23

## 2020-06-23 RX ORDER — FLUCONAZOLE 150 MG/1
200 TABLET ORAL DAILY
Refills: 0 | Status: DISCONTINUED | OUTPATIENT
Start: 2020-06-23 | End: 2020-06-27

## 2020-06-23 RX ADMIN — METHADONE HYDROCHLORIDE 5 MILLIGRAM(S): 40 TABLET ORAL at 17:14

## 2020-06-23 RX ADMIN — HYDROMORPHONE HYDROCHLORIDE 1.5 MILLIGRAM(S): 2 INJECTION INTRAMUSCULAR; INTRAVENOUS; SUBCUTANEOUS at 03:20

## 2020-06-23 RX ADMIN — BUMETANIDE 4 MILLIGRAM(S): 0.25 INJECTION INTRAMUSCULAR; INTRAVENOUS at 05:11

## 2020-06-23 RX ADMIN — NICARDIPINE HYDROCHLORIDE 30 MILLIGRAM(S): 30 CAPSULE, EXTENDED RELEASE ORAL at 05:11

## 2020-06-23 RX ADMIN — HYDROMORPHONE HYDROCHLORIDE 6 MILLIGRAM(S): 2 INJECTION INTRAMUSCULAR; INTRAVENOUS; SUBCUTANEOUS at 13:59

## 2020-06-23 RX ADMIN — HYDROMORPHONE HYDROCHLORIDE 6 MILLIGRAM(S): 2 INJECTION INTRAMUSCULAR; INTRAVENOUS; SUBCUTANEOUS at 07:00

## 2020-06-23 RX ADMIN — NICARDIPINE HYDROCHLORIDE 30 MILLIGRAM(S): 30 CAPSULE, EXTENDED RELEASE ORAL at 13:52

## 2020-06-23 RX ADMIN — GABAPENTIN 200 MILLIGRAM(S): 400 CAPSULE ORAL at 21:33

## 2020-06-23 RX ADMIN — GABAPENTIN 200 MILLIGRAM(S): 400 CAPSULE ORAL at 13:51

## 2020-06-23 RX ADMIN — SODIUM ZIRCONIUM CYCLOSILICATE 10 GRAM(S): 10 POWDER, FOR SUSPENSION ORAL at 12:11

## 2020-06-23 RX ADMIN — HYDROMORPHONE HYDROCHLORIDE 6 MILLIGRAM(S): 2 INJECTION INTRAMUSCULAR; INTRAVENOUS; SUBCUTANEOUS at 17:34

## 2020-06-23 RX ADMIN — HYDROMORPHONE HYDROCHLORIDE 1.5 MILLIGRAM(S): 2 INJECTION INTRAMUSCULAR; INTRAVENOUS; SUBCUTANEOUS at 08:33

## 2020-06-23 RX ADMIN — HYDROMORPHONE HYDROCHLORIDE 6 MILLIGRAM(S): 2 INJECTION INTRAMUSCULAR; INTRAVENOUS; SUBCUTANEOUS at 21:29

## 2020-06-23 RX ADMIN — HYDROMORPHONE HYDROCHLORIDE 1.5 MILLIGRAM(S): 2 INJECTION INTRAMUSCULAR; INTRAVENOUS; SUBCUTANEOUS at 12:10

## 2020-06-23 RX ADMIN — Medication 650 MILLIGRAM(S): at 01:48

## 2020-06-23 RX ADMIN — FLUCONAZOLE 200 MILLIGRAM(S): 150 TABLET ORAL at 06:20

## 2020-06-23 RX ADMIN — HYDROMORPHONE HYDROCHLORIDE 6 MILLIGRAM(S): 2 INJECTION INTRAMUSCULAR; INTRAVENOUS; SUBCUTANEOUS at 10:54

## 2020-06-23 RX ADMIN — HEPARIN SODIUM 5000 UNIT(S): 5000 INJECTION INTRAVENOUS; SUBCUTANEOUS at 05:11

## 2020-06-23 RX ADMIN — HYDROMORPHONE HYDROCHLORIDE 6 MILLIGRAM(S): 2 INJECTION INTRAMUSCULAR; INTRAVENOUS; SUBCUTANEOUS at 01:45

## 2020-06-23 RX ADMIN — SEVELAMER CARBONATE 800 MILLIGRAM(S): 2400 POWDER, FOR SUSPENSION ORAL at 21:33

## 2020-06-23 RX ADMIN — Medication 650 MILLIGRAM(S): at 13:52

## 2020-06-23 RX ADMIN — QUETIAPINE FUMARATE 25 MILLIGRAM(S): 200 TABLET, FILM COATED ORAL at 21:33

## 2020-06-23 RX ADMIN — Medication 1 MILLIGRAM(S): at 18:48

## 2020-06-23 RX ADMIN — HYDROMORPHONE HYDROCHLORIDE 1.5 MILLIGRAM(S): 2 INJECTION INTRAMUSCULAR; INTRAVENOUS; SUBCUTANEOUS at 15:30

## 2020-06-23 RX ADMIN — Medication 1 MILLIGRAM(S): at 08:41

## 2020-06-23 RX ADMIN — SEVELAMER CARBONATE 800 MILLIGRAM(S): 2400 POWDER, FOR SUSPENSION ORAL at 05:11

## 2020-06-23 RX ADMIN — SEVELAMER CARBONATE 800 MILLIGRAM(S): 2400 POWDER, FOR SUSPENSION ORAL at 13:52

## 2020-06-23 RX ADMIN — GABAPENTIN 200 MILLIGRAM(S): 400 CAPSULE ORAL at 05:11

## 2020-06-23 RX ADMIN — NICARDIPINE HYDROCHLORIDE 30 MILLIGRAM(S): 30 CAPSULE, EXTENDED RELEASE ORAL at 21:33

## 2020-06-23 RX ADMIN — HEPARIN SODIUM 5000 UNIT(S): 5000 INJECTION INTRAVENOUS; SUBCUTANEOUS at 21:33

## 2020-06-23 RX ADMIN — Medication 650 MILLIGRAM(S): at 19:24

## 2020-06-23 RX ADMIN — Medication 2 MILLIGRAM(S): at 21:33

## 2020-06-23 RX ADMIN — ALTEPLASE 2 MILLIGRAM(S): KIT at 11:54

## 2020-06-23 RX ADMIN — Medication 650 MILLIGRAM(S): at 08:37

## 2020-06-23 RX ADMIN — Medication 100 MILLIGRAM(S): at 05:12

## 2020-06-23 RX ADMIN — Medication 650 MILLIGRAM(S): at 17:14

## 2020-06-23 RX ADMIN — HEPARIN SODIUM 5000 UNIT(S): 5000 INJECTION INTRAVENOUS; SUBCUTANEOUS at 13:50

## 2020-06-23 RX ADMIN — METHADONE HYDROCHLORIDE 5 MILLIGRAM(S): 40 TABLET ORAL at 05:11

## 2020-06-23 NOTE — PROGRESS NOTE ADULT - ASSESSMENT
A/P:     30 y/o M in SICU following found down, presenting with rhabdomyolysis/ compartment syndrome with fasciotomies to L LE X 4  closure on 6/19.    - Continue Prevena wound vacs to incisions, vacs will stay in place for approximately 10 days  - Continue MEÑO drains, monitor output   - Pain control   - LE elevated   - Management per SICU     Plastic Surgery   [id number] A/P:     28 y/o M in SICU following found down, presenting with rhabdomyolysis/ compartment syndrome with fasciotomies to L LE X 4  closure on 6/19.    - Continue Prevena wound vacs to incisions, vacs will stay in place for approximately 10 days  - Continue MEÑO drains, monitor output   - CONT ABX  - Pain control   - LE elevated   - Management per SICU     Plastic Surgery   [id number]

## 2020-06-23 NOTE — PROGRESS NOTE ADULT - ASSESSMENT
29M w/ PMH of bipolar depression, s/p found down at home, transferred from [facility] for further management of acute renal failure due to rhabdomyolysis, s/p four compartment fasciotomy and lateral/medial thigh fasciotomies with ANTOINETTE on HD. Now s/p fasciotomy closure POD 4.    Neuro hx of bipolar depression  - A&O x 3  - Pain Control: oral dilaudid, tylenol, methadone  - Xanax 1 mg q8 PRN   - ativan BID  - Seroquel QHS  - Increased gabapentin, standing tizanidine per Pain service  - Psych following, no plan to restart home lithium  - Pain following, appreciate recommendations    Resp  - RA   - Mobilize, OOB to chair  - Obtain Chest XR    CVS  - Appropriate mental status, appears well perfused   - c/w nicardipine 30 mg PO q8 hrs   - if HTN again, rec'd clonidine .1mg by addiction psych    GI  - Regular diet with fluid restriction 1L for hyponatremia   - Bowel regimen senna 2 tabs qHS  - Patient declining miralax, last BM 06/17 prior to that > 1w      - ANTOINETTE in setting of rhabdomyolysis  - CK down to <2000  - Remains hypervolemic, oliguric but UOP on an upward trend  - IV locked   -R fem shiley placed, HD  - Plan for permacath once afebrile  - Nephrology following    Heme  - H&H 7.4/22; received 1u PRBC for hemoglobin 7 on 6/19  - SQH for dvt ppx    ID  - Febrile to TMax 101.2  - Leukocytosis of 28  - Continue to monitor fever curve  -started on cefazolin, diflucan    Endo  - monitor FGS    Lines  - Right radial a line  -Right femoral shiley

## 2020-06-23 NOTE — PROGRESS NOTE ADULT - SUBJECTIVE AND OBJECTIVE BOX
SICU Daily Progress Note  =====================================================  HPI:  29M with PMH of bipolar depression transferred from [facility] for further management of acute renal failure due to rhabdomyolysis. Pt was found unresponsive at home, lying on his L side. He states he has not been taking his Lithium for at least 2 wks. At the OSH, he was treated for hyperkalemia. He received D5, insulin, calcium gluconate, Kayexalate Last BM was on . For L leg pain he was given lidocaine patch, dilaudid 1mg prn and tramadol 50mg. Hydralazine 20mg given for HTN. From 1 to 7 am only 100cc of UOP. Urine was tea colored. 500cc bolus of NS was given. CT cervical spine at OSH was (-). CT Head (-). CTAP and CXR were (-). CK on admission there was 177,000.   Pt was seen by surgery team in MICU for LUE swelling and compartment syndrome was ruled out. Surgery team was re-consulted today due to new symptoms of L foot drop and numbness. Pt was found to be highly suspicious for compartment syndrome in the upper thigh, and was emergently taken to OR.   Pt is now s/p four compartment fasciotomy of LLE, and left lateral and medial thigh fasciotomies. SICU consulted for q1h neurovascular checks and hemodynamic monitoring. Now s/p closure.    Interval/Overnight Events:       -Patient underwent dialysis 2x yesterday  -Hyperkalemic to 5.9 after first dialysis  -elevated WBC 28, febrile Tmax 101.2  -started on diflucan and cefazolin    HISTORY  29y Male  Allergies: No Known Allergies    PAST MEDICAL & SURGICAL HISTORY:  Bipolar depression  Migraine  Displaced fracture of proximal phalanx of left little finger  Difficulty sleeping  ADD (attention deficit disorder)  History of facial surgery: Fadi-Facial Realignment Surgery in   History of fasciotomy  Compartment syndrome, nontraumatic, lower extremity  Hyponatremia  Traumatic rhabdomyolysis, sequela  Bipolar 1 disorder  Compartment syndrome of left lower extremity, initial encounter  Compartment syndrome of thigh  Compartment syndrome of buttock  CKD (chronic kidney disease) stage 5, GFR less than 15 ml/min  Hypocalcemia  Hyperkalemia  ANTOINETTE (acute kidney injury)  Insertion, catheter, dialysis, temporary  Closure, wound, delayed  Fasciotomy, thigh  Decompression fasciotomy, lower extremity  Gluteal-iliotibial fasciotomy  Fasciotomy of left hip  Fasciotomy, thigh      MEDICATIONS:   --------------------------------------------------------------------------------------  Neurologic Medications  acetaminophen   Tablet .. 650 milliGRAM(s) Oral every 6 hours  ALPRAZolam 1 milliGRAM(s) Oral every 8 hours PRN anxiety  gabapentin 200 milliGRAM(s) Oral three times a day  HYDROmorphone   Tablet 4 milliGRAM(s) Oral every 3 hours PRN Moderate Pain (4 - 6)  HYDROmorphone   Tablet 6 milliGRAM(s) Oral every 3 hours PRN Severe Pain (7 - 10)  HYDROmorphone  Injectable 1.5 milliGRAM(s) IV Push every 3 hours PRN Severe Break Through  LORazepam     Tablet 1 milliGRAM(s) Oral <User Schedule>  LORazepam     Tablet 2 milliGRAM(s) Oral <User Schedule>  methadone    Tablet 5 milliGRAM(s) Oral two times a day  QUEtiapine 25 milliGRAM(s) Oral at bedtime  tiZANidine 1 milliGRAM(s) Oral every 6 hours PRN muscle spasm and pain    Respiratory Medications    Cardiovascular Medications  niCARdipine 30 milliGRAM(s) Oral every 8 hours    Gastrointestinal Medications  polyethylene glycol 3350 17 Gram(s) Oral two times a day  senna 2 Tablet(s) Oral at bedtime  sodium chloride 0.9% lock flush 10 milliLiter(s) IV Push every 1 hour PRN Pre/post blood products, medications, blood draw, and to maintain line patency    Genitourinary Medications    Hematologic/Oncologic Medications  heparin   Injectable 5000 Unit(s) SubCutaneous every 8 hours    Antimicrobial/Immunologic Medications  ceFAZolin   IVPB 1000 milliGRAM(s) IV Intermittent every 24 hours  fluconAZOLE IVPB 50 milliGRAM(s) IV Intermittent once    Endocrine/Metabolic Medications    Topical/Other Medications  chlorhexidine 4% Liquid 1 Application(s) Topical <User Schedule>  sevelamer carbonate 800 milliGRAM(s) Oral every 8 hours  sodium zirconium cyclosilicate 10 Gram(s) Oral daily    --------------------------------------------------------------------------------------    VITAL SIGNS, INS/OUTS (last 24 hours):  --------------------------------------------------------------------------------------  T(C): 37.7 (20 @ 00:00), Max: 38.3 (20 @ 20:00)  HR: 113 (20 @ 00:00) (106 - 119)  BP: 133/67 (20 @ 00:00) (114/59 - 171/95)  BP(mean): 82 (20 @ 00:00) (75 - 121)  ABP: --  ABP(mean): --  RR: 16 (20 @ 00:00) (11 - 22)  SpO2: 96% (20 @ 00:00) (96% - 100%)  Wt(kg): --  CVP(mm Hg): --  CI: --  CAPILLARY BLOOD GLUCOSE       N/A       @ 07:01  -   @ 07:00  --------------------------------------------------------  IN:    dexmedetomidine Infusion: 52.3 mL    IV PiggyBack: 50 mL    Oral Fluid: 1350 mL    Other: 400 mL  Total IN: 1852.3 mL    OUT:    Drain: 10 mL    Drain: 50 mL    Drain: 10 mL    Drain: 10 mL    Other: 3400 mL    Voided: 1050 mL  Total OUT: 4530 mL    Total NET: -2677.7 mL       @ 07:01  -   @ 01:05  --------------------------------------------------------  IN:    IV PiggyBack: 100 mL    Oral Fluid: 300 mL    Other: 400 mL  Total IN: 800 mL    OUT:    Drain: 50 mL    Drain: 10 mL    Drain: 20 mL    Drain: 30 mL    Other: 3269 mL    Voided: 500 mL  Total OUT: 3879 mL    Total NET: -3079 mL  --------------------------------------------------------------------------------------    EXAM:  NEUROLOGY  Exam: Normal, NAD, alert, oriented x3, no focal deficits. PERRLA.   [x] Adequacy of sedation and pain control has been assessed and adjusted    RESPIRATORY  Exam: Lungs clear to auscultation, Normal expansion/effort.       CARDIOVASCULAR  Exam: S1, S2.  Regular rate and rhythm.  Peripheral edema   Cardiac Rhythm: Normal Sinus Rhythm      GI/NUTRITION  Exam: Abdomen soft, Non-tender, Non-distended.    Current Diet: Regular, fluid restricted 1000ml     METABOLIC/FLUIDS/ELECTROLYTES      HEMATOLOGIC  [x] DVT Prophylaxis: heparin   Injectable 5000 Unit(s) SubCutaneous every 8 hours    Transfusions:	[] PRBC	[] Platelets		[] FFP	[] Cryoprecipitate    INFECTIOUS DISEASE  Antimicrobials/Immunologic Medications:  ceFAZolin   IVPB 1000 milliGRAM(s) IV Intermittent every 24 hours  fluconAZOLE IVPB 50 milliGRAM(s) IV Intermittent once        Tubes/Lines/Drains   [x] Peripheral IV  [] Central Venous Line     	[] R	[] L	[] IJ	[] Fem	[] SC	Date Placed:   [] Arterial Line		[] R	[] L	[] Fem	[] Rad	[] Ax	Date Placed:   [] PICC		[] Midline		[] Mediport  [] Urinary Catheter		Date Placed:   [x] Necessity of urinary, arterial, and venous catheters discussed    VASCULAR  Exam: Extremities warm, pink, well-perfused. 2+ DP pulses palpable b/l. LLE with ACE dressing C/D/I, preveena x 2 in place with good suction. MEÑO x5 with SS output.    MUSCULOSKELETAL  Exam: All extremities moving spontaneously without limitations.    SKIN  Exam: Good skin turgor, no skin breakdown.     LABS  --------------------------------------------------------------------------------------  CBC ( @ 04:30)                              8.1<L>                         29.99<H>  )----------------(  413<H>     --    % Neutrophils, --    % Lymphocytes, ANC: --                                  24.3<L>    BMP ( @ 19:20)             131<L>  |  91<L>   |  56<H> 		Ca++ --      Ca 8.1<L>             ---------------------------------( 144<H>		Mg 1.9                5.9<H>  |  26      |  4.92<H>			Ph 5.0<H>  BMP ( @ 04:30)             130<L>  |  92<L>   |  69<H> 		Ca++ --      Ca 8.1<L>             ---------------------------------( 124<H>		Mg 2.1                5.5<H>  |  24      |  5.66<H>			Ph 5.9<H>      Coags ( @ 04:30)  aPTT 28.4 / INR 1.03 / PT 11.7          Urinalysis ( @ 10:45):     Color: LIGHT YELLOW / Appearance: CLEAR / S.015 / pH: 7.5 / Gluc: NEGATIVE / Ketones: NEGATIVE / Bili: NEGATIVE / Urobili: NORMAL / Protein :200<H> / Nitrites: NEGATIVE / Leuk.Est: NEGATIVE / RBC: 3-5 / WBC: 6-10<H> / Sq Epi: FEW / Non Sq Epi:  / Bacteria FEW       -> .Blood Blood-Peripheral Culture ( @ 21:18)     NG    NG    No growth to date.    -> .Blood Blood-Venous Culture ( @ 21:14)     NG    NG    No growth to date.  --------------------------------------------------------------------------------------    OTHER LABORATORY:     IMAGING STUDIES:   CXR:

## 2020-06-23 NOTE — PROGRESS NOTE ADULT - SUBJECTIVE AND OBJECTIVE BOX
Vascular Progress Note    S: Patient seen and examined. Continue to be febrile with leukocytosis, otherwise, Reports tolerating diet without nausea, vomiting, passing flatus, having bowel movements, voiding without issues. Denies fever, chills, SOB, chest pain.     O:  Physical Exam:  General: NAD  Respiratory: no increased work of breathing   Abdomen: soft, nontender, nondistended  Extremities: LE ACE wrap in place no strikethrough. MEÑO SS  LLE gross motor intact from hip to toes, no signs of LLE arterial insufficiency   lle sensory deficit on left foot dorsum remains as stated above      Vital Signs Last 24 Hrs  T(C): 37.3 (23 Jun 2020 05:25), Max: 38.3 (22 Jun 2020 20:00)  T(F): 99.2 (23 Jun 2020 05:25), Max: 101 (22 Jun 2020 20:00)  HR: 129 (23 Jun 2020 07:00) (106 - 129)  BP: 127/83 (23 Jun 2020 07:00) (114/59 - 169/104)  BP(mean): 89 (23 Jun 2020 07:00) (75 - 121)  RR: 17 (23 Jun 2020 07:00) (12 - 22)  SpO2: 99% (23 Jun 2020 07:00) (96% - 100%)    I&O's Detail    22 Jun 2020 07:01  -  23 Jun 2020 07:00  --------------------------------------------------------  IN:    IV PiggyBack: 250 mL    Oral Fluid: 480 mL    Other: 800 mL    Packed Red Blood Cells: 300 mL  Total IN: 1830 mL    OUT:    Drain: 25 mL    Drain: 45 mL    Drain: 75 mL    Drain: 10 mL    Drain: 50 mL    Other: 3669 mL    Voided: 700 mL  Total OUT: 4574 mL    Total NET: -2744 mL                                6.5    22.75 )-----------( 421      ( 23 Jun 2020 03:25 )             20.2       06-23    130<L>  |  92<L>  |  50<H>  ----------------------------<  112<H>  4.4   |  25  |  4.16<H>    Ca    8.4      23 Jun 2020 03:25  Phos  4.7     06-23  Mg     1.9     06-23 Vascular Progress Note    S: Patient seen and examined. Continue to be febrile with leukocytosis, otherwise, Reports tolerating diet without nausea, vomiting, passing flatus, having bowel movements, voiding without issues. Denies fever, chills, SOB, chest pain.     O:  Physical Exam:  General: NAD  Respiratory: no increased work of breathing   Abdomen: soft, nontender, nondistended  Extremities: LE ACE wrap in place no strikethrough. MEÑO SS  LLE gross motor intact from hip to toes, no signs of LLE arterial insufficiency   lle sensory deficit on left foot dorsum remains as stated above no acute changes      Vital Signs Last 24 Hrs  T(C): 37.3 (23 Jun 2020 05:25), Max: 38.3 (22 Jun 2020 20:00)  T(F): 99.2 (23 Jun 2020 05:25), Max: 101 (22 Jun 2020 20:00)  HR: 129 (23 Jun 2020 07:00) (106 - 129)  BP: 127/83 (23 Jun 2020 07:00) (114/59 - 169/104)  BP(mean): 89 (23 Jun 2020 07:00) (75 - 121)  RR: 17 (23 Jun 2020 07:00) (12 - 22)  SpO2: 99% (23 Jun 2020 07:00) (96% - 100%)    I&O's Detail    22 Jun 2020 07:01  -  23 Jun 2020 07:00  --------------------------------------------------------  IN:    IV PiggyBack: 250 mL    Oral Fluid: 480 mL    Other: 800 mL    Packed Red Blood Cells: 300 mL  Total IN: 1830 mL    OUT:    Drain: 25 mL    Drain: 45 mL    Drain: 75 mL    Drain: 10 mL    Drain: 50 mL    Other: 3669 mL    Voided: 700 mL  Total OUT: 4574 mL    Total NET: -2744 mL                                6.5    22.75 )-----------( 421      ( 23 Jun 2020 03:25 )             20.2       06-23    130<L>  |  92<L>  |  50<H>  ----------------------------<  112<H>  4.4   |  25  |  4.16<H>    Ca    8.4      23 Jun 2020 03:25  Phos  4.7     06-23  Mg     1.9     06-23    CT Abd pelvis reviewed

## 2020-06-23 NOTE — PROGRESS NOTE ADULT - ASSESSMENT
29M with PMH of bipolar depression transferred from [facility] for further management of acute renal failure due to rhabdomyolysis, Taken to the OR for LLE compartment syndrome. Now s/p four compartment fasciotomy of the left lower leg 6/11. s/p closure with plastics on 6/19. Doing well clinically, however continue to spike fever with leukocytosis.      Plan  - CT A/P WITHOUT IV con to rule out RP hematoma/gluteus necrosis  - Plastics to take down VAC to assess wound as source of infection  - pain control PRN  - diet as tolerated  - continue HD as per Renal - Permacath when afebrile   - DVT ppx   - OOB to chair as tolerated, PT consult for mobilization  - Appreciate SICU care  - recommend ID consult       C Team Surgery  [id number] 29M with PMH of bipolar depression transferred from [facility] for further management of acute renal failure due to rhabdomyolysis, Taken to the OR for LLE compartment syndrome. Now s/p four compartment fasciotomy of the left lower leg 6/11. s/p closure with plastics on 6/19. Doing well clinically, however continue to spike fever with leukocytosis.      Plan  - Plastics to take down VAC to assess wound as source of infection  - pain control PRN  - diet as tolerated  - continue HD as per Renal - Permacath when afebrile   - DVT ppx   - OOB to chair as tolerated, PT consult for mobilization  - Appreciate SICU care  - recommend ID consult   d/w pt ct findings  will review w IR to see if there is a discernible collection for roman and cx       C Team Surgery  [id number]

## 2020-06-23 NOTE — PROGRESS NOTE BEHAVIORAL HEALTH - NSBHFUPINTERVALHXFT_PSY_A_CORE
Patient seen at bedside. No acute overnight events. Received PRN Xanax 1mg PO at 1520 yesterday. Pt did not sleep through the night because he was getting dialysis, but reports he was not anxious.    Per team, pt did well overnight. Patient seen at bedside. No acute overnight events. Received PRN Xanax 1mg PO at 1520 yesterday. Pt did not sleep through the night because he was getting dialysis, but reports he was not anxious. Feels like his pain is under control and he is medically improving. Pt is hopeful about the future.    Per team, pt did well overnight.

## 2020-06-23 NOTE — PROGRESS NOTE ADULT - ATTENDING COMMENTS
ANTOINETTE  ATN  Hyperkalemia    Cont on HD as needed for hyperkalemia and volume overload in the setting of severe ANTOINETTE

## 2020-06-23 NOTE — PROGRESS NOTE ADULT - ATTENDING COMMENTS
I Adam Sauer MD have seen and examined the patient today and agree with  the  evaluation, assessment and plan of the surgical house officer  ASHTYN Sauer MD have personally seen and examined the patient at bedside today at 6pm

## 2020-06-23 NOTE — PROGRESS NOTE ADULT - ATTENDING COMMENTS
Patient with no issues overnight. With persistent fevers daily and leukocytosis. Fever workup thus far has been negative. Plan for CT a/p noncontrast to r/o gluteal necrosis as cause for fever. Patient with daily HD.  N pain control with transition from pca to dilaudid prn, methadone, ativan, xanax for anxiety  resp cxr clear, on room air  cv hemodynamically stable  gi reg diet  gu/renal daily HD, monitor potassium, making adequate urine with improvement in creatinine and potassium - start po bicarb  heme vte ppx  id ancef and diflucan  endo no changes    I have personally interviewed when possible and examined the patient, reviewed data and laboratory tests/x-rays and all pertinent electronic images.  I was physically present for the key portions of the evaluation and management (E/M) service provided.   The SICU team has a constant risk benefit analyzes discussion with the primary team, all consultants, House Staff and PA's on all decisions.  These diagnoses are unrelated to the surgical procedure noted above.  I meet with family if needed to get further history, discuss the case and make care decisions for this patient who might not be able to participate.  Time involved in performance of separately billable procedures was not counted toward my critical care time. There is no overlap.  I spent 30 minutes ( 0800Hrs-0915Hrs in AM/ 1600Hrs-1715Hrs in PM, or other time indicated) of critical care time for the diagnoses, assessment, plan and interventions.  This time excludes time spent on separate procedures and teaching.

## 2020-06-23 NOTE — CHART NOTE - NSCHARTNOTEFT_GEN_A_CORE
Source: Patient ,nursing    Diet : Regular 1000 ml Fluid Restriction No Conc. Potassium, No Conc. Phos.     Patient reports good appetite, 100% intake @ meals , wt. fluctuates reflective of fluid status .     Current Weight: - 113.8 kg on 6/23/2020    Pertinent Medications: MEDICATIONS  (STANDING):  acetaminophen   Tablet .. 650 milliGRAM(s) Oral every 6 hours  ceFAZolin   IVPB 1000 milliGRAM(s) IV Intermittent every 24 hours  chlorhexidine 4% Liquid 1 Application(s) Topical <User Schedule>  fluconAZOLE   Tablet 200 milliGRAM(s) Oral daily  gabapentin 200 milliGRAM(s) Oral three times a day  heparin   Injectable 5000 Unit(s) SubCutaneous every 8 hours  LORazepam     Tablet 1 milliGRAM(s) Oral <User Schedule>  LORazepam     Tablet 2 milliGRAM(s) Oral <User Schedule>  methadone    Tablet 5 milliGRAM(s) Oral two times a day  niCARdipine 30 milliGRAM(s) Oral every 8 hours  polyethylene glycol 3350 17 Gram(s) Oral two times a day  QUEtiapine 25 milliGRAM(s) Oral at bedtime  senna 2 Tablet(s) Oral at bedtime  sevelamer carbonate 800 milliGRAM(s) Oral every 8 hours  sodium bicarbonate 650 milliGRAM(s) Oral two times a day  sodium zirconium cyclosilicate 10 Gram(s) Oral daily    MEDICATIONS  (PRN):  ALPRAZolam 1 milliGRAM(s) Oral every 8 hours PRN anxiety  HYDROmorphone   Tablet 4 milliGRAM(s) Oral every 3 hours PRN Moderate Pain (4 - 6)  HYDROmorphone   Tablet 6 milliGRAM(s) Oral every 3 hours PRN Severe Pain (7 - 10)  HYDROmorphone  Injectable 1.5 milliGRAM(s) IV Push every 3 hours PRN Severe Break Through  sodium chloride 0.9% lock flush 10 milliLiter(s) IV Push every 1 hour PRN Pre/post blood products, medications, blood draw, and to maintain line patency  tiZANidine 1 milliGRAM(s) Oral every 6 hours PRN muscle spasm and pain    Pertinent Labs:  06-23 Na130 mmol/L<L> Glu 112 mg/dL<H> K+ 4.4 mmol/L Cr  4.16 mg/dL<H> BUN 50 mg/dL<H> 06-23 Phos 4.7 mg/dL<H> 06-19 Alb 2.0 g/dL<L>      Skin: intact    Estimated Needs:   no change since previous assessment      Recommend to continue PO diet     Monitoring and Evaluation:  PO intake , Tolerance to diet prescription , weights & follow up per protocol

## 2020-06-23 NOTE — PROGRESS NOTE ADULT - SUBJECTIVE AND OBJECTIVE BOX
[facility] DIVISION OF KIDNEY DISEASES AND HYPERTENSION -- FOLLOW UP NOTE  --------------------------------------------------------------------------------  HPI: 29-year-old male, with history bipolar depression was transferred from [facility] to [facility] for severe rhabdomyolysis. As per paper chart, pt was found to have cocaine and amphetamines in urine drug screen. Pt also with an elevated CPK ~ 180K. Nephrology team consulted for ANTOINETTE and rhabdomyolyiss. Upon review of labs on [facility], Scr was 1.11 on 8/23/2017. Pt. underwent fasciotomy on 6/11/20 for LLE compartement syndrome. Pt. had wound vac placed on  6/15/20.  Pt. underwent partial closure of fasciotomy by plastic surgery team on 6/19/20. Pt. receiving daily dialysis, yesterday required 2 sessions due to hyperkalemia noted in the evening, serum potassium today 4.4.    Pt. evaluated at bedside, in no distress. Continues to have episodic fever. Labs reviewed, will arrange for HD again with 2K bath (with 4.4 serum potassium, concern for hypokalemia using 0-1K bath). Noted to be anemic today to 6.5.    PAST HISTORY  --------------------------------------------------------------------------------  No significant changes to PMH, PSH, FHx, SHx, unless otherwise noted    ALLERGIES & MEDICATIONS  --------------------------------------------------------------------------------  Allergies    No Known Allergies    Intolerances    Standing Inpatient Medications  acetaminophen   Tablet .. 650 milliGRAM(s) Oral every 6 hours  ceFAZolin   IVPB 1000 milliGRAM(s) IV Intermittent every 24 hours  chlorhexidine 4% Liquid 1 Application(s) Topical <User Schedule>  fluconAZOLE   Tablet 200 milliGRAM(s) Oral daily  gabapentin 200 milliGRAM(s) Oral three times a day  heparin   Injectable 5000 Unit(s) SubCutaneous every 8 hours  LORazepam     Tablet 1 milliGRAM(s) Oral <User Schedule>  LORazepam     Tablet 2 milliGRAM(s) Oral <User Schedule>  methadone    Tablet 5 milliGRAM(s) Oral two times a day  niCARdipine 30 milliGRAM(s) Oral every 8 hours  polyethylene glycol 3350 17 Gram(s) Oral two times a day  QUEtiapine 25 milliGRAM(s) Oral at bedtime  senna 2 Tablet(s) Oral at bedtime  sevelamer carbonate 800 milliGRAM(s) Oral every 8 hours  sodium zirconium cyclosilicate 10 Gram(s) Oral daily    REVIEW OF SYSTEMS  --------------------------------------------------------------------------------  Gen: no lethargy  Respiratory: No dyspnea  CV: No chest pain  GI: No abdominal pain  MSK: + LE edema, scrotal edema  Neuro: No dizziness  Heme: No bleeding    All other systems were reviewed and are negative, except as noted.    VITALS/PHYSICAL EXAM  --------------------------------------------------------------------------------  T(C): 37.3 (06-23-20 @ 05:25), Max: 38.3 (06-22-20 @ 20:00)  HR: 120 (06-23-20 @ 08:00) (106 - 129)  BP: 148/87 (06-23-20 @ 08:00) (114/59 - 169/104)  RR: 14 (06-23-20 @ 08:00) (12 - 22)  SpO2: 99% (06-23-20 @ 08:00) (96% - 100%)  Wt(kg): --    06-22-20 @ 07:01  -  06-23-20 @ 07:00  --------------------------------------------------------  IN: 1830 mL / OUT: 4574 mL / NET: -2744 mL    Physical Exam:  	Gen: NAD  	HEENT: MMM   	Pulm: CTA B/L  	CV: S1S2  	Abd: Soft, +BS               Ext: left lower extremity in bandage with multiple drains, lower extremity and scrotal edema  	Neuro: Awake  	Skin: Warm and dry              Vascular: R femoral non tunneled HD catheter    LABS/STUDIES  --------------------------------------------------------------------------------              6.5    22.75 >-----------<  421      [06-23-20 @ 03:25]              20.2     130  |  92  |  50  ----------------------------<  112      [06-23-20 @ 03:25]  4.4   |  25  |  4.16        Ca     8.4     [06-23-20 @ 03:25]      Mg     1.9     [06-23-20 @ 03:25]      Phos  4.7     [06-23-20 @ 03:25]    Creatinine Trend:  SCr 4.16 [06-23 @ 03:25]  SCr 4.92 [06-22 @ 19:20]  SCr 5.66 [06-22 @ 04:30]  SCr 6.86 [06-21 @ 18:30]  SCr 6.05 [06-21 @ 04:45]

## 2020-06-23 NOTE — PROGRESS NOTE ADULT - PROBLEM SELECTOR PLAN 1
Pt with oliguric ANTOINETTE secondary to rhabdomyolysis. Had R femoral non tunneled catheter placed on 6/21/20 and arranged for urgent HD due to serum potassium of 6.8. Pt. required 2 sessions of HD yesterday for hyperkalemia as well. Labs reviewed, will arrange for HD today with [facility]. Will assess daily for HD needs. Monitor labs and urine output. Avoid potential nephrotoxins.

## 2020-06-23 NOTE — PROGRESS NOTE BEHAVIORAL HEALTH - SUMMARY
Patient is a 30 y/o single male, no significant PMHx, PPHx bipolar disorder, no prior inpatient hospitalizations, follows deisi/ Dr. White once every 2 weeks, no prior SA, no prior NSSIB, occasional cocaine and alcohol use, transferred from [facility] after presenting with acute renal failure 2/2 rhabdomyolysis after being found unresponsive at home by neighbors. Patient has received multiple HD since admission, and is s/p fasciotomy for compartment syndrome in E. Patient seems to be minimizing symptoms, states he feels good and has been handling all procedures well, with no excess anxiety. SICU team reports increased anxiety during the night, including pt crying. Responds well to Xanax. Pt okay with starting standing Ativan with Xanax PRN.

## 2020-06-23 NOTE — PROGRESS NOTE ADULT - SUBJECTIVE AND OBJECTIVE BOX
Anesthesia Pain Management Service- Attending Addendum    SUBJECTIVE: Patient's pain control adequate    Therapy:	  [ X] IV PCA	   [ ] Epidural           [ ] s/p Spinal Opoid              [ ] Postpartum infusion	  [ ] Patient controlled regional anesthesia (PCRA)    [ ] prn Analgesics    Allergies    No Known Allergies    Intolerances      MEDICATIONS  (STANDING):  acetaminophen   Tablet .. 650 milliGRAM(s) Oral every 6 hours  ceFAZolin   IVPB 1000 milliGRAM(s) IV Intermittent every 24 hours  chlorhexidine 4% Liquid 1 Application(s) Topical <User Schedule>  fluconAZOLE   Tablet 200 milliGRAM(s) Oral daily  gabapentin 200 milliGRAM(s) Oral three times a day  heparin   Injectable 5000 Unit(s) SubCutaneous every 8 hours  LORazepam     Tablet 1 milliGRAM(s) Oral <User Schedule>  LORazepam     Tablet 2 milliGRAM(s) Oral <User Schedule>  methadone    Tablet 5 milliGRAM(s) Oral two times a day  niCARdipine 30 milliGRAM(s) Oral every 8 hours  polyethylene glycol 3350 17 Gram(s) Oral two times a day  QUEtiapine 25 milliGRAM(s) Oral at bedtime  senna 2 Tablet(s) Oral at bedtime  sevelamer carbonate 800 milliGRAM(s) Oral every 8 hours  sodium zirconium cyclosilicate 10 Gram(s) Oral daily    MEDICATIONS  (PRN):  ALPRAZolam 1 milliGRAM(s) Oral every 8 hours PRN anxiety  HYDROmorphone   Tablet 4 milliGRAM(s) Oral every 3 hours PRN Moderate Pain (4 - 6)  HYDROmorphone   Tablet 6 milliGRAM(s) Oral every 3 hours PRN Severe Pain (7 - 10)  HYDROmorphone  Injectable 1.5 milliGRAM(s) IV Push every 3 hours PRN Severe Break Through  sodium chloride 0.9% lock flush 10 milliLiter(s) IV Push every 1 hour PRN Pre/post blood products, medications, blood draw, and to maintain line patency  tiZANidine 1 milliGRAM(s) Oral every 6 hours PRN muscle spasm and pain      OBJECTIVE:   [X] No new signs     [ ] Other:    Side Effects:  [X ] None			[ ] Other:      ASSESSMENT/PLAN  -Discontinue current therapy    [ ] Therapy changed to:    [ ] IV PCA       [ ] Epidural     [ X] prn Analgesics     Comments: Pain management per primary team, APS to sign off

## 2020-06-23 NOTE — PROGRESS NOTE ADULT - SUBJECTIVE AND OBJECTIVE BOX
Follow up on patient who is a 29y old  Male who presents with a chief complaint of rhabdo/compartment syndrome (22 Jun 2020 14:27)      HPI:  29 y M with PMH of bipolar depression transferred from [facility] for further management of acute renal failure due to rhabdomyolysis. Pt was found unresponsive at home, lying on his L side. He states he has not been taking his Lithium for at least 2 wks. At the OSH, he was treated for hyperkalemia. He received D5, insulin, calcium gluconate, Kayexalate Last BM was on 6/9. For L leg pain he was given lidocaine patch, dilaudid 1mg prn and tramadol 50mg. Hydralazine 20mg given for HTN. From 1 to 7 am only 100cc of UOP. Urine was tea colored. 500cc bolus of NS was given. CT cervical spine at OSH was (-). CT Head (-). CTAP and CXR were (-). CK on admission there was 177,000. (10 Ramírez 2020 13:14)      PAST MEDICAL & SURGICAL HISTORY:  Bipolar depression  Migraine  Displaced fracture of proximal phalanx of left little finger  Difficulty sleeping  ADD (attention deficit disorder)  History of facial surgery: Fadi-Facial Realignment Surgery in 2008      MEDICATIONS  (STANDING):  acetaminophen   Tablet .. 650 milliGRAM(s) Oral every 6 hours  ceFAZolin   IVPB 1000 milliGRAM(s) IV Intermittent every 24 hours  chlorhexidine 4% Liquid 1 Application(s) Topical <User Schedule>  fluconAZOLE   Tablet 200 milliGRAM(s) Oral daily  gabapentin 200 milliGRAM(s) Oral three times a day  heparin   Injectable 5000 Unit(s) SubCutaneous every 8 hours  LORazepam     Tablet 1 milliGRAM(s) Oral <User Schedule>  LORazepam     Tablet 2 milliGRAM(s) Oral <User Schedule>  methadone    Tablet 5 milliGRAM(s) Oral two times a day  niCARdipine 30 milliGRAM(s) Oral every 8 hours  polyethylene glycol 3350 17 Gram(s) Oral two times a day  QUEtiapine 25 milliGRAM(s) Oral at bedtime  senna 2 Tablet(s) Oral at bedtime  sevelamer carbonate 800 milliGRAM(s) Oral every 8 hours  sodium zirconium cyclosilicate 10 Gram(s) Oral daily    MEDICATIONS  (PRN):  ALPRAZolam 1 milliGRAM(s) Oral every 8 hours PRN anxiety  HYDROmorphone   Tablet 4 milliGRAM(s) Oral every 3 hours PRN Moderate Pain (4 - 6)  HYDROmorphone   Tablet 6 milliGRAM(s) Oral every 3 hours PRN Severe Pain (7 - 10)  HYDROmorphone  Injectable 1.5 milliGRAM(s) IV Push every 3 hours PRN Severe Break Through  sodium chloride 0.9% lock flush 10 milliLiter(s) IV Push every 1 hour PRN Pre/post blood products, medications, blood draw, and to maintain line patency  tiZANidine 1 milliGRAM(s) Oral every 6 hours PRN muscle spasm and pain      ICU Vital Signs Last 24 Hrs  T(C): 37.3 (23 Jun 2020 05:25), Max: 38.3 (22 Jun 2020 20:00)  T(F): 99.2 (23 Jun 2020 05:25), Max: 101 (22 Jun 2020 20:00)  HR: 120 (23 Jun 2020 08:00) (106 - 129)  BP: 148/87 (23 Jun 2020 08:00) (114/59 - 169/104)  BP(mean): 102 (23 Jun 2020 08:00) (75 - 119)  ABP: --  ABP(mean): --  RR: 14 (23 Jun 2020 08:00) (12 - 22)  SpO2: 99% (23 Jun 2020 08:00) (96% - 100%)      Vital Signs Last 24 Hrs  T(C): 37.3 (23 Jun 2020 05:25), Max: 38.3 (22 Jun 2020 20:00)  T(F): 99.2 (23 Jun 2020 05:25), Max: 101 (22 Jun 2020 20:00)  HR: 120 (23 Jun 2020 08:00) (106 - 129)  BP: 148/87 (23 Jun 2020 08:00) (114/59 - 169/104)  BP(mean): 102 (23 Jun 2020 08:00) (75 - 119)  RR: 14 (23 Jun 2020 08:00) (12 - 22)  SpO2: 99% (23 Jun 2020 08:00) (96% - 100%)                          6.5    22.75 )-----------( 421      ( 23 Jun 2020 03:25 )             20.2           PT/INR - ( 22 Jun 2020 04:30 )   PT: 11.7 SEC;   INR: 1.03          PTT - ( 22 Jun 2020 04:30 )  PTT:28.4 SEC  SUMMARY:    Patient seen at bedside. Patient reports 7/10 pain on left leg. Patient states current pain regimen is helping him. Patient alert and oriented. Patient answers questions appropriately Patient unable to make eye contact, falling asleep while talking. Patient not in any apparent distress.    COMMENTS/PLAN:  Continue current regimen for pain management.

## 2020-06-23 NOTE — PROGRESS NOTE ADULT - SUBJECTIVE AND OBJECTIVE BOX
Plastic Surgery Progress Note      Subjective:  - Patient seen and examined, doing well.  - Pt off PCA now on Oral pain medication.      Objective:    PHYSICAL EXAM:  General: NAD   LE: Prevena vacs in place and holding suction, LE with ace wrap in place no strikethrough. MEÑO SS.      Vital Signs  T(C): 37.3 (06-23-20 @ 05:25), Max: 38.3 (06-22-20 @ 20:00)  T(F): 99.2 (06-23-20 @ 05:25), Max: 101 (06-22-20 @ 20:00)  HR: 129 (06-23-20 @ 07:00) (106 - 129)  BP: 127/83 (06-23-20 @ 07:00) (114/59 - 169/104)  RR: 17 (06-23-20 @ 07:00) (12 - 22)  SpO2: 99% (06-23-20 @ 07:00) (96% - 100%)      22 Jun 2020 07:01  -  23 Jun 2020 07:00  --------------------------------------------------------  IN:    IV PiggyBack: 250 mL    Oral Fluid: 480 mL    Other: 800 mL    Packed Red Blood Cells: 300 mL  Total IN: 1830 mL    OUT:    Drain: 25 mL    Drain: 45 mL    Drain: 75 mL    Drain: 10 mL    Drain: 50 mL    Other: 3669 mL    Voided: 700 mL  Total OUT: 4574 mL    Total NET: -2744 mL

## 2020-06-24 DIAGNOSIS — E87.70 FLUID OVERLOAD, UNSPECIFIED: ICD-10-CM

## 2020-06-24 LAB
ALBUMIN SERPL ELPH-MCNC: 2.5 G/DL — LOW (ref 3.3–5)
ALP SERPL-CCNC: 69 U/L — SIGNIFICANT CHANGE UP (ref 40–120)
ALT FLD-CCNC: 8 U/L — SIGNIFICANT CHANGE UP (ref 4–41)
ANION GAP SERPL CALC-SCNC: 12 MMO/L — SIGNIFICANT CHANGE UP (ref 7–14)
ANION GAP SERPL CALC-SCNC: 13 MMO/L — SIGNIFICANT CHANGE UP (ref 7–14)
AST SERPL-CCNC: 29 U/L — SIGNIFICANT CHANGE UP (ref 4–40)
BILIRUB SERPL-MCNC: 0.2 MG/DL — SIGNIFICANT CHANGE UP (ref 0.2–1.2)
BUN SERPL-MCNC: 45 MG/DL — HIGH (ref 7–23)
BUN SERPL-MCNC: 56 MG/DL — HIGH (ref 7–23)
CALCIUM SERPL-MCNC: 8.2 MG/DL — LOW (ref 8.4–10.5)
CALCIUM SERPL-MCNC: 8.2 MG/DL — LOW (ref 8.4–10.5)
CHLORIDE SERPL-SCNC: 93 MMOL/L — LOW (ref 98–107)
CHLORIDE SERPL-SCNC: 96 MMOL/L — LOW (ref 98–107)
CO2 SERPL-SCNC: 26 MMOL/L — SIGNIFICANT CHANGE UP (ref 22–31)
CO2 SERPL-SCNC: 27 MMOL/L — SIGNIFICANT CHANGE UP (ref 22–31)
CREAT SERPL-MCNC: 4.22 MG/DL — HIGH (ref 0.5–1.3)
CREAT SERPL-MCNC: 4.91 MG/DL — HIGH (ref 0.5–1.3)
GLUCOSE SERPL-MCNC: 112 MG/DL — HIGH (ref 70–99)
GLUCOSE SERPL-MCNC: 119 MG/DL — HIGH (ref 70–99)
HCT VFR BLD CALC: 21 % — CRITICAL LOW (ref 39–50)
HCT VFR BLD CALC: 22.6 % — LOW (ref 39–50)
HGB BLD-MCNC: 6.8 G/DL — CRITICAL LOW (ref 13–17)
HGB BLD-MCNC: 7.6 G/DL — LOW (ref 13–17)
MAGNESIUM SERPL-MCNC: 1.8 MG/DL — SIGNIFICANT CHANGE UP (ref 1.6–2.6)
MAGNESIUM SERPL-MCNC: 2.2 MG/DL — SIGNIFICANT CHANGE UP (ref 1.6–2.6)
MCHC RBC-ENTMCNC: 27.4 PG — SIGNIFICANT CHANGE UP (ref 27–34)
MCHC RBC-ENTMCNC: 28.6 PG — SIGNIFICANT CHANGE UP (ref 27–34)
MCHC RBC-ENTMCNC: 32.4 % — SIGNIFICANT CHANGE UP (ref 32–36)
MCHC RBC-ENTMCNC: 33.6 % — SIGNIFICANT CHANGE UP (ref 32–36)
MCV RBC AUTO: 84.7 FL — SIGNIFICANT CHANGE UP (ref 80–100)
MCV RBC AUTO: 85 FL — SIGNIFICANT CHANGE UP (ref 80–100)
NRBC # FLD: 0 K/UL — SIGNIFICANT CHANGE UP (ref 0–0)
NRBC # FLD: 0 K/UL — SIGNIFICANT CHANGE UP (ref 0–0)
PHOSPHATE SERPL-MCNC: 4.1 MG/DL — SIGNIFICANT CHANGE UP (ref 2.5–4.5)
PHOSPHATE SERPL-MCNC: 4.3 MG/DL — SIGNIFICANT CHANGE UP (ref 2.5–4.5)
PLATELET # BLD AUTO: 394 K/UL — SIGNIFICANT CHANGE UP (ref 150–400)
PLATELET # BLD AUTO: 435 K/UL — HIGH (ref 150–400)
PMV BLD: 9 FL — SIGNIFICANT CHANGE UP (ref 7–13)
PMV BLD: 9.1 FL — SIGNIFICANT CHANGE UP (ref 7–13)
POTASSIUM SERPL-MCNC: 4 MMOL/L — SIGNIFICANT CHANGE UP (ref 3.5–5.3)
POTASSIUM SERPL-MCNC: 4.3 MMOL/L — SIGNIFICANT CHANGE UP (ref 3.5–5.3)
POTASSIUM SERPL-SCNC: 4 MMOL/L — SIGNIFICANT CHANGE UP (ref 3.5–5.3)
POTASSIUM SERPL-SCNC: 4.3 MMOL/L — SIGNIFICANT CHANGE UP (ref 3.5–5.3)
PROT SERPL-MCNC: 5 G/DL — LOW (ref 6–8.3)
RBC # BLD: 2.48 M/UL — LOW (ref 4.2–5.8)
RBC # BLD: 2.66 M/UL — LOW (ref 4.2–5.8)
RBC # FLD: 13.4 % — SIGNIFICANT CHANGE UP (ref 10.3–14.5)
RBC # FLD: 13.5 % — SIGNIFICANT CHANGE UP (ref 10.3–14.5)
SARS-COV-2 RNA SPEC QL NAA+PROBE: SIGNIFICANT CHANGE UP
SODIUM SERPL-SCNC: 132 MMOL/L — LOW (ref 135–145)
SODIUM SERPL-SCNC: 135 MMOL/L — SIGNIFICANT CHANGE UP (ref 135–145)
WBC # BLD: 18.6 K/UL — HIGH (ref 3.8–10.5)
WBC # BLD: 20.27 K/UL — HIGH (ref 3.8–10.5)
WBC # FLD AUTO: 18.6 K/UL — HIGH (ref 3.8–10.5)
WBC # FLD AUTO: 20.27 K/UL — HIGH (ref 3.8–10.5)

## 2020-06-24 PROCEDURE — 99233 SBSQ HOSP IP/OBS HIGH 50: CPT | Mod: GC

## 2020-06-24 PROCEDURE — 99232 SBSQ HOSP IP/OBS MODERATE 35: CPT

## 2020-06-24 RX ORDER — BUMETANIDE 0.25 MG/ML
4 INJECTION INTRAMUSCULAR; INTRAVENOUS ONCE
Refills: 0 | Status: COMPLETED | OUTPATIENT
Start: 2020-06-24 | End: 2020-06-24

## 2020-06-24 RX ORDER — BUMETANIDE 0.25 MG/ML
4 INJECTION INTRAMUSCULAR; INTRAVENOUS ONCE
Refills: 0 | Status: DISCONTINUED | OUTPATIENT
Start: 2020-06-24 | End: 2020-06-24

## 2020-06-24 RX ORDER — BUMETANIDE 0.25 MG/ML
1 INJECTION INTRAMUSCULAR; INTRAVENOUS ONCE
Refills: 0 | Status: DISCONTINUED | OUTPATIENT
Start: 2020-06-24 | End: 2020-06-24

## 2020-06-24 RX ORDER — NICARDIPINE HYDROCHLORIDE 30 MG/1
30 CAPSULE, EXTENDED RELEASE ORAL EVERY 12 HOURS
Refills: 0 | Status: DISCONTINUED | OUTPATIENT
Start: 2020-06-24 | End: 2020-06-24

## 2020-06-24 RX ORDER — NICARDIPINE HYDROCHLORIDE 30 MG/1
30 CAPSULE, EXTENDED RELEASE ORAL EVERY 12 HOURS
Refills: 0 | Status: DISCONTINUED | OUTPATIENT
Start: 2020-06-24 | End: 2020-06-25

## 2020-06-24 RX ORDER — MAGNESIUM SULFATE 500 MG/ML
2 VIAL (ML) INJECTION ONCE
Refills: 0 | Status: COMPLETED | OUTPATIENT
Start: 2020-06-24 | End: 2020-06-24

## 2020-06-24 RX ORDER — HYDROMORPHONE HYDROCHLORIDE 2 MG/ML
1 INJECTION INTRAMUSCULAR; INTRAVENOUS; SUBCUTANEOUS EVERY 6 HOURS
Refills: 0 | Status: DISCONTINUED | OUTPATIENT
Start: 2020-06-24 | End: 2020-06-29

## 2020-06-24 RX ADMIN — Medication 1 MILLIGRAM(S): at 20:41

## 2020-06-24 RX ADMIN — Medication 1 MILLIGRAM(S): at 08:27

## 2020-06-24 RX ADMIN — POLYETHYLENE GLYCOL 3350 17 GRAM(S): 17 POWDER, FOR SOLUTION ORAL at 17:11

## 2020-06-24 RX ADMIN — HYDROMORPHONE HYDROCHLORIDE 6 MILLIGRAM(S): 2 INJECTION INTRAMUSCULAR; INTRAVENOUS; SUBCUTANEOUS at 10:14

## 2020-06-24 RX ADMIN — NICARDIPINE HYDROCHLORIDE 30 MILLIGRAM(S): 30 CAPSULE, EXTENDED RELEASE ORAL at 17:11

## 2020-06-24 RX ADMIN — SODIUM ZIRCONIUM CYCLOSILICATE 10 GRAM(S): 10 POWDER, FOR SUSPENSION ORAL at 11:48

## 2020-06-24 RX ADMIN — HEPARIN SODIUM 5000 UNIT(S): 5000 INJECTION INTRAVENOUS; SUBCUTANEOUS at 06:13

## 2020-06-24 RX ADMIN — GABAPENTIN 200 MILLIGRAM(S): 400 CAPSULE ORAL at 13:56

## 2020-06-24 RX ADMIN — HYDROMORPHONE HYDROCHLORIDE 6 MILLIGRAM(S): 2 INJECTION INTRAMUSCULAR; INTRAVENOUS; SUBCUTANEOUS at 06:49

## 2020-06-24 RX ADMIN — NICARDIPINE HYDROCHLORIDE 30 MILLIGRAM(S): 30 CAPSULE, EXTENDED RELEASE ORAL at 06:13

## 2020-06-24 RX ADMIN — GABAPENTIN 200 MILLIGRAM(S): 400 CAPSULE ORAL at 22:42

## 2020-06-24 RX ADMIN — HEPARIN SODIUM 5000 UNIT(S): 5000 INJECTION INTRAVENOUS; SUBCUTANEOUS at 13:56

## 2020-06-24 RX ADMIN — HEPARIN SODIUM 5000 UNIT(S): 5000 INJECTION INTRAVENOUS; SUBCUTANEOUS at 22:42

## 2020-06-24 RX ADMIN — SEVELAMER CARBONATE 800 MILLIGRAM(S): 2400 POWDER, FOR SUSPENSION ORAL at 13:56

## 2020-06-24 RX ADMIN — HYDROMORPHONE HYDROCHLORIDE 6 MILLIGRAM(S): 2 INJECTION INTRAMUSCULAR; INTRAVENOUS; SUBCUTANEOUS at 20:35

## 2020-06-24 RX ADMIN — Medication 100 MILLIGRAM(S): at 06:13

## 2020-06-24 RX ADMIN — TIZANIDINE 1 MILLIGRAM(S): 4 TABLET ORAL at 19:57

## 2020-06-24 RX ADMIN — FLUCONAZOLE 200 MILLIGRAM(S): 150 TABLET ORAL at 06:12

## 2020-06-24 RX ADMIN — SEVELAMER CARBONATE 800 MILLIGRAM(S): 2400 POWDER, FOR SUSPENSION ORAL at 06:13

## 2020-06-24 RX ADMIN — Medication 2 MILLIGRAM(S): at 22:42

## 2020-06-24 RX ADMIN — Medication 650 MILLIGRAM(S): at 06:14

## 2020-06-24 RX ADMIN — FLUCONAZOLE 200 MILLIGRAM(S): 150 TABLET ORAL at 11:48

## 2020-06-24 RX ADMIN — GABAPENTIN 200 MILLIGRAM(S): 400 CAPSULE ORAL at 06:13

## 2020-06-24 RX ADMIN — HYDROMORPHONE HYDROCHLORIDE 1 MILLIGRAM(S): 2 INJECTION INTRAMUSCULAR; INTRAVENOUS; SUBCUTANEOUS at 09:36

## 2020-06-24 RX ADMIN — Medication 650 MILLIGRAM(S): at 00:56

## 2020-06-24 RX ADMIN — Medication 50 GRAM(S): at 06:13

## 2020-06-24 RX ADMIN — HYDROMORPHONE HYDROCHLORIDE 6 MILLIGRAM(S): 2 INJECTION INTRAMUSCULAR; INTRAVENOUS; SUBCUTANEOUS at 17:22

## 2020-06-24 RX ADMIN — CHLORHEXIDINE GLUCONATE 1 APPLICATION(S): 213 SOLUTION TOPICAL at 06:14

## 2020-06-24 RX ADMIN — BUMETANIDE 132 MILLIGRAM(S): 0.25 INJECTION INTRAMUSCULAR; INTRAVENOUS at 15:00

## 2020-06-24 RX ADMIN — HYDROMORPHONE HYDROCHLORIDE 6 MILLIGRAM(S): 2 INJECTION INTRAMUSCULAR; INTRAVENOUS; SUBCUTANEOUS at 02:32

## 2020-06-24 RX ADMIN — QUETIAPINE FUMARATE 25 MILLIGRAM(S): 200 TABLET, FILM COATED ORAL at 22:41

## 2020-06-24 RX ADMIN — SEVELAMER CARBONATE 800 MILLIGRAM(S): 2400 POWDER, FOR SUSPENSION ORAL at 22:42

## 2020-06-24 RX ADMIN — METHADONE HYDROCHLORIDE 5 MILLIGRAM(S): 40 TABLET ORAL at 17:11

## 2020-06-24 RX ADMIN — METHADONE HYDROCHLORIDE 5 MILLIGRAM(S): 40 TABLET ORAL at 06:13

## 2020-06-24 RX ADMIN — HYDROMORPHONE HYDROCHLORIDE 1 MILLIGRAM(S): 2 INJECTION INTRAMUSCULAR; INTRAVENOUS; SUBCUTANEOUS at 15:40

## 2020-06-24 RX ADMIN — Medication 650 MILLIGRAM(S): at 13:55

## 2020-06-24 NOTE — PROGRESS NOTE ADULT - SUBJECTIVE AND OBJECTIVE BOX
Follow up on patient who is a 29y old  Male who presents with a chief complaint of rhabdo/compartment syndrome (22 Jun 2020 14:27)      HPI:  29 y M with PMH of bipolar depression transferred from [facility] for further management of acute renal failure due to rhabdomyolysis. Pt was found unresponsive at home, lying on his L side. He states he has not been taking his Lithium for at least 2 wks. At the OSH, he was treated for hyperkalemia. He received D5, insulin, calcium gluconate, Kayexalate Last BM was on 6/9. For L leg pain he was given lidocaine patch, dilaudid 1mg prn and tramadol 50mg. Hydralazine 20mg given for HTN. From 1 to 7 am only 100cc of UOP. Urine was tea colored. 500cc bolus of NS was given. CT cervical spine at OSH was (-). CT Head (-). CTAP and CXR were (-). CK on admission there was 177,000. (10 Ramírez 2020 13:14)      PAST MEDICAL & SURGICAL HISTORY:  Bipolar depression  Migraine  Displaced fracture of proximal phalanx of left little finger  Difficulty sleeping  ADD (attention deficit disorder)  History of facial surgery: Fadi-Facial Realignment Surgery in 2008      MEDICATIONS  (STANDING):  acetaminophen   Tablet .. 650 milliGRAM(s) Oral every 6 hours  ceFAZolin   IVPB 1000 milliGRAM(s) IV Intermittent every 24 hours  chlorhexidine 4% Liquid 1 Application(s) Topical <User Schedule>  fluconAZOLE   Tablet 200 milliGRAM(s) Oral daily  gabapentin 200 milliGRAM(s) Oral three times a day  heparin   Injectable 5000 Unit(s) SubCutaneous every 8 hours  LORazepam     Tablet 1 milliGRAM(s) Oral <User Schedule>  LORazepam     Tablet 2 milliGRAM(s) Oral <User Schedule>  methadone    Tablet 5 milliGRAM(s) Oral two times a day  niCARdipine 30 milliGRAM(s) Oral every 12 hours  polyethylene glycol 3350 17 Gram(s) Oral two times a day  QUEtiapine 25 milliGRAM(s) Oral at bedtime  senna 2 Tablet(s) Oral at bedtime  sevelamer carbonate 800 milliGRAM(s) Oral every 8 hours  sodium zirconium cyclosilicate 10 Gram(s) Oral daily    MEDICATIONS  (PRN):  ALPRAZolam 1 milliGRAM(s) Oral every 8 hours PRN anxiety  HYDROmorphone   Tablet 4 milliGRAM(s) Oral every 3 hours PRN Moderate Pain (4 - 6)  HYDROmorphone   Tablet 6 milliGRAM(s) Oral every 3 hours PRN Severe Pain (7 - 10)  HYDROmorphone  Injectable 1 milliGRAM(s) IV Push every 6 hours PRN For PT and extensive movement only  sodium chloride 0.9% lock flush 10 milliLiter(s) IV Push every 1 hour PRN Pre/post blood products, medications, blood draw, and to maintain line patency  tiZANidine 1 milliGRAM(s) Oral every 6 hours PRN muscle spasm and pain      ICU Vital Signs Last 24 Hrs  T(C): 37.6 (24 Jun 2020 08:00), Max: 37.9 (23 Jun 2020 20:00)  T(F): 99.6 (24 Jun 2020 08:00), Max: 100.2 (23 Jun 2020 20:00)  HR: 124 (24 Jun 2020 08:00) (104 - 128)  BP: 139/85 (24 Jun 2020 08:00) (126/76 - 171/81)  BP(mean): 95 (24 Jun 2020 08:00) (76 - 118)  ABP: --  ABP(mean): --  RR: 15 (24 Jun 2020 08:00) (13 - 22)  SpO2: 99% (24 Jun 2020 08:00) (95% - 100%)      Vital Signs Last 24 Hrs  T(C): 37.6 (24 Jun 2020 08:00), Max: 37.9 (23 Jun 2020 20:00)  T(F): 99.6 (24 Jun 2020 08:00), Max: 100.2 (23 Jun 2020 20:00)  HR: 124 (24 Jun 2020 08:00) (104 - 128)  BP: 139/85 (24 Jun 2020 08:00) (126/76 - 171/81)  BP(mean): 95 (24 Jun 2020 08:00) (76 - 118)  RR: 15 (24 Jun 2020 08:00) (13 - 22)  SpO2: 99% (24 Jun 2020 08:00) (95% - 100%)                          6.8    20.27 )-----------( 435      ( 24 Jun 2020 00:52 )             21.0     SUMMARY:   Patient seen at bedside. Patient states his patient's pain is 7/10 on his left leg. Patient states the current pain regimen is helping him with the pain. Patient alert and orientedx4. Patient answers questions appropriately. Patient unable to maintain eye contact, falling asleep while talking. Not in any apparent distress.    COMMENTS/PLAN:     Continue current pain regimen, IV Dilaudid 1mg Q6H PRN prior to physical therapy.

## 2020-06-24 NOTE — PROGRESS NOTE ADULT - PROBLEM SELECTOR PLAN 1
Pt with oliguric ANTOINETTE secondary to rhabdomyolysis. Had R femoral non tunneled catheter placed on 6/21/20 and arranged for urgent HD due to serum potassium of 6.8. Last HD on 6/23/20. Labs reviewed, pt. possibly showing signs of renal recovery. Will arrange for HD pending repeat labs today. Will assess daily for HD needs. Monitor labs and urine output. Avoid potential nephrotoxins.

## 2020-06-24 NOTE — PROGRESS NOTE ADULT - ASSESSMENT
A/P:     28 y/o M in SICU following found down, presenting with rhabdomyolysis/ compartment syndrome with fasciotomies to L LE X 4  closure on 6/19.    - Continue Prevena wound vacs to incisions, vacs will stay in place for approximately 10 days  - Continue MEÑO drains, monitor output   - CONT ABX  - Pain control   - LE elevated   - Management per SICU     Plastic Surgery   [id number] A/P:     28 y/o M in SICU following found down, presenting with rhabdomyolysis/ compartment syndrome with fasciotomies to L LE X 4  closure on 6/19.    - Continue Prevena wound vacs to incisions, vacs will stay in place for approximately 10 days  - Continue MEÑO drains, monitor output   - CONT ABX  - Pain control   - LE elevated   - Management per SICU       Attending  Drain 1 stripped and irrigated clear output    Plastic Surgery   [id number]

## 2020-06-24 NOTE — PROGRESS NOTE ADULT - SUBJECTIVE AND OBJECTIVE BOX
Vascular Progress Note    S: Patient seen and examined. Continue to be febrile with leukocytosis, otherwise, Reports tolerating diet without nausea, vomiting, passing flatus, having bowel movements, voiding without issues. Denies fever, chills, SOB, chest pain.     O:  Physical Exam:  General: NAD  Respiratory: non-labored on room air   Abdomen: soft, nontender, nondistended  Extremities: LE Gisela VACs in place. MEÑO SS.  LLE gross motor intact from hip to toes, no signs of LLE arterial insufficiency   LLE sensory deficit on left foot dorsum remains as stated above no acute changes      Vital Signs Last 24 Hrs  T(C): 37.8 (24 Jun 2020 04:00), Max: 37.9 (23 Jun 2020 20:00)  T(F): 100 (24 Jun 2020 04:00), Max: 100.2 (23 Jun 2020 20:00)  HR: 128 (24 Jun 2020 07:00) (104 - 128)  BP: 143/74 (24 Jun 2020 07:00) (126/76 - 171/81)  BP(mean): 90 (24 Jun 2020 07:00) (76 - 118)  RR: 16 (24 Jun 2020 07:00) (13 - 22)  SpO2: 99% (24 Jun 2020 07:00) (95% - 100%)    I&O's Summary    23 Jun 2020 07:01  -  24 Jun 2020 07:00  --------------------------------------------------------  IN: 2790 mL / OUT: 4875 mL / NET: -2085 mL        LABS:                         6.8    20.27 )-----------( 435      ( 24 Jun 2020 00:52 )             21.0     	  06-24    135  |  96<L>  |  45<H>  ----------------------------<  119<H>  4.0   |  27  |  4.22<H>    Ca    8.2<L>      24 Jun 2020 00:52  Phos  4.1     06-24  Mg     1.8     06-24

## 2020-06-24 NOTE — PROGRESS NOTE ADULT - SUBJECTIVE AND OBJECTIVE BOX
Anesthesia Pain Management Service- Attending Addendum    SUBJECTIVE: Pt doing well with IV PCA without problems reported.    Therapy:	  [ X] IV PCA	   [ ] Epidural           [ ] s/p Spinal Opoid              [ ] Postpartum infusion	  [ ] Patient controlled regional anesthesia (PCRA)    [ ] prn Analgesics    Allergies    No Known Allergies    Intolerances      MEDICATIONS  (STANDING):  ceFAZolin   IVPB 1000 milliGRAM(s) IV Intermittent every 24 hours  chlorhexidine 4% Liquid 1 Application(s) Topical <User Schedule>  fluconAZOLE   Tablet 200 milliGRAM(s) Oral daily  gabapentin 200 milliGRAM(s) Oral three times a day  heparin   Injectable 5000 Unit(s) SubCutaneous every 8 hours  LORazepam     Tablet 1 milliGRAM(s) Oral <User Schedule>  LORazepam     Tablet 2 milliGRAM(s) Oral <User Schedule>  methadone    Tablet 5 milliGRAM(s) Oral two times a day  niCARdipine 30 milliGRAM(s) Oral every 12 hours  polyethylene glycol 3350 17 Gram(s) Oral two times a day  QUEtiapine 25 milliGRAM(s) Oral at bedtime  senna 2 Tablet(s) Oral at bedtime  sevelamer carbonate 800 milliGRAM(s) Oral every 8 hours  sodium zirconium cyclosilicate 10 Gram(s) Oral daily    MEDICATIONS  (PRN):  ALPRAZolam 1 milliGRAM(s) Oral every 8 hours PRN anxiety  HYDROmorphone   Tablet 4 milliGRAM(s) Oral every 3 hours PRN Moderate Pain (4 - 6)  HYDROmorphone   Tablet 6 milliGRAM(s) Oral every 3 hours PRN Severe Pain (7 - 10)  HYDROmorphone  Injectable 1 milliGRAM(s) IV Push every 6 hours PRN For PT and extensive movement only  sodium chloride 0.9% lock flush 10 milliLiter(s) IV Push every 1 hour PRN Pre/post blood products, medications, blood draw, and to maintain line patency  tiZANidine 1 milliGRAM(s) Oral every 6 hours PRN muscle spasm and pain      OBJECTIVE:   [X] No new signs     [ ] Other:    Side Effects:  [X ] None			[ ] Other:    Assessment of Catheter Site:		[ ] Intact		[ ] Other:    ASSESSMENT/PLAN  [ X] Continue current therapy    [ ] Therapy changed to:    [ ] IV PCA       [ ] Epidural     [ ] prn Analgesics     Comments:

## 2020-06-24 NOTE — PROGRESS NOTE ADULT - SUBJECTIVE AND OBJECTIVE BOX
Plastic Surgery Progress Note      Subjective:  - Patient seen and examined on rounds, pt in some increased pain after moving   - No events overnight      Objective:    PHYSICAL EXAM:  General: NAD   LE: Prevena vacs in place and holding suction, LE with ace wrap in place no strikethrough. MEÑO SS.    Vital Signs  T(C): 37.8 (06-24-20 @ 04:00), Max: 37.9 (06-23-20 @ 20:00)  T(F): 100 (06-24-20 @ 04:00), Max: 100.2 (06-23-20 @ 20:00)  HR: 119 (06-24-20 @ 06:00) (104 - 122)  BP: 153/91 (06-24-20 @ 06:00) (126/76 - 171/81)  RR: 16 (06-24-20 @ 06:00) (13 - 22)  SpO2: 98% (06-24-20 @ 06:00) (95% - 100%)      23 Jun 2020 07:01  -  24 Jun 2020 07:00  --------------------------------------------------------  IN:    IV PiggyBack: 50 mL    Oral Fluid: 1360 mL    Other: 900 mL    Packed Red Blood Cells: 300 mL  Total IN: 2610 mL    OUT:    Drain: 30 mL    Drain: 40 mL    Drain: 20 mL    Drain: 20 mL    Drain: 45 mL    Other: 3600 mL    Voided: 1120 mL  Total OUT: 4875 mL    Total NET: -2265 mL

## 2020-06-24 NOTE — PROGRESS NOTE ADULT - ATTENDING COMMENTS
I Adam Sauer MD have seen and examined the patient today and agree with  the  evaluation, assessment and plan of the surgical house officer  ASHTYN Sauer MD have personally seen and examined the patient at bedside today at 4pm

## 2020-06-24 NOTE — PROGRESS NOTE ADULT - SUBJECTIVE AND OBJECTIVE BOX
SICU Daily Progress Note  =====================================================  HPI:  29M with PMH of bipolar depression transferred from [facility] for further management of acute renal failure due to rhabdomyolysis. Pt was found unresponsive at home, lying on his L side. He states he has not been taking his Lithium for at least 2 wks. At the OSH, he was treated for hyperkalemia. He received D5, insulin, calcium gluconate, Kayexalate Last BM was on 6/9. For L leg pain he was given lidocaine patch, dilaudid 1mg prn and tramadol 50mg. Hydralazine 20mg given for HTN. From 1 to 7 am only 100cc of UOP. Urine was tea colored. 500cc bolus of NS was given. CT cervical spine at OSH was (-). CT Head (-). CTAP and CXR were (-). CK on admission there was 177,000.   Pt was seen by surgery team in MICU for LUE swelling and compartment syndrome was ruled out. Surgery team was re-consulted today due to new symptoms of L foot drop and numbness. Pt was found to be highly suspicious for compartment syndrome in the upper thigh, and was emergently taken to OR.   Pt is now s/p four compartment fasciotomy of LLE, and left lateral and medial thigh fasciotomies. SICU consulted for q1h neurovascular checks and hemodynamic monitoring. Now s/p closure.    Interval/Overnight Events:       - IV pain meds adjusted  - s/p 1u PRBC with H&H from 6.5/ 20 to 7.5/22   - HD yesterday with 2.7L off      PAST MEDICAL & SURGICAL HISTORY:  Bipolar depression  Migraine  Displaced fracture of proximal phalanx of left little finger  Difficulty sleeping  ADD (attention deficit disorder)  History of facial surgery: Fadi-Facial Realignment Surgery in 2008  History of fasciotomy  Compartment syndrome, nontraumatic, lower extremity  Hyponatremia  Traumatic rhabdomyolysis, sequela  Bipolar 1 disorder  Compartment syndrome of left lower extremity, initial encounter  Compartment syndrome of thigh  Compartment syndrome of buttock  CKD (chronic kidney disease) stage 5, GFR less than 15 ml/min  Hypocalcemia  Hyperkalemia  ANTOINETTE (acute kidney injury)  Insertion, catheter, dialysis, temporary  Closure, wound, delayed  Fasciotomy, thigh  Decompression fasciotomy, lower extremity  Gluteal-iliotibial fasciotomy  Fasciotomy of left hip  Fasciotomy, thigh      MEDICATIONS:   --------------------------------------------------------------------------------------  CBC (06-23 @ 14:55)                              7.5<L>                         21.57<H>  )----------------(  434<H>     --    % Neutrophils, --    % Lymphocytes, ANC: --                                  22.5<L>  CBC (06-23 @ 10:18)                              7.0<LL>                         12.52<H>  )----------------(  401<H>     --    % Neutrophils, --    % Lymphocytes, ANC: --                                  21.4<L>    BMP (06-23 @ 14:55)             132<L>  |  92<L>   |  28<H> 		Ca++ --      Ca 8.2<L>             ---------------------------------( 121<H>		Mg 1.6                3.6     |  28      |  2.53<H>			Ph 2.6     BMP (06-23 @ 03:25)             130<L>  |  92<L>   |  50<H> 		Ca++ --      Ca 8.4                ---------------------------------( 112<H>		Mg 1.9                4.4     |  25      |  4.16<H>			Ph 4.7<H>        Cardiac Markers (06-23 @ 03:25)     Trop: -- -- / CKMB: -- / CK: 184      --------------------------------------------------------------------------------------  ICU Vital Signs Last 24 Hrs  T(C): 37.9 (23 Jun 2020 20:00), Max: 37.9 (23 Jun 2020 20:00)  T(F): 100.2 (23 Jun 2020 20:00), Max: 100.2 (23 Jun 2020 20:00)  HR: 120 (24 Jun 2020 00:00) (104 - 129)  BP: 131/61 (24 Jun 2020 00:00) (126/83 - 171/81)  BP(mean): 76 (24 Jun 2020 00:00) (76 - 118)  ABP: --  ABP(mean): --  RR: 16 (24 Jun 2020 00:00) (12 - 22)  SpO2: 95% (24 Jun 2020 00:00) (95% - 100%)    I&O's Detail    22 Jun 2020 07:01  -  23 Jun 2020 07:00  --------------------------------------------------------  IN:    IV PiggyBack: 250 mL    Oral Fluid: 480 mL    Other: 800 mL    Packed Red Blood Cells: 300 mL  Total IN: 1830 mL    OUT:    Drain: 25 mL    Drain: 45 mL    Drain: 75 mL    Drain: 10 mL    Drain: 50 mL    Other: 3669 mL    Voided: 750 mL  Total OUT: 4624 mL    Total NET: -2794 mL      23 Jun 2020 07:01  -  24 Jun 2020 00:30  --------------------------------------------------------  IN:    Oral Fluid: 1000 mL    Other: 900 mL  Total IN: 1900 mL    OUT:    Drain: 15 mL    Drain: 10 mL    Drain: 15 mL    Drain: 10 mL    Drain: 10 mL    Other: 3600 mL    Voided: 620 mL  Total OUT: 4280 mL    Total NET: -2380 mL      --------------------------------------------------------------------------------------    EXAM:  NEUROLOGY  Exam: Normal, NAD, alert, oriented x3, no focal deficits. PERRLA.       RESPIRATORY  Exam: Lungs clear to auscultation, Normal expansion/effort.       CARDIOVASCULAR  Exam: S1, S2.  Regular rate and rhythm.  Peripheral edema   Cardiac Rhythm: Normal Sinus Rhythm      GI/NUTRITION  Exam: Abdomen soft, Non-tender, Non-distended.    Current Diet: Regular, fluid restricted 1000ml     METABOLIC/FLUIDS/ELECTROLYTES      HEMATOLOGIC  [x] DVT Prophylaxis: heparin   Injectable 5000 Unit(s) SubCutaneous every 8 hours    Transfusions:	[] PRBC	[] Platelets		[] FFP	[] Cryoprecipitate    INFECTIOUS DISEASE  Antimicrobials/Immunologic Medications:  ceFAZolin   IVPB 1000 milliGRAM(s) IV Intermittent every 24 hours  fluconAZOLE IVPB 50 milliGRAM(s) IV Intermittent once        Tubes/Lines/Drains   [x] Peripheral IV  [] Central Venous Line     	[] R	[] L	[] IJ	[] Fem	[] SC	Date Placed:   [] Arterial Line		[] R	[] L	[] Fem	[] Rad	[] Ax	Date Placed:   [] PICC		[] Midline		[] Mediport  [] Urinary Catheter		Date Placed:   [x] Necessity of urinary, arterial, and venous catheters discussed    VASCULAR  Exam: Extremities warm, pink, well-perfused. 2+ DP pulses palpable b/l. LLE with ACE dressing C/D/I, preveena x 2 in place with good suction. MEÑO x5 with SS output.    MUSCULOSKELETAL  Exam: All extremities moving spontaneously without limitations.    SKIN  Exam: Good skin turgor, no skin breakdown.     LABS  --------------------------------------------------------------------------------------  CBC (06-23 @ 14:55)                              7.5<L>                         21.57<H>  )----------------(  434<H>     --    % Neutrophils, --    % Lymphocytes, ANC: --                                  22.5<L>  CBC (06-23 @ 10:18)                              7.0<LL>                         12.52<H>  )----------------(  401<H>     --    % Neutrophils, --    % Lymphocytes, ANC: --                                  21.4<L>    BMP (06-23 @ 14:55)             132<L>  |  92<L>   |  28<H> 		Ca++ --      Ca 8.2<L>             ---------------------------------( 121<H>		Mg 1.6                3.6     |  28      |  2.53<H>			Ph 2.6     BMP (06-23 @ 03:25)             130<L>  |  92<L>   |  50<H> 		Ca++ --      Ca 8.4                ---------------------------------( 112<H>		Mg 1.9                4.4     |  25      |  4.16<H>			Ph 4.7<H>        Cardiac Markers (06-23 @ 03:25)     Trop: -- -- / CKMB: -- / CK: 184        -------------------------------------------------------------------------------------- SICU Daily Progress Note  =====================================================  HPI:  29M with PMH of bipolar depression transferred from [facility] for further management of acute renal failure due to rhabdomyolysis. Pt was found unresponsive at home, lying on his L side. He states he has not been taking his Lithium for at least 2 wks. At the OSH, he was treated for hyperkalemia. He received D5, insulin, calcium gluconate, Kayexalate Last BM was on 6/9. For L leg pain he was given lidocaine patch, dilaudid 1mg prn and tramadol 50mg. Hydralazine 20mg given for HTN. From 1 to 7 am only 100cc of UOP. Urine was tea colored. 500cc bolus of NS was given. CT cervical spine at OSH was (-). CT Head (-). CTAP and CXR were (-). CK on admission there was 177,000.   Pt was seen by surgery team in MICU for LUE swelling and compartment syndrome was ruled out. Surgery team was re-consulted today due to new symptoms of L foot drop and numbness. Pt was found to be highly suspicious for compartment syndrome in the upper thigh, and was emergently taken to OR.   Pt is now s/p four compartment fasciotomy of LLE, and left lateral and medial thigh fasciotomies. SICU consulted for q1h neurovascular checks and hemodynamic monitoring. Now s/p closure.    Interval/Overnight Events:       - IV pain meds adjusted  - last night 1u PRBC with H&H from 6.5/ 20 to 7.5/22   - overnight 1u PRBC given due to H/h 6.8/21  - HD yesterday with 2.7L off      PAST MEDICAL & SURGICAL HISTORY:  Bipolar depression  Migraine  Displaced fracture of proximal phalanx of left little finger  Difficulty sleeping  ADD (attention deficit disorder)  History of facial surgery: Fadi-Facial Realignment Surgery in 2008  History of fasciotomy  Compartment syndrome, nontraumatic, lower extremity  Hyponatremia  Traumatic rhabdomyolysis, sequela  Bipolar 1 disorder  Compartment syndrome of left lower extremity, initial encounter  Compartment syndrome of thigh  Compartment syndrome of buttock  CKD (chronic kidney disease) stage 5, GFR less than 15 ml/min  Hypocalcemia  Hyperkalemia  ANTOINETTE (acute kidney injury)  Insertion, catheter, dialysis, temporary  Closure, wound, delayed  Fasciotomy, thigh  Decompression fasciotomy, lower extremity  Gluteal-iliotibial fasciotomy  Fasciotomy of left hip  Fasciotomy, thigh      MEDICATIONS:   --------------------------------------------------------------------------------------  CBC (06-23 @ 14:55)                              7.5<L>                         21.57<H>  )----------------(  434<H>     --    % Neutrophils, --    % Lymphocytes, ANC: --                                  22.5<L>  CBC (06-23 @ 10:18)                              7.0<LL>                         12.52<H>  )----------------(  401<H>     --    % Neutrophils, --    % Lymphocytes, ANC: --                                  21.4<L>    BMP (06-23 @ 14:55)             132<L>  |  92<L>   |  28<H> 		Ca++ --      Ca 8.2<L>             ---------------------------------( 121<H>		Mg 1.6                3.6     |  28      |  2.53<H>			Ph 2.6     BMP (06-23 @ 03:25)             130<L>  |  92<L>   |  50<H> 		Ca++ --      Ca 8.4                ---------------------------------( 112<H>		Mg 1.9                4.4     |  25      |  4.16<H>			Ph 4.7<H>        Cardiac Markers (06-23 @ 03:25)     Trop: -- -- / CKMB: -- / CK: 184      --------------------------------------------------------------------------------------  ICU Vital Signs Last 24 Hrs  T(C): 37.9 (23 Jun 2020 20:00), Max: 37.9 (23 Jun 2020 20:00)  T(F): 100.2 (23 Jun 2020 20:00), Max: 100.2 (23 Jun 2020 20:00)  HR: 120 (24 Jun 2020 00:00) (104 - 129)  BP: 131/61 (24 Jun 2020 00:00) (126/83 - 171/81)  BP(mean): 76 (24 Jun 2020 00:00) (76 - 118)  ABP: --  ABP(mean): --  RR: 16 (24 Jun 2020 00:00) (12 - 22)  SpO2: 95% (24 Jun 2020 00:00) (95% - 100%)    I&O's Detail    22 Jun 2020 07:01  -  23 Jun 2020 07:00  --------------------------------------------------------  IN:    IV PiggyBack: 250 mL    Oral Fluid: 480 mL    Other: 800 mL    Packed Red Blood Cells: 300 mL  Total IN: 1830 mL    OUT:    Drain: 25 mL    Drain: 45 mL    Drain: 75 mL    Drain: 10 mL    Drain: 50 mL    Other: 3669 mL    Voided: 750 mL  Total OUT: 4624 mL    Total NET: -2794 mL      23 Jun 2020 07:01  -  24 Jun 2020 00:30  --------------------------------------------------------  IN:    Oral Fluid: 1000 mL    Other: 900 mL  Total IN: 1900 mL    OUT:    Drain: 15 mL    Drain: 10 mL    Drain: 15 mL    Drain: 10 mL    Drain: 10 mL    Other: 3600 mL    Voided: 620 mL  Total OUT: 4280 mL    Total NET: -2380 mL      --------------------------------------------------------------------------------------    EXAM:  NEUROLOGY  Exam: Normal, NAD, alert, oriented x3, no focal deficits. PERRLA.       RESPIRATORY  Exam: Lungs clear to auscultation, Normal expansion/effort.       CARDIOVASCULAR  Exam: S1, S2.  Regular rate and rhythm.  Peripheral edema   Cardiac Rhythm: Normal Sinus Rhythm      GI/NUTRITION  Exam: Abdomen soft, Non-tender, Non-distended.    Current Diet: Regular, fluid restricted 1000ml     METABOLIC/FLUIDS/ELECTROLYTES      HEMATOLOGIC  [x] DVT Prophylaxis: heparin   Injectable 5000 Unit(s) SubCutaneous every 8 hours    Transfusions:	[] PRBC	[] Platelets		[] FFP	[] Cryoprecipitate    INFECTIOUS DISEASE  Antimicrobials/Immunologic Medications:  ceFAZolin   IVPB 1000 milliGRAM(s) IV Intermittent every 24 hours  fluconAZOLE IVPB 50 milliGRAM(s) IV Intermittent once        Tubes/Lines/Drains   [x] Peripheral IV  [] Central Venous Line     	[] R	[] L	[] IJ	[] Fem	[] SC	Date Placed:   [] Arterial Line		[] R	[] L	[] Fem	[] Rad	[] Ax	Date Placed:   [] PICC		[] Midline		[] Mediport  [] Urinary Catheter		Date Placed:   [x] Necessity of urinary, arterial, and venous catheters discussed    VASCULAR  Exam: Extremities warm, pink, well-perfused. 2+ DP pulses palpable b/l. LLE with ACE dressing C/D/I, preveena x 2 in place with good suction. MEÑO x5 with SS output.    MUSCULOSKELETAL  Exam: All extremities moving spontaneously without limitations.    SKIN  Exam: Good skin turgor, no skin breakdown.     LABS  --------------------------------------------------------------------------------------  CBC (06-23 @ 14:55)                              7.5<L>                         21.57<H>  )----------------(  434<H>     --    % Neutrophils, --    % Lymphocytes, ANC: --                                  22.5<L>  CBC (06-23 @ 10:18)                              7.0<LL>                         12.52<H>  )----------------(  401<H>     --    % Neutrophils, --    % Lymphocytes, ANC: --                                  21.4<L>    BMP (06-23 @ 14:55)             132<L>  |  92<L>   |  28<H> 		Ca++ --      Ca 8.2<L>             ---------------------------------( 121<H>		Mg 1.6                3.6     |  28      |  2.53<H>			Ph 2.6     BMP (06-23 @ 03:25)             130<L>  |  92<L>   |  50<H> 		Ca++ --      Ca 8.4                ---------------------------------( 112<H>		Mg 1.9                4.4     |  25      |  4.16<H>			Ph 4.7<H>        Cardiac Markers (06-23 @ 03:25)     Trop: -- -- / CKMB: -- / CK: 184        --------------------------------------------------------------------------------------

## 2020-06-24 NOTE — PROGRESS NOTE ADULT - ATTENDING COMMENTS
Patient doing well postoperatively. Afebrile overnight, leukocytosis downtrending. Received HD yesterday with 2.7l removal.  Plan  N pain control multimodal therapy, c/w methdone  resp on room air, comfortable  cv hemodynamically stable - plan to wean down nifidipine - htn likely 2/2 to pain  gi reg diet  gu/renal monitor uop - appreciate renal recs - will reassess repeat bmp in afternoon and will base HD on afternoon labs, c/w phosphate and potassium binders  heme received 1 unit prbc - vte ppx, f/u cbc post transfusion  id ancef diflucan plan to d/c tomorrow - f/u IR for possible gluteal drainage  endo no changes    I have personally interviewed when possible and examined the patient, reviewed data and laboratory tests/x-rays and all pertinent electronic images.  I was physically present for the key portions of the evaluation and management (E/M) service provided.   The SICU team has a constant risk benefit analyzes discussion with the primary team, all consultants, House Staff and PA's on all decisions.  These diagnoses are unrelated to the surgical procedure noted above.  I meet with family if needed to get further history, discuss the case and make care decisions for this patient who might not be able to participate.  Time involved in performance of separately billable procedures was not counted toward my critical care time. There is no overlap.  I spent 30 minutes ( 0800Hrs-0915Hrs in AM/ 1600Hrs-1715Hrs in PM, or other time indicated) of critical care time for the diagnoses, assessment, plan and interventions.  This time excludes time spent on separate procedures and teaching.

## 2020-06-24 NOTE — PROGRESS NOTE ADULT - ATTENDING COMMENTS
ANTOINETTE  Roland    Will monitor today off dialysis given that he has made more urine  Monitor labs closely as well as Is/Os

## 2020-06-24 NOTE — PROGRESS NOTE ADULT - PROBLEM SELECTOR PLAN 5
Pt. remains hypervolemic on exam. Recommend intermittent IV Bumex to maintain volume status. Monitor urine output and daily weights.    If any questions, please feel free to contact me  Denys Lipscomb   Nephrology Fellow  [phone]  (After 5 pm or on weekends please page the on-call fellow)

## 2020-06-24 NOTE — PROGRESS NOTE ADULT - SUBJECTIVE AND OBJECTIVE BOX
[facility] DIVISION OF KIDNEY DISEASES AND HYPERTENSION -- FOLLOW UP NOTE  --------------------------------------------------------------------------------  HPI: 29-year-old male, with history bipolar depression was transferred from [facility] to [facility] for severe rhabdomyolysis. As per paper chart, pt was found to have cocaine and amphetamines in urine drug screen. Pt also with an elevated CPK ~ 180K. Nephrology team consulted for ANTOINETTE and rhabdomyolyiss. Upon review of labs on [facility], Scr was 1.11 on 8/23/2017. Pt. underwent fasciotomy on 6/11/20 for LLE compartement syndrome. Pt. had wound vac placed on  6/15/20.  Pt. underwent partial closure of fasciotomy by plastic surgery team on 6/19/20. Last hemodialysis on 6/23/20.    Pt. evaluated at bedside, in no distress. Labs showing possible signs of renal recovery and pt. with modest response to IV Bumex.     PAST HISTORY  --------------------------------------------------------------------------------  No significant changes to PMH, PSH, FHx, SHx, unless otherwise noted    ALLERGIES & MEDICATIONS  --------------------------------------------------------------------------------  Allergies    No Known Allergies    Intolerances    Standing Inpatient Medications  acetaminophen   Tablet .. 650 milliGRAM(s) Oral every 6 hours  ceFAZolin   IVPB 1000 milliGRAM(s) IV Intermittent every 24 hours  chlorhexidine 4% Liquid 1 Application(s) Topical <User Schedule>  fluconAZOLE   Tablet 200 milliGRAM(s) Oral daily  gabapentin 200 milliGRAM(s) Oral three times a day  heparin   Injectable 5000 Unit(s) SubCutaneous every 8 hours  LORazepam     Tablet 1 milliGRAM(s) Oral <User Schedule>  LORazepam     Tablet 2 milliGRAM(s) Oral <User Schedule>  methadone    Tablet 5 milliGRAM(s) Oral two times a day  niCARdipine 30 milliGRAM(s) Oral every 12 hours  polyethylene glycol 3350 17 Gram(s) Oral two times a day  QUEtiapine 25 milliGRAM(s) Oral at bedtime  senna 2 Tablet(s) Oral at bedtime  sevelamer carbonate 800 milliGRAM(s) Oral every 8 hours  sodium zirconium cyclosilicate 10 Gram(s) Oral daily    PRN Inpatient Medications  ALPRAZolam 1 milliGRAM(s) Oral every 8 hours PRN  HYDROmorphone   Tablet 4 milliGRAM(s) Oral every 3 hours PRN  HYDROmorphone   Tablet 6 milliGRAM(s) Oral every 3 hours PRN  HYDROmorphone  Injectable 1 milliGRAM(s) IV Push every 6 hours PRN  sodium chloride 0.9% lock flush 10 milliLiter(s) IV Push every 1 hour PRN  tiZANidine 1 milliGRAM(s) Oral every 6 hours PRN      REVIEW OF SYSTEMS  --------------------------------------------------------------------------------  Gen: no lethargy  Respiratory: No dyspnea  CV: No chest pain  GI: No abdominal pain  MSK: + LE edema, scrotal edema  Neuro: No dizziness  Heme: No bleeding    All other systems were reviewed and are negative, except as noted.    VITALS/PHYSICAL EXAM  --------------------------------------------------------------------------------  T(C): 37.6 (06-24-20 @ 08:00), Max: 37.9 (06-23-20 @ 20:00)  HR: 124 (06-24-20 @ 08:00) (104 - 128)  BP: 139/85 (06-24-20 @ 08:00) (126/76 - 171/81)  RR: 15 (06-24-20 @ 08:00) (13 - 22)  SpO2: 99% (06-24-20 @ 08:00) (95% - 100%)  Wt(kg): --      06-23-20 @ 07:01  -  06-24-20 @ 07:00  --------------------------------------------------------  IN: 2790 mL / OUT: 4875 mL / NET: -2085 mL    Physical Exam:  	Gen: NAD  	HEENT: MMM   	Pulm: CTA B/L  	CV: S1S2  	Abd: Soft, +BS               Ext: left lower extremity with multiple drains, lower extremity and scrotal edema  	Neuro: Awake  	Skin: Warm and dry              Vascular: R femoral non tunneled HD catheter +    LABS/STUDIES  --------------------------------------------------------------------------------              6.8    20.27 >-----------<  435      [06-24-20 @ 00:52]              21.0     135  |  96  |  45  ----------------------------<  119      [06-24-20 @ 00:52]  4.0   |  27  |  4.22        Ca     8.2     [06-24-20 @ 00:52]      Mg     1.8     [06-24-20 @ 00:52]      Phos  4.1     [06-24-20 @ 00:52]    Creatinine Trend:  SCr 4.22 [06-24 @ 00:52]  SCr 2.53 [06-23 @ 14:55]  SCr 4.16 [06-23 @ 03:25]  SCr 4.92 [06-22 @ 19:20]  SCr 5.66 [06-22 @ 04:30]

## 2020-06-24 NOTE — PROGRESS NOTE ADULT - ASSESSMENT
29M w/ PMH of bipolar depression, s/p found down at home, transferred from [facility] for further management of acute renal failure due to rhabdomyolysis, s/p four compartment fasciotomy and lateral/medial thigh fasciotomies with ANTOINETTE on HD. Now s/p fasciotomy closure POD 4.    Neuro hx of bipolar depression  - A&O x 3  - Pain Control: oral dilaudid, tylenol, methadone  - IV dilaudid dosing adjusted  - Xanax 1 mg q8 PRN   - ativan BID  - Seroquel QHS  - Increased gabapentin, standing tizanidine per Pain service  - Psych following, no plan to restart home lithium  - Pain following, appreciate recommendations    Resp  - RA   - Mobilize, OOB to chair  - Obtain Chest XR    CVS  - Appropriate mental status, appears well perfused   - c/w nicardipine 30 mg PO q8 hrs   - if HTN again, rec'd clonidine .1mg by addiction psych    GI  - Regular diet with fluid restriction 1L for hyponatremia   - Bowel regimen senna 2 tabs qHS  - Patient declining miralax, last BM 06/17 prior to that > 1w      - ANTOINETTE in setting of rhabdomyolysis, slowly improving  - CK down to <2000  - Remains hypervolemic, oliguric but UOP on an upward trend  - IV locked   - R fem shiley placed, HD  - Plan for permacath once afebrile  - Nephrology following    Heme  - H&H 7.5/22 from 6.5/20 after 1u ORBC 6/23  - SQH for dvt ppx    ID  - Febrile to TMax 101.2  - Leukocytosis of 21  - bcx NTD, Ucx neg, CXR neg  - Continue to monitor fever curve  - started on cefazolin, diflucan    Endo  - monitor FGS    Lines  - Right radial a line  - Right femoral shawna

## 2020-06-24 NOTE — PROGRESS NOTE ADULT - ASSESSMENT
29M with PMH of bipolar depression transferred from [facility] for further management of acute renal failure due to rhabdomyolysis, Taken to the OR for LLE compartment syndrome. Now s/p four compartment fasciotomy of the left lower leg 6/11. s/p closure with plastics on 6/19. Doing well clinically, however continue to spike fever with leukocytosis.      Plan  - pain control PRN  - diet as tolerated  - continue HD as per Renal - Permacath when afebrile   - DVT ppx   - OOB to chair as tolerated, PT consult for mobilization  - Appreciate SICU care  - recommend ID consult       C Team Surgery  [id number] 29M with PMH of bipolar depression transferred from [facility] for further management of acute renal failure due to rhabdomyolysis, Taken to the OR for LLE compartment syndrome. Now s/p four compartment fasciotomy of the left lower leg 6/11. s/p closure with plastics on 6/19. Doing well clinically, however continue to spike fever with leukocytosis.      Plan  - pain control PRN  - diet as tolerated  - continue HD as per Renal - Permacath when afebrile   - DVT ppx   - OOB to chair as tolerated, PT consult for mobilization  - Appreciate SICU care    bedside d/w pt that i am concerned for  left buttock fasciitis and at this time i recooment open exploration of the left buttock  pt wants to wait and be re eval milad  risks of nec fasciitis explained to pt who understands   will d/c tylenol rx

## 2020-06-25 LAB
ANION GAP SERPL CALC-SCNC: 17 MMO/L — HIGH (ref 7–14)
ANION GAP SERPL CALC-SCNC: 17 MMO/L — HIGH (ref 7–14)
BASOPHILS # BLD AUTO: 0.06 K/UL — SIGNIFICANT CHANGE UP (ref 0–0.2)
BASOPHILS NFR BLD AUTO: 0.4 % — SIGNIFICANT CHANGE UP (ref 0–2)
BUN SERPL-MCNC: 67 MG/DL — HIGH (ref 7–23)
BUN SERPL-MCNC: 77 MG/DL — HIGH (ref 7–23)
CALCIUM SERPL-MCNC: 8.1 MG/DL — LOW (ref 8.4–10.5)
CALCIUM SERPL-MCNC: 8.2 MG/DL — LOW (ref 8.4–10.5)
CHLORIDE SERPL-SCNC: 93 MMOL/L — LOW (ref 98–107)
CHLORIDE SERPL-SCNC: 97 MMOL/L — LOW (ref 98–107)
CO2 SERPL-SCNC: 22 MMOL/L — SIGNIFICANT CHANGE UP (ref 22–31)
CO2 SERPL-SCNC: 23 MMOL/L — SIGNIFICANT CHANGE UP (ref 22–31)
CREAT SERPL-MCNC: 5.26 MG/DL — HIGH (ref 0.5–1.3)
CREAT SERPL-MCNC: 5.48 MG/DL — HIGH (ref 0.5–1.3)
CULTURE RESULTS: SIGNIFICANT CHANGE UP
CULTURE RESULTS: SIGNIFICANT CHANGE UP
EOSINOPHIL # BLD AUTO: 0.25 K/UL — SIGNIFICANT CHANGE UP (ref 0–0.5)
EOSINOPHIL NFR BLD AUTO: 1.5 % — SIGNIFICANT CHANGE UP (ref 0–6)
GLUCOSE SERPL-MCNC: 107 MG/DL — HIGH (ref 70–99)
GLUCOSE SERPL-MCNC: 212 MG/DL — HIGH (ref 70–99)
HCT VFR BLD CALC: 23.1 % — LOW (ref 39–50)
HGB BLD-MCNC: 7.6 G/DL — LOW (ref 13–17)
IMM GRANULOCYTES NFR BLD AUTO: 4.3 % — HIGH (ref 0–1.5)
LYMPHOCYTES # BLD AUTO: 1.89 K/UL — SIGNIFICANT CHANGE UP (ref 1–3.3)
LYMPHOCYTES # BLD AUTO: 11.4 % — LOW (ref 13–44)
MAGNESIUM SERPL-MCNC: 2.2 MG/DL — SIGNIFICANT CHANGE UP (ref 1.6–2.6)
MAGNESIUM SERPL-MCNC: 2.4 MG/DL — SIGNIFICANT CHANGE UP (ref 1.6–2.6)
MCHC RBC-ENTMCNC: 28 PG — SIGNIFICANT CHANGE UP (ref 27–34)
MCHC RBC-ENTMCNC: 32.9 % — SIGNIFICANT CHANGE UP (ref 32–36)
MCV RBC AUTO: 85.2 FL — SIGNIFICANT CHANGE UP (ref 80–100)
MONOCYTES # BLD AUTO: 0.87 K/UL — SIGNIFICANT CHANGE UP (ref 0–0.9)
MONOCYTES NFR BLD AUTO: 5.3 % — SIGNIFICANT CHANGE UP (ref 2–14)
NEUTROPHILS # BLD AUTO: 12.78 K/UL — HIGH (ref 1.8–7.4)
NEUTROPHILS NFR BLD AUTO: 77.1 % — HIGH (ref 43–77)
NRBC # FLD: 0 K/UL — SIGNIFICANT CHANGE UP (ref 0–0)
PHOSPHATE SERPL-MCNC: 5.6 MG/DL — HIGH (ref 2.5–4.5)
PHOSPHATE SERPL-MCNC: 5.9 MG/DL — HIGH (ref 2.5–4.5)
PLATELET # BLD AUTO: 415 K/UL — HIGH (ref 150–400)
PMV BLD: 9.2 FL — SIGNIFICANT CHANGE UP (ref 7–13)
POTASSIUM SERPL-MCNC: 3.7 MMOL/L — SIGNIFICANT CHANGE UP (ref 3.5–5.3)
POTASSIUM SERPL-MCNC: 4.2 MMOL/L — SIGNIFICANT CHANGE UP (ref 3.5–5.3)
POTASSIUM SERPL-SCNC: 3.7 MMOL/L — SIGNIFICANT CHANGE UP (ref 3.5–5.3)
POTASSIUM SERPL-SCNC: 4.2 MMOL/L — SIGNIFICANT CHANGE UP (ref 3.5–5.3)
RBC # BLD: 2.71 M/UL — LOW (ref 4.2–5.8)
RBC # FLD: 13.5 % — SIGNIFICANT CHANGE UP (ref 10.3–14.5)
SODIUM SERPL-SCNC: 132 MMOL/L — LOW (ref 135–145)
SODIUM SERPL-SCNC: 137 MMOL/L — SIGNIFICANT CHANGE UP (ref 135–145)
SPECIMEN SOURCE: SIGNIFICANT CHANGE UP
SPECIMEN SOURCE: SIGNIFICANT CHANGE UP
WBC # BLD: 16.57 K/UL — HIGH (ref 3.8–10.5)
WBC # FLD AUTO: 16.57 K/UL — HIGH (ref 3.8–10.5)

## 2020-06-25 PROCEDURE — 99232 SBSQ HOSP IP/OBS MODERATE 35: CPT

## 2020-06-25 PROCEDURE — 76882 US LMTD JT/FCL EVL NVASC XTR: CPT | Mod: 26,LT

## 2020-06-25 PROCEDURE — 99233 SBSQ HOSP IP/OBS HIGH 50: CPT | Mod: GC

## 2020-06-25 RX ORDER — NICARDIPINE HYDROCHLORIDE 30 MG/1
30 CAPSULE, EXTENDED RELEASE ORAL
Refills: 0 | Status: DISCONTINUED | OUTPATIENT
Start: 2020-06-25 | End: 2020-06-27

## 2020-06-25 RX ORDER — NICARDIPINE HYDROCHLORIDE 30 MG/1
30 CAPSULE, EXTENDED RELEASE ORAL
Refills: 0 | Status: DISCONTINUED | OUTPATIENT
Start: 2020-06-25 | End: 2020-06-25

## 2020-06-25 RX ORDER — BUMETANIDE 0.25 MG/ML
4 INJECTION INTRAMUSCULAR; INTRAVENOUS ONCE
Refills: 0 | Status: COMPLETED | OUTPATIENT
Start: 2020-06-25 | End: 2020-06-25

## 2020-06-25 RX ADMIN — NICARDIPINE HYDROCHLORIDE 30 MILLIGRAM(S): 30 CAPSULE, EXTENDED RELEASE ORAL at 06:04

## 2020-06-25 RX ADMIN — GABAPENTIN 200 MILLIGRAM(S): 400 CAPSULE ORAL at 06:04

## 2020-06-25 RX ADMIN — Medication 1 MILLIGRAM(S): at 20:30

## 2020-06-25 RX ADMIN — SEVELAMER CARBONATE 800 MILLIGRAM(S): 2400 POWDER, FOR SUSPENSION ORAL at 22:13

## 2020-06-25 RX ADMIN — HYDROMORPHONE HYDROCHLORIDE 6 MILLIGRAM(S): 2 INJECTION INTRAMUSCULAR; INTRAVENOUS; SUBCUTANEOUS at 03:27

## 2020-06-25 RX ADMIN — METHADONE HYDROCHLORIDE 5 MILLIGRAM(S): 40 TABLET ORAL at 06:04

## 2020-06-25 RX ADMIN — NICARDIPINE HYDROCHLORIDE 30 MILLIGRAM(S): 30 CAPSULE, EXTENDED RELEASE ORAL at 10:15

## 2020-06-25 RX ADMIN — Medication 2 MILLIGRAM(S): at 20:43

## 2020-06-25 RX ADMIN — HYDROMORPHONE HYDROCHLORIDE 6 MILLIGRAM(S): 2 INJECTION INTRAMUSCULAR; INTRAVENOUS; SUBCUTANEOUS at 16:00

## 2020-06-25 RX ADMIN — BUMETANIDE 132 MILLIGRAM(S): 0.25 INJECTION INTRAMUSCULAR; INTRAVENOUS at 10:09

## 2020-06-25 RX ADMIN — HEPARIN SODIUM 5000 UNIT(S): 5000 INJECTION INTRAVENOUS; SUBCUTANEOUS at 12:02

## 2020-06-25 RX ADMIN — QUETIAPINE FUMARATE 25 MILLIGRAM(S): 200 TABLET, FILM COATED ORAL at 22:13

## 2020-06-25 RX ADMIN — SEVELAMER CARBONATE 800 MILLIGRAM(S): 2400 POWDER, FOR SUSPENSION ORAL at 06:04

## 2020-06-25 RX ADMIN — CHLORHEXIDINE GLUCONATE 1 APPLICATION(S): 213 SOLUTION TOPICAL at 06:04

## 2020-06-25 RX ADMIN — HYDROMORPHONE HYDROCHLORIDE 6 MILLIGRAM(S): 2 INJECTION INTRAMUSCULAR; INTRAVENOUS; SUBCUTANEOUS at 08:00

## 2020-06-25 RX ADMIN — HEPARIN SODIUM 5000 UNIT(S): 5000 INJECTION INTRAVENOUS; SUBCUTANEOUS at 06:04

## 2020-06-25 RX ADMIN — GABAPENTIN 200 MILLIGRAM(S): 400 CAPSULE ORAL at 22:13

## 2020-06-25 RX ADMIN — GABAPENTIN 200 MILLIGRAM(S): 400 CAPSULE ORAL at 12:02

## 2020-06-25 RX ADMIN — HEPARIN SODIUM 5000 UNIT(S): 5000 INJECTION INTRAVENOUS; SUBCUTANEOUS at 22:13

## 2020-06-25 RX ADMIN — HYDROMORPHONE HYDROCHLORIDE 6 MILLIGRAM(S): 2 INJECTION INTRAMUSCULAR; INTRAVENOUS; SUBCUTANEOUS at 19:32

## 2020-06-25 RX ADMIN — SODIUM ZIRCONIUM CYCLOSILICATE 10 GRAM(S): 10 POWDER, FOR SUSPENSION ORAL at 12:02

## 2020-06-25 RX ADMIN — SEVELAMER CARBONATE 800 MILLIGRAM(S): 2400 POWDER, FOR SUSPENSION ORAL at 12:02

## 2020-06-25 RX ADMIN — Medication 100 MILLIGRAM(S): at 06:04

## 2020-06-25 RX ADMIN — FLUCONAZOLE 200 MILLIGRAM(S): 150 TABLET ORAL at 13:37

## 2020-06-25 RX ADMIN — METHADONE HYDROCHLORIDE 5 MILLIGRAM(S): 40 TABLET ORAL at 16:18

## 2020-06-25 RX ADMIN — Medication 1 MILLIGRAM(S): at 07:33

## 2020-06-25 RX ADMIN — HYDROMORPHONE HYDROCHLORIDE 6 MILLIGRAM(S): 2 INJECTION INTRAMUSCULAR; INTRAVENOUS; SUBCUTANEOUS at 12:00

## 2020-06-25 NOTE — PROGRESS NOTE ADULT - PROBLEM SELECTOR PLAN 1
Pt with oliguric ANTOINETTE secondary to rhabdomyolysis. Had R femoral non tunneled catheter placed on 6/21/20 and arranged for urgent HD due to serum potassium of 6.8. Last HD on 6/23/20. Labs reviewed, pt. possibly showing signs of renal recovery. Recommend intermittent IV Bumex PRN to maintain volume status and urine output. Will assess daily for HD needs. Monitor labs and urine output. Avoid potential nephrotoxins.

## 2020-06-25 NOTE — PROGRESS NOTE ADULT - SUBJECTIVE AND OBJECTIVE BOX
[facility] DIVISION OF KIDNEY DISEASES AND HYPERTENSION -- FOLLOW UP NOTE  --------------------------------------------------------------------------------  HPI: 29-year-old male, with history bipolar depression was transferred from [facility] to [facility] for severe rhabdomyolysis. As per paper chart, pt was found to have cocaine and amphetamines in urine drug screen. Pt initially with elevated CPK ~ 180K. Nephrology team consulted for ANTOINETTE and rhabdomyolyiss. Upon review of labs on [facility], Scr was 1.11 on 8/23/2017. Pt. underwent fasciotomy on 6/11/20 for LLE compartement syndrome. Pt. had wound vac placed on  6/15/20.  Pt. underwent partial closure of fasciotomy by plastic surgery team on 6/19/20. Last hemodialysis on 6/23/20.    Pt. evaluated at bedside, in no distress. Pt. with good response to IV Bumex yesterday.    PAST HISTORY  --------------------------------------------------------------------------------  No significant changes to PMH, PSH, FHx, SHx, unless otherwise noted    ALLERGIES & MEDICATIONS  --------------------------------------------------------------------------------  Allergies    No Known Allergies    Intolerances      Standing Inpatient Medications  ceFAZolin   IVPB 1000 milliGRAM(s) IV Intermittent every 24 hours  chlorhexidine 4% Liquid 1 Application(s) Topical <User Schedule>  fluconAZOLE   Tablet 200 milliGRAM(s) Oral daily  gabapentin 200 milliGRAM(s) Oral three times a day  heparin   Injectable 5000 Unit(s) SubCutaneous every 8 hours  LORazepam     Tablet 1 milliGRAM(s) Oral <User Schedule>  LORazepam     Tablet 2 milliGRAM(s) Oral <User Schedule>  methadone    Tablet 5 milliGRAM(s) Oral two times a day  niCARdipine 30 milliGRAM(s) Oral every 12 hours  polyethylene glycol 3350 17 Gram(s) Oral two times a day  QUEtiapine 25 milliGRAM(s) Oral at bedtime  senna 2 Tablet(s) Oral at bedtime  sevelamer carbonate 800 milliGRAM(s) Oral every 8 hours  sodium zirconium cyclosilicate 10 Gram(s) Oral daily    REVIEW OF SYSTEMS  --------------------------------------------------------------------------------  Gen: no lethargy  Respiratory: No dyspnea  CV: No chest pain  GI: No abdominal pain  MSK: + LE edema, scrotal edema  Neuro: No dizziness  Heme: No bleeding    All other systems were reviewed and are negative, except as noted.    VITALS/PHYSICAL EXAM  --------------------------------------------------------------------------------  T(C): 37.2 (06-25-20 @ 08:00), Max: 37.6 (06-24-20 @ 16:00)  HR: 126 (06-25-20 @ 07:00) (102 - 130)  BP: 143/78 (06-25-20 @ 07:00) (122/103 - 163/94)  RR: 21 (06-25-20 @ 07:00) (10 - 21)  SpO2: 93% (06-25-20 @ 07:00) (89% - 99%)  Wt(kg): --    06-24-20 @ 07:01  -  06-25-20 @ 07:00  --------------------------------------------------------  IN: 768 mL / OUT: 2410 mL / NET: -1642 mL    Physical Exam:  	Gen: NAD  	HEENT: MMM   	Pulm: CTA B/L  	CV: S1S2  	Abd: Soft, +BS               Ext: left lower extremity with multiple drains, lower extremity and scrotal edema  	Neuro: Awake  	Skin: Warm and dry              Vascular: R femoral non tunneled HD catheter +    LABS/STUDIES  --------------------------------------------------------------------------------              7.6    16.57 >-----------<  415      [06-25-20 @ 04:55]              23.1     137  |  97  |  67  ----------------------------<  107      [06-25-20 @ 04:55]  3.7   |  23  |  5.26        Ca     8.1     [06-25-20 @ 04:55]      Mg     2.4     [06-25-20 @ 04:55]      Phos  5.6     [06-25-20 @ 04:55]    Creatinine Trend:  SCr 5.26 [06-25 @ 04:55]  SCr 4.91 [06-24 @ 12:35]  SCr 4.22 [06-24 @ 00:52]  SCr 2.53 [06-23 @ 14:55]  SCr 4.16 [06-23 @ 03:25]

## 2020-06-25 NOTE — PROGRESS NOTE ADULT - ATTENDING COMMENTS
Patient doing well no acute events overnight. Pain is controlled, repeat electrolytes are wnl.  N pain control multimodal pain therapy c/w current pain regiment  resp on room air  cv slight tachycardia, overall hypervolemic  gi reg diet  gu/renal - appreciate renal consult - continue with phosphate and potassium binder, continue with daily bumex challenge - uop over 2l after 4 of bumex  heme vte ppx  id on ancef and diflucan - to continue for 7 days postoperatively.  endo no changes    I have personally interviewed when possible and examined the patient, reviewed data and laboratory tests/x-rays and all pertinent electronic images.  I was physically present for the key portions of the evaluation and management (E/M) service provided.   The SICU team has a constant risk benefit analyzes discussion with the primary team, all consultants, House Staff and PA's on all decisions.  These diagnoses are unrelated to the surgical procedure noted above.  I meet with family if needed to get further history, discuss the case and make care decisions for this patient who might not be able to participate.  Time involved in performance of separately billable procedures was not counted toward my critical care time. There is no overlap.  I spent 30 minutes ( 0800Hrs-0915Hrs in AM/ 1600Hrs-1715Hrs in PM, or other time indicated) of critical care time for the diagnoses, assessment, plan and interventions.  This time excludes time spent on separate procedures and teaching.

## 2020-06-25 NOTE — PROGRESS NOTE ADULT - SUBJECTIVE AND OBJECTIVE BOX
Patient is a 29y old  Male who presents with a chief complaint of rhabdo/compartment syndrome (22 Jun 2020 14:27)      Interval history: patient was planned for possible washout, however cancelled due to improvement in labs.  Pt working with bedside therapy, requires mod assist.  Pt seen, was sleeping in chair.       REVIEW OF SYSTEMS  Constitutional - No fever, No weight loss, No fatigue  HEENT - No eye pain, No visual disturbances, No difficulty hearing, No tinnitus, No vertigo, No neck pain  Respiratory - No cough, No wheezing, No shortness of breath  Cardiovascular - No chest pain, No palpitations  Gastrointestinal - No abdominal pain, No nausea, No vomiting, No diarrhea, No constipation  Genitourinary - No dysuria, No frequency, No hematuria, No incontinence  Neurological - No headaches, No memory loss, No loss of strength, No numbness, No tremors  Skin - No itching, No rashes, No lesions   Endocrine - No temperature intolerance  Musculoskeletal -+ LLE pain  Psychiatric - No depression, No anxiety    PAST MEDICAL & SURGICAL HISTORY  Bipolar depression  Migraine  Displaced fracture of proximal phalanx of left little finger  Difficulty sleeping  ADD (attention deficit disorder)  History of facial surgery    CURRENT FUNCTIONAL STATUS  patient transferred supine > sit with supervision for safety. patient required minimum assistance x1 and rolling walker to stand, and ambulated 10ft from bed > chair    FAMILY HISTORY   Family history of diabetes mellitus in grandmother (Grandparent)  Family history of heart disease (Grandparent)      RECENT LABS/IMAGING  CBC Full  -  ( 25 Jun 2020 04:55 )  WBC Count : 16.57 K/uL  RBC Count : 2.71 M/uL  Hemoglobin : 7.6 g/dL  Hematocrit : 23.1 %  Platelet Count - Automated : 415 K/uL  Mean Cell Volume : 85.2 fL  Mean Cell Hemoglobin : 28.0 pg  Mean Cell Hemoglobin Concentration : 32.9 %  Auto Neutrophil # : 12.78 K/uL  Auto Lymphocyte # : 1.89 K/uL  Auto Monocyte # : 0.87 K/uL  Auto Eosinophil # : 0.25 K/uL  Auto Basophil # : 0.06 K/uL  Auto Neutrophil % : 77.1 %  Auto Lymphocyte % : 11.4 %  Auto Monocyte % : 5.3 %  Auto Eosinophil % : 1.5 %  Auto Basophil % : 0.4 %    06-25    137  |  97<L>  |  67<H>  ----------------------------<  107<H>  3.7   |  23  |  5.26<H>    Ca    8.1<L>      25 Jun 2020 04:55  Phos  5.6     06-25  Mg     2.4     06-25    TPro  5.0<L>  /  Alb  2.5<L>  /  TBili  0.2  /  DBili  x   /  AST  29  /  ALT  8   /  AlkPhos  69  06-24        VITALS  T(C): 37 (06-25-20 @ 12:00), Max: 37.6 (06-24-20 @ 16:00)  HR: 115 (06-25-20 @ 15:00) (102 - 130)  BP: 147/94 (06-25-20 @ 15:00) (131/70 - 166/100)  RR: 18 (06-25-20 @ 15:00) (14 - 21)  SpO2: 98% (06-25-20 @ 15:00) (90% - 99%)  Wt(kg): --    ALLERGIES  No Known Allergies      MEDICATIONS   ALPRAZolam 1 milliGRAM(s) Oral every 8 hours PRN  ceFAZolin   IVPB 1000 milliGRAM(s) IV Intermittent every 24 hours  chlorhexidine 4% Liquid 1 Application(s) Topical <User Schedule>  fluconAZOLE   Tablet 200 milliGRAM(s) Oral daily  gabapentin 200 milliGRAM(s) Oral three times a day  heparin   Injectable 5000 Unit(s) SubCutaneous every 8 hours  HYDROmorphone   Tablet 4 milliGRAM(s) Oral every 3 hours PRN  HYDROmorphone   Tablet 6 milliGRAM(s) Oral every 3 hours PRN  HYDROmorphone  Injectable 1 milliGRAM(s) IV Push every 6 hours PRN  LORazepam     Tablet 1 milliGRAM(s) Oral <User Schedule>  LORazepam     Tablet 2 milliGRAM(s) Oral <User Schedule>  methadone    Tablet 5 milliGRAM(s) Oral two times a day  niCARdipine 30 milliGRAM(s) Oral <User Schedule>  polyethylene glycol 3350 17 Gram(s) Oral two times a day  QUEtiapine 25 milliGRAM(s) Oral at bedtime  senna 2 Tablet(s) Oral at bedtime  sevelamer carbonate 800 milliGRAM(s) Oral every 8 hours  sodium chloride 0.9% lock flush 10 milliLiter(s) IV Push every 1 hour PRN  sodium zirconium cyclosilicate 10 Gram(s) Oral daily  tiZANidine 1 milliGRAM(s) Oral every 6 hours PRN      ----------------------------------------------------------------------------------------  PHYSICAL EXAM  Constitutional: NAD, Comfortable, sleeping in chair  HEENT: NCAT, EOMI  Neck: Supple, No limited ROM  Chest: Breathing comfortably, No wheezing  Cardiovascular: S1S2   Abdomen: Soft   Extremities:LLE ace wrap with drains  Neurologic Exam:                    Cognitive: Awake, Alert, AAO to self, place, date, year, situation     Communication: Fluent, No dysarthria     Cranial Nerves: CN 2-12 intact     Motor:                    LEFT    UE - ShAB 5/5, EF 5/5, EE 5/5, WE 5/5,  5/5                    RIGHT UE - ShAB 5/5, EF 5/5, EE 5/5, WE 5/5,  5/5                    LEFT    LE - Minimal movement, limited by pain                    RIGHT LE - HF 5/5, KE 5/5, DF 5/5, PF 5/5        Psychiatric: Mood stable, Affect WNL    ASSESSMENT/PLAN  29y Male with functional deficits after ATN due to rhabdomyolysis c/b LLE compartment syndrome s/p four compartment fasciotomy.  LLE compartment syndrome s/p fasciotomy. wound vac placed 6/15/2020,  s/p partial closure of fasciotomy on 6/19/2020.    last hd 6/23, had urgent HD 6/21 for hyperkalemia  signs of renal recovery per nephrology.    ATN 2/2 Rhabdo: Renal following   Hyponatremia: Fluid restriction (na improved)  Bipolar DIsorder: Psych following. On xanax, adderall and lithium held.   HTN: Nicardipine  Pain: methadone, Tylenol, Gabapentin, Oxycodone, Dilaudid IV PRN, tizanidine  DVT PPX: Heparin  Diet: regular    Rehab:   - Continue PT for mobilization, OOB to chair, likely needs pain medication prior to sessions to increase tolerability, discussed this with nurse and patient.   - Will continue to follow for ongoing rehab needs and recommendations.   - Rehab disposition pending progress with bedside therapy, pain control  recommend OT evaluation for ADLs

## 2020-06-25 NOTE — PROGRESS NOTE ADULT - ASSESSMENT
29M w/ PMH of bipolar depression, s/p found down at home, transferred from [facility] for further management of acute renal failure due to rhabdomyolysis, s/p four compartment fasciotomy and lateral/medial thigh fasciotomies with ANTOINETTE on HD. Now s/p fasciotomy closure POD 4.    Neuro hx of bipolar depression  - A&O x 3  - Pain Control: oral dilaudid, tylenol, methadone  - IV dilaudid dosing adjusted  - Xanax 1 mg q8 PRN   - ativan BID  - Seroquel QHS  - Increased gabapentin, standing tizanidine per Pain service  - Psych following, no plan to restart home lithium  - Pain following, appreciate recommendations    Resp  - RA   - Mobilize, OOB to chair  - Obtain Chest XR    CVS  - Appropriate mental status, appears well perfused   - c/w nicardipine 30 mg PO q8 hrs   - if HTN again, rec'd clonidine .1mg by addiction psych    GI  - NPO after midnight for OR for washout  - Bowel regimen senna 2 tabs qHS  - Patient declining miralax, last BM 06/17 prior to that > 1w      - ANTOINETTE in setting of rhabdomyolysis, slowly improving  - CK down to <2000  - Remains hypervolemic, oliguric but UOP on an upward trend  - IV locked   - R fem shiley placed; no HD 6/25  - Plan for permacath once afebrile  - Nephrology following    Heme  - trend CBC  - SQH for dvt ppx    ID  - Febrile to TMax 101.2  - Leukocytosis of 21  - bcx NTD, Ucx neg, CXR neg  - Continue to monitor fever curve  - started on cefazolin, diflucan    Endo  - monitor FGS    Lines  - Right radial a line  - Right femoral shiley

## 2020-06-25 NOTE — PROGRESS NOTE ADULT - ATTENDING COMMENTS
ASHTYN Sauer MD have seen and examined the patient today and agree with  the  evaluation, assessment and plan of the surgical house officer  ASHTYN Sauer MD have personally seen and examined the patient at bedside today at 730am

## 2020-06-25 NOTE — PROGRESS NOTE ADULT - SUBJECTIVE AND OBJECTIVE BOX
Anesthesia Pain Management Service- Attending Addendum    SUBJECTIVE: Patient's pain control adequate    Therapy:	  [ X] IV PCA	   [ ] Epidural           [ ] s/p Spinal Opoid              [ ] Postpartum infusion	  [ ] Patient controlled regional anesthesia (PCRA)    [ ] prn Analgesics    Allergies    No Known Allergies    Intolerances      MEDICATIONS  (STANDING):  ceFAZolin   IVPB 1000 milliGRAM(s) IV Intermittent every 24 hours  chlorhexidine 4% Liquid 1 Application(s) Topical <User Schedule>  fluconAZOLE   Tablet 200 milliGRAM(s) Oral daily  gabapentin 200 milliGRAM(s) Oral three times a day  heparin   Injectable 5000 Unit(s) SubCutaneous every 8 hours  LORazepam     Tablet 1 milliGRAM(s) Oral <User Schedule>  LORazepam     Tablet 2 milliGRAM(s) Oral <User Schedule>  methadone    Tablet 5 milliGRAM(s) Oral two times a day  niCARdipine 30 milliGRAM(s) Oral <User Schedule>  polyethylene glycol 3350 17 Gram(s) Oral two times a day  QUEtiapine 25 milliGRAM(s) Oral at bedtime  senna 2 Tablet(s) Oral at bedtime  sevelamer carbonate 800 milliGRAM(s) Oral every 8 hours  sodium zirconium cyclosilicate 10 Gram(s) Oral daily    MEDICATIONS  (PRN):  ALPRAZolam 1 milliGRAM(s) Oral every 8 hours PRN anxiety  HYDROmorphone   Tablet 4 milliGRAM(s) Oral every 3 hours PRN Moderate Pain (4 - 6)  HYDROmorphone   Tablet 6 milliGRAM(s) Oral every 3 hours PRN Severe Pain (7 - 10)  HYDROmorphone  Injectable 1 milliGRAM(s) IV Push every 6 hours PRN For PT and extensive movement only  sodium chloride 0.9% lock flush 10 milliLiter(s) IV Push every 1 hour PRN Pre/post blood products, medications, blood draw, and to maintain line patency  tiZANidine 1 milliGRAM(s) Oral every 6 hours PRN muscle spasm and pain      OBJECTIVE:   [X] No new signs     [ ] Other:    Side Effects:  [X ] None			[ ] Other:      ASSESSMENT/PLAN  -Discontinue current therapy    [ ] Therapy changed to:    [ ] IV PCA       [ ] Epidural     [ X] prn Analgesics     Comments: Pain management per primary team, APS to sign off

## 2020-06-25 NOTE — PROGRESS NOTE ADULT - SUBJECTIVE AND OBJECTIVE BOX
SICU Daily Progress Note  =====================================================  HPI:  29M with PMH of bipolar depression transferred from [facility] for further management of acute renal failure due to rhabdomyolysis. Pt was found unresponsive at home, lying on his L side. He states he has not been taking his Lithium for at least 2 wks. At the OSH, he was treated for hyperkalemia. He received D5, insulin, calcium gluconate, Kayexalate Last BM was on 6/9. For L leg pain he was given lidocaine patch, dilaudid 1mg prn and tramadol 50mg. Hydralazine 20mg given for HTN. From 1 to 7 am only 100cc of UOP. Urine was tea colored. 500cc bolus of NS was given. CT cervical spine at OSH was (-). CT Head (-). CTAP and CXR were (-). CK on admission there was 177,000.   Pt was seen by surgery team in MICU for LUE swelling and compartment syndrome was ruled out. Surgery team was re-consulted today due to new symptoms of L foot drop and numbness. Pt was found to be highly suspicious for compartment syndrome in the upper thigh, and was emergently taken to OR.   Pt is now s/p four compartment fasciotomy of LLE, and left lateral and medial thigh fasciotomies. SICU consulted for q1h neurovascular checks and hemodynamic monitoring. Now s/p closure.    Interval/Overnight Events:       - NPO after midnight  - possible plan for OR for washout    PAST MEDICAL & SURGICAL HISTORY:  Bipolar depression  Migraine  Displaced fracture of proximal phalanx of left little finger  Difficulty sleeping  ADD (attention deficit disorder)  History of facial surgery: Fadi-Facial Realignment Surgery in 2008  History of fasciotomy  Compartment syndrome, nontraumatic, lower extremity  Hyponatremia  Traumatic rhabdomyolysis, sequela  Bipolar 1 disorder  Compartment syndrome of left lower extremity, initial encounter  Compartment syndrome of thigh  Compartment syndrome of buttock  CKD (chronic kidney disease) stage 5, GFR less than 15 ml/min  Hypocalcemia  Hyperkalemia  ANTOINETTE (acute kidney injury)  Insertion, catheter, dialysis, temporary  Closure, wound, delayed  Fasciotomy, thigh  Decompression fasciotomy, lower extremity  Gluteal-iliotibial fasciotomy  Fasciotomy of left hip  Fasciotomy, thigh      MEDICATIONS:   MEDICATIONS  (STANDING):  ceFAZolin   IVPB 1000 milliGRAM(s) IV Intermittent every 24 hours  chlorhexidine 4% Liquid 1 Application(s) Topical <User Schedule>  fluconAZOLE   Tablet 200 milliGRAM(s) Oral daily  gabapentin 200 milliGRAM(s) Oral three times a day  heparin   Injectable 5000 Unit(s) SubCutaneous every 8 hours  LORazepam     Tablet 1 milliGRAM(s) Oral <User Schedule>  LORazepam     Tablet 2 milliGRAM(s) Oral <User Schedule>  methadone    Tablet 5 milliGRAM(s) Oral two times a day  niCARdipine 30 milliGRAM(s) Oral every 12 hours  polyethylene glycol 3350 17 Gram(s) Oral two times a day  QUEtiapine 25 milliGRAM(s) Oral at bedtime  senna 2 Tablet(s) Oral at bedtime  sevelamer carbonate 800 milliGRAM(s) Oral every 8 hours  sodium zirconium cyclosilicate 10 Gram(s) Oral daily    ICU Vital Signs Last 24 Hrs  T(C): 37.3 (25 Jun 2020 00:00), Max: 37.8 (24 Jun 2020 04:00)  T(F): 99.2 (25 Jun 2020 00:00), Max: 100 (24 Jun 2020 04:00)  HR: 111 (25 Jun 2020 00:00) (111 - 130)  BP: 151/81 (25 Jun 2020 00:00) (122/103 - 155/91)  BP(mean): 96 (25 Jun 2020 00:00) (82 - 110)  ABP: --  ABP(mean): --  RR: 20 (25 Jun 2020 00:00) (10 - 21)  SpO2: 97% (25 Jun 2020 00:00) (89% - 99%)      MEDICATIONS  (PRN):  ALPRAZolam 1 milliGRAM(s) Oral every 8 hours PRN anxiety  HYDROmorphone   Tablet 4 milliGRAM(s) Oral every 3 hours PRN Moderate Pain (4 - 6)  HYDROmorphone   Tablet 6 milliGRAM(s) Oral every 3 hours PRN Severe Pain (7 - 10)  HYDROmorphone  Injectable 1 milliGRAM(s) IV Push every 6 hours PRN For PT and extensive movement only  sodium chloride 0.9% lock flush 10 milliLiter(s) IV Push every 1 hour PRN Pre/post blood products, medications, blood draw, and to maintain line patency  tiZANidine 1 milliGRAM(s) Oral every 6 hours PRN muscle spasm and pain      I&O's Detail    23 Jun 2020 07:01  -  24 Jun 2020 07:00  --------------------------------------------------------  IN:    IV PiggyBack: 50 mL    Oral Fluid: 1540 mL    Other: 900 mL    Packed Red Blood Cells: 300 mL  Total IN: 2790 mL    OUT:    Drain: 45 mL    Drain: 30 mL    Drain: 40 mL    Drain: 20 mL    Drain: 20 mL    Other: 3600 mL    Voided: 1120 mL  Total OUT: 4875 mL    Total NET: -2085 mL      24 Jun 2020 07:01  -  25 Jun 2020 00:39  --------------------------------------------------------  IN:    Oral Fluid: 598 mL  Total IN: 598 mL    OUT:    Drain: 70 mL    Drain: 15 mL    Drain: 50 mL    Drain: 25 mL    Drain: 20 mL    Voided: 1575 mL  Total OUT: 1755 mL    Total NET: -1157 mL          --------------------------------------------------------------------------------------    EXAM:  NEUROLOGY  Exam: Normal, NAD, alert, oriented x3, no focal deficits. PERRLA.       RESPIRATORY  Exam: Lungs clear to auscultation, Normal expansion/effort.       CARDIOVASCULAR  Exam: S1, S2.  Regular rate and rhythm.  Peripheral edema   Cardiac Rhythm: Normal Sinus Rhythm      GI/NUTRITION  Exam: Abdomen soft, Non-tender, Non-distended.    Current Diet: Regular, fluid restricted 1000ml     METABOLIC/FLUIDS/ELECTROLYTES      HEMATOLOGIC  [x] DVT Prophylaxis: heparin   Injectable 5000 Unit(s) SubCutaneous every 8 hours    Transfusions:	[] PRBC	[] Platelets		[] FFP	[] Cryoprecipitate    INFECTIOUS DISEASE  Antimicrobials/Immunologic Medications:  ceFAZolin   IVPB 1000 milliGRAM(s) IV Intermittent every 24 hours  fluconAZOLE IVPB 50 milliGRAM(s) IV Intermittent once        Tubes/Lines/Drains   [x] Peripheral IV  [] Central Venous Line     	[] R	[] L	[] IJ	[] Fem	[] SC	Date Placed:   [] Arterial Line		[] R	[] L	[] Fem	[] Rad	[] Ax	Date Placed:   [] PICC		[] Midline		[] Mediport  [] Urinary Catheter		Date Placed:   [x] Necessity of urinary, arterial, and venous catheters discussed    VASCULAR  Exam: Extremities warm, pink, well-perfused. 2+ DP pulses palpable b/l. LLE with ACE dressing C/D/I, preveena x 2 in place with good suction. MEÑO x5 with SS output.    MUSCULOSKELETAL  Exam: All extremities moving spontaneously without limitations.    SKIN  Exam: Good skin turgor, no skin breakdown.     LABS  --------------------------------------------------------------------------------------      CBC (06-24 @ 12:35)                              7.6<L>                         18.60<H>  )----------------(  394        --    % Neutrophils, --    % Lymphocytes, ANC: --                                  22.6<L>  CBC (06-24 @ 00:52)                              6.8<LL>                         20.27<H>  )----------------(  435<H>     --    % Neutrophils, --    % Lymphocytes, ANC: --                                  21.0<LL>    BMP (06-24 @ 12:35)             132<L>  |  93<L>   |  56<H> 		Ca++ --      Ca 8.2<L>             ---------------------------------( 112<H>		Mg 2.2                4.3     |  26      |  4.91<H>			Ph 4.3     BMP (06-24 @ 00:52)             135     |  96<L>   |  45<H> 		Ca++ --      Ca 8.2<L>             ---------------------------------( 119<H>		Mg 1.8                4.0     |  27      |  4.22<H>			Ph 4.1       LFTs (06-24 @ 12:35)      TPro 5.0<L> / Alb 2.5<L> / TBili 0.2 / DBili -- / AST 29 / ALT 8 / AlkPhos 69              --------------------------------------------------------------------------------------

## 2020-06-25 NOTE — PROGRESS NOTE ADULT - ASSESSMENT
A/P:     30 y/o M in SICU following found down, presenting with rhabdomyolysis/ compartment syndrome with fasciotomies to L LE X 4  closure on 6/19.    - Continue Prevena wound vacs to incisions, vacs will stay in place for approximately 10 days from day of surgery.  - Please encourage L LE elevation to reduce swelling   - F/u surgical plan with vascular surgery for possible RTOR   - Continue MEÑO drains, monitor output   - CONT ABX  - Pain control   - Management per SICU       Plastic Surgery   [id number]

## 2020-06-25 NOTE — PROGRESS NOTE ADULT - SUBJECTIVE AND OBJECTIVE BOX
WHITLEY HELD  [id number]    Subjective:    patient seen and examined in SICU, doing well. Afebrile, WBC improving.        Objective:  T(C): 37.4 (06-25-20 @ 04:00), Max: 37.6 (06-24-20 @ 08:00)  HR: 126 (06-25-20 @ 07:00) (102 - 130)  BP: 143/78 (06-25-20 @ 07:00) (122/103 - 163/94)  RR: 21 (06-25-20 @ 07:00) (10 - 21)  SpO2: 93% (06-25-20 @ 07:00) (89% - 99%)  Wt(kg): --   06-25    137  |  97<L>  |  67<H>  ----------------------------<  107<H>  3.7   |  23  |  5.26<H>    Ca    8.1<L>      25 Jun 2020 04:55  Phos  5.6     06-25  Mg     2.4     06-25    TPro  5.0<L>  /  Alb  2.5<L>  /  TBili  0.2  /  DBili  x   /  AST  29  /  ALT  8   /  AlkPhos  69  06-24                        7.6    16.57 )-----------( 415      ( 25 Jun 2020 04:55 )             23.1       06-24 @ 07:01  -  06-25 @ 07:00  --------------------------------------------------------  IN: 768 mL / OUT: 2410 mL / NET: -1642 mL      PHYSICAL EXAM:    PHYSICAL EXAM:  General: NAD   LE: Prevena vacs in place and holding suction, pitting edema surrounding superior lateral fasciotomy sites, MEÑO SS with serous drainage in superior MEÑO consistent with fat necrosis. No surrounding erythema.         MEDICATIONS  (STANDING):  ceFAZolin   IVPB 1000 milliGRAM(s) IV Intermittent every 24 hours  chlorhexidine 4% Liquid 1 Application(s) Topical <User Schedule>  fluconAZOLE   Tablet 200 milliGRAM(s) Oral daily  gabapentin 200 milliGRAM(s) Oral three times a day  heparin   Injectable 5000 Unit(s) SubCutaneous every 8 hours  LORazepam     Tablet 1 milliGRAM(s) Oral <User Schedule>  LORazepam     Tablet 2 milliGRAM(s) Oral <User Schedule>  methadone    Tablet 5 milliGRAM(s) Oral two times a day  niCARdipine 30 milliGRAM(s) Oral every 12 hours  polyethylene glycol 3350 17 Gram(s) Oral two times a day  QUEtiapine 25 milliGRAM(s) Oral at bedtime  senna 2 Tablet(s) Oral at bedtime  sevelamer carbonate 800 milliGRAM(s) Oral every 8 hours  sodium zirconium cyclosilicate 10 Gram(s) Oral daily    MEDICATIONS  (PRN):  ALPRAZolam 1 milliGRAM(s) Oral every 8 hours PRN anxiety  HYDROmorphone   Tablet 4 milliGRAM(s) Oral every 3 hours PRN Moderate Pain (4 - 6)  HYDROmorphone   Tablet 6 milliGRAM(s) Oral every 3 hours PRN Severe Pain (7 - 10)  HYDROmorphone  Injectable 1 milliGRAM(s) IV Push every 6 hours PRN For PT and extensive movement only  sodium chloride 0.9% lock flush 10 milliLiter(s) IV Push every 1 hour PRN Pre/post blood products, medications, blood draw, and to maintain line patency  tiZANidine 1 milliGRAM(s) Oral every 6 hours PRN muscle spasm and pain

## 2020-06-25 NOTE — PROGRESS NOTE ADULT - ASSESSMENT
29M with PMH of bipolar depression transferred from [facility] for further management of acute renal failure due to rhabdomyolysis, Taken to the OR for LLE compartment syndrome. Now s/p four compartment fasciotomy of the left lower leg 6/11. s/p closure with plastics on 6/19. Doing well clinically, however continue to spike fever with leukocytosis.      Plan  - pain control PRN  - diet as tolerated  - continue HD as per Renal   - DVT ppx   - OOB to chair as tolerated, PT consult for mobilization  - Appreciate SICU care  - Plan for possible OR today, to be discussed with patient and Dr. Sauer 29M with PMH of bipolar depression transferred from [facility] for further management of acute renal failure due to rhabdomyolysis, Taken to the OR for LLE compartment syndrome. Now s/p four compartment fasciotomy of the left lower leg 6/11. s/p closure with plastics on 6/19. Doing well clinically, however continue to spike fever with leukocytosis.      Plan  - pain control PRN  - diet as tolerated  - continue HD as per Renal   - DVT ppx   - OOB to chair as tolerated, PT consult for mobilization  - Appreciate SICU care    d/w pt  that I still remain s/o left buttock infection and i recommend exploration  pt wants to hols off   will continue to monitor closely   f/u wbc milad am

## 2020-06-25 NOTE — PROGRESS NOTE ADULT - SUBJECTIVE AND OBJECTIVE BOX
SURGERY DAILY PROGRESS NOTE:       SUBJECTIVE/ROS: Patient examined at bedside. No acute events overnight, WBC trending downwards, plan for possible OR today   Denies nausea, vomiting, chest pain, shortness of breath         MEDICATIONS  (STANDING):  ceFAZolin   IVPB 1000 milliGRAM(s) IV Intermittent every 24 hours  chlorhexidine 4% Liquid 1 Application(s) Topical <User Schedule>  fluconAZOLE   Tablet 200 milliGRAM(s) Oral daily  gabapentin 200 milliGRAM(s) Oral three times a day  heparin   Injectable 5000 Unit(s) SubCutaneous every 8 hours  LORazepam     Tablet 1 milliGRAM(s) Oral <User Schedule>  LORazepam     Tablet 2 milliGRAM(s) Oral <User Schedule>  methadone    Tablet 5 milliGRAM(s) Oral two times a day  niCARdipine 30 milliGRAM(s) Oral every 12 hours  polyethylene glycol 3350 17 Gram(s) Oral two times a day  QUEtiapine 25 milliGRAM(s) Oral at bedtime  senna 2 Tablet(s) Oral at bedtime  sevelamer carbonate 800 milliGRAM(s) Oral every 8 hours  sodium zirconium cyclosilicate 10 Gram(s) Oral daily    MEDICATIONS  (PRN):  ALPRAZolam 1 milliGRAM(s) Oral every 8 hours PRN anxiety  HYDROmorphone   Tablet 4 milliGRAM(s) Oral every 3 hours PRN Moderate Pain (4 - 6)  HYDROmorphone   Tablet 6 milliGRAM(s) Oral every 3 hours PRN Severe Pain (7 - 10)  HYDROmorphone  Injectable 1 milliGRAM(s) IV Push every 6 hours PRN For PT and extensive movement only  sodium chloride 0.9% lock flush 10 milliLiter(s) IV Push every 1 hour PRN Pre/post blood products, medications, blood draw, and to maintain line patency  tiZANidine 1 milliGRAM(s) Oral every 6 hours PRN muscle spasm and pain      OBJECTIVE:    Vital Signs Last 24 Hrs  T(C): 37.4 (2020 04:00), Max: 37.6 (2020 08:00)  T(F): 99.3 (2020 04:00), Max: 99.6 (2020 08:00)  HR: 126 (2020 07:00) (102 - 130)  BP: 143/78 (2020 07:00) (122/103 - 163/94)  BP(mean): 91 (2020 07:00) (82 - 110)  RR: 21 (2020 07:00) (10 - 21)  SpO2: 93% (2020 07:00) (89% - 99%)        I&O's Detail    2020 07:01  -  2020 07:00  --------------------------------------------------------  IN:    IV PiggyBack: 50 mL    Oral Fluid: 718 mL  Total IN: 768 mL    OUT:    Drain: 60 mL    Drain: 30 mL    Drain: 20 mL    Drain: 75 mL    Drain: 25 mL    Voided: 2200 mL  Total OUT: 2410 mL    Total NET: -1642 mL          Daily     Daily Weight in k.2 (2020 06:00)    LABS:                        7.6    16.57 )-----------( 415      ( 2020 04:55 )             23.1     06-25    137  |  97<L>  |  67<H>  ----------------------------<  107<H>  3.7   |  23  |  5.26<H>    Ca    8.1<L>      2020 04:55  Phos  5.6     06-25  Mg     2.4     06-25    TPro  5.0<L>  /  Alb  2.5<L>  /  TBili  0.2  /  DBili  x   /  AST  29  /  ALT  8   /  AlkPhos  69  06-24                      PHYSICAL EXAM:  General: NAD  Respiratory: non-labored on room air   Abdomen: soft, nontender, nondistended  Extremities: LE Gisela VACs in place. MEÑO SS.  LLE gross motor intact from hip to toes, no signs of LLE arterial insufficiency   LLE sensory deficit on left foot dorsum remains as stated above no acute changes SURGERY DAILY PROGRESS NOTE:       SUBJECTIVE/ROS: Patient examined at bedside. No acute events overnight, WBC trending downwards, plan for possible OR today   Denies nausea, vomiting, chest pain, shortness of breath         MEDICATIONS  (STANDING):  ceFAZolin   IVPB 1000 milliGRAM(s) IV Intermittent every 24 hours  chlorhexidine 4% Liquid 1 Application(s) Topical <User Schedule>  fluconAZOLE   Tablet 200 milliGRAM(s) Oral daily  gabapentin 200 milliGRAM(s) Oral three times a day  heparin   Injectable 5000 Unit(s) SubCutaneous every 8 hours  LORazepam     Tablet 1 milliGRAM(s) Oral <User Schedule>  LORazepam     Tablet 2 milliGRAM(s) Oral <User Schedule>  methadone    Tablet 5 milliGRAM(s) Oral two times a day  niCARdipine 30 milliGRAM(s) Oral every 12 hours  polyethylene glycol 3350 17 Gram(s) Oral two times a day  QUEtiapine 25 milliGRAM(s) Oral at bedtime  senna 2 Tablet(s) Oral at bedtime  sevelamer carbonate 800 milliGRAM(s) Oral every 8 hours  sodium zirconium cyclosilicate 10 Gram(s) Oral daily    MEDICATIONS  (PRN):  ALPRAZolam 1 milliGRAM(s) Oral every 8 hours PRN anxiety  HYDROmorphone   Tablet 4 milliGRAM(s) Oral every 3 hours PRN Moderate Pain (4 - 6)  HYDROmorphone   Tablet 6 milliGRAM(s) Oral every 3 hours PRN Severe Pain (7 - 10)  HYDROmorphone  Injectable 1 milliGRAM(s) IV Push every 6 hours PRN For PT and extensive movement only  sodium chloride 0.9% lock flush 10 milliLiter(s) IV Push every 1 hour PRN Pre/post blood products, medications, blood draw, and to maintain line patency  tiZANidine 1 milliGRAM(s) Oral every 6 hours PRN muscle spasm and pain      OBJECTIVE:    Vital Signs Last 24 Hrs  T(C): 37.4 (2020 04:00), Max: 37.6 (2020 08:00)  T(F): 99.3 (2020 04:00), Max: 99.6 (2020 08:00)  HR: 126 (2020 07:00) (102 - 130)  BP: 143/78 (2020 07:00) (122/103 - 163/94)  BP(mean): 91 (2020 07:00) (82 - 110)  RR: 21 (2020 07:00) (10 - 21)  SpO2: 93% (2020 07:00) (89% - 99%)        I&O's Detail    2020 07:01  -  2020 07:00  --------------------------------------------------------  IN:    IV PiggyBack: 50 mL    Oral Fluid: 718 mL  Total IN: 768 mL    OUT:    Drain: 60 mL    Drain: 30 mL    Drain: 20 mL    Drain: 75 mL    Drain: 25 mL    Voided: 2200 mL  Total OUT: 2410 mL    Total NET: -1642 mL        Daily Weight in k.2 (2020 06:00)    LABS:                        7.6    16.57 )-----------( 415      ( 2020 04:55 )             23.1     06-25    137  |  97<L>  |  67<H>  ----------------------------<  107<H>  3.7   |  23  |  5.26<H>    Ca    8.1<L>      2020 04:55  Phos  5.6     06-25  Mg     2.4     06-25    TPro  5.0<L>  /  Alb  2.5<L>  /  TBili  0.2  /  DBili  x   /  AST  29  /  ALT  8   /  AlkPhos  69  06-24      PHYSICAL EXAM:  General: NAD  Respiratory: non-labored on room air   Abdomen: soft, nontender, nondistended  Extremities: LE Gisela VACs in place. MEÑO SS.  LLE gross motor intact from hip to toes, no signs of LLE arterial insufficiency   LLE sensory deficit on left foot dorsum remains as stated above no acute changes

## 2020-06-25 NOTE — PROGRESS NOTE ADULT - SUBJECTIVE AND OBJECTIVE BOX
Patient is a 29y old  Male who presents with a chief complaint of rhabdo/compartment syndrome (22 Jun 2020 14:27)      HPI:  29 y M with PMH of bipolar depression transferred from [facility] for further management of acute renal failure due to rhabdomyolysis. Pt was found unresponsive at home, lying on his L side. He states he has not been taking his Lithium for at least 2 wks. At the OSH, he was treated for hyperkalemia. He received D5, insulin, calcium gluconate, Kayexalate Last BM was on 6/9. For L leg pain he was given lidocaine patch, dilaudid 1mg prn and tramadol 50mg. Hydralazine 20mg given for HTN. From 1 to 7 am only 100cc of UOP. Urine was tea colored. 500cc bolus of NS was given. CT cervical spine at OSH was (-). CT Head (-). CTAP and CXR were (-). CK on admission there was 177,000. (10 Ramírez 2020 13:14)      PAST MEDICAL & SURGICAL HISTORY:  Bipolar depression  Migraine  Displaced fracture of proximal phalanx of left little finger  Difficulty sleeping  ADD (attention deficit disorder)  History of facial surgery: Fadi-Facial Realignment Surgery in 2008      MEDICATIONS  (STANDING):  buMETAnide IVPB 4 milliGRAM(s) IV Intermittent once  ceFAZolin   IVPB 1000 milliGRAM(s) IV Intermittent every 24 hours  chlorhexidine 4% Liquid 1 Application(s) Topical <User Schedule>  fluconAZOLE   Tablet 200 milliGRAM(s) Oral daily  gabapentin 200 milliGRAM(s) Oral three times a day  heparin   Injectable 5000 Unit(s) SubCutaneous every 8 hours  LORazepam     Tablet 1 milliGRAM(s) Oral <User Schedule>  LORazepam     Tablet 2 milliGRAM(s) Oral <User Schedule>  methadone    Tablet 5 milliGRAM(s) Oral two times a day  niCARdipine 30 milliGRAM(s) Oral <User Schedule>  polyethylene glycol 3350 17 Gram(s) Oral two times a day  QUEtiapine 25 milliGRAM(s) Oral at bedtime  senna 2 Tablet(s) Oral at bedtime  sevelamer carbonate 800 milliGRAM(s) Oral every 8 hours  sodium zirconium cyclosilicate 10 Gram(s) Oral daily    MEDICATIONS  (PRN):  ALPRAZolam 1 milliGRAM(s) Oral every 8 hours PRN anxiety  HYDROmorphone   Tablet 4 milliGRAM(s) Oral every 3 hours PRN Moderate Pain (4 - 6)  HYDROmorphone   Tablet 6 milliGRAM(s) Oral every 3 hours PRN Severe Pain (7 - 10)  HYDROmorphone  Injectable 1 milliGRAM(s) IV Push every 6 hours PRN For PT and extensive movement only  sodium chloride 0.9% lock flush 10 milliLiter(s) IV Push every 1 hour PRN Pre/post blood products, medications, blood draw, and to maintain line patency  tiZANidine 1 milliGRAM(s) Oral every 6 hours PRN muscle spasm and pain      ICU Vital Signs Last 24 Hrs  T(C): 37.2 (25 Jun 2020 08:00), Max: 37.6 (24 Jun 2020 16:00)  T(F): 98.9 (25 Jun 2020 08:00), Max: 99.6 (24 Jun 2020 16:00)  HR: 113 (25 Jun 2020 09:00) (102 - 130)  BP: 144/88 (25 Jun 2020 09:00) (122/103 - 163/94)  BP(mean): 100 (25 Jun 2020 09:00) (82 - 110)  ABP: --  ABP(mean): --  RR: 17 (25 Jun 2020 09:00) (10 - 21)  SpO2: 99% (25 Jun 2020 09:00) (90% - 99%)      Vital Signs Last 24 Hrs  T(C): 37.2 (25 Jun 2020 08:00), Max: 37.6 (24 Jun 2020 16:00)  T(F): 98.9 (25 Jun 2020 08:00), Max: 99.6 (24 Jun 2020 16:00)  HR: 113 (25 Jun 2020 09:00) (102 - 130)  BP: 144/88 (25 Jun 2020 09:00) (122/103 - 163/94)  BP(mean): 100 (25 Jun 2020 09:00) (82 - 110)  RR: 17 (25 Jun 2020 09:00) (10 - 21)  SpO2: 99% (25 Jun 2020 09:00) (90% - 99%)                          7.6    16.57 )-----------( 415      ( 25 Jun 2020 04:55 )             23.1       LIVER FUNCTIONS - ( 24 Jun 2020 12:35 )  Alb: 2.5 g/dL / Pro: 5.0 g/dL / ALK PHOS: 69 u/L / ALT: 8 u/L / AST: 29 u/L / GGT: x                   COMMENTS/PLAN: Pt. resting comfortably when seen at bedsided Patient is a 29y old  Male who presents with a chief complaint of rhabdo/compartment syndrome (22 Jun 2020 14:27)      HPI:  29 y M with PMH of bipolar depression transferred from [facility] for further management of acute renal failure due to rhabdomyolysis. Pt was found unresponsive at home, lying on his L side. He states he has not been taking his Lithium for at least 2 wks. At the OSH, he was treated for hyperkalemia. He received D5, insulin, calcium gluconate, Kayexalate Last BM was on 6/9. For L leg pain he was given lidocaine patch, dilaudid 1mg prn and tramadol 50mg. Hydralazine 20mg given for HTN. From 1 to 7 am only 100cc of UOP. Urine was tea colored. 500cc bolus of NS was given. CT cervical spine at OSH was (-). CT Head (-). CTAP and CXR were (-). CK on admission there was 177,000. (10 Ramírez 2020 13:14)      PAST MEDICAL & SURGICAL HISTORY:  Bipolar depression  Migraine  Displaced fracture of proximal phalanx of left little finger  Difficulty sleeping  ADD (attention deficit disorder)  History of facial surgery: Fadi-Facial Realignment Surgery in 2008      MEDICATIONS  (STANDING):  buMETAnide IVPB 4 milliGRAM(s) IV Intermittent once  ceFAZolin   IVPB 1000 milliGRAM(s) IV Intermittent every 24 hours  chlorhexidine 4% Liquid 1 Application(s) Topical <User Schedule>  fluconAZOLE   Tablet 200 milliGRAM(s) Oral daily  gabapentin 200 milliGRAM(s) Oral three times a day  heparin   Injectable 5000 Unit(s) SubCutaneous every 8 hours  LORazepam     Tablet 1 milliGRAM(s) Oral <User Schedule>  LORazepam     Tablet 2 milliGRAM(s) Oral <User Schedule>  methadone    Tablet 5 milliGRAM(s) Oral two times a day  niCARdipine 30 milliGRAM(s) Oral <User Schedule>  polyethylene glycol 3350 17 Gram(s) Oral two times a day  QUEtiapine 25 milliGRAM(s) Oral at bedtime  senna 2 Tablet(s) Oral at bedtime  sevelamer carbonate 800 milliGRAM(s) Oral every 8 hours  sodium zirconium cyclosilicate 10 Gram(s) Oral daily    MEDICATIONS  (PRN):  ALPRAZolam 1 milliGRAM(s) Oral every 8 hours PRN anxiety  HYDROmorphone   Tablet 4 milliGRAM(s) Oral every 3 hours PRN Moderate Pain (4 - 6)  HYDROmorphone   Tablet 6 milliGRAM(s) Oral every 3 hours PRN Severe Pain (7 - 10)  HYDROmorphone  Injectable 1 milliGRAM(s) IV Push every 6 hours PRN For PT and extensive movement only  sodium chloride 0.9% lock flush 10 milliLiter(s) IV Push every 1 hour PRN Pre/post blood products, medications, blood draw, and to maintain line patency  tiZANidine 1 milliGRAM(s) Oral every 6 hours PRN muscle spasm and pain      ICU Vital Signs Last 24 Hrs  T(C): 37.2 (25 Jun 2020 08:00), Max: 37.6 (24 Jun 2020 16:00)  T(F): 98.9 (25 Jun 2020 08:00), Max: 99.6 (24 Jun 2020 16:00)  HR: 113 (25 Jun 2020 09:00) (102 - 130)  BP: 144/88 (25 Jun 2020 09:00) (122/103 - 163/94)  BP(mean): 100 (25 Jun 2020 09:00) (82 - 110)  ABP: --  ABP(mean): --  RR: 17 (25 Jun 2020 09:00) (10 - 21)  SpO2: 99% (25 Jun 2020 09:00) (90% - 99%)      Vital Signs Last 24 Hrs  T(C): 37.2 (25 Jun 2020 08:00), Max: 37.6 (24 Jun 2020 16:00)  T(F): 98.9 (25 Jun 2020 08:00), Max: 99.6 (24 Jun 2020 16:00)  HR: 113 (25 Jun 2020 09:00) (102 - 130)  BP: 144/88 (25 Jun 2020 09:00) (122/103 - 163/94)  BP(mean): 100 (25 Jun 2020 09:00) (82 - 110)  RR: 17 (25 Jun 2020 09:00) (10 - 21)  SpO2: 99% (25 Jun 2020 09:00) (90% - 99%)                          7.6    16.57 )-----------( 415      ( 25 Jun 2020 04:55 )             23.1       LIVER FUNCTIONS - ( 24 Jun 2020 12:35 )  Alb: 2.5 g/dL / Pro: 5.0 g/dL / ALK PHOS: 69 u/L / ALT: 8 u/L / AST: 29 u/L / GGT: x                   COMMENTS/PLAN: Pt. resting comfortably when seen at bedside, awaken to verbal stimuli. States pain is 8/10 but current regimen is helping and pain is getting better. Plan is to continue current regimen and primary team may continue to ween down on pain meds as needed. May contact pain service if pain isn't being managed.

## 2020-06-26 LAB
ANION GAP SERPL CALC-SCNC: 18 MMO/L — HIGH (ref 7–14)
BASOPHILS # BLD AUTO: 0.09 K/UL — SIGNIFICANT CHANGE UP (ref 0–0.2)
BASOPHILS NFR BLD AUTO: 0.5 % — SIGNIFICANT CHANGE UP (ref 0–2)
BUN SERPL-MCNC: 87 MG/DL — HIGH (ref 7–23)
CA-I BLD-SCNC: 1.06 MMOL/L — SIGNIFICANT CHANGE UP (ref 1.03–1.23)
CALCIUM SERPL-MCNC: 8.3 MG/DL — LOW (ref 8.4–10.5)
CHLORIDE SERPL-SCNC: 96 MMOL/L — LOW (ref 98–107)
CO2 SERPL-SCNC: 20 MMOL/L — LOW (ref 22–31)
CREAT SERPL-MCNC: 5.77 MG/DL — HIGH (ref 0.5–1.3)
EOSINOPHIL # BLD AUTO: 0.31 K/UL — SIGNIFICANT CHANGE UP (ref 0–0.5)
EOSINOPHIL NFR BLD AUTO: 1.6 % — SIGNIFICANT CHANGE UP (ref 0–6)
GLUCOSE SERPL-MCNC: 94 MG/DL — SIGNIFICANT CHANGE UP (ref 70–99)
HCT VFR BLD CALC: 22.2 % — LOW (ref 39–50)
HGB BLD-MCNC: 7.4 G/DL — LOW (ref 13–17)
IMM GRANULOCYTES NFR BLD AUTO: 2.7 % — HIGH (ref 0–1.5)
LYMPHOCYTES # BLD AUTO: 10.8 % — LOW (ref 13–44)
LYMPHOCYTES # BLD AUTO: 2.03 K/UL — SIGNIFICANT CHANGE UP (ref 1–3.3)
MAGNESIUM SERPL-MCNC: 2.4 MG/DL — SIGNIFICANT CHANGE UP (ref 1.6–2.6)
MCHC RBC-ENTMCNC: 28.7 PG — SIGNIFICANT CHANGE UP (ref 27–34)
MCHC RBC-ENTMCNC: 33.3 % — SIGNIFICANT CHANGE UP (ref 32–36)
MCV RBC AUTO: 86 FL — SIGNIFICANT CHANGE UP (ref 80–100)
MONOCYTES # BLD AUTO: 0.98 K/UL — HIGH (ref 0–0.9)
MONOCYTES NFR BLD AUTO: 5.2 % — SIGNIFICANT CHANGE UP (ref 2–14)
NEUTROPHILS # BLD AUTO: 14.88 K/UL — HIGH (ref 1.8–7.4)
NEUTROPHILS NFR BLD AUTO: 79.2 % — HIGH (ref 43–77)
NRBC # FLD: 0 K/UL — SIGNIFICANT CHANGE UP (ref 0–0)
PHOSPHATE SERPL-MCNC: 7.1 MG/DL — HIGH (ref 2.5–4.5)
PLATELET # BLD AUTO: 430 K/UL — HIGH (ref 150–400)
PMV BLD: 9.3 FL — SIGNIFICANT CHANGE UP (ref 7–13)
POTASSIUM SERPL-MCNC: 4.5 MMOL/L — SIGNIFICANT CHANGE UP (ref 3.5–5.3)
POTASSIUM SERPL-SCNC: 4.5 MMOL/L — SIGNIFICANT CHANGE UP (ref 3.5–5.3)
RBC # BLD: 2.58 M/UL — LOW (ref 4.2–5.8)
RBC # FLD: 13.4 % — SIGNIFICANT CHANGE UP (ref 10.3–14.5)
SODIUM SERPL-SCNC: 134 MMOL/L — LOW (ref 135–145)
WBC # BLD: 18.8 K/UL — HIGH (ref 3.8–10.5)
WBC # FLD AUTO: 18.8 K/UL — HIGH (ref 3.8–10.5)

## 2020-06-26 PROCEDURE — 99233 SBSQ HOSP IP/OBS HIGH 50: CPT | Mod: GC

## 2020-06-26 PROCEDURE — 99232 SBSQ HOSP IP/OBS MODERATE 35: CPT

## 2020-06-26 RX ORDER — NICARDIPINE HYDROCHLORIDE 30 MG/1
30 CAPSULE, EXTENDED RELEASE ORAL ONCE
Refills: 0 | Status: COMPLETED | OUTPATIENT
Start: 2020-06-26 | End: 2020-06-26

## 2020-06-26 RX ORDER — METHADONE HYDROCHLORIDE 40 MG/1
5 TABLET ORAL ONCE
Refills: 0 | Status: DISCONTINUED | OUTPATIENT
Start: 2020-06-26 | End: 2020-06-26

## 2020-06-26 RX ORDER — HYDROMORPHONE HYDROCHLORIDE 2 MG/ML
1 INJECTION INTRAMUSCULAR; INTRAVENOUS; SUBCUTANEOUS ONCE
Refills: 0 | Status: DISCONTINUED | OUTPATIENT
Start: 2020-06-26 | End: 2020-06-26

## 2020-06-26 RX ADMIN — NICARDIPINE HYDROCHLORIDE 30 MILLIGRAM(S): 30 CAPSULE, EXTENDED RELEASE ORAL at 19:29

## 2020-06-26 RX ADMIN — SODIUM ZIRCONIUM CYCLOSILICATE 10 GRAM(S): 10 POWDER, FOR SUSPENSION ORAL at 12:15

## 2020-06-26 RX ADMIN — NICARDIPINE HYDROCHLORIDE 30 MILLIGRAM(S): 30 CAPSULE, EXTENDED RELEASE ORAL at 05:55

## 2020-06-26 RX ADMIN — GABAPENTIN 200 MILLIGRAM(S): 400 CAPSULE ORAL at 13:19

## 2020-06-26 RX ADMIN — Medication 1 MILLIGRAM(S): at 09:32

## 2020-06-26 RX ADMIN — Medication 1 MILLIGRAM(S): at 17:55

## 2020-06-26 RX ADMIN — METHADONE HYDROCHLORIDE 5 MILLIGRAM(S): 40 TABLET ORAL at 17:52

## 2020-06-26 RX ADMIN — GABAPENTIN 200 MILLIGRAM(S): 400 CAPSULE ORAL at 20:25

## 2020-06-26 RX ADMIN — HEPARIN SODIUM 5000 UNIT(S): 5000 INJECTION INTRAVENOUS; SUBCUTANEOUS at 20:25

## 2020-06-26 RX ADMIN — HEPARIN SODIUM 5000 UNIT(S): 5000 INJECTION INTRAVENOUS; SUBCUTANEOUS at 05:55

## 2020-06-26 RX ADMIN — HYDROMORPHONE HYDROCHLORIDE 1 MILLIGRAM(S): 2 INJECTION INTRAMUSCULAR; INTRAVENOUS; SUBCUTANEOUS at 11:03

## 2020-06-26 RX ADMIN — SEVELAMER CARBONATE 800 MILLIGRAM(S): 2400 POWDER, FOR SUSPENSION ORAL at 06:04

## 2020-06-26 RX ADMIN — HYDROMORPHONE HYDROCHLORIDE 6 MILLIGRAM(S): 2 INJECTION INTRAMUSCULAR; INTRAVENOUS; SUBCUTANEOUS at 21:33

## 2020-06-26 RX ADMIN — QUETIAPINE FUMARATE 25 MILLIGRAM(S): 200 TABLET, FILM COATED ORAL at 20:25

## 2020-06-26 RX ADMIN — HYDROMORPHONE HYDROCHLORIDE 6 MILLIGRAM(S): 2 INJECTION INTRAMUSCULAR; INTRAVENOUS; SUBCUTANEOUS at 02:41

## 2020-06-26 RX ADMIN — SEVELAMER CARBONATE 800 MILLIGRAM(S): 2400 POWDER, FOR SUSPENSION ORAL at 20:25

## 2020-06-26 RX ADMIN — Medication 2 MILLIGRAM(S): at 20:25

## 2020-06-26 RX ADMIN — FLUCONAZOLE 200 MILLIGRAM(S): 150 TABLET ORAL at 13:19

## 2020-06-26 RX ADMIN — Medication 100 MILLIGRAM(S): at 05:55

## 2020-06-26 RX ADMIN — METHADONE HYDROCHLORIDE 5 MILLIGRAM(S): 40 TABLET ORAL at 06:04

## 2020-06-26 RX ADMIN — HEPARIN SODIUM 5000 UNIT(S): 5000 INJECTION INTRAVENOUS; SUBCUTANEOUS at 13:19

## 2020-06-26 RX ADMIN — SEVELAMER CARBONATE 800 MILLIGRAM(S): 2400 POWDER, FOR SUSPENSION ORAL at 13:19

## 2020-06-26 RX ADMIN — HYDROMORPHONE HYDROCHLORIDE 6 MILLIGRAM(S): 2 INJECTION INTRAMUSCULAR; INTRAVENOUS; SUBCUTANEOUS at 09:32

## 2020-06-26 RX ADMIN — HYDROMORPHONE HYDROCHLORIDE 1 MILLIGRAM(S): 2 INJECTION INTRAMUSCULAR; INTRAVENOUS; SUBCUTANEOUS at 16:21

## 2020-06-26 RX ADMIN — GABAPENTIN 200 MILLIGRAM(S): 400 CAPSULE ORAL at 05:55

## 2020-06-26 RX ADMIN — CHLORHEXIDINE GLUCONATE 1 APPLICATION(S): 213 SOLUTION TOPICAL at 16:21

## 2020-06-26 NOTE — PROGRESS NOTE ADULT - ATTENDING COMMENTS
ANTOINETTE  Rhabdo    Potentially recovering from ATN at this point    Cont to monitor Is/Os, daily weights off diuretics

## 2020-06-26 NOTE — PROGRESS NOTE ADULT - SUBJECTIVE AND OBJECTIVE BOX
Vac removed from leg  MEÑO Drains removed from leg    Wounds CDI    Will remove VAC from thigh monday  Dry dressings to leg  OOB ambulate   WBAT  Needs AFO fitting

## 2020-06-26 NOTE — PROGRESS NOTE BEHAVIORAL HEALTH - SUMMARY
Patient is a 28 y/o single male, no significant PMHx, PPHx bipolar disorder, no prior inpatient hospitalizations, follows deisi/ Dr. White once every 2 weeks, no prior SA, no prior NSSIB, occasional cocaine and alcohol use, transferred from [facility] after presenting with acute renal failure 2/2 rhabdomyolysis after being found unresponsive at home by neighbors. Patient has received multiple HD since admission, and is s/p fasciotomy for compartment syndrome in E. Patient seems to be minimizing symptoms, states he feels good and has been handling all procedures well, with no excess anxiety. SICU team reports increased anxiety during the night, including pt crying. Responds well to Xanax. Pt okay with starting standing Ativan with Xanax PRN. Patient is a 28 y/o single male, no significant PMHx, PPHx bipolar disorder, no prior inpatient hospitalizations, follows deisi/ Dr. White once every 2 weeks, no prior SA, no prior NSSIB, occasional cocaine and alcohol use, transferred from [facility] after presenting with acute renal failure 2/2 rhabdomyolysis after being found unresponsive at home by neighbors. Patient has received multiple HD since admission, and is s/p fasciotomy for compartment syndrome in E. Patient seems to be minimizing symptoms, states he feels good and has been handling all procedures well, with no excess anxiety. SICU team reports increased anxiety during the night, including pt crying. Responds well to Xanax. Pt okay with starting standing Ativan with Xanax PRN.    6/26: Called to reassess patient; patient denies any sustained depression or anxiety. No SI/HI. Pt hopeful and future-oriented. Refusing washout procedure because his father advised him against doing it at this time; however pt appears reasonable and states he is willing to reconsider the procedure depending on clinical course.

## 2020-06-26 NOTE — PROGRESS NOTE BEHAVIORAL HEALTH - NSBHFUPINTERVALHXFT_PSY_A_CORE
Patient seen at bedside. No acute overnight events. Received PRN Xanax 1mg PO last evening. Called back to see patient today by Vascular Attending to assess for depression after pt refused to get washout procedure in the setting of rising WBC. On exam pt calm, sitting in bed with TV on. Denies feeling sustained depression, does admit at times feeling down and worried about his medical issues wondering if he will recover, this is worse at night sometimes, but overall able to remain hopeful and says the Ativan has been helpful. No SI/HI. No psychotic sx.  Pt states that he had multiple discussions with his father Dr. Camacho (a surgeon here) who does not believe that the procedure is necessarily indicated at this time; pt states he trusts his father's opinion a great deal, and together they've decided he will not get the procedure. Pt does state that if his father thought it was indicated, that he would have it done. Also agrees to keep an open mind and reconsider in the future if his WBC continues to rise or other infectious symptoms emerge.    Discussed with pt's father Dr. Camacho (cell [phone]) who corroborates what pt says, Dr. Camacho does not feel procedure is indicated at this time and has advised his son to hold off on getting it done for now. Does not feel pt is clinically depressed at this time, and feels overall that pt has been improving both physically and mentally.

## 2020-06-26 NOTE — PROGRESS NOTE ADULT - ATTENDING COMMENTS
I Adam Sauer MD have seen and examined the patient today and agree with  the  evaluation, assessment and plan of the surgical house officer  ASHTYN Sauer MD have personally seen and examined the patient at bedside today at 90am

## 2020-06-26 NOTE — PROGRESS NOTE ADULT - SUBJECTIVE AND OBJECTIVE BOX
Plastic Surgery Progress Note    Subjective:  - Pt seen and examined on AM rounds  - Didn't RTOR yesterday per pt preference  -No events overnight     Objective:    PHYSICAL EXAM:  General: NAD   LE: Prevena vacs in place and holding suction, pitting edema surrounding superior lateral fasciotomy sites, MEÑO SS with serous drainage in superior MEÑO consistent with fat necrosis. No surrounding erythema.     Vital Signs  T(C): 37.3 (06-26-20 @ 08:00), Max: 37.6 (06-25-20 @ 20:00)  T(F): 99.2 (06-26-20 @ 08:00), Max: 99.7 (06-25-20 @ 20:00)  HR: 122 (06-26-20 @ 08:00) (109 - 123)  BP: 137/80 (06-26-20 @ 08:00) (131/85 - 166/100)  RR: 12 (06-26-20 @ 08:00) (12 - 20)  SpO2: 93% (06-26-20 @ 08:00) (91% - 99%)      25 Jun 2020 07:01  -  26 Jun 2020 07:00  --------------------------------------------------------  IN:    IV PiggyBack: 100 mL    Oral Fluid: 60 mL  Total IN: 160 mL    OUT:    Drain: 30 mL    Drain: 15 mL    Drain: 15 mL    Drain: 90 mL    Drain: 60 mL    VAC (Vacuum Assisted Closure) System: 10 mL    Voided: 2975 mL  Total OUT: 3195 mL    Total NET: -3035 mL      26 Jun 2020 07:01  -  26 Jun 2020 08:19  --------------------------------------------------------  IN:  Total IN: 0 mL    OUT:    Voided: 500 mL  Total OUT: 500 mL    Total NET: -500 mL

## 2020-06-26 NOTE — PROGRESS NOTE ADULT - SUBJECTIVE AND OBJECTIVE BOX
SURGERY DAILY PROGRESS NOTE:       SUBJECTIVE/ROS: Patient examined at bedside. No acute events overnight, pain currently well-controlled.    Denies nausea, vomiting, chest pain, shortness of breath       MEDICATIONS  (STANDING):  ceFAZolin   IVPB 1000 milliGRAM(s) IV Intermittent every 24 hours  chlorhexidine 4% Liquid 1 Application(s) Topical <User Schedule>  fluconAZOLE   Tablet 200 milliGRAM(s) Oral daily  gabapentin 200 milliGRAM(s) Oral three times a day  heparin   Injectable 5000 Unit(s) SubCutaneous every 8 hours  LORazepam     Tablet 1 milliGRAM(s) Oral <User Schedule>  LORazepam     Tablet 2 milliGRAM(s) Oral <User Schedule>  methadone    Tablet 5 milliGRAM(s) Oral two times a day  methadone    Tablet 5 milliGRAM(s) Oral once  niCARdipine 30 milliGRAM(s) Oral <User Schedule>  polyethylene glycol 3350 17 Gram(s) Oral two times a day  QUEtiapine 25 milliGRAM(s) Oral at bedtime  senna 2 Tablet(s) Oral at bedtime  sevelamer carbonate 800 milliGRAM(s) Oral every 8 hours  sodium zirconium cyclosilicate 10 Gram(s) Oral daily    MEDICATIONS  (PRN):  ALPRAZolam 1 milliGRAM(s) Oral every 8 hours PRN anxiety  HYDROmorphone   Tablet 4 milliGRAM(s) Oral every 3 hours PRN Moderate Pain (4 - 6)  HYDROmorphone   Tablet 6 milliGRAM(s) Oral every 3 hours PRN Severe Pain (7 - 10)  HYDROmorphone  Injectable 1 milliGRAM(s) IV Push every 6 hours PRN For PT and extensive movement only  sodium chloride 0.9% lock flush 10 milliLiter(s) IV Push every 1 hour PRN Pre/post blood products, medications, blood draw, and to maintain line patency  tiZANidine 1 milliGRAM(s) Oral every 6 hours PRN muscle spasm and pain      OBJECTIVE:    ICU Vital Signs Last 24 Hrs  T(C): 37.6 (26 Jun 2020 04:00), Max: 37.6 (25 Jun 2020 20:00)  T(F): 99.7 (26 Jun 2020 04:00), Max: 99.7 (25 Jun 2020 20:00)  HR: 117 (26 Jun 2020 06:00) (109 - 126)  BP: 140/95 (26 Jun 2020 06:00) (131/85 - 166/100)  BP(mean): 105 (26 Jun 2020 06:00) (91 - 129)  ABP: --  ABP(mean): --  RR: 14 (26 Jun 2020 06:00) (12 - 21)  SpO2: 94% (26 Jun 2020 06:00) (90% - 99%)    I&O's Detail    24 Jun 2020 07:01  -  25 Jun 2020 07:00  --------------------------------------------------------  IN:    IV PiggyBack: 50 mL    Oral Fluid: 718 mL  Total IN: 768 mL    OUT:    Drain: 60 mL    Drain: 30 mL    Drain: 20 mL    Drain: 75 mL    Drain: 25 mL    Voided: 2200 mL  Total OUT: 2410 mL    Total NET: -1642 mL      25 Jun 2020 07:01  -  26 Jun 2020 06:35  --------------------------------------------------------  IN:    IV PiggyBack: 100 mL    Oral Fluid: 60 mL  Total IN: 160 mL    OUT:    Drain: 30 mL    Drain: 15 mL    Drain: 15 mL    Drain: 90 mL    Drain: 60 mL    VAC (Vacuum Assisted Closure) System: 10 mL    Voided: 2975 mL  Total OUT: 3195 mL    Total NET: -3035 mL        LABS:    (Pending)      PHYSICAL EXAM:  General: NAD  Respiratory: non-labored on room air   Abdomen: soft, nontender, nondistended  Extremities: LE Gisela VACs in place. MEÑO SS.  LLE gross motor intact from hip to toes, no signs of LLE arterial insufficiency   LLE sensory deficit on left foot dorsum remains as stated above no acute changes SURGERY DAILY PROGRESS NOTE:       SUBJECTIVE/ROS: Patient examined at bedside. No acute events overnight, pain currently well-controlled.    Denies nausea, vomiting, chest pain, shortness of breath       MEDICATIONS  (STANDING):  ceFAZolin   IVPB 1000 milliGRAM(s) IV Intermittent every 24 hours  chlorhexidine 4% Liquid 1 Application(s) Topical <User Schedule>  fluconAZOLE   Tablet 200 milliGRAM(s) Oral daily  gabapentin 200 milliGRAM(s) Oral three times a day  heparin   Injectable 5000 Unit(s) SubCutaneous every 8 hours  LORazepam     Tablet 1 milliGRAM(s) Oral <User Schedule>  LORazepam     Tablet 2 milliGRAM(s) Oral <User Schedule>  methadone    Tablet 5 milliGRAM(s) Oral two times a day  methadone    Tablet 5 milliGRAM(s) Oral once  niCARdipine 30 milliGRAM(s) Oral <User Schedule>  polyethylene glycol 3350 17 Gram(s) Oral two times a day  QUEtiapine 25 milliGRAM(s) Oral at bedtime  senna 2 Tablet(s) Oral at bedtime  sevelamer carbonate 800 milliGRAM(s) Oral every 8 hours  sodium zirconium cyclosilicate 10 Gram(s) Oral daily    MEDICATIONS  (PRN):  ALPRAZolam 1 milliGRAM(s) Oral every 8 hours PRN anxiety  HYDROmorphone   Tablet 4 milliGRAM(s) Oral every 3 hours PRN Moderate Pain (4 - 6)  HYDROmorphone   Tablet 6 milliGRAM(s) Oral every 3 hours PRN Severe Pain (7 - 10)  HYDROmorphone  Injectable 1 milliGRAM(s) IV Push every 6 hours PRN For PT and extensive movement only  sodium chloride 0.9% lock flush 10 milliLiter(s) IV Push every 1 hour PRN Pre/post blood products, medications, blood draw, and to maintain line patency  tiZANidine 1 milliGRAM(s) Oral every 6 hours PRN muscle spasm and pain      OBJECTIVE:    ICU Vital Signs Last 24 Hrs  T(C): 37.6 (26 Jun 2020 04:00), Max: 37.6 (25 Jun 2020 20:00)  T(F): 99.7 (26 Jun 2020 04:00), Max: 99.7 (25 Jun 2020 20:00)  HR: 117 (26 Jun 2020 06:00) (109 - 126)  BP: 140/95 (26 Jun 2020 06:00) (131/85 - 166/100)  BP(mean): 105 (26 Jun 2020 06:00) (91 - 129)  ABP: --  ABP(mean): --  RR: 14 (26 Jun 2020 06:00) (12 - 21)  SpO2: 94% (26 Jun 2020 06:00) (90% - 99%)    I&O's Detail    24 Jun 2020 07:01  -  25 Jun 2020 07:00  --------------------------------------------------------  IN:    IV PiggyBack: 50 mL    Oral Fluid: 718 mL  Total IN: 768 mL    OUT:    Drain: 60 mL    Drain: 30 mL    Drain: 20 mL    Drain: 75 mL    Drain: 25 mL    Voided: 2200 mL  Total OUT: 2410 mL    Total NET: -1642 mL      25 Jun 2020 07:01  -  26 Jun 2020 06:35  --------------------------------------------------------  IN:    IV PiggyBack: 100 mL    Oral Fluid: 60 mL  Total IN: 160 mL    OUT:    Drain: 30 mL    Drain: 15 mL    Drain: 15 mL    Drain: 90 mL    Drain: 60 mL    VAC (Vacuum Assisted Closure) System: 10 mL    Voided: 2975 mL  Total OUT: 3195 mL    Total NET: -3035 mL    LABS:                        7.4    18.80 )-----------( 430      ( 26 Jun 2020 06:58 )             22.2     06-26    134<L>  |  96<L>  |  87<H>  ----------------------------<  94  4.5   |  20<L>  |  5.77<H>    Ca    8.3<L>      26 Jun 2020 02:50  Phos  7.1     06-26  Mg     2.4     06-26          PHYSICAL EXAM:  General: NAD  Respiratory: non-labored on room air   Abdomen: soft, nontender, nondistended  Extremities: LE Gisela VACs in place. MEÑO SS.  LLE gross motor intact from hip to toes, no signs of LLE arterial insufficiency   LLE sensory deficit on left foot dorsum remains as stated above no acute changes      RLE US reviewed no collections

## 2020-06-26 NOTE — PROGRESS NOTE ADULT - ASSESSMENT
29M w/ PMH of bipolar depression, s/p found down at home, transferred from [facility] for further management of acute renal failure due to rhabdomyolysis, s/p four compartment fasciotomy and lateral/medial thigh fasciotomies with ANTOINETTE on HD. Now s/p fasciotomy closure    Neuro hx of bipolar depression  - A&O x 3  - Pain Control: oral dilaudid, tylenol, methadone  - IV dilaudid dosing adjusted  - Xanax 1 mg q8 PRN   - ativan BID  - Seroquel QHS  - Increased gabapentin, standing tizanidine per Pain service  - Psych following, no plan to restart home lithium  - Pain following, appreciate recommendations    Resp  - RA   - Mobilize, OOB to chair  - Obtain Chest XR    CVS  - Appropriate mental status, appears well perfused   - c/w nicardipine 30 mg PO q8 hrs   - if HTN again, rec'd clonidine .1mg by addiction psych    GI  - NPO after midnight for OR for washout  - Bowel regimen senna 2 tabs qHS  - Patient declining miralax, last BM 06/17 prior to that > 1w      - ANTOINETTE in setting of rhabdomyolysis, slowly improving  - CK down to <2000  - Remains hypervolemic, oliguric but UOP on an upward trend  - IV locked   - R fem shiley placed; no HD 6/25  - Plan for permacath once afebrile  - Nephrology following    Heme  - trend CBC  - SQH for dvt ppx    ID  - Febrile to TMax 101.2  - Leukocytosis of 21  - bcx NTD, Ucx neg, CXR neg  - Continue to monitor fever curve  - started on cefazolin, diflucan    Endo  - monitor FGS    Lines  - Right radial a line  - Right femoral shiley 29M w/ PMH of bipolar depression, s/p found down at home, transferred from [facility] for further management of acute renal failure due to rhabdomyolysis, s/p four compartment fasciotomy and lateral/medial thigh fasciotomies with ANTOINETTE on HD. Now s/p fasciotomy closure    Neuro hx of bipolar depression  - A&O x 3  - Pain Control: oral dilaudid, tylenol, methadone  - IV dilaudid dosing adjusted  - Xanax 1 mg q8 PRN   - ativan BID  - Seroquel QHS  - Increased gabapentin, standing tizanidine per Pain service  - Psych following, no plan to restart home lithium  - Pain following, appreciate recommendations    Resp  - RA   - Mobilize, OOB to chair  - Obtain Chest XR    CVS  - Appropriate mental status, appears well perfused   - c/w nicardipine    GI  - Bowel regimen senna 2 tabs qHS  - Patient declining miralax, last BM 06/17 prior to that > 1w      - ANTOINETTE in setting of rhabdomyolysis, slowly improving  - CK down  - urine output improving  - IV locked   - R fem shawna placed; no HD 6/25, will remove jhoanaley today  - Nephrology following    Heme  - trend CBC  - SQH for dvt ppx    ID  - afebrile over 48hours  - Leukocytosis to 18  - bcx NTD, Ucx neg, CXR neg  - started on cefazolin, diflucan for a 7 day course    Endo  - monitor FGS    Lines  - Right radial a line

## 2020-06-26 NOTE — PROGRESS NOTE ADULT - ASSESSMENT
29M with PMH of bipolar depression transferred from [facility] for further management of acute renal failure due to rhabdomyolysis, Taken to the OR for LLE compartment syndrome. Now s/p four compartment fasciotomy of the left lower leg 6/11. s/p closure with plastics on 6/19. Doing well clinically, however continue to spike fever with leukocytosis.      Plan  - pain control PRN  - NPO for possible OR washout today   - low threshold for wound washout  - check am WBC  - continue HD as per Renal   - DVT ppx   - OOB to chair as tolerated, PT consult for mobilization  - Appreciate SICU care 29M with PMH of bipolar depression transferred from [facility] for further management of acute renal failure due to rhabdomyolysis, Taken to the OR for LLE compartment syndrome. Now s/p four compartment fasciotomy of the left lower leg 6/11. s/p closure with plastics on 6/19. Doing well clinically, however continue to spike fever with leukocytosis.      Plan  - pain control PRN  - d/w pt that i remain very concerned for infectios process and the early onset of fasciitis and I recommend to proceed w left buttock/gluteal exploration  pt refuses  he understands risks of  deision and severe infection and possible death  pt is to completed abx and antifungal rx my mon  hold off on tylenol  f/u twm  continue woundcare and tyler as per plastics  will follow

## 2020-06-26 NOTE — PROGRESS NOTE ADULT - ATTENDING COMMENTS
Agree with notes of health care providers on my service (PAs, Residents and House Staff).  The patient is in SICU with diagnosis mentioned in the note.    The SICU team has a constant risk benefit analyzes discussion with the primary team, all consultants, House Staff and PA's.  29M w/ PMH of bipolar depression, s/p found down at home, s/p fasciotomy closure and ANTOINETTE  I have personally examined the patient, reviewed data and laboratory tests/x-rays and all pertinent electronic images.    EXAM:  NEUROLOGY  Exam: Normal, NAD, alert, oriented x3, no focal deficits. PERRLA.   RESPIRATORY  Exam: Lungs clear to auscultation, Normal expansion/effort.   CARDIOVASCULAR  Exam: S1, S2.  Regular rate and rhythm.    Cardiac Rhythm: Normal Sinus Rhythm  GI/NUTRITION  Exam: Abdomen soft, Non-tender, Non-distended.    Current Diet: Regular, fluid restricted     The assessment and plan is specified below:  Neuro hx of bipolar depression  - A&O x 3  - Pain Control: oral dilaudid, tylenol, methadone  - IV dilaudid dosing adjusted  - Xanax 1 mg q8 PRN   - ativan BID  - Seroquel QHS  - Increased gabapentin, standing tizanidine per Pain service  - Psych following, no plan to restart home lithium  - Pain following, appreciate recommendations    Resp  - RA   - Mobilize, OOB to chair  - Obtain Chest XR    CVS  - Appropriate mental status, appears well perfused   - c/w nicardipine 30 mg PO q8 hrs   - if HTN again, rec'd clonidine .1mg by addiction psych    GI  - NPO after midnight for OR for washout  - Bowel regimen senna 2 tabs qHS  - Patient declining miralax, last BM 06/17 prior to that > 1w      - ANTOINETTE in setting of rhabdomyolysis, slowly improving  - CK down to <2000  - Remains hypervolemic, oliguric but UOP on an upward trend  - IV locked   - R fem shiley placed; no HD 6/25  - Plan for permacath once afebrile  - Nephrology following    Heme  - trend CBC  - SQH for dvt ppx    ID  - Febrile to TMax 101.2  - Leukocytosis of 21  - bcx NTD, Ucx neg, CXR neg  - Continue to monitor fever curve  - started on cefazolin, diflucan    Endo  - monitor FGS    Lines  - Right radial a line  - Right femoral shiley

## 2020-06-26 NOTE — PROGRESS NOTE ADULT - SUBJECTIVE AND OBJECTIVE BOX
SICU Daily Progress Note  =====================================================  HPI:  29M with PMH of bipolar depression transferred from [facility] for further management of acute renal failure due to rhabdomyolysis. Pt was found unresponsive at home, lying on his L side. He states he has not been taking his Lithium for at least 2 wks. At the OSH, he was treated for hyperkalemia. He received D5, insulin, calcium gluconate, Kayexalate Last BM was on 6/9. For L leg pain he was given lidocaine patch, dilaudid 1mg prn and tramadol 50mg. Hydralazine 20mg given for HTN. From 1 to 7 am only 100cc of UOP. Urine was tea colored. 500cc bolus of NS was given. CT cervical spine at OSH was (-). CT Head (-). CTAP and CXR were (-). CK on admission there was 177,000.   Pt was seen by surgery team in MICU for LUE swelling and compartment syndrome was ruled out. Surgery team was re-consulted today due to new symptoms of L foot drop and numbness. Pt was found to be highly suspicious for compartment syndrome in the upper thigh, and was emergently taken to OR.   Pt is now s/p four compartment fasciotomy of LLE, and left lateral and medial thigh fasciotomies. SICU consulted for q1h neurovascular checks and hemodynamic monitoring. Now s/p closure.      Interval/Overnight Events:      -No acute events overnight  -Continuing bumex 4mg  -Nicardipine decreased to daily from BID  -No HD today  -Continue ancef      HISTORY  29y Male  Allergies: No Known Allergies    PAST MEDICAL & SURGICAL HISTORY:  Bipolar depression  Migraine  Displaced fracture of proximal phalanx of left little finger  Difficulty sleeping  ADD (attention deficit disorder)  History of facial surgery: Fadi-Facial Realignment Surgery in 2008  History of fasciotomy  Compartment syndrome, nontraumatic, lower extremity  Hypervolemia  Hyponatremia  Traumatic rhabdomyolysis, sequela  Bipolar 1 disorder  Compartment syndrome of left lower extremity, initial encounter  Compartment syndrome of thigh  Compartment syndrome of buttock  CKD (chronic kidney disease) stage 5, GFR less than 15 ml/min  Hypocalcemia  Hyperkalemia  ANTOINETTE (acute kidney injury)  Insertion, catheter, dialysis, temporary  Closure, wound, delayed  Fasciotomy, thigh  Decompression fasciotomy, lower extremity  Gluteal-iliotibial fasciotomy  Fasciotomy of left hip  Fasciotomy, thigh      MEDICATIONS:   --------------------------------------------------------------------------------------  Neurologic Medications  ALPRAZolam 1 milliGRAM(s) Oral every 8 hours PRN anxiety  gabapentin 200 milliGRAM(s) Oral three times a day  HYDROmorphone   Tablet 4 milliGRAM(s) Oral every 3 hours PRN Moderate Pain (4 - 6)  HYDROmorphone   Tablet 6 milliGRAM(s) Oral every 3 hours PRN Severe Pain (7 - 10)  HYDROmorphone  Injectable 1 milliGRAM(s) IV Push every 6 hours PRN For PT and extensive movement only  LORazepam     Tablet 1 milliGRAM(s) Oral <User Schedule>  LORazepam     Tablet 2 milliGRAM(s) Oral <User Schedule>  methadone    Tablet 5 milliGRAM(s) Oral two times a day  QUEtiapine 25 milliGRAM(s) Oral at bedtime  tiZANidine 1 milliGRAM(s) Oral every 6 hours PRN muscle spasm and pain    Respiratory Medications    Cardiovascular Medications  niCARdipine 30 milliGRAM(s) Oral <User Schedule>    Gastrointestinal Medications  polyethylene glycol 3350 17 Gram(s) Oral two times a day  senna 2 Tablet(s) Oral at bedtime  sodium chloride 0.9% lock flush 10 milliLiter(s) IV Push every 1 hour PRN Pre/post blood products, medications, blood draw, and to maintain line patency    Genitourinary Medications    Hematologic/Oncologic Medications  heparin   Injectable 5000 Unit(s) SubCutaneous every 8 hours    Antimicrobial/Immunologic Medications  ceFAZolin   IVPB 1000 milliGRAM(s) IV Intermittent every 24 hours  fluconAZOLE   Tablet 200 milliGRAM(s) Oral daily    Endocrine/Metabolic Medications    Topical/Other Medications  chlorhexidine 4% Liquid 1 Application(s) Topical <User Schedule>  sevelamer carbonate 800 milliGRAM(s) Oral every 8 hours  sodium zirconium cyclosilicate 10 Gram(s) Oral daily    --------------------------------------------------------------------------------------    VITAL SIGNS, INS/OUTS (last 24 hours):  --------------------------------------------------------------------------------------  T(C): 37.6 (06-25-20 @ 20:00), Max: 37.6 (06-25-20 @ 20:00)  HR: 112 (06-26-20 @ 01:00) (102 - 126)  BP: 131/85 (06-26-20 @ 01:00) (131/85 - 166/100)  BP(mean): 94 (06-26-20 @ 01:00) (84 - 129)  RR: 18 (06-26-20 @ 01:00) (12 - 21)  SpO2: 92% (06-26-20 @ 01:00) (90% - 99%)  CAPILLARY BLOOD GLUCOSE       N/A      06-24 @ 07:01  -  06-25 @ 07:00  --------------------------------------------------------  IN:    IV PiggyBack: 50 mL    Oral Fluid: 718 mL  Total IN: 768 mL    OUT:    Drain: 25 mL    Drain: 60 mL    Drain: 30 mL    Drain: 20 mL    Drain: 75 mL    Voided: 2200 mL  Total OUT: 2410 mL    Total NET: -1642 mL      06-25 @ 07:01  -  06-26 @ 01:34  --------------------------------------------------------  IN:    Oral Fluid: 60 mL  Total IN: 60 mL    OUT:    Drain: 40 mL    Drain: 10 mL    Drain: 30 mL    Drain: 30 mL    Drain: 15 mL    VAC (Vacuum Assisted Closure) System: 10 mL    Voided: 2300 mL  Total OUT: 2435 mL    Total NET: -2375 mL  --------------------------------------------------------------------------------------    EXAM:  NEUROLOGY  Exam: Normal, NAD, alert, oriented x3, no focal deficits. PERRLA.   [x] Adequacy of sedation and pain control has been assessed and adjusted    RESPIRATORY  Exam: Lungs clear to auscultation, Normal expansion/effort.       CARDIOVASCULAR  Exam: S1, S2.  Regular rate and rhythm.    Cardiac Rhythm: Normal Sinus Rhythm      GI/NUTRITION  Exam: Abdomen soft, Non-tender, Non-distended.    Current Diet: Regular, fluid restricted     METABOLIC/FLUIDS/ELECTROLYTES      HEMATOLOGIC  [x] DVT Prophylaxis: heparin   Injectable 5000 Unit(s) SubCutaneous every 8 hours    Transfusions:	[] PRBC	[] Platelets		[] FFP	[] Cryoprecipitate    INFECTIOUS DISEASE  Antimicrobials/Immunologic Medications:  ceFAZolin   IVPB 1000 milliGRAM(s) IV Intermittent every 24 hours  fluconAZOLE   Tablet 200 milliGRAM(s) Oral daily    Day #  of  ***    Tubes/Lines/Drains   [x] Peripheral IV  [] Central Venous Line     	[] R	[] L	[] IJ	[] Fem	[] SC	Date Placed:   [] Arterial Line		[] R	[] L	[] Fem	[] Rad	[] Ax	Date Placed:   [] PICC		[] Midline		[] Mediport  [] Urinary Catheter		Date Placed:   [x] Necessity of urinary, arterial, and venous catheters discussed    VASCULAR  Exam: Extremities warm, pink, well-perfused.     MUSCULOSKELETAL  Exam: All extremities moving spontaneously without limitations.     SKIN  Exam: Good skin turgor, no skin breakdown.     LABS  --------------------------------------------------------------------------------------  CBC (06-25 @ 04:55)                              7.6<L>                         16.57<H>  )----------------(  415<H>     77.1<H>% Neutrophils, 11.4<L>% Lymphocytes, ANC: 12.78<H>                              23.1<L>    BMP (06-25 @ 15:45)             132<L>  |  93<L>   |  77<H> 		Ca++ --      Ca 8.2<L>             ---------------------------------( 212<H>		Mg 2.2                4.2     |  22      |  5.48<H>			Ph 5.9<H>  BMP (06-25 @ 04:55)             137     |  97<L>   |  67<H> 		Ca++ --      Ca 8.1<L>             ---------------------------------( 107<H>		Mg 2.4                3.7     |  23      |  5.26<H>			Ph 5.6<H>                -> .Urine Clean Catch (Midstream) Culture (06-23 @ 00:14)     NG    NG    <10,000 CFU/mL Normal Urogenital Karen    -> .Blood Blood-Venous Culture (06-22 @ 09:47)     NG    NG    No growth to date.    -> .Blood Blood-Venous Culture (06-20 @ 19:30)     NG    NG    No Growth Final  --------------------------------------------------------------------------------------    OTHER LABORATORY:     IMAGING STUDIES:   CXR:

## 2020-06-26 NOTE — PROGRESS NOTE ADULT - ASSESSMENT
A/P:   28 y/o M in SICU following found down, presenting with rhabdomyolysis/ compartment syndrome with fasciotomies to L LE X 4  closure on 6/19.    - Continue Prevena wound vacs to incisions, vacs will stay in place for approximately 10 days from day of surgery.  - Please encourage L LE elevation to reduce swelling   - Continue MEÑO drains, monitor output   - CONT ABX  - Pain control   - Management per SICU     Plastic Surgery   [id number]

## 2020-06-26 NOTE — PROGRESS NOTE ADULT - SUBJECTIVE AND OBJECTIVE BOX
[facility] DIVISION OF KIDNEY DISEASES AND HYPERTENSION -- FOLLOW UP NOTE  --------------------------------------------------------------------------------  HPI: 29-year-old male, with history bipolar depression was transferred from [facility] to [facility] for severe rhabdomyolysis. As per paper chart, pt was found to have cocaine and amphetamines in urine drug screen. Pt initially with elevated CPK ~ 180K. Nephrology team consulted for ANTOINETTE and rhabdomyolysis Upon review of labs on [facility], Scr was 1.11 on 8/23/2017. Pt. underwent fasciotomy on 6/11/20 for LLE compartment syndrome. Pt. had wound vac placed on  6/15/20.  Pt. underwent partial closure of fasciotomy by plastic surgery team on 6/19/20. Last hemodialysis on 6/23/20.    Pt. evaluated at bedside, in no distress. Pt. with good response to IV Bumex yesterday.    PAST HISTORY  --------------------------------------------------------------------------------  No significant changes to PMH, PSH, FHx, SHx, unless otherwise noted    ALLERGIES & MEDICATIONS  --------------------------------------------------------------------------------  Allergies    No Known Allergies    Intolerances    Standing Inpatient Medications  ceFAZolin   IVPB 1000 milliGRAM(s) IV Intermittent every 24 hours  chlorhexidine 4% Liquid 1 Application(s) Topical <User Schedule>  fluconAZOLE   Tablet 200 milliGRAM(s) Oral daily  gabapentin 200 milliGRAM(s) Oral three times a day  heparin   Injectable 5000 Unit(s) SubCutaneous every 8 hours  LORazepam     Tablet 1 milliGRAM(s) Oral <User Schedule>  LORazepam     Tablet 2 milliGRAM(s) Oral <User Schedule>  methadone    Tablet 5 milliGRAM(s) Oral two times a day  methadone    Tablet 5 milliGRAM(s) Oral once  niCARdipine 30 milliGRAM(s) Oral <User Schedule>  polyethylene glycol 3350 17 Gram(s) Oral two times a day  QUEtiapine 25 milliGRAM(s) Oral at bedtime  senna 2 Tablet(s) Oral at bedtime  sevelamer carbonate 800 milliGRAM(s) Oral every 8 hours  sodium zirconium cyclosilicate 10 Gram(s) Oral daily    REVIEW OF SYSTEMS  --------------------------------------------------------------------------------  Gen: no lethargy  Respiratory: No dyspnea  CV: No chest pain  GI: No abdominal pain  MSK: + LE edema, scrotal edema  Neuro: No dizziness  Heme: No bleeding    VITALS/PHYSICAL EXAM  --------------------------------------------------------------------------------  T(C): 37.3 (06-26-20 @ 08:00), Max: 37.6 (06-25-20 @ 20:00)  HR: 122 (06-26-20 @ 08:00) (109 - 123)  BP: 137/80 (06-26-20 @ 08:00) (131/85 - 166/100)  RR: 12 (06-26-20 @ 08:00) (12 - 20)  SpO2: 93% (06-26-20 @ 08:00) (91% - 98%)  Wt(kg): --    06-25-20 @ 07:01  -  06-26-20 @ 07:00  --------------------------------------------------------  IN: 160 mL / OUT: 3195 mL / NET: -3035 mL    06-26-20 @ 07:01  -  06-26-20 @ 09:06  --------------------------------------------------------  IN: 0 mL / OUT: 500 mL / NET: -500 mL    Physical Exam:  	Gen: NAD  	HEENT: SHASHA  	Pulm: CTA B/L  	CV: S1S2  	Abd: Soft, +BS   	Ext: LE pitting edema B/L  	Neuro: Awake  	Skin: Warm and dry  	Vascular access: RIJ femoral HD catheter +    LABS/STUDIES  --------------------------------------------------------------------------------              7.4    18.80 >-----------<  430      [06-26-20 @ 06:58]              22.2     134  |  96  |  87  ----------------------------<  94      [06-26-20 @ 02:50]  4.5   |  20  |  5.77        Ca     8.3     [06-26-20 @ 02:50]      iCa    1.06     [06-26 @ 02:50]      Mg     2.4     [06-26-20 @ 02:50]      Phos  7.1     [06-26-20 @ 02:50]    Creatinine Trend:  SCr 5.77 [06-26 @ 02:50]  SCr 5.48 [06-25 @ 15:45]  SCr 5.26 [06-25 @ 04:55]  SCr 4.91 [06-24 @ 12:35]  SCr 4.22 [06-24 @ 00:52]

## 2020-06-26 NOTE — PROGRESS NOTE ADULT - PROBLEM SELECTOR PLAN 1
Pt with ANTOINETTE secondary to rhabdomyolysis. Had R femoral non tunneled catheter placed on 6/21/20 and arranged for urgent HD due to serum potassium of 6.8. Last HD on 6/23/20. Labs reviewed, pt. appears to be in ATN plateau phase. Recommend intermittent IV Bumex PRN to maintain volume status and urine output. Can remove HD catheter. Monitor labs and urine output. Avoid potential nephrotoxins.

## 2020-06-27 LAB
ANION GAP SERPL CALC-SCNC: 18 MMO/L — HIGH (ref 7–14)
BUN SERPL-MCNC: 95 MG/DL — HIGH (ref 7–23)
CA-I BLD-SCNC: 1.13 MMOL/L — SIGNIFICANT CHANGE UP (ref 1.03–1.23)
CALCIUM SERPL-MCNC: 8.6 MG/DL — SIGNIFICANT CHANGE UP (ref 8.4–10.5)
CHLORIDE SERPL-SCNC: 98 MMOL/L — SIGNIFICANT CHANGE UP (ref 98–107)
CO2 SERPL-SCNC: 22 MMOL/L — SIGNIFICANT CHANGE UP (ref 22–31)
CREAT SERPL-MCNC: 5.17 MG/DL — HIGH (ref 0.5–1.3)
CULTURE RESULTS: SIGNIFICANT CHANGE UP
GLUCOSE SERPL-MCNC: 103 MG/DL — HIGH (ref 70–99)
HCT VFR BLD CALC: 22.7 % — LOW (ref 39–50)
HGB BLD-MCNC: 7.6 G/DL — LOW (ref 13–17)
MAGNESIUM SERPL-MCNC: 2.5 MG/DL — SIGNIFICANT CHANGE UP (ref 1.6–2.6)
MCHC RBC-ENTMCNC: 28.8 PG — SIGNIFICANT CHANGE UP (ref 27–34)
MCHC RBC-ENTMCNC: 33.5 % — SIGNIFICANT CHANGE UP (ref 32–36)
MCV RBC AUTO: 86 FL — SIGNIFICANT CHANGE UP (ref 80–100)
NRBC # FLD: 0 K/UL — SIGNIFICANT CHANGE UP (ref 0–0)
PHOSPHATE SERPL-MCNC: 7.6 MG/DL — HIGH (ref 2.5–4.5)
PLATELET # BLD AUTO: 434 K/UL — HIGH (ref 150–400)
PMV BLD: 9.5 FL — SIGNIFICANT CHANGE UP (ref 7–13)
POTASSIUM SERPL-MCNC: 4.6 MMOL/L — SIGNIFICANT CHANGE UP (ref 3.5–5.3)
POTASSIUM SERPL-SCNC: 4.6 MMOL/L — SIGNIFICANT CHANGE UP (ref 3.5–5.3)
RBC # BLD: 2.64 M/UL — LOW (ref 4.2–5.8)
RBC # FLD: 13.2 % — SIGNIFICANT CHANGE UP (ref 10.3–14.5)
SODIUM SERPL-SCNC: 138 MMOL/L — SIGNIFICANT CHANGE UP (ref 135–145)
SPECIMEN SOURCE: SIGNIFICANT CHANGE UP
WBC # BLD: 17.33 K/UL — HIGH (ref 3.8–10.5)
WBC # FLD AUTO: 17.33 K/UL — HIGH (ref 3.8–10.5)

## 2020-06-27 PROCEDURE — 99232 SBSQ HOSP IP/OBS MODERATE 35: CPT | Mod: GC

## 2020-06-27 PROCEDURE — 99232 SBSQ HOSP IP/OBS MODERATE 35: CPT

## 2020-06-27 RX ORDER — FLUCONAZOLE 150 MG/1
200 TABLET ORAL DAILY
Refills: 0 | Status: COMPLETED | OUTPATIENT
Start: 2020-06-27 | End: 2020-06-29

## 2020-06-27 RX ORDER — NICARDIPINE HYDROCHLORIDE 30 MG/1
20 CAPSULE, EXTENDED RELEASE ORAL THREE TIMES A DAY
Refills: 0 | Status: DISCONTINUED | OUTPATIENT
Start: 2020-06-27 | End: 2020-06-29

## 2020-06-27 RX ADMIN — HEPARIN SODIUM 5000 UNIT(S): 5000 INJECTION INTRAVENOUS; SUBCUTANEOUS at 05:37

## 2020-06-27 RX ADMIN — Medication 1 MILLIGRAM(S): at 14:47

## 2020-06-27 RX ADMIN — SEVELAMER CARBONATE 800 MILLIGRAM(S): 2400 POWDER, FOR SUSPENSION ORAL at 05:37

## 2020-06-27 RX ADMIN — Medication 1 MILLIGRAM(S): at 08:08

## 2020-06-27 RX ADMIN — QUETIAPINE FUMARATE 25 MILLIGRAM(S): 200 TABLET, FILM COATED ORAL at 21:51

## 2020-06-27 RX ADMIN — NICARDIPINE HYDROCHLORIDE 30 MILLIGRAM(S): 30 CAPSULE, EXTENDED RELEASE ORAL at 05:38

## 2020-06-27 RX ADMIN — CHLORHEXIDINE GLUCONATE 1 APPLICATION(S): 213 SOLUTION TOPICAL at 08:07

## 2020-06-27 RX ADMIN — GABAPENTIN 200 MILLIGRAM(S): 400 CAPSULE ORAL at 21:52

## 2020-06-27 RX ADMIN — Medication 100 MILLIGRAM(S): at 05:37

## 2020-06-27 RX ADMIN — GABAPENTIN 200 MILLIGRAM(S): 400 CAPSULE ORAL at 14:40

## 2020-06-27 RX ADMIN — METHADONE HYDROCHLORIDE 5 MILLIGRAM(S): 40 TABLET ORAL at 05:38

## 2020-06-27 RX ADMIN — HYDROMORPHONE HYDROCHLORIDE 6 MILLIGRAM(S): 2 INJECTION INTRAMUSCULAR; INTRAVENOUS; SUBCUTANEOUS at 08:00

## 2020-06-27 RX ADMIN — Medication 2 MILLIGRAM(S): at 21:52

## 2020-06-27 RX ADMIN — METHADONE HYDROCHLORIDE 5 MILLIGRAM(S): 40 TABLET ORAL at 16:49

## 2020-06-27 RX ADMIN — HYDROMORPHONE HYDROCHLORIDE 6 MILLIGRAM(S): 2 INJECTION INTRAMUSCULAR; INTRAVENOUS; SUBCUTANEOUS at 15:53

## 2020-06-27 RX ADMIN — SODIUM ZIRCONIUM CYCLOSILICATE 10 GRAM(S): 10 POWDER, FOR SUSPENSION ORAL at 14:40

## 2020-06-27 RX ADMIN — HYDROMORPHONE HYDROCHLORIDE 1 MILLIGRAM(S): 2 INJECTION INTRAMUSCULAR; INTRAVENOUS; SUBCUTANEOUS at 14:41

## 2020-06-27 RX ADMIN — SEVELAMER CARBONATE 800 MILLIGRAM(S): 2400 POWDER, FOR SUSPENSION ORAL at 21:51

## 2020-06-27 RX ADMIN — HEPARIN SODIUM 5000 UNIT(S): 5000 INJECTION INTRAVENOUS; SUBCUTANEOUS at 14:40

## 2020-06-27 RX ADMIN — SEVELAMER CARBONATE 800 MILLIGRAM(S): 2400 POWDER, FOR SUSPENSION ORAL at 14:40

## 2020-06-27 RX ADMIN — NICARDIPINE HYDROCHLORIDE 20 MILLIGRAM(S): 30 CAPSULE, EXTENDED RELEASE ORAL at 21:51

## 2020-06-27 RX ADMIN — HYDROMORPHONE HYDROCHLORIDE 1 MILLIGRAM(S): 2 INJECTION INTRAMUSCULAR; INTRAVENOUS; SUBCUTANEOUS at 09:20

## 2020-06-27 RX ADMIN — NICARDIPINE HYDROCHLORIDE 20 MILLIGRAM(S): 30 CAPSULE, EXTENDED RELEASE ORAL at 14:40

## 2020-06-27 RX ADMIN — HEPARIN SODIUM 5000 UNIT(S): 5000 INJECTION INTRAVENOUS; SUBCUTANEOUS at 21:51

## 2020-06-27 RX ADMIN — GABAPENTIN 200 MILLIGRAM(S): 400 CAPSULE ORAL at 05:38

## 2020-06-27 RX ADMIN — FLUCONAZOLE 200 MILLIGRAM(S): 150 TABLET ORAL at 14:40

## 2020-06-27 NOTE — PROGRESS NOTE ADULT - SUBJECTIVE AND OBJECTIVE BOX
SICU Daily Progress Note  =====================================================  HPI:   29M with PMH of bipolar depression transferred from [facility] for further management of acute renal failure due to rhabdomyolysis. Pt was found unresponsive at home, lying on his L side. He states he has not been taking his Lithium for at least 2 wks. At the OSH, he was treated for hyperkalemia. He received D5, insulin, calcium gluconate, Kayexalate Last BM was on 6/9. For L leg pain he was given lidocaine patch, dilaudid 1mg prn and tramadol 50mg. Hydralazine 20mg given for HTN. From 1 to 7 am only 100cc of UOP. Urine was tea colored. 500cc bolus of NS was given. CT cervical spine at OSH was (-). CT Head (-). CTAP and CXR were (-). CK on admission there was 177,000.   Pt was seen by surgery team in MICU for LUE swelling and compartment syndrome was ruled out. Surgery team was re-consulted today due to new symptoms of L foot drop and numbness. Pt was found to be highly suspicious for compartment syndrome in the upper thigh, and was emergently taken to OR.   Pt is now s/p four compartment fasciotomy of LLE, and left lateral and medial thigh fasciotomies. SICU consulted for q1h neurovascular checks and hemodynamic monitoring. Now s/p closure.      Interval/Overnight Events:       -No acute events overnight  -Right femoral shiley removed yesterday  -Continue to monitor urine output 2.2L over the past 24hrs    HISTORY  29y Male  Allergies: No Known Allergies    PAST MEDICAL & SURGICAL HISTORY:  Bipolar depression  Migraine  Displaced fracture of proximal phalanx of left little finger  Difficulty sleeping  ADD (attention deficit disorder)  History of facial surgery: Fadi-Facial Realignment Surgery in 2008  History of fasciotomy  Compartment syndrome, nontraumatic, lower extremity  Hypervolemia  Hyponatremia  Traumatic rhabdomyolysis, sequela  Bipolar 1 disorder  Compartment syndrome of left lower extremity, initial encounter  Compartment syndrome of thigh  Compartment syndrome of buttock  CKD (chronic kidney disease) stage 5, GFR less than 15 ml/min  Hypocalcemia  Hyperkalemia  ANTOINETTE (acute kidney injury)  Insertion, catheter, dialysis, temporary  Closure, wound, delayed  Fasciotomy, thigh  Decompression fasciotomy, lower extremity  Gluteal-iliotibial fasciotomy  Fasciotomy of left hip  Fasciotomy, thigh      MEDICATIONS:   --------------------------------------------------------------------------------------  Neurologic Medications  ALPRAZolam 1 milliGRAM(s) Oral every 8 hours PRN anxiety  gabapentin 200 milliGRAM(s) Oral three times a day  HYDROmorphone   Tablet 4 milliGRAM(s) Oral every 3 hours PRN Moderate Pain (4 - 6)  HYDROmorphone   Tablet 6 milliGRAM(s) Oral every 3 hours PRN Severe Pain (7 - 10)  HYDROmorphone  Injectable 1 milliGRAM(s) IV Push every 6 hours PRN For PT and extensive movement only  LORazepam     Tablet 1 milliGRAM(s) Oral <User Schedule>  LORazepam     Tablet 2 milliGRAM(s) Oral <User Schedule>  methadone    Tablet 5 milliGRAM(s) Oral two times a day  QUEtiapine 25 milliGRAM(s) Oral at bedtime  tiZANidine 1 milliGRAM(s) Oral every 6 hours PRN muscle spasm and pain    Respiratory Medications    Cardiovascular Medications  niCARdipine 30 milliGRAM(s) Oral <User Schedule>    Gastrointestinal Medications  polyethylene glycol 3350 17 Gram(s) Oral two times a day  senna 2 Tablet(s) Oral at bedtime  sodium chloride 0.9% lock flush 10 milliLiter(s) IV Push every 1 hour PRN Pre/post blood products, medications, blood draw, and to maintain line patency    Genitourinary Medications    Hematologic/Oncologic Medications  heparin   Injectable 5000 Unit(s) SubCutaneous every 8 hours    Antimicrobial/Immunologic Medications  ceFAZolin   IVPB 1000 milliGRAM(s) IV Intermittent every 24 hours  fluconAZOLE   Tablet 200 milliGRAM(s) Oral daily    Endocrine/Metabolic Medications    Topical/Other Medications  chlorhexidine 4% Liquid 1 Application(s) Topical <User Schedule>  sevelamer carbonate 800 milliGRAM(s) Oral every 8 hours  sodium zirconium cyclosilicate 10 Gram(s) Oral daily    --------------------------------------------------------------------------------------    VITAL SIGNS, INS/OUTS (last 24 hours):  --------------------------------------------------------------------------------------  T(C): 37.6 (06-26-20 @ 20:00), Max: 37.6 (06-26-20 @ 04:00)  HR: 117 (06-27-20 @ 00:00) (109 - 126)  BP: 135/101 (06-27-20 @ 00:00) (129/81 - 179/94)  BP(mean): 108 (06-27-20 @ 00:00) (87 - 115)  RR: 16 (06-27-20 @ 00:00) (12 - 21)  SpO2: 95% (06-27-20 @ 00:00) (91% - 99%)  CAPILLARY BLOOD GLUCOSE      06-25 @ 07:01  -  06-26 @ 07:00  --------------------------------------------------------  IN:    IV PiggyBack: 100 mL    Oral Fluid: 60 mL  Total IN: 160 mL    OUT:    Drain: 30 mL    Drain: 15 mL    Drain: 15 mL    Drain: 90 mL    Drain: 60 mL    VAC (Vacuum Assisted Closure) System: 10 mL    Voided: 2975 mL  Total OUT: 3195 mL    Total NET: -3035 mL      06-26 @ 07:01  -  06-27 @ 02:10  --------------------------------------------------------  IN:    Oral Fluid: 600 mL  Total IN: 600 mL    OUT:    Drain: 30 mL    Drain: 25 mL    Drain: 5 mL    Voided: 2250 mL  Total OUT: 2310 mL    Total NET: -1710 mL  --------------------------------------------------------------------------------------    EXAM:  NEUROLOGY  Exam: Normal, NAD, alert, oriented x3, no focal deficits. PERRLA.   [x] Adequacy of sedation and pain control has been assessed and adjusted    RESPIRATORY  Exam: Lungs clear to auscultation, Normal expansion/effort.   [x] Extubation Readiness Assessed    CARDIOVASCULAR  Exam: S1, S2.  Regular rate and rhythm.    Cardiac Rhythm: Normal Sinus Rhythm      GI/NUTRITION  Exam: Abdomen soft, Non-tender, Non-distended.   Current Diet: Regular diet    METABOLIC/FLUIDS/ELECTROLYTES      HEMATOLOGIC  [x] DVT Prophylaxis: heparin   Injectable 5000 Unit(s) SubCutaneous every 8 hours    Transfusions:	[] PRBC	[] Platelets		[] FFP	[] Cryoprecipitate    INFECTIOUS DISEASE  Antimicrobials/Immunologic Medications:  ceFAZolin   IVPB 1000 milliGRAM(s) IV Intermittent every 24 hours  fluconAZOLE   Tablet 200 milliGRAM(s) Oral daily      Tubes/Lines/Drains   [x] Peripheral IV  [] Central Venous Line     	[] R	[] L	[] IJ	[] Fem	[] SC	Date Placed:   [] Arterial Line		[] R	[] L	[] Fem	[] Rad	[] Ax	Date Placed:   [] PICC		[] Midline		[] Mediport  [] Urinary Catheter		Date Placed:   [x] Necessity of urinary, arterial, and venous catheters discussed    VASCULAR  Exam: Extremities warm, pink, well-perfused.     MUSCULOSKELETAL  Exam: All extremities moving spontaneously without limitations.     SKIN  Exam: Good skin turgor, no skin breakdown.     LABS  --------------------------------------------------------------------------------------  CBC (06-26 @ 06:58)                              7.4<L>                         18.80<H>  )----------------(  430<H>     79.2<H>% Neutrophils, 10.8<L>% Lymphocytes, ANC: 14.88<H>                              22.2<L>    BMP (06-26 @ 02:50)             134<L>  |  96<L>   |  87<H> 		Ca++ 1.06    Ca 8.3<L>             ---------------------------------( 94    		Mg 2.4                4.5     |  20<L>   |  5.77<H>			Ph 7.1<H>  BMP (06-25 @ 15:45)             132<L>  |  93<L>   |  77<H> 		Ca++ --      Ca 8.2<L>             ---------------------------------( 212<H>		Mg 2.2                4.2     |  22      |  5.48<H>			Ph 5.9<H>    -> .Urine Clean Catch (Midstream) Culture (06-23 @ 00:14)     NG    NG    <10,000 CFU/mL Normal Urogenital Karen    -> .Blood Blood-Venous Culture (06-22 @ 09:47)     NG    NG    No growth to date.    -> .Blood Blood-Venous Culture (06-20 @ 19:30)     NG    NG    No Growth Final  --------------------------------------------------------------------------------------    OTHER LABORATORY:     IMAGING STUDIES:   CXR:

## 2020-06-27 NOTE — OCCUPATIONAL THERAPY INITIAL EVALUATION ADULT - DIAGNOSIS, OT EVAL
s/p left LE compartment syndrome, s/p left thigh fasciotomies; decreased functional mobility, decreased ADL performance

## 2020-06-27 NOTE — PROGRESS NOTE ADULT - SUBJECTIVE AND OBJECTIVE BOX
[facility] DIVISION OF KIDNEY DISEASES AND HYPERTENSION -- FOLLOW UP NOTE  --------------------------------------------------------------------------------  HPI: 29-year-old male, with history bipolar depression was transferred from [facility] to [facility] for severe rhabdomyolysis. As per paper chart, pt was found to have cocaine and amphetamines in urine drug screen. Pt initially with elevated CPK ~ 180K. Nephrology team consulted for ANTOINETTE and rhabdomyolyiss. Upon review of labs on [facility], Scr was 1.11 on 8/23/2017. Pt. underwent fasciotomy on 6/11/20 for LLE compartement syndrome. Pt. had wound vac placed on  6/15/20.  Pt. underwent partial closure of fasciotomy by plastic surgery team on 6/19/20. Last hemodialysis on 6/23/20.    Pt. evaluated at bedside, in no distress., polyuric, 4 lit in 24 hours, no events overnight.       PAST HISTORY  --------------------------------------------------------------------------------  No significant changes to PMH, PSH, FHx, SHx, unless otherwise noted    ALLERGIES & MEDICATIONS  --------------------------------------------------------------------------------  Allergies    No Known Allergies    Intolerances      Standing Inpatient Medications  ceFAZolin   IVPB 1000 milliGRAM(s) IV Intermittent every 24 hours  chlorhexidine 4% Liquid 1 Application(s) Topical <User Schedule>  fluconAZOLE   Tablet 200 milliGRAM(s) Oral daily  gabapentin 200 milliGRAM(s) Oral three times a day  heparin   Injectable 5000 Unit(s) SubCutaneous every 8 hours  LORazepam     Tablet 1 milliGRAM(s) Oral <User Schedule>  LORazepam     Tablet 2 milliGRAM(s) Oral <User Schedule>  methadone    Tablet 5 milliGRAM(s) Oral two times a day  niCARdipine 30 milliGRAM(s) Oral <User Schedule>  polyethylene glycol 3350 17 Gram(s) Oral two times a day  QUEtiapine 25 milliGRAM(s) Oral at bedtime  senna 2 Tablet(s) Oral at bedtime  sevelamer carbonate 800 milliGRAM(s) Oral every 8 hours  sodium zirconium cyclosilicate 10 Gram(s) Oral daily    PRN Inpatient Medications  ALPRAZolam 1 milliGRAM(s) Oral every 8 hours PRN  HYDROmorphone   Tablet 4 milliGRAM(s) Oral every 3 hours PRN  HYDROmorphone   Tablet 6 milliGRAM(s) Oral every 3 hours PRN  HYDROmorphone  Injectable 1 milliGRAM(s) IV Push every 6 hours PRN  sodium chloride 0.9% lock flush 10 milliLiter(s) IV Push every 1 hour PRN  tiZANidine 1 milliGRAM(s) Oral every 6 hours PRN      REVIEW OF SYSTEMS  --------------------------------------------------------------------------------  Gen: no lethargy  Respiratory: No dyspnea  CV: No chest pain  GI: No abdominal pain  MSK: no  LE edema,  no scrotal edema  Neuro: No dizziness  Heme: No bleeding    All other systems were reviewed and are negative, except as noted.    VITALS/PHYSICAL EXAM  --------------------------------------------------------------------------------  T(C): 37.3 (06-27-20 @ 08:00), Max: 37.6 (06-26-20 @ 20:00)  HR: 101 (06-27-20 @ 08:00) (101 - 126)  BP: 125/50 (06-27-20 @ 08:00) (125/50 - 179/94)  RR: 18 (06-27-20 @ 08:00) (14 - 21)  SpO2: 96% (06-27-20 @ 08:00) (93% - 99%)  Wt(kg): --        06-26-20 @ 07:01  -  06-27-20 @ 07:00  --------------------------------------------------------  IN: 650 mL / OUT: 4100 mL / NET: -3450 mL    06-27-20 @ 07:01  -  06-27-20 @ 09:01  --------------------------------------------------------  IN: 220 mL / OUT: 200 mL / NET: 20 mL      Physical Exam:  	Gen: NAD  	HEENT: MMM   	Pulm: CTA B/L  	CV: S1S2  	Abd: Soft, +BS               Ext: left lower extremity with multiple drains, lower extremity and scrotal edema  	Neuro: Awake  	Skin: Warm and dry              Vascular: No access.     LABS/STUDIES  --------------------------------------------------------------------------------              7.6    17.33 >-----------<  434      [06-27-20 @ 05:30]              22.7     138  |  98  |  95  ----------------------------<  103      [06-27-20 @ 05:30]  4.6   |  22  |  5.17        Ca     8.6     [06-27-20 @ 05:30]      iCa    1.13     [06-27 @ 05:30]      Mg     2.5     [06-27-20 @ 05:30]      Phos  7.6     [06-27-20 @ 05:30]        Creatinine Trend:  SCr 5.17 [06-27 @ 05:30]  SCr 5.77 [06-26 @ 02:50]  SCr 5.48 [06-25 @ 15:45]  SCr 5.26 [06-25 @ 04:55]  SCr 4.91 [06-24 @ 12:35]    Urinalysis - [06-22-20 @ 10:45]      Color LIGHT YELLOW / Appearance CLEAR / SG 1.015 / pH 7.5      Gluc NEGATIVE / Ketone NEGATIVE  / Bili NEGATIVE / Urobili NORMAL       Blood MODERATE / Protein 200 / Leuk Est NEGATIVE / Nitrite NEGATIVE      RBC 3-5 / WBC 6-10 / Hyaline  / Gran  / Sq Epi FEW / Non Sq Epi  / Bacteria FEW        HBsAb 9.2      [06-11-20 @ 03:40]  HBsAb Nonreactive      [06-10-20 @ 21:47]  HBsAg NEGATIVE      [06-11-20 @ 03:40]  HCV 0.07, Nonreactive Hepatitis C AB  S/CO Ratio                        Interpretation  < 1.00                                   Non-Reactive  1.00 - 4.99                         Weakly-Reactive  >= 5.00                                Reactive  Non-Reactive: Aperson with a non-reactive HCV antibody  result is considered uninfected.  No further action is  needed unless recent infection is suspected.  In these  cases, consider repeat testing later to detect  seroconversion..  Weakly-Reactive: HCV antibody test is abnormal, HCV RNA  Qualitative test will follow.  Reactive: HCV antibody test is abnormal, HCV RNA  Qualitative test will follow.  Note: HCV antibody testing is performed on the [employer] system.      [06-11-20 @ 03:40]  HIV Nonreactive The HIV Ag/Ab Combo test performed screens for HIV-1 p24  antigen, antibodies to HIV-1 (group M and group O), and  antibodies to HIV-2. All specimens repeatedly reactive  will reflex to an HIV 1/2 antibody confirmation and  differentiation test. This assay detects p24 antigen which  may be present prior to the development of HIV antibodies,  therefore a reactive result with a negative HIV 1/2 AB  Confirmation should be followed up with HIV-1 RNA, HIV-2  RNA and repeat testing in 4-8 weeks. A nonreactive result  does not preclude previous exposure to or infection with  HIV-1 or HIV-2.      [06-10-20 @ 14:15] [facility] DIVISION OF KIDNEY DISEASES AND HYPERTENSION -- FOLLOW UP NOTE  --------------------------------------------------------------------------------  HPI: 29-year-old male, with history bipolar depression was transferred from [facility] to [facility] for severe rhabdomyolysis. As per paper chart, pt was found to have cocaine and amphetamines in urine drug screen. Pt initially with elevated CPK ~ 180K. Nephrology team consulted for ANTOINETTE and rhabdomyolyiss. Upon review of labs on [facility], Scr was 1.11 on 8/23/2017. Pt. underwent fasciotomy on 6/11/20 for LLE compartement syndrome. Pt. had wound vac placed on  6/15/20.  Pt. underwent partial closure of fasciotomy by plastic surgery team on 6/19/20. Last HD treatment on 6/23/20.    Pt. evaluated at bedside, in no distress., polyuric, 4 lit in 24 hours, no events overnight.     PAST HISTORY  --------------------------------------------------------------------------------  No significant changes to PMH, PSH, FHx, SHx, unless otherwise noted    ALLERGIES & MEDICATIONS  --------------------------------------------------------------------------------  Allergies    No Known Allergies    Intolerances    Standing Inpatient Medications  ceFAZolin   IVPB 1000 milliGRAM(s) IV Intermittent every 24 hours  chlorhexidine 4% Liquid 1 Application(s) Topical <User Schedule>  fluconAZOLE   Tablet 200 milliGRAM(s) Oral daily  gabapentin 200 milliGRAM(s) Oral three times a day  heparin   Injectable 5000 Unit(s) SubCutaneous every 8 hours  LORazepam     Tablet 1 milliGRAM(s) Oral <User Schedule>  LORazepam     Tablet 2 milliGRAM(s) Oral <User Schedule>  methadone    Tablet 5 milliGRAM(s) Oral two times a day  niCARdipine 30 milliGRAM(s) Oral <User Schedule>  polyethylene glycol 3350 17 Gram(s) Oral two times a day  QUEtiapine 25 milliGRAM(s) Oral at bedtime  senna 2 Tablet(s) Oral at bedtime  sevelamer carbonate 800 milliGRAM(s) Oral every 8 hours  sodium zirconium cyclosilicate 10 Gram(s) Oral daily    PRN Inpatient Medications  ALPRAZolam 1 milliGRAM(s) Oral every 8 hours PRN  HYDROmorphone   Tablet 4 milliGRAM(s) Oral every 3 hours PRN  HYDROmorphone   Tablet 6 milliGRAM(s) Oral every 3 hours PRN  HYDROmorphone  Injectable 1 milliGRAM(s) IV Push every 6 hours PRN  sodium chloride 0.9% lock flush 10 milliLiter(s) IV Push every 1 hour PRN  tiZANidine 1 milliGRAM(s) Oral every 6 hours PRN    REVIEW OF SYSTEMS  --------------------------------------------------------------------------------  Gen: no lethargy  Respiratory: No dyspnea  CV: No chest pain  GI: No abdominal pain  MSK: Left leg pain+  Neuro: No dizziness  Heme: No bleeding    All other systems were reviewed and are negative, except as noted.    VITALS/PHYSICAL EXAM  --------------------------------------------------------------------------------  T(C): 37.3 (06-27-20 @ 08:00), Max: 37.6 (06-26-20 @ 20:00)  HR: 101 (06-27-20 @ 08:00) (101 - 126)  BP: 125/50 (06-27-20 @ 08:00) (125/50 - 179/94)  RR: 18 (06-27-20 @ 08:00) (14 - 21)  SpO2: 96% (06-27-20 @ 08:00) (93% - 99%)  Wt(kg): --    06-26-20 @ 07:01  -  06-27-20 @ 07:00  --------------------------------------------------------  IN: 650 mL / OUT: 4100 mL / NET: -3450 mL    06-27-20 @ 07:01  -  06-27-20 @ 09:01  --------------------------------------------------------  IN: 220 mL / OUT: 200 mL / NET: 20 mL    Physical Exam:  	Gen: resting, NAD  	HEENT: No JVD   	Pulm: CTA B/L  	CV: S1S2+  	Abd: Soft, +BS               Ext: LLE : multiple drains seen, LE and scrotal edema  	Neuro: Awake, alert  	Skin: Warm and dry             LABS/STUDIES  --------------------------------------------------------------------------------              7.6    17.33 >-----------<  434      [06-27-20 @ 05:30]              22.7     138  |  98  |  95  ----------------------------<  103      [06-27-20 @ 05:30]  4.6   |  22  |  5.17        Ca     8.6     [06-27-20 @ 05:30]      iCa    1.13     [06-27 @ 05:30]      Mg     2.5     [06-27-20 @ 05:30]      Phos  7.6     [06-27-20 @ 05:30]    Creatinine Trend:  SCr 5.17 [06-27 @ 05:30]  SCr 5.77 [06-26 @ 02:50]  SCr 5.48 [06-25 @ 15:45]  SCr 5.26 [06-25 @ 04:55]  SCr 4.91 [06-24 @ 12:35]    HBsAb 9.2      [06-11-20 @ 03:40]  HBsAb Nonreactive      [06-10-20 @ 21:47]  HBsAg NEGATIVE      [06-11-20 @ 03:40]  HCV 0.07, Nonreactive Hepatitis C AB  S/CO Ratio                        Interpretation  < 1.00                                   Non-Reactive  1.00 - 4.99                         Weakly-Reactive  >= 5.00                                Reactive  Non-Reactive: Aperson with a non-reactive HCV antibody  result is considered uninfected.  No further action is  needed unless recent infection is suspected.  In these  cases, consider repeat testing later to detect  seroconversion..  Weakly-Reactive: HCV antibody test is abnormal, HCV RNA  Qualitative test will follow.  Reactive: HCV antibody test is abnormal, HCV RNA  Qualitative test will follow.  Note: HCV antibody testing is performed on the [employer]   system.      [06-11-20 @ 03:40]  HIV Nonreactive The HIV Ag/Ab Combo test performed screens for HIV-1 p24  antigen, antibodies to HIV-1 (group M and group O), and  antibodies to HIV-2. All specimens repeatedly reactive  will reflex to an HIV 1/2 antibody confirmation and  differentiation test. This assay detects p24 antigen which  may be present prior to the development of HIV antibodies,  therefore a reactive result with a negative HIV 1/2 AB  Confirmation should be followed up with HIV-1 RNA, HIV-2  RNA and repeat testing in 4-8 weeks. A nonreactive result  does not preclude previous exposure to or infection with  HIV-1 or HIV-2.      [06-10-20 @ 14:15]

## 2020-06-27 NOTE — PROGRESS NOTE ADULT - ASSESSMENT
A/P:   30 y/o M in SICU following found down, presenting with rhabdomyolysis/ compartment syndrome with fasciotomies to L LE X 4  closure on 6/19.    - Continue Prevena wound vacs to incisions, vacs will stay in place for approximately 10 days from day of surgery.  - Please encourage L LE elevation to reduce swelling   - Continue MEÑO drains, monitor output   - CONT ABX  - Pain control   - Management per SICU     Plastic Surgery   [id number]

## 2020-06-27 NOTE — OCCUPATIONAL THERAPY INITIAL EVALUATION ADULT - PERTINENT HX OF CURRENT PROBLEM, REHAB EVAL
29 year old male with history of bipolar depression transferred from [facility] for further management of acute renal failure due to rhabdomyolysis. Patient was found unresponsive at home, lying on his left side. Patient found to have compartment syndrome on the left LE, now s/p four compartment fasciotomy and lateral/medial thigh fasciotomies s/p closure on 6/19/2020.

## 2020-06-27 NOTE — OCCUPATIONAL THERAPY INITIAL EVALUATION ADULT - LIVES WITH, PROFILE
Patient reports living alone in a co-op, however, upon discharge, patient reports he will be staying with his parents.

## 2020-06-27 NOTE — PROGRESS NOTE ADULT - SUBJECTIVE AND OBJECTIVE BOX
Plastic Surgery Progress Note    Subjective:  - Pt seen and examined on AM rounds  - Drains removed yesterday  -No events overnight     Objective:    PHYSICAL EXAM:  General: NAD   LE: Prevena vacs in place and holding suction, pitting edema surrounding superior lateral fasciotomy sites, MEÑO SS with serous drainage in superior MEÑO consistent with fat necrosis. No surrounding erythema.     T(C): 37.3 (06-27-20 @ 08:00), Max: 37.6 (06-26-20 @ 20:00)  HR: 109 (06-27-20 @ 09:00) (101 - 126)  BP: 133/79 (06-27-20 @ 09:00) (125/50 - 179/94)  BP(mean): 90 (06-27-20 @ 09:00) (80 - 115)  ABP: --  ABP(mean): --  RR: 14 (06-27-20 @ 09:00) (14 - 21)  SpO2: 97% (06-27-20 @ 09:00) (93% - 99%)  Wt(kg): --  CVP(mm Hg): --  CI: --  CAPILLARY BLOOD GLUCOSE       N/A      06-26 @ 07:01  -  06-27 @ 07:00  --------------------------------------------------------  IN:    IV PiggyBack: 50 mL    Oral Fluid: 600 mL  Total IN: 650 mL    OUT:    Drain: 15 mL    Drain: 45 mL    Drain: 40 mL    Voided: 4000 mL  Total OUT: 4100 mL    Total NET: -3450 mL      06-27 @ 07:01  -  06-27 @ 09:47  --------------------------------------------------------  IN:    Oral Fluid: 220 mL  Total IN: 220 mL    OUT:    Voided: 550 mL  Total OUT: 550 mL    Total NET: -330 mL

## 2020-06-27 NOTE — PROGRESS NOTE ADULT - ASSESSMENT
29M w/ PMH of bipolar depression, s/p found down at home, transferred from [facility] for further management of acute renal failure due to rhabdomyolysis, s/p four compartment fasciotomy and lateral/medial thigh fasciotomies with ANTOINETTE on HD. Now s/p fasciotomy closure    Neuro hx of bipolar depression  - A&O x 3  - Pain Control: oral dilaudid, tylenol, methadone  - IV dilaudid dosing adjusted  - Xanax 1 mg q8 PRN   - ativan BID  - Seroquel QHS  - Increased gabapentin, standing tizanidine per Pain service  - Psych following, no plan to restart home lithium  - Pain following, appreciate recommendations    Resp  - RA   - Mobilize, OOB to chair  - Obtain Chest XR    CVS  - Appropriate mental status, appears well perfused   - c/w nicardipine    GI  - Bowel regimen senna 2 tabs qHS  - Patient declining miralax, last BM 06/17 prior to that > 1w      - ANTOINETTE in setting of rhabdomyolysis, slowly improving  - CK down  - urine output improving  - IV locked   - R fem shawna placed; no HD 6/25, will remove jhoanaley today  - Nephrology following    Heme  - trend CBC  - SQH for dvt ppx    ID  - afebrile over 48hours  - Leukocytosis to 18  - bcx NTD, Ucx neg, CXR neg  - started on cefazolin, diflucan for a 7 day course    Endo  - monitor FGS    Lines  - Right radial a line 29M w/ PMH of bipolar depression, s/p found down at home, transferred from [facility] for further management of acute renal failure due to rhabdomyolysis, s/p four compartment fasciotomy and lateral/medial thigh fasciotomies with ANTOINETTE on HD. Now s/p fasciotomy closure    Neuro hx of bipolar depression  - A&O x 3  - Pain Control: oral dilaudid, tylenol, methadone  - IV dilaudid dosing adjusted  - Xanax 1 mg q8 PRN   - ativan BID  - Seroquel QHS  - Increased gabapentin, standing tizanidine per Pain service  - Psych following, no plan to restart home lithium  - Pain following, appreciate recommendations    Resp  - RA   - Mobilize, OOB to chair  - Obtain Chest XR    CVS  - Appropriate mental status, appears well perfused   - c/w nicardipine - 20 tid    GI  - Bowel regimen senna 2 tabs qHS  - Patient declining miralax, last BM 06/17 prior to that > 1w  - Switch to renal diet       - ANTOINETTE in setting of rhabdomyolysis, slowly improving  - CK down  - urine output improving  - IV locked   - R fem shiley placed; no HD 6/25, removed 6/26  - Nephrology following    Heme  - trend CBC  - SQH for dvt ppx    ID  - afebrile over 48hours  - Leukocytosis to 18  - bcx NTD, Ucx neg, CXR neg  - started on cefazolin, diflucan for a 7 day course    Endo  - monitor FGS    Lines  - Right radial a line    Critical Care dx:  Rhabdomyolysis   ANTOINETTE

## 2020-06-27 NOTE — PROGRESS NOTE ADULT - ATTENDING COMMENTS
Agree with notes of health care providers on my service (PAs, Residents and House Staff).  The patient is in SICU with diagnosis mentioned in the note.    The SICU team has a constant risk benefit analyzes discussion with the primary team, all consultants, House Staff and PA's.  29M w/ PMH of bipolar depression, s/p found down at home, transferred from [facility] for further management of acute renal failure due to rhabdomyolysis, s/p four compartment fasciotomy and lateral/medial thigh fasciotomies with ANTOINETTE on HD. Now s/p fasciotomy closure  I have personally examined the patient, reviewed data and laboratory tests/x-rays and all pertinent electronic images.    EXAM:  NEUROLOGY  Exam: Normal, NAD, alert, oriented x3, no focal deficits. PERRLA.   RESPIRATORY  Exam: Lungs clear to auscultation, Normal expansion/effort.   CARDIOVASCULAR  Exam: S1, S2.  Regular rate and rhythm.    Cardiac Rhythm: Normal Sinus Rhythm  GI/NUTRITION  Exam: Abdomen soft, Non-tender, Non-distended.   Current Diet: Regular diet    The assessment and plan is specified below:  Neuro hx of bipolar depression  - A&O x 3  - Pain Control: oral dilaudid, tylenol, methadone  - IV dilaudid dosing adjusted  - Xanax 1 mg q8 PRN   - ativan BID  - Seroquel QHS  - Increased gabapentin, standing tizanidine per Pain service  - Psych following, no plan to restart home lithium  - Pain following, appreciate recommendations    Resp  - RA   - Mobilize, OOB to chair  - Obtain Chest XR    CVS  - Appropriate mental status, appears well perfused   - c/w nicardipine - 20 tid    GI  - Bowel regimen senna 2 tabs qHS  - Patient declining miralax, last BM 06/17 prior to that > 1w  - Switch to renal diet       - ANTOINETTE in setting of rhabdomyolysis, slowly improving  - CK down  - urine output improving  - IV locked   - R fem shiley placed; no HD 6/25, removed 6/26  - Nephrology following    Heme  - trend CBC  - SQH for dvt ppx    ID  - afebrile over 48hours  - Leukocytosis to 18  - bcx NTD, Ucx neg, CXR neg  - started on cefazolin, diflucan for a 7 day course    Endo  - monitor FGS    Lines  - Right radial a line    Critical Care dx:  Rhabdomyolysis   ANTOINETTE

## 2020-06-27 NOTE — PROGRESS NOTE ADULT - PROBLEM SELECTOR PLAN 1
Pt with ANTOINETTE secondary to rhabdomyolysis. Had R femoral non tunneled catheter placed on 6/21/20 and arranged for urgent HD due to serum potassium of 6.8. Last HD on 6/23/20. Labs reviewed, scr stable today at 5.17, non oliguric 4 lit of UOP in 24 hours.  No indication for HD. Monitor labs and urine output. Avoid potential nephrotoxins. Pt. with ANTOINETTE secondary to rhabdomyolysis. Pt. received urgent HD on 6/21/20 for hyperkalemia. Last HD on 6/23/20. Pt. non-oliguric, with increased UOP in last 24 hrs. Labs reviewed. Scr elevated/stable today at 5.17 today. No current plan for HD. Monitor labs and urine output. Avoid potential nephrotoxins

## 2020-06-27 NOTE — OCCUPATIONAL THERAPY INITIAL EVALUATION ADULT - GENERAL OBSERVATIONS, REHAB EVAL
Patient received semisupine in bed in NAD. +left LE drains. +left LE AFO. Per RN Josefina, patient okay to participate in OT evaluation.

## 2020-06-28 LAB
ANION GAP SERPL CALC-SCNC: 16 MMO/L — HIGH (ref 7–14)
APTT BLD: 30.4 SEC — SIGNIFICANT CHANGE UP (ref 27.5–36.3)
BLD GP AB SCN SERPL QL: NEGATIVE — SIGNIFICANT CHANGE UP
BUN SERPL-MCNC: 92 MG/DL — HIGH (ref 7–23)
CALCIUM SERPL-MCNC: 8.5 MG/DL — SIGNIFICANT CHANGE UP (ref 8.4–10.5)
CHLORIDE SERPL-SCNC: 99 MMOL/L — SIGNIFICANT CHANGE UP (ref 98–107)
CO2 SERPL-SCNC: 23 MMOL/L — SIGNIFICANT CHANGE UP (ref 22–31)
CREAT SERPL-MCNC: 4.26 MG/DL — HIGH (ref 0.5–1.3)
GLUCOSE SERPL-MCNC: 103 MG/DL — HIGH (ref 70–99)
HCT VFR BLD CALC: 21.5 % — LOW (ref 39–50)
HGB BLD-MCNC: 6.9 G/DL — CRITICAL LOW (ref 13–17)
INR BLD: 1.08 — SIGNIFICANT CHANGE UP (ref 0.88–1.17)
MAGNESIUM SERPL-MCNC: 2.5 MG/DL — SIGNIFICANT CHANGE UP (ref 1.6–2.6)
MCHC RBC-ENTMCNC: 27.6 PG — SIGNIFICANT CHANGE UP (ref 27–34)
MCHC RBC-ENTMCNC: 32.1 % — SIGNIFICANT CHANGE UP (ref 32–36)
MCV RBC AUTO: 86 FL — SIGNIFICANT CHANGE UP (ref 80–100)
NRBC # FLD: 0 K/UL — SIGNIFICANT CHANGE UP (ref 0–0)
PHOSPHATE SERPL-MCNC: 7.2 MG/DL — HIGH (ref 2.5–4.5)
PLATELET # BLD AUTO: 428 K/UL — HIGH (ref 150–400)
PMV BLD: 8.9 FL — SIGNIFICANT CHANGE UP (ref 7–13)
POTASSIUM SERPL-MCNC: 4.6 MMOL/L — SIGNIFICANT CHANGE UP (ref 3.5–5.3)
POTASSIUM SERPL-SCNC: 4.6 MMOL/L — SIGNIFICANT CHANGE UP (ref 3.5–5.3)
PROTHROM AB SERPL-ACNC: 12.3 SEC — SIGNIFICANT CHANGE UP (ref 9.8–13.1)
RBC # BLD: 2.5 M/UL — LOW (ref 4.2–5.8)
RBC # FLD: 13.1 % — SIGNIFICANT CHANGE UP (ref 10.3–14.5)
RH IG SCN BLD-IMP: POSITIVE — SIGNIFICANT CHANGE UP
SODIUM SERPL-SCNC: 138 MMOL/L — SIGNIFICANT CHANGE UP (ref 135–145)
WBC # BLD: 12.38 K/UL — HIGH (ref 3.8–10.5)
WBC # FLD AUTO: 12.38 K/UL — HIGH (ref 3.8–10.5)

## 2020-06-28 PROCEDURE — 99233 SBSQ HOSP IP/OBS HIGH 50: CPT

## 2020-06-28 PROCEDURE — 99232 SBSQ HOSP IP/OBS MODERATE 35: CPT

## 2020-06-28 RX ORDER — SEVELAMER CARBONATE 2400 MG/1
800 POWDER, FOR SUSPENSION ORAL
Refills: 0 | Status: DISCONTINUED | OUTPATIENT
Start: 2020-06-28 | End: 2020-06-29

## 2020-06-28 RX ADMIN — HYDROMORPHONE HYDROCHLORIDE 6 MILLIGRAM(S): 2 INJECTION INTRAMUSCULAR; INTRAVENOUS; SUBCUTANEOUS at 02:58

## 2020-06-28 RX ADMIN — HEPARIN SODIUM 5000 UNIT(S): 5000 INJECTION INTRAVENOUS; SUBCUTANEOUS at 13:18

## 2020-06-28 RX ADMIN — GABAPENTIN 200 MILLIGRAM(S): 400 CAPSULE ORAL at 06:29

## 2020-06-28 RX ADMIN — NICARDIPINE HYDROCHLORIDE 20 MILLIGRAM(S): 30 CAPSULE, EXTENDED RELEASE ORAL at 06:29

## 2020-06-28 RX ADMIN — Medication 1 MILLIGRAM(S): at 09:59

## 2020-06-28 RX ADMIN — HYDROMORPHONE HYDROCHLORIDE 6 MILLIGRAM(S): 2 INJECTION INTRAMUSCULAR; INTRAVENOUS; SUBCUTANEOUS at 17:44

## 2020-06-28 RX ADMIN — HYDROMORPHONE HYDROCHLORIDE 1 MILLIGRAM(S): 2 INJECTION INTRAMUSCULAR; INTRAVENOUS; SUBCUTANEOUS at 08:11

## 2020-06-28 RX ADMIN — SODIUM ZIRCONIUM CYCLOSILICATE 10 GRAM(S): 10 POWDER, FOR SUSPENSION ORAL at 12:12

## 2020-06-28 RX ADMIN — GABAPENTIN 200 MILLIGRAM(S): 400 CAPSULE ORAL at 21:05

## 2020-06-28 RX ADMIN — HEPARIN SODIUM 5000 UNIT(S): 5000 INJECTION INTRAVENOUS; SUBCUTANEOUS at 21:05

## 2020-06-28 RX ADMIN — CHLORHEXIDINE GLUCONATE 1 APPLICATION(S): 213 SOLUTION TOPICAL at 12:13

## 2020-06-28 RX ADMIN — SEVELAMER CARBONATE 800 MILLIGRAM(S): 2400 POWDER, FOR SUSPENSION ORAL at 17:41

## 2020-06-28 RX ADMIN — HYDROMORPHONE HYDROCHLORIDE 6 MILLIGRAM(S): 2 INJECTION INTRAMUSCULAR; INTRAVENOUS; SUBCUTANEOUS at 10:00

## 2020-06-28 RX ADMIN — NICARDIPINE HYDROCHLORIDE 20 MILLIGRAM(S): 30 CAPSULE, EXTENDED RELEASE ORAL at 13:18

## 2020-06-28 RX ADMIN — Medication 100 MILLIGRAM(S): at 06:28

## 2020-06-28 RX ADMIN — GABAPENTIN 200 MILLIGRAM(S): 400 CAPSULE ORAL at 13:18

## 2020-06-28 RX ADMIN — Medication 2 MILLIGRAM(S): at 21:04

## 2020-06-28 RX ADMIN — HYDROMORPHONE HYDROCHLORIDE 1 MILLIGRAM(S): 2 INJECTION INTRAMUSCULAR; INTRAVENOUS; SUBCUTANEOUS at 15:04

## 2020-06-28 RX ADMIN — FLUCONAZOLE 200 MILLIGRAM(S): 150 TABLET ORAL at 13:17

## 2020-06-28 RX ADMIN — NICARDIPINE HYDROCHLORIDE 20 MILLIGRAM(S): 30 CAPSULE, EXTENDED RELEASE ORAL at 22:07

## 2020-06-28 RX ADMIN — HYDROMORPHONE HYDROCHLORIDE 6 MILLIGRAM(S): 2 INJECTION INTRAMUSCULAR; INTRAVENOUS; SUBCUTANEOUS at 21:04

## 2020-06-28 RX ADMIN — QUETIAPINE FUMARATE 25 MILLIGRAM(S): 200 TABLET, FILM COATED ORAL at 21:05

## 2020-06-28 RX ADMIN — HEPARIN SODIUM 5000 UNIT(S): 5000 INJECTION INTRAVENOUS; SUBCUTANEOUS at 06:29

## 2020-06-28 RX ADMIN — SEVELAMER CARBONATE 800 MILLIGRAM(S): 2400 POWDER, FOR SUSPENSION ORAL at 06:29

## 2020-06-28 NOTE — CHART NOTE - NSCHARTNOTEFT_GEN_A_CORE
Source: Patient & EMR    Diet : Renal Restrictions   - For patients receiving Renal Replacement - No Protein Restr, No Conc K, No Conc Phos, Low Sodium       Patient reports good appetite , PO nutrition, no issues with PO nutrition , taking  100% of the meals . Wt. trending down but still above IBW .       Current Weight: - 104.6 KG O 6/28/2020; IBW - 78 kg     Pertinent Medications: MEDICATIONS  (STANDING):  ceFAZolin   IVPB 1000 milliGRAM(s) IV Intermittent every 24 hours  chlorhexidine 4% Liquid 1 Application(s) Topical <User Schedule>  fluconAZOLE   Tablet 200 milliGRAM(s) Oral daily  gabapentin 200 milliGRAM(s) Oral three times a day  heparin   Injectable 5000 Unit(s) SubCutaneous every 8 hours  LORazepam     Tablet 1 milliGRAM(s) Oral <User Schedule>  LORazepam     Tablet 2 milliGRAM(s) Oral <User Schedule>  niCARdipine 20 milliGRAM(s) Oral three times a day  polyethylene glycol 3350 17 Gram(s) Oral two times a day  QUEtiapine 25 milliGRAM(s) Oral at bedtime  senna 2 Tablet(s) Oral at bedtime  sevelamer carbonate 800 milliGRAM(s) Oral three times a day with meals  sodium zirconium cyclosilicate 10 Gram(s) Oral daily    MEDICATIONS  (PRN):  HYDROmorphone   Tablet 4 milliGRAM(s) Oral every 3 hours PRN Moderate Pain (4 - 6)  HYDROmorphone   Tablet 6 milliGRAM(s) Oral every 3 hours PRN Severe Pain (7 - 10)  HYDROmorphone  Injectable 1 milliGRAM(s) IV Push every 6 hours PRN For PT and extensive movement only  sodium chloride 0.9% lock flush 10 milliLiter(s) IV Push every 1 hour PRN Pre/post blood products, medications, blood draw, and to maintain line patency  tiZANidine 1 milliGRAM(s) Oral every 6 hours PRN muscle spasm and pain    Pertinent Labs:  06-28 Na138 mmol/L Glu 103 mg/dL<H> K+ 4.6 mmol/L Cr  4.26 mg/dL<H> BUN 92 mg/dL<H> 06-28 Phos 7.2 mg/dL<H> 06-24 Alb 2.5 g/dL<L>      Skin: intact    Estimated Needs:   no change since previous assessment    Recommend  - Regular diet     Monitoring and Evaluation:  PO intake , Tolerance to diet prescription , weights & follow up per protocol

## 2020-06-28 NOTE — PROGRESS NOTE ADULT - SUBJECTIVE AND OBJECTIVE BOX
SICU Daily Progress Note  =====================================================  Interval/Overnight Events:     ***    POD #          	SICU Day #    ***    HPI:  ((insert from previous note)) ***    PMH:   ***    ALLERGIES:  No Known Allergies      --------------------------------------------------------------------------------------    MEDICATIONS:    Neurologic Medications  ALPRAZolam 1 milliGRAM(s) Oral every 8 hours PRN anxiety  gabapentin 200 milliGRAM(s) Oral three times a day  HYDROmorphone   Tablet 4 milliGRAM(s) Oral every 3 hours PRN Moderate Pain (4 - 6)  HYDROmorphone   Tablet 6 milliGRAM(s) Oral every 3 hours PRN Severe Pain (7 - 10)  HYDROmorphone  Injectable 1 milliGRAM(s) IV Push every 6 hours PRN For PT and extensive movement only  LORazepam     Tablet 1 milliGRAM(s) Oral <User Schedule>  LORazepam     Tablet 2 milliGRAM(s) Oral <User Schedule>  QUEtiapine 25 milliGRAM(s) Oral at bedtime  tiZANidine 1 milliGRAM(s) Oral every 6 hours PRN muscle spasm and pain    Respiratory Medications    Cardiovascular Medications  niCARdipine 20 milliGRAM(s) Oral three times a day    Gastrointestinal Medications  polyethylene glycol 3350 17 Gram(s) Oral two times a day  senna 2 Tablet(s) Oral at bedtime  sodium chloride 0.9% lock flush 10 milliLiter(s) IV Push every 1 hour PRN Pre/post blood products, medications, blood draw, and to maintain line patency    Genitourinary Medications    Hematologic/Oncologic Medications  heparin   Injectable 5000 Unit(s) SubCutaneous every 8 hours    Antimicrobial/Immunologic Medications  ceFAZolin   IVPB 1000 milliGRAM(s) IV Intermittent every 24 hours  fluconAZOLE   Tablet 200 milliGRAM(s) Oral daily    Endocrine/Metabolic Medications    Topical/Other Medications  chlorhexidine 4% Liquid 1 Application(s) Topical <User Schedule>  sevelamer carbonate 800 milliGRAM(s) Oral every 8 hours  sodium zirconium cyclosilicate 10 Gram(s) Oral daily    --------------------------------------------------------------------------------------    VITAL SIGNS:  ICU Vital Signs Last 24 Hrs  T(C): 37.3 (28 Jun 2020 00:00), Max: 37.4 (27 Jun 2020 20:00)  T(F): 99.2 (28 Jun 2020 00:00), Max: 99.4 (27 Jun 2020 20:00)  HR: 116 (28 Jun 2020 00:00) (101 - 121)  BP: 152/79 (28 Jun 2020 00:00) (125/50 - 157/99)  BP(mean): 97 (28 Jun 2020 00:00) (80 - 129)  RR: 17 (28 Jun 2020 00:00) (10 - 23)  SpO2: 95% (28 Jun 2020 00:00) (93% - 100%)    --------------------------------------------------------------------------------------    INS AND OUTS:  I&O's Detail    26 Jun 2020 07:01  -  27 Jun 2020 07:00  --------------------------------------------------------  IN:    IV PiggyBack: 50 mL    Oral Fluid: 600 mL  Total IN: 650 mL    OUT:    Drain: 15 mL    Drain: 45 mL    Drain: 40 mL    Voided: 4000 mL  Total OUT: 4100 mL    Total NET: -3450 mL      27 Jun 2020 07:01  -  28 Jun 2020 01:47  --------------------------------------------------------  IN:    Oral Fluid: 580 mL  Total IN: 580 mL    OUT:    Drain: 10 mL    Drain: 5 mL    Drain: 5 mL    Voided: 2900 mL  Total OUT: 2920 mL    Total NET: -2340 mL      --------------------------------------------------------------------------------------  EXAM:  Neuro: sedated, +/- paralyzed  HEENT: normocephalic, atraumatic  Respiratory: intubated, normal CW expansion  Cardiovascular: S1,S2, regular rate and rhythm.  Abdomen: soft, nondistended, NGT/OGT  Vascular: extremities are warm and well perfused.  Skin: normal skin turgor, intact.      --------------------------------------------------------------------------------------    LABS    ((Insert SICU Labs here))***  -------------------------------------------------------------------------------------- SICU Daily Progress Note  =====================================================  Interval/Overnight Events:      - no acute overnight events    PAST MEDICAL & SURGICAL HISTORY:  Bipolar depression  Migraine  Displaced fracture of proximal phalanx of left little finger  Difficulty sleeping  ADD (attention deficit disorder)  History of facial surgery: Fadi-Facial Realignment Surgery in 2008    ALLERGIES:  No Known Allergies    --------------------------------------------------------------------------------------    MEDICATIONS:    Neurologic Medications  ALPRAZolam 1 milliGRAM(s) Oral every 8 hours PRN anxiety  gabapentin 200 milliGRAM(s) Oral three times a day  HYDROmorphone   Tablet 4 milliGRAM(s) Oral every 3 hours PRN Moderate Pain (4 - 6)  HYDROmorphone   Tablet 6 milliGRAM(s) Oral every 3 hours PRN Severe Pain (7 - 10)  HYDROmorphone  Injectable 1 milliGRAM(s) IV Push every 6 hours PRN For PT and extensive movement only  LORazepam     Tablet 1 milliGRAM(s) Oral <User Schedule>  LORazepam     Tablet 2 milliGRAM(s) Oral <User Schedule>  QUEtiapine 25 milliGRAM(s) Oral at bedtime  tiZANidine 1 milliGRAM(s) Oral every 6 hours PRN muscle spasm and pain    Respiratory Medications    Cardiovascular Medications  niCARdipine 20 milliGRAM(s) Oral three times a day    Gastrointestinal Medications  polyethylene glycol 3350 17 Gram(s) Oral two times a day  senna 2 Tablet(s) Oral at bedtime  sodium chloride 0.9% lock flush 10 milliLiter(s) IV Push every 1 hour PRN Pre/post blood products, medications, blood draw, and to maintain line patency    Genitourinary Medications    Hematologic/Oncologic Medications  heparin   Injectable 5000 Unit(s) SubCutaneous every 8 hours    Antimicrobial/Immunologic Medications  ceFAZolin   IVPB 1000 milliGRAM(s) IV Intermittent every 24 hours  fluconAZOLE   Tablet 200 milliGRAM(s) Oral daily    Endocrine/Metabolic Medications    Topical/Other Medications  chlorhexidine 4% Liquid 1 Application(s) Topical <User Schedule>  sevelamer carbonate 800 milliGRAM(s) Oral every 8 hours  sodium zirconium cyclosilicate 10 Gram(s) Oral daily    --------------------------------------------------------------------------------------    VITAL SIGNS:  ICU Vital Signs Last 24 Hrs  T(C): 37.3 (28 Jun 2020 00:00), Max: 37.4 (27 Jun 2020 20:00)  T(F): 99.2 (28 Jun 2020 00:00), Max: 99.4 (27 Jun 2020 20:00)  HR: 116 (28 Jun 2020 00:00) (101 - 121)  BP: 152/79 (28 Jun 2020 00:00) (125/50 - 157/99)  BP(mean): 97 (28 Jun 2020 00:00) (80 - 129)  RR: 17 (28 Jun 2020 00:00) (10 - 23)  SpO2: 95% (28 Jun 2020 00:00) (93% - 100%)    --------------------------------------------------------------------------------------    INS AND OUTS:  I&O's Detail    26 Jun 2020 07:01  -  27 Jun 2020 07:00  --------------------------------------------------------  IN:    IV PiggyBack: 50 mL    Oral Fluid: 600 mL  Total IN: 650 mL    OUT:    Drain: 15 mL    Drain: 45 mL    Drain: 40 mL    Voided: 4000 mL  Total OUT: 4100 mL    Total NET: -3450 mL      27 Jun 2020 07:01  -  28 Jun 2020 01:47  --------------------------------------------------------  IN:    Oral Fluid: 580 mL  Total IN: 580 mL    OUT:    Drain: 10 mL    Drain: 5 mL    Drain: 5 mL    Voided: 2900 mL  Total OUT: 2920 mL    Total NET: -2340 mL      --------------------------------------------------------------------------------------  EXAM:  Neuro: sedated, +/- paralyzed  HEENT: normocephalic, atraumatic  Respiratory: intubated, normal CW expansion  Cardiovascular: S1,S2, regular rate and rhythm.  Abdomen: soft, nondistended, NGT/OGT  Vascular: extremities are warm and well perfused.  Skin: normal skin turgor, intact.      --------------------------------------------------------------------------------------    LABS    ((Insert SICU Labs here))***  -------------------------------------------------------------------------------------- SICU Daily Progress Note  =====================================================  Interval/Overnight Events:      - no acute overnight events    PAST MEDICAL & SURGICAL HISTORY:  Bipolar depression  Migraine  Displaced fracture of proximal phalanx of left little finger  Difficulty sleeping  ADD (attention deficit disorder)  History of facial surgery: Fadi-Facial Realignment Surgery in 2008    ALLERGIES:  No Known Allergies    --------------------------------------------------------------------------------------    MEDICATIONS:    Neurologic Medications  ALPRAZolam 1 milliGRAM(s) Oral every 8 hours PRN anxiety  gabapentin 200 milliGRAM(s) Oral three times a day  HYDROmorphone   Tablet 4 milliGRAM(s) Oral every 3 hours PRN Moderate Pain (4 - 6)  HYDROmorphone   Tablet 6 milliGRAM(s) Oral every 3 hours PRN Severe Pain (7 - 10)  HYDROmorphone  Injectable 1 milliGRAM(s) IV Push every 6 hours PRN For PT and extensive movement only  LORazepam     Tablet 1 milliGRAM(s) Oral <User Schedule>  LORazepam     Tablet 2 milliGRAM(s) Oral <User Schedule>  QUEtiapine 25 milliGRAM(s) Oral at bedtime  tiZANidine 1 milliGRAM(s) Oral every 6 hours PRN muscle spasm and pain    Respiratory Medications    Cardiovascular Medications  niCARdipine 20 milliGRAM(s) Oral three times a day    Gastrointestinal Medications  polyethylene glycol 3350 17 Gram(s) Oral two times a day  senna 2 Tablet(s) Oral at bedtime  sodium chloride 0.9% lock flush 10 milliLiter(s) IV Push every 1 hour PRN Pre/post blood products, medications, blood draw, and to maintain line patency    Genitourinary Medications    Hematologic/Oncologic Medications  heparin   Injectable 5000 Unit(s) SubCutaneous every 8 hours    Antimicrobial/Immunologic Medications  ceFAZolin   IVPB 1000 milliGRAM(s) IV Intermittent every 24 hours  fluconAZOLE   Tablet 200 milliGRAM(s) Oral daily    Endocrine/Metabolic Medications    Topical/Other Medications  chlorhexidine 4% Liquid 1 Application(s) Topical <User Schedule>  sevelamer carbonate 800 milliGRAM(s) Oral every 8 hours  sodium zirconium cyclosilicate 10 Gram(s) Oral daily    --------------------------------------------------------------------------------------    VITAL SIGNS:  ICU Vital Signs Last 24 Hrs  T(C): 37.3 (28 Jun 2020 00:00), Max: 37.4 (27 Jun 2020 20:00)  T(F): 99.2 (28 Jun 2020 00:00), Max: 99.4 (27 Jun 2020 20:00)  HR: 116 (28 Jun 2020 00:00) (101 - 121)  BP: 152/79 (28 Jun 2020 00:00) (125/50 - 157/99)  BP(mean): 97 (28 Jun 2020 00:00) (80 - 129)  RR: 17 (28 Jun 2020 00:00) (10 - 23)  SpO2: 95% (28 Jun 2020 00:00) (93% - 100%)    --------------------------------------------------------------------------------------    INS AND OUTS:  I&O's Detail    26 Jun 2020 07:01  -  27 Jun 2020 07:00  --------------------------------------------------------  IN:    IV PiggyBack: 50 mL    Oral Fluid: 600 mL  Total IN: 650 mL    OUT:    Drain: 15 mL    Drain: 45 mL    Drain: 40 mL    Voided: 4000 mL  Total OUT: 4100 mL    Total NET: -3450 mL      27 Jun 2020 07:01  -  28 Jun 2020 01:47  --------------------------------------------------------  IN:    Oral Fluid: 580 mL  Total IN: 580 mL    OUT:    Drain: 10 mL    Drain: 5 mL    Drain: 5 mL    Voided: 2900 mL  Total OUT: 2920 mL    Total NET: -2340 mL      --------------------------------------------------------------------------------------  EXAM:  Neuro: NAD, A/Ox4  HEENT: normocephalic, atraumatic  Respiratory: nonlabored respirations, normal CW expansion  Cardiovascular: S1,S2, regular rate and rhythm.  Abdomen: soft, nondistended  Extremities:  Skin: normal skin turgor, intact.      --------------------------------------------------------------------------------------    LABS    ((Insert SICU Labs here))***  -------------------------------------------------------------------------------------- SICU Daily Progress Note  =====================================================  Interval/Overnight Events:      - no acute overnight events    PAST MEDICAL & SURGICAL HISTORY:  Bipolar depression  Migraine  Displaced fracture of proximal phalanx of left little finger  Difficulty sleeping  ADD (attention deficit disorder)  History of facial surgery: Fadi-Facial Realignment Surgery in 2008    ALLERGIES:  No Known Allergies    --------------------------------------------------------------------------------------    MEDICATIONS:    Neurologic Medications  ALPRAZolam 1 milliGRAM(s) Oral every 8 hours PRN anxiety  gabapentin 200 milliGRAM(s) Oral three times a day  HYDROmorphone   Tablet 4 milliGRAM(s) Oral every 3 hours PRN Moderate Pain (4 - 6)  HYDROmorphone   Tablet 6 milliGRAM(s) Oral every 3 hours PRN Severe Pain (7 - 10)  HYDROmorphone  Injectable 1 milliGRAM(s) IV Push every 6 hours PRN For PT and extensive movement only  LORazepam     Tablet 1 milliGRAM(s) Oral <User Schedule>  LORazepam     Tablet 2 milliGRAM(s) Oral <User Schedule>  QUEtiapine 25 milliGRAM(s) Oral at bedtime  tiZANidine 1 milliGRAM(s) Oral every 6 hours PRN muscle spasm and pain    Respiratory Medications    Cardiovascular Medications  niCARdipine 20 milliGRAM(s) Oral three times a day    Gastrointestinal Medications  polyethylene glycol 3350 17 Gram(s) Oral two times a day  senna 2 Tablet(s) Oral at bedtime  sodium chloride 0.9% lock flush 10 milliLiter(s) IV Push every 1 hour PRN Pre/post blood products, medications, blood draw, and to maintain line patency    Genitourinary Medications    Hematologic/Oncologic Medications  heparin   Injectable 5000 Unit(s) SubCutaneous every 8 hours    Antimicrobial/Immunologic Medications  ceFAZolin   IVPB 1000 milliGRAM(s) IV Intermittent every 24 hours  fluconAZOLE   Tablet 200 milliGRAM(s) Oral daily    Endocrine/Metabolic Medications    Topical/Other Medications  chlorhexidine 4% Liquid 1 Application(s) Topical <User Schedule>  sevelamer carbonate 800 milliGRAM(s) Oral every 8 hours  sodium zirconium cyclosilicate 10 Gram(s) Oral daily    --------------------------------------------------------------------------------------    VITAL SIGNS:  ICU Vital Signs Last 24 Hrs  T(C): 37.3 (28 Jun 2020 00:00), Max: 37.4 (27 Jun 2020 20:00)  T(F): 99.2 (28 Jun 2020 00:00), Max: 99.4 (27 Jun 2020 20:00)  HR: 116 (28 Jun 2020 00:00) (101 - 121)  BP: 152/79 (28 Jun 2020 00:00) (125/50 - 157/99)  BP(mean): 97 (28 Jun 2020 00:00) (80 - 129)  RR: 17 (28 Jun 2020 00:00) (10 - 23)  SpO2: 95% (28 Jun 2020 00:00) (93% - 100%)    --------------------------------------------------------------------------------------    INS AND OUTS:  I&O's Detail    26 Jun 2020 07:01  -  27 Jun 2020 07:00  --------------------------------------------------------  IN:    IV PiggyBack: 50 mL    Oral Fluid: 600 mL  Total IN: 650 mL    OUT:    Drain: 15 mL    Drain: 45 mL    Drain: 40 mL    Voided: 4000 mL  Total OUT: 4100 mL    Total NET: -3450 mL      27 Jun 2020 07:01  -  28 Jun 2020 01:47  --------------------------------------------------------  IN:    Oral Fluid: 580 mL  Total IN: 580 mL    OUT:    Drain: 10 mL    Drain: 5 mL    Drain: 5 mL    Voided: 2900 mL  Total OUT: 2920 mL    Total NET: -2340 mL      --------------------------------------------------------------------------------------  EXAM:  Neuro: NAD, A/Rosette  HEENT: normocephalic, atraumatic  Respiratory: nonlabored respirations, normal CW expansion  Cardiovascular: S1,S2, regular rate and rhythm.  Abdomen: soft, nondistended  Extremities:  Skin: normal skin turgor, intact.      --------------------------------------------------------------------------------------    LABS    CBC (06-28 @ 06:30)                              6.9<LL>                         12.38<H>  )----------------(  428<H>     --    % Neutrophils, --    % Lymphocytes, ANC: --                                  21.5<L>              CBC (06-27 @ 05:30)                              7.6<L>                         17.33<H>  )----------------(  434<H>     --    % Neutrophils, --    % Lymphocytes, ANC: --                                  22.7<L>                BMP (06-28 @ 06:30)             138     |  99      |  92<H> 		Ca++ --      Ca 8.5                ---------------------------------( 103<H>		Mg 2.5                4.6     |  23      |  4.26<H>			Ph 7.2<H>  BMP (06-27 @ 05:30)             138     |  98      |  95<H> 		Ca++ 1.13    Ca 8.6                ---------------------------------( 103<H>		Mg 2.5                4.6     |  22      |  5.17<H>			Ph 7.6<H>      Coags (06-28 @ 06:30)  aPTT 30.4 / INR 1.08 / PT 12.3        -> .Urine Clean Catch (Midstream) Culture (06-23 @ 00:14)     NG    NG    <10,000 CFU/mL Normal Urogenital Karen    -> .Blood Blood-Venous Culture (06-22 @ 07:00)     NG    NG    No Growth Final    -> .Blood Blood-Venous Culture (06-20 @ 19:30)     NG    NG    No Growth Final      --------------------------------------------------------------------------------------

## 2020-06-28 NOTE — PROGRESS NOTE ADULT - ASSESSMENT
29M with PMH of bipolar depression transferred from [facility] for further management of acute renal failure due to rhabdomyolysis, Taken to the OR for LLE compartment syndrome. Now s/p four compartment fasciotomy of the left lower leg 6/11. s/p closure with plastics on 6/19. Doing well clinically.    Plan  - pain control PRN  pt is to completed abx and antifungal rx my mon  hold off on tylenol  continue wound care and tyler as per plastics  will follow     C Team Surgery  [id number]

## 2020-06-28 NOTE — PROGRESS NOTE ADULT - ATTENDING COMMENTS
Agree with notes of health care providers on my service (PAs, Residents and House Staff).  The patient is in SICU with diagnosis mentioned in the note.    The SICU team has a constant risk benefit analyzes discussion with the primary team, all consultants, House Staff and PA's.  29M w/ PMH of bipolar depression, s/p four compartment fasciotomy and lateral/medial thigh fasciotomies with ANTOINETTE on HD. Now s/p fasciotomy closure.   I have personally examined the patient, reviewed data and laboratory tests/x-rays and all pertinent electronic images.    EXAM:  Neuro: NAD, A/Ox4  HEENT: normocephalic, atraumatic  Respiratory: nonlabored respirations, normal CW expansion  Cardiovascular: S1,S2, regular rate and rhythm.  Abdomen: soft, nondistended  Extremities:  Skin: normal skin turgor, intact.    The assessment and plan is specified below:  Neuro hx of bipolar depression  - A&O x 3  - Pain Control: oral dilaudid, tylenol, methadone  - Xanax 1 mg q8 PRN   - ativan BID  - Seroquel QHS  - Increased gabapentin, standing tizanidine per Pain service  - Psych following, no plan to restart home lithium  - Pain following, appreciate recommendations    Resp  - RA   - Mobilize, OOB to chair    CVS  - Appropriate mental status, appears well perfused   - c/w nicardipine - 20 tid, BP well controlled    GI  - Bowel regimen senna 2 tabs qHS  - Patient declining miralax, last BM 06/17 prior to that > 1w  - Switch to renal diet       - ANTOINETTE in setting of rhabdomyolysis, slowly improving  - CK down  - urine output improving  - IV locked   - R fem shiley placed; no HD 6/25, removed 6/26  - Nephrology following    Heme  - tranfuse 1 unit PRBC, follow up BCB  - SQH for dvt ppx    ID  - afebrile over 48hours  - Leukocytosis to 18  - bcx NTD, Ucx neg, CXR neg  - started on cefazolin, diflucan for a 7 day course  - Follow up ID length of Abx    Endo  - monitor FGS    Critical Care dx:  Rhabdomyolysis   ANTOINETTE

## 2020-06-28 NOTE — PROGRESS NOTE ADULT - ASSESSMENT
A/P:   30 y/o M in SICU following found down, presenting with rhabdomyolysis/ compartment syndrome with fasciotomies to L LE X 4  closure on 6/19.    - Okay to dress incisions with guaze and tegaderms. Can change dressing PRN  - Please encourage L LE elevation to reduce swelling   - Continue MEÑO drains, monitor output   - CONT ABX  - Pain control   - Management per SICU     Plastic Surgery   [id number]

## 2020-06-28 NOTE — PROGRESS NOTE ADULT - SUBJECTIVE AND OBJECTIVE BOX
Vascular Progress Note    S: Patient seen and examined. No acute events overnight. Reports tolerating diet without nausea, vomiting, passing flatus, having bowel movements, voiding without issues. Denies fever, chills, SOB, chest pain.     O:  PHYSICAL EXAM:  General: NAD  Respiratory: non-labored on room air   Abdomen: soft, nontender, nondistended  Extremities: LE Gisela VACs in place. MEÑO SS.  LLE gross motor intact from hip to toes, no signs of LLE arterial insufficiency   LLE sensory deficit on left foot dorsum remains as stated above no acute changes       Vital Signs Last 24 Hrs  T(C): 37.6 (28 Jun 2020 04:00), Max: 37.6 (28 Jun 2020 04:00)  T(F): 99.6 (28 Jun 2020 04:00), Max: 99.6 (28 Jun 2020 04:00)  HR: 113 (28 Jun 2020 04:00) (101 - 121)  BP: 129/94 (28 Jun 2020 04:00) (125/50 - 152/79)  BP(mean): 99 (28 Jun 2020 04:00) (80 - 129)  RR: 16 (28 Jun 2020 04:00) (9 - 23)  SpO2: 95% (28 Jun 2020 04:00) (94% - 100%)    I&O's Detail    26 Jun 2020 07:01  -  27 Jun 2020 07:00  --------------------------------------------------------  IN:    IV PiggyBack: 50 mL    Oral Fluid: 600 mL  Total IN: 650 mL    OUT:    Drain: 15 mL    Drain: 45 mL    Drain: 40 mL    Voided: 4000 mL  Total OUT: 4100 mL    Total NET: -3450 mL      27 Jun 2020 07:01  -  28 Jun 2020 06:05  --------------------------------------------------------  IN:    Oral Fluid: 580 mL  Total IN: 580 mL    OUT:    Drain: 10 mL    Drain: 5 mL    Drain: 5 mL    Voided: 3850 mL  Total OUT: 3870 mL    Total NET: -3290 mL                                7.6    17.33 )-----------( 434      ( 27 Jun 2020 05:30 )             22.7       06-27    138  |  98  |  95<H>  ----------------------------<  103<H>  4.6   |  22  |  5.17<H>    Ca    8.6      27 Jun 2020 05:30  Phos  7.6     06-27  Mg     2.5     06-27

## 2020-06-28 NOTE — PROGRESS NOTE ADULT - SUBJECTIVE AND OBJECTIVE BOX
Plastic Surgery Progress Note    Subjective:  - Pt seen and examined on AM rounds  - Prevena vac removed yesterday and dressing changed last night by Dr De Leon, pt did not want dressing changed by PRS team on AM rounds     Objective:    PHYSICAL EXAM:  General: NAD   LE:  MEÑO SS with serous drainage in superior MEÑO consistent with fat necrosis. No surrounding erythema. Rocio c/d/i    Vital Signs  T(C): 37.3 (06-28-20 @ 08:00), Max: 37.6 (06-28-20 @ 04:00)  T(F): 99.2 (06-28-20 @ 08:00), Max: 99.6 (06-28-20 @ 04:00)  HR: 117 (06-28-20 @ 09:00) (106 - 119)  BP: 136/83 (06-28-20 @ 09:00) (125/90 - 152/79)  RR: 11 (06-28-20 @ 09:00) (9 - 23)  SpO2: 99% (06-28-20 @ 09:00) (94% - 100%)      27 Jun 2020 07:01  -  28 Jun 2020 07:00  --------------------------------------------------------  IN:    Oral Fluid: 580 mL  Total IN: 580 mL    OUT:    Drain: 35 mL    Drain: 25 mL    Drain: 35 mL    Voided: 4750 mL  Total OUT: 4845 mL    Total NET: -4265 mL      28 Jun 2020 07:01  -  28 Jun 2020 09:42  --------------------------------------------------------  IN:  Total IN: 0 mL    OUT:    Voided: 300 mL  Total OUT: 300 mL    Total NET: -300 mL

## 2020-06-28 NOTE — PROGRESS NOTE ADULT - ASSESSMENT
29M w/ PMH of bipolar depression, s/p found down at home, transferred from [facility] for further management of acute renal failure due to rhabdomyolysis, s/p four compartment fasciotomy and lateral/medial thigh fasciotomies with ANTOINETTE on HD. Now s/p fasciotomy closure.     Neuro hx of bipolar depression  - A&O x 3  - Pain Control: oral dilaudid, tylenol, methadone  - Xanax 1 mg q8 PRN   - ativan BID  - Seroquel QHS  - Increased gabapentin, standing tizanidine per Pain service  - Psych following, no plan to restart home lithium  - Pain following, appreciate recommendations    Resp  - RA   - Mobilize, OOB to chair    CVS  - Appropriate mental status, appears well perfused   - c/w nicardipine - 20 tid    GI  - Bowel regimen senna 2 tabs qHS  - Patient declining miralax, last BM 06/17 prior to that > 1w  - Switch to renal diet       - ANTOINETTE in setting of rhabdomyolysis, slowly improving  - CK down  - urine output improving  - IV locked   - R fem shiley placed; no HD 6/25, removed 6/26  - Nephrology following    Heme  - trend CBC  - SQH for dvt ppx    ID  - afebrile over 48hours  - Leukocytosis to 18  - bcx NTD, Ucx neg, CXR neg  - started on cefazolin, diflucan for a 7 day course    Endo  - monitor FGS    Critical Care dx:  Rhabdomyolysis   ANTOINETTE 29M w/ PMH of bipolar depression, s/p found down at home, transferred from [facility] for further management of acute renal failure due to rhabdomyolysis, s/p four compartment fasciotomy and lateral/medial thigh fasciotomies with ANTOINETTE on HD. Now s/p fasciotomy closure.     Neuro hx of bipolar depression  - A&O x 3  - Pain Control: oral dilaudid, tylenol, methadone  - Xanax 1 mg q8 PRN   - ativan BID  - Seroquel QHS  - Increased gabapentin, standing tizanidine per Pain service  - Psych following, no plan to restart home lithium  - Pain following, appreciate recommendations    Resp  - RA   - Mobilize, OOB to chair    CVS  - Appropriate mental status, appears well perfused   - c/w nicardipine - 20 tid, BP well controlled    GI  - Bowel regimen senna 2 tabs qHS  - Patient declining miralax, last BM 06/17 prior to that > 1w  - Switch to renal diet       - ANTOINETTE in setting of rhabdomyolysis, slowly improving  - CK down  - urine output improving  - IV locked   - R fem shiley placed; no HD 6/25, removed 6/26  - Nephrology following    Heme  - tranfuse 1 unit PRBC, follow up BCB  - SQH for dvt ppx    ID  - afebrile over 48hours  - Leukocytosis to 18  - bcx NTD, Ucx neg, CXR neg  - started on cefazolin, diflucan for a 7 day course  - Follow up ID length of Abx    Endo  - monitor FGS    Critical Care dx:  Rhabdomyolysis   ANTOINETTE

## 2020-06-29 ENCOUNTER — TRANSCRIPTION ENCOUNTER (OUTPATIENT)
Age: 30
End: 2020-06-29

## 2020-06-29 VITALS
RESPIRATION RATE: 12 BRPM | DIASTOLIC BLOOD PRESSURE: 92 MMHG | OXYGEN SATURATION: 97 % | HEART RATE: 100 BPM | TEMPERATURE: 99 F | SYSTOLIC BLOOD PRESSURE: 135 MMHG

## 2020-06-29 LAB
ANION GAP SERPL CALC-SCNC: 16 MMO/L — HIGH (ref 7–14)
BUN SERPL-MCNC: 88 MG/DL — HIGH (ref 7–23)
CALCIUM SERPL-MCNC: 8.8 MG/DL — SIGNIFICANT CHANGE UP (ref 8.4–10.5)
CHLORIDE SERPL-SCNC: 100 MMOL/L — SIGNIFICANT CHANGE UP (ref 98–107)
CO2 SERPL-SCNC: 23 MMOL/L — SIGNIFICANT CHANGE UP (ref 22–31)
CREAT SERPL-MCNC: 3.62 MG/DL — HIGH (ref 0.5–1.3)
GLUCOSE SERPL-MCNC: 100 MG/DL — HIGH (ref 70–99)
HCT VFR BLD CALC: 26.2 % — LOW (ref 39–50)
HGB BLD-MCNC: 8.4 G/DL — LOW (ref 13–17)
MAGNESIUM SERPL-MCNC: 2.4 MG/DL — SIGNIFICANT CHANGE UP (ref 1.6–2.6)
MCHC RBC-ENTMCNC: 27.5 PG — SIGNIFICANT CHANGE UP (ref 27–34)
MCHC RBC-ENTMCNC: 32.1 % — SIGNIFICANT CHANGE UP (ref 32–36)
MCV RBC AUTO: 85.9 FL — SIGNIFICANT CHANGE UP (ref 80–100)
NRBC # FLD: 0 K/UL — SIGNIFICANT CHANGE UP (ref 0–0)
PHOSPHATE SERPL-MCNC: 6.7 MG/DL — HIGH (ref 2.5–4.5)
PLATELET # BLD AUTO: 404 K/UL — HIGH (ref 150–400)
PMV BLD: 9.2 FL — SIGNIFICANT CHANGE UP (ref 7–13)
POTASSIUM SERPL-MCNC: 4.7 MMOL/L — SIGNIFICANT CHANGE UP (ref 3.5–5.3)
POTASSIUM SERPL-SCNC: 4.7 MMOL/L — SIGNIFICANT CHANGE UP (ref 3.5–5.3)
RBC # BLD: 3.05 M/UL — LOW (ref 4.2–5.8)
RBC # FLD: 13.8 % — SIGNIFICANT CHANGE UP (ref 10.3–14.5)
SODIUM SERPL-SCNC: 139 MMOL/L — SIGNIFICANT CHANGE UP (ref 135–145)
WBC # BLD: 12.75 K/UL — HIGH (ref 3.8–10.5)
WBC # FLD AUTO: 12.75 K/UL — HIGH (ref 3.8–10.5)

## 2020-06-29 PROCEDURE — 99232 SBSQ HOSP IP/OBS MODERATE 35: CPT | Mod: GC

## 2020-06-29 PROCEDURE — 99233 SBSQ HOSP IP/OBS HIGH 50: CPT

## 2020-06-29 PROCEDURE — 99233 SBSQ HOSP IP/OBS HIGH 50: CPT | Mod: GC

## 2020-06-29 RX ORDER — CEPHALEXIN 500 MG
1 CAPSULE ORAL
Qty: 28 | Refills: 0
Start: 2020-06-29 | End: 2020-07-12

## 2020-06-29 RX ORDER — GABAPENTIN 400 MG/1
2 CAPSULE ORAL
Qty: 84 | Refills: 0
Start: 2020-06-29 | End: 2020-07-12

## 2020-06-29 RX ORDER — SEVELAMER CARBONATE 2400 MG/1
1 POWDER, FOR SUSPENSION ORAL
Qty: 21 | Refills: 0
Start: 2020-06-29 | End: 2020-07-05

## 2020-06-29 RX ORDER — LITHIUM CARBONATE 300 MG/1
0 TABLET, EXTENDED RELEASE ORAL
Qty: 0 | Refills: 0 | DISCHARGE

## 2020-06-29 RX ORDER — DEXTROAMPHETAMINE SACCHARATE, AMPHETAMINE ASPARTATE, DEXTROAMPHETAMINE SULFATE AND AMPHETAMINE SULFATE 1.875; 1.875; 1.875; 1.875 MG/1; MG/1; MG/1; MG/1
1 TABLET ORAL
Qty: 0 | Refills: 0 | DISCHARGE

## 2020-06-29 RX ORDER — POLYETHYLENE GLYCOL 3350 17 G/17G
17 POWDER, FOR SOLUTION ORAL
Qty: 0 | Refills: 0 | DISCHARGE
Start: 2020-06-29

## 2020-06-29 RX ORDER — NICARDIPINE HYDROCHLORIDE 30 MG/1
1 CAPSULE, EXTENDED RELEASE ORAL
Qty: 42 | Refills: 0
Start: 2020-06-29 | End: 2020-07-12

## 2020-06-29 RX ORDER — HYDROMORPHONE HYDROCHLORIDE 2 MG/ML
1 INJECTION INTRAMUSCULAR; INTRAVENOUS; SUBCUTANEOUS
Qty: 12 | Refills: 0
Start: 2020-06-29 | End: 2020-06-30

## 2020-06-29 RX ORDER — SENNA PLUS 8.6 MG/1
2 TABLET ORAL
Qty: 0 | Refills: 0 | DISCHARGE
Start: 2020-06-29

## 2020-06-29 RX ORDER — SEVELAMER CARBONATE 2400 MG/1
1 POWDER, FOR SUSPENSION ORAL
Qty: 42 | Refills: 0
Start: 2020-06-29 | End: 2020-07-12

## 2020-06-29 RX ORDER — BUTALBITAL/ACETAMINOPHEN 50MG-650MG
1 TABLET ORAL
Qty: 0 | Refills: 0 | DISCHARGE

## 2020-06-29 RX ORDER — OXYCODONE HYDROCHLORIDE 5 MG/1
1 TABLET ORAL
Qty: 0 | Refills: 0 | DISCHARGE

## 2020-06-29 RX ADMIN — SEVELAMER CARBONATE 800 MILLIGRAM(S): 2400 POWDER, FOR SUSPENSION ORAL at 08:04

## 2020-06-29 RX ADMIN — Medication 100 MILLIGRAM(S): at 06:13

## 2020-06-29 RX ADMIN — HYDROMORPHONE HYDROCHLORIDE 1 MILLIGRAM(S): 2 INJECTION INTRAMUSCULAR; INTRAVENOUS; SUBCUTANEOUS at 14:53

## 2020-06-29 RX ADMIN — SODIUM ZIRCONIUM CYCLOSILICATE 10 GRAM(S): 10 POWDER, FOR SUSPENSION ORAL at 11:22

## 2020-06-29 RX ADMIN — FLUCONAZOLE 200 MILLIGRAM(S): 150 TABLET ORAL at 11:23

## 2020-06-29 RX ADMIN — Medication 1 MILLIGRAM(S): at 08:05

## 2020-06-29 RX ADMIN — SEVELAMER CARBONATE 800 MILLIGRAM(S): 2400 POWDER, FOR SUSPENSION ORAL at 11:29

## 2020-06-29 RX ADMIN — CHLORHEXIDINE GLUCONATE 1 APPLICATION(S): 213 SOLUTION TOPICAL at 11:22

## 2020-06-29 RX ADMIN — HYDROMORPHONE HYDROCHLORIDE 6 MILLIGRAM(S): 2 INJECTION INTRAMUSCULAR; INTRAVENOUS; SUBCUTANEOUS at 16:48

## 2020-06-29 RX ADMIN — GABAPENTIN 200 MILLIGRAM(S): 400 CAPSULE ORAL at 06:13

## 2020-06-29 RX ADMIN — HYDROMORPHONE HYDROCHLORIDE 6 MILLIGRAM(S): 2 INJECTION INTRAMUSCULAR; INTRAVENOUS; SUBCUTANEOUS at 11:54

## 2020-06-29 RX ADMIN — HYDROMORPHONE HYDROCHLORIDE 6 MILLIGRAM(S): 2 INJECTION INTRAMUSCULAR; INTRAVENOUS; SUBCUTANEOUS at 08:05

## 2020-06-29 RX ADMIN — SEVELAMER CARBONATE 800 MILLIGRAM(S): 2400 POWDER, FOR SUSPENSION ORAL at 16:49

## 2020-06-29 RX ADMIN — HYDROMORPHONE HYDROCHLORIDE 6 MILLIGRAM(S): 2 INJECTION INTRAMUSCULAR; INTRAVENOUS; SUBCUTANEOUS at 03:25

## 2020-06-29 RX ADMIN — NICARDIPINE HYDROCHLORIDE 20 MILLIGRAM(S): 30 CAPSULE, EXTENDED RELEASE ORAL at 06:13

## 2020-06-29 RX ADMIN — NICARDIPINE HYDROCHLORIDE 20 MILLIGRAM(S): 30 CAPSULE, EXTENDED RELEASE ORAL at 11:23

## 2020-06-29 RX ADMIN — HEPARIN SODIUM 5000 UNIT(S): 5000 INJECTION INTRAVENOUS; SUBCUTANEOUS at 06:12

## 2020-06-29 RX ADMIN — HEPARIN SODIUM 5000 UNIT(S): 5000 INJECTION INTRAVENOUS; SUBCUTANEOUS at 11:22

## 2020-06-29 RX ADMIN — GABAPENTIN 200 MILLIGRAM(S): 400 CAPSULE ORAL at 11:23

## 2020-06-29 RX ADMIN — HYDROMORPHONE HYDROCHLORIDE 1 MILLIGRAM(S): 2 INJECTION INTRAMUSCULAR; INTRAVENOUS; SUBCUTANEOUS at 09:04

## 2020-06-29 NOTE — DISCHARGE NOTE PROVIDER - NSDCMRMEDTOKEN_GEN_ALL_CORE_FT
Adderall 30 mg oral tablet: 1 tab(s) orally 2 times a day  ALPRAZolam 1 mg oral tablet: 1 tab(s) orally 3 times a day  butalbital-acetaminophen 50 mg-325 mg oral capsule: 1 tab(s) orally 3 times a day  lithium 300 mg oral capsule:   oxyCODONE 30 mg oral tablet: 1 tab(s) orally once a day ALPRAZolam 1 mg oral tablet: 1 tab(s) orally 3 times a day  butalbital-acetaminophen 50 mg-325 mg oral capsule: 1 tab(s) orally 3 times a day  oxyCODONE 30 mg oral tablet: 1 tab(s) orally once a day  polyethylene glycol 3350 oral powder for reconstitution: 17 gram(s) orally 2 times a day  senna oral tablet: 2 tab(s) orally once a day (at bedtime) ALPRAZolam 1 mg oral tablet: 1 tab(s) orally 3 times a day  polyethylene glycol 3350 oral powder for reconstitution: 17 gram(s) orally 2 times a day  senna oral tablet: 2 tab(s) orally once a day (at bedtime) ALPRAZolam 1 mg oral tablet: 1 tab(s) orally 3 times a day  gabapentin 100 mg oral capsule: 2 cap(s) orally 3 times a day  HYDROmorphone 4 mg oral tablet: 1 tab(s) orally every 4 hours, As Needed -Moderate Pain (4 - 6) MDD:6  niCARdipine 20 mg oral capsule: 1 cap(s) orally 3 times a day  polyethylene glycol 3350 oral powder for reconstitution: 17 gram(s) orally 2 times a day  senna oral tablet: 2 tab(s) orally once a day (at bedtime)  sevelamer carbonate 800 mg oral tablet: 1 tab(s) orally 3 times a day (with meals) ALPRAZolam 1 mg oral tablet: 1 tab(s) orally 3 times a day  gabapentin 100 mg oral capsule: 2 cap(s) orally 3 times a day  HYDROmorphone 4 mg oral tablet: 1 tab(s) orally every 4 hours, As Needed -Moderate Pain (4 - 6) MDD:6  Keflex 500 mg oral capsule: 1 cap(s) orally every 12 hours MDD:1000mg  niCARdipine 20 mg oral capsule: 1 cap(s) orally 3 times a day  polyethylene glycol 3350 oral powder for reconstitution: 17 gram(s) orally 2 times a day  senna oral tablet: 2 tab(s) orally once a day (at bedtime)  sevelamer carbonate 800 mg oral tablet: 1 tab(s) orally 3 times a day (with meals)  Silvadene 1% topical cream: Apply topically to affected area prn  (open areas of proximal lateral thigh wound) daily or with dressing changes

## 2020-06-29 NOTE — PROGRESS NOTE ADULT - PROBLEM SELECTOR PROBLEM 3
Compartment syndrome of thigh
Hyperkalemia
Hyperkalemia
Hypocalcemia
Hyponatremia
Compartment syndrome of thigh
ANTOINETTE (acute kidney injury)

## 2020-06-29 NOTE — PROGRESS NOTE ADULT - PROBLEM SELECTOR PROBLEM 4
Compartment syndrome of left lower extremity, initial encounter
Hyperkalemia
Hypervolemia
Hypervolemia
Hyponatremia
Compartment syndrome of left lower extremity, initial encounter

## 2020-06-29 NOTE — DISCHARGE NOTE PROVIDER - NSDCCPTREATMENT_GEN_ALL_CORE_FT
PRINCIPAL PROCEDURE  Procedure: Gluteal-iliotibial fasciotomy  Findings and Treatment:       SECONDARY PROCEDURE  Procedure: Closure, wound, delayed  Findings and Treatment:     Procedure: Decompression fasciotomy, lower extremity  Findings and Treatment:

## 2020-06-29 NOTE — DISCHARGE NOTE PROVIDER - NSDCFUADDAPPT_GEN_ALL_CORE_FT
Please follow up with NEPHROLOGY, Dr. Acosta in 1-2weeks at [address] [place] [address].  Call to make an appointment. Please follow up with NEPHROLOGY, Dr. Acosta in 1-2weeks at [address] [place] [address].  Call [phone] to make an appointment.    Please follow up with PLASTIC SURGERY, Dr. De Leon in 1-2 weeks. Call  [phone] to make an appointment. Please follow up with your surgeon, Dr. Sauer in 1-2 weeks. Call [phone] to make an appointment.    Please follow up with NEPHROLOGY, Dr. Acosta in 1-2weeks at [address] [place] [address].  Call [phone] to make an appointment.    Please follow up with PLASTIC SURGERY, Dr. De Leon in 1-2 weeks. Call  [phone] to make an appointment.    Continue to followup with outpatient psychiatrist Dr. White.  You may follow up with [facility] [phone] for substance use treatment.

## 2020-06-29 NOTE — PROGRESS NOTE ADULT - SUBJECTIVE AND OBJECTIVE BOX
Patient is a 29y old  Male who presents with a chief complaint of Rhabdomyolysis (29 Jun 2020 11:53)      Vascular Surgery Attending Progress Note    Interval HPI:  pt states  left leg is feeling better  Pt ambulates but has a left foot drop     Medications:  chlorhexidine 4% Liquid 1 Application(s) Topical <User Schedule>  gabapentin 200 milliGRAM(s) Oral three times a day  heparin   Injectable 5000 Unit(s) SubCutaneous every 8 hours  HYDROmorphone   Tablet 4 milliGRAM(s) Oral every 3 hours PRN  HYDROmorphone   Tablet 6 milliGRAM(s) Oral every 3 hours PRN  HYDROmorphone  Injectable 1 milliGRAM(s) IV Push every 6 hours PRN  LORazepam     Tablet 2 milliGRAM(s) Oral <User Schedule>  niCARdipine 20 milliGRAM(s) Oral three times a day  polyethylene glycol 3350 17 Gram(s) Oral two times a day  QUEtiapine 25 milliGRAM(s) Oral at bedtime  senna 2 Tablet(s) Oral at bedtime  sevelamer carbonate 800 milliGRAM(s) Oral three times a day with meals  sodium chloride 0.9% lock flush 10 milliLiter(s) IV Push every 1 hour PRN  sodium zirconium cyclosilicate 10 Gram(s) Oral daily  tiZANidine 1 milliGRAM(s) Oral every 6 hours PRN      Vital Signs Last 24 Hrs  T(C): 37.1 (29 Jun 2020 12:00), Max: 37.6 (29 Jun 2020 00:00)  T(F): 98.8 (29 Jun 2020 12:00), Max: 99.7 (29 Jun 2020 00:00)  HR: 101 (29 Jun 2020 11:00) (101 - 126)  BP: 138/110 (29 Jun 2020 11:00) (115/75 - 155/90)  BP(mean): 117 (29 Jun 2020 11:00) (74 - 117)  RR: 12 (29 Jun 2020 11:00) (11 - 20)  SpO2: 96% (29 Jun 2020 11:00) (90% - 100%)  I&O's Summary    28 Jun 2020 07:01  -  29 Jun 2020 07:00  --------------------------------------------------------  IN: 2360 mL / OUT: 5005 mL / NET: -2645 mL    29 Jun 2020 07:01  -  29 Jun 2020 12:12  --------------------------------------------------------  IN: 120 mL / OUT: 1550 mL / NET: -1430 mL        Physical Exam:  Neuro  A&Ox3 VSS  Vascular:    Pulses    DPA          rt   +2        lt  +2                PTA          rt   +1        lt  +1   Extremity: all dressing c/d/i  lle warm well perfused  Musculoskeletal: left foot drop remains    LABS:                        8.4    12.75 )-----------( 404      ( 29 Jun 2020 03:44 )             26.2     06-29    139  |  100  |  88<H>  ----------------------------<  100<H>  4.7   |  23  |  3.62<H>    Ca    8.8      29 Jun 2020 03:44  Phos  6.7     06-29  Mg     2.4     06-29      PT/INR - ( 28 Jun 2020 06:30 )   PT: 12.3 SEC;   INR: 1.08          PTT - ( 28 Jun 2020 06:30 )  PTT:30.4 SEC    GLORIA PEÑA MD  [phone] Cell [phone]

## 2020-06-29 NOTE — PROGRESS NOTE BEHAVIORAL HEALTH - NSBHCONSULTPRIMARYDISCUSSYES_PSY_A_CORE FT
discussed in person with team

## 2020-06-29 NOTE — PROGRESS NOTE ADULT - NSHPATTENDINGPLANDISCUSS_GEN_ALL_CORE
team
vascular surgery fellow
Team
team and patient
patient and SICU team
surg ho
ICU team at bedside.
patient
patient
patient and team
patient and team
surg ho
surg ho
team
team and patient
surg ho
patient
surg ho
surg ho

## 2020-06-29 NOTE — DISCHARGE NOTE PROVIDER - NSDCFUADDINST_GEN_ALL_CORE_FT
WOUND CARE: Please change your upper-most, lateral thigh dressing every time it's saturated/soiled. Change the remainder of your dressings daily. To change dressings, remove old dressings, and replace with dry gauze, cover with abdominal pads, and secure with tape. Please keep incisions clean and dry. Please do not Scrub or rub incisions. Do not use lotion or powder on incisions.   DRAIN CARE: You will be discharged with MEÑO drains. You will need to empty them and record outputs accurately. This will be taught to you by the nursing staff. Please do not remove the MEÑO drains. They will be removed in the office. Please bring to the office accurate records of output.   BATHING: You may shower and/or sponge bathe. You may use warm soapy water in the shower and rinse, pat dry.  ACTIVITY: No heavy lifting or straining. Otherwise, you may return to your usual level of physical activity. If you are taking narcotic pain medication DO NOT drive a car, operate machinery or make important decisions.  DIET: Return to your usual diet.  NOTIFY YOUR SURGEON IF YOU HAVE: any bleeding that does not stop, any pus draining from your wound(s), any fever (over 100.4 F) persistent nausea/vomiting, or if your pain is not controlled on your discharge pain medications, unable to urinate.  Please follow up with your primary care physician in one week regarding your hospitalization, bring copies of your discharge paperwork.  Please follow up with your surgeon, Dr. Sauer in 1-2 weeks. Call [phone] to make an appointment. ANTIBIOTICS: You will be prescribed with 2 weeks of antibiotics. Please take as directed until the entire antibiotic course is completed.   WOUND CARE: Please change your upper-most, lateral thigh dressing every time it's saturated/soiled. Change the remainder of your dressings daily. To change dressings, remove old dressings, and replace with dry gauze, cover with abdominal pads, and secure with tape. Please keep incisions clean and dry. Please do not Scrub or rub incisions. Do not use lotion or powder on incisions.   DRAIN CARE: You will be discharged with MEÑO drains. You will need to empty them and record outputs accurately. This will be taught to you by the nursing staff. Please do not remove the MEÑO drains. They will be removed in the office. Please bring to the office accurate records of output.   BATHING: You may shower and/or sponge bathe. You may use warm soapy water in the shower and rinse, pat dry.  ACTIVITY: No heavy lifting or straining. Otherwise, you may return to your usual level of physical activity. If you are taking narcotic pain medication DO NOT drive a car, operate machinery or make important decisions.  DIET: Return to your usual diet.  NOTIFY YOUR SURGEON IF YOU HAVE: any bleeding that does not stop, any pus draining from your wound(s), any fever (over 100.4 F) persistent nausea/vomiting, or if your pain is not controlled on your discharge pain medications, unable to urinate.  Please follow up with your primary care physician in one week regarding your hospitalization, bring copies of your discharge paperwork.  Please follow up with your surgeon, Dr. Sauer in 1-2 weeks. Call [phone] to make an appointment.

## 2020-06-29 NOTE — DISCHARGE NOTE PROVIDER - NSDCCPCAREPLAN_GEN_ALL_CORE_FT
PRINCIPAL DISCHARGE DIAGNOSIS  Diagnosis: Compartment syndrome of left lower extremity, initial encounter  Assessment and Plan of Treatment: Compartment syndrome of left lower extremity, initial encounter      SECONDARY DISCHARGE DIAGNOSES  Diagnosis: ANTOINETTE (acute kidney injury)  Assessment and Plan of Treatment: ANTOINETTE (acute kidney injury)

## 2020-06-29 NOTE — PROGRESS NOTE ADULT - PROBLEM SELECTOR PLAN 1
Pt. with ANTOINETTE secondary to rhabdomyolysis. Pt. received urgent HD on 6/21/20 for hyperkalemia. Last HD on 6/23/20. Pt. non-oliguric, with increased UOP in last 24 hrs. Labs reviewed. Scr improved to 3.62 today. No current plan for HD. Encourage PO intake. Monitor labs and urine output. Avoid potential nephrotoxins    If any questions, please feel free to contact me  Denys Lipscomb   Nephrology Fellow  [phone]  (After 5 pm or on weekends please page the on-call fellow)

## 2020-06-29 NOTE — DISCHARGE NOTE PROVIDER - CARE PROVIDERS DIRECT ADDRESSES
,[email or web address]

## 2020-06-29 NOTE — PROGRESS NOTE BEHAVIORAL HEALTH - NSBHCONSULTFOLLOWAFTERCARE_PSY_A_CORE FT
Patient will continue to followup with outpatient psychiatrist Dr. White.  Patient may follow up with [facility] [phone] for substance use treatment.
Patient will continue to followup with outpatient psychiatrist Dr. White.  Patient may follow up with [facility] [phone] for substance use treatment.
Patient will continue to followup with outpatient psychiatrist Dr. White.  Patient may follow up with [facility] [phone] for substance use treatment.
Patient will continue to followup with outpatient psychiatrist Dr. White.
Patient will continue to followup with outpatient psychiatrist Dr. White.  Patient may follow up with [facility] [phone] for substance use treatment.
Patient will continue to followup with outpatient psychiatrist Dr. White.  Patient may follow up with [facility] [phone] for substance use treatment.

## 2020-06-29 NOTE — DISCHARGE NOTE NURSING/CASE MANAGEMENT/SOCIAL WORK - PATIENT PORTAL LINK FT
You can access the FollowMyHealth Patient Portal offered by [facility] by registering at the following website: [email or web address]. By joining [employer]’s FollowMyHealth portal, you will also be able to view your health information using other applications (apps) compatible with our system.

## 2020-06-29 NOTE — PROGRESS NOTE ADULT - PROBLEM SELECTOR PLAN 2
CK improved to 1961 on 6/16/20. Monitor CK.
CK improved to 1961 today. Monitor CK.
CK improved to 1961 yesterday. Monitor CK.
CK improved to 6004 today. Pt. planned for wound vac by surgery team. Monitor CK.
CK remains over 20,000.  Continue to monitor serially.  Potassium stable.
CK remains over 20,000.  Continue to monitor serially.  Potassium stable.
I Adam Sauer MD have seen and examined the patient today and agree with  the  evaluation, assessment and plan of the surgical house officer
In the setting of ANTOINETTE, hypervolemia, and pain. sNa WNL at 134 today. Pt. on fluid restriction < 1L/day. Will UF with HD. Monitor serum sodium.
In the setting of ANTOINETTE, hypervolemia, and pain. sNa WNL at 137 today. Pt. on fluid restriction < 1L/day. Will UF with HD. Monitor serum sodium.
Serum calcium 10 today (corrected for albumin). Monitor serum calcium.
Serum calcuim 10 today (corrected for albumin). Monitor serum calcium.
Serum calcuim 9.2 (corrected for albumin) in the setting of rhabdomyolysis. Will take for HD with standard calcium bath. Monitor serum calcium.
Serum calcuim 9.3 (corrected for albumin) in the setting of rhabdomyolysis. Will take for HD with standard calcium bath. Monitor serum calcium.
Serum calcuim 9.5 today (corrected for albumin) in the setting of rhabdomyolysis. Discontinue Renvela and start Phoslo 667 mg TID. Monitor serum calcium.
Serum calcuim 9.5 today (corrected for albumin) in the setting of rhabdomyolysis. Discontinue Renvela and start Phoslo 667 mg TID. Monitor serum calcium.
Serum potassium today WNL (3.8) s/p 1 session of HD. Low potassium diet advised. Monitor serum potassium.
Serum potassium today WNL (3.8) s/p 1 session of HD. Low potassium diet advised. Monitor serum potassium.
Volume status has improved, polyuric, 4 lit in 24 hours, hold diuretics for now. Monitor electrolytes.     Matias Marie   Nephrology Fellow  Pager   (After 5 pm or on weekends please page the on-call fellow)
I Adam Sauer MD have seen and examined the patient today and agree with  the  evaluation, assessment and plan of the surgical house officer

## 2020-06-29 NOTE — PROGRESS NOTE BEHAVIORAL HEALTH - NSBHADMITCOUNSEL_PSY_A_CORE
risk factor reduction/importance of adherence to chosen treatment/client/family/caregiver education/prognosis
client/family/caregiver education/importance of adherence to chosen treatment/prognosis/Discussed importance of continuing on current psychotropic meds and f/u with outpatient psychiatrist after discharge/instructions for management, treatment and follow up/risk factor reduction
client/family/caregiver education/prognosis/importance of adherence to chosen treatment/instructions for management, treatment and follow up

## 2020-06-29 NOTE — PROGRESS NOTE ADULT - PROBLEM SELECTOR PLAN 4
I Adam Sauer MD have seen and examined the patient today and agree with  the  evaluation, assessment and plan of the surgical house officer
In the setting of ANTOINETTE and hypervolemia, check serum osmo. Will attempt HD with UF today.
In the setting of ANTOINETTE, hypervolemia, and pain. sNa improved to 126 today. Serum osmolality 275 and uOsms 235. Check TSH, AM cortisol, and lipid panel. Start fluid restriction < 1L/day. Monitor serum sodium.    If any questions, please feel free to contact me  Denys Lipscomb   Nephrology Fellow  [phone]  (After 5 pm or on weekends please page the on-call fellow)
In the setting of ANTOINETTE, hypervolemia, and pain. sNa lower at 122 today. Check serum and urine osmolality. Monitor serum sodium.    If any questions, please feel free to contact me  Denys Lipscomb   Nephrology Fellow  [phone]  (After 5 pm or on weekends please page the on-call fellow)
In the setting of ANTOINETTE, hypervolemia, and pain. sNa lower at 123 today. Serum osmolality 275 and uOsms 235. Check TSH, AM cortisol, and lipid panel. Start fluid restriction < 1L/day. Monitor serum sodium.    If any questions, please feel free to contact me  Denys Lipscomb   Nephrology Fellow  [phone]  (After 5 pm or on weekends please page the on-call fellow)
In the setting of ANTOINETTE, hypervolemia, and pain. sNa stable at 127 today. Check serum and urine osmolality. Monitor serum sodium.    If any questions, please feel free to contact me  Denys Lipscomb   Nephrology Fellow  [phone]  (After 5 pm or on weekends please page the on-call fellow)
Pt. remains hypervolemic on exam. Recommend intermittent IV Bumex to maintain volume status. Monitor urine output and daily weights.    If any questions, please feel free to contact me  Denys Lipscomb   Nephrology Fellow  [phone]  (After 5 pm or on weekends please page the on-call fellow)
Pt. remains hypervolemic on exam. Recommend intermittent IV Bumex to maintain volume status. Monitor urine output and daily weights.    If any questions, please feel free to contact me  Denys Lipscomb   Nephrology Fellow  [phone]  (After 5 pm or on weekends please page the on-call fellow)
Pt. with hyperkalemia in the setting of surgical repair and ANTOINETTE. Serum potassium 4.0 today. Continue Lokelma 10 grams daily for now. Low potassium diet. Monitor serum potassium
Pt. with hyperkalemia in the setting of surgical repair and ANTOINETTE. Serum potassium high at 5.7 today. Pt. without HD access. Recommend Lokelma 10 mg TID today. Bumex IV as outline above. Low potassium diet. Monitor serum potassium.    If any questions, please feel free to contact me  Denys Lipscomb   Nephrology Fellow  [phone]  (After 5 pm or on weekends please page the on-call fellow)
Pt. with hyperkalemia in the setting of surgical repair and ANTOINETTE. Serum potassium was 6.8 on 6/21/20. Had HD access replaced and went for urgent HD. Pt. required 2 sessions of HD yesterday as well. Continue Lokelma 10 grams daily for now. Low potassium diet. Monitor serum potassium.    If any questions, please feel free to contact me  Denys Lipscomb   Nephrology Fellow  [phone]  (After 5 pm or on weekends please page the on-call fellow)
Pt. with hyperkalemia in the setting of surgical repair and ANTOINETTE. Serum potassium was 6.8 yesterday. Had HD access replaced and went for urgent HD. Continue Lokelma 10 grams daily for now. Low potassium diet. Monitor serum potassium.    If any questions, please feel free to contact me  Denys Lipscomb   Nephrology Fellow  [phone]  (After 5 pm or on weekends please page the on-call fellow)
I Adam Sauer MD have seen and examined the patient today and agree with  the  evaluation, assessment and plan of the surgical house officer

## 2020-06-29 NOTE — PROGRESS NOTE BEHAVIORAL HEALTH - NSBHFUPINTERVALHXFT_PSY_A_CORE
Patient seen sitting in chair. He smiles, states he feels "good" today. States he walked to the nursing station earlier, and was able to go outside yesterday. He has had good appetite, is happy that he is getting better medically, is looking forward to going home. He states his parents are in contact with a new psychiatrist who he will follow up with after discharge, but also plans on contacting his current outpatient psychiatrist. He reports sleeping "okay," denies SI/HI, no AVH.

## 2020-06-29 NOTE — PROGRESS NOTE BEHAVIORAL HEALTH - SUMMARY
Patient is a 30 y/o single male, no significant PMHx, PPHx bipolar disorder, no prior inpatient hospitalizations, follows deisi/ Dr. White once every 2 weeks, no prior SA, no prior NSSIB, occasional cocaine and alcohol use, transferred from [facility] after presenting with acute renal failure 2/2 rhabdomyolysis after being found unresponsive at home by neighbors. Patient has received multiple HD since admission, and is s/p fasciotomy for compartment syndrome in E. Patient seems to be minimizing symptoms, states he feels good and has been handling all procedures well, with no excess anxiety. SICU team reports increased anxiety during the night, including pt crying. Responds well to Xanax. Pt okay with starting standing Ativan with Xanax PRN.    6/26: Called to reassess patient; patient denies any sustained depression or anxiety. No SI/HI. Pt hopeful and future-oriented. Refusing washout procedure because his father advised him against doing it at this time; however pt appears reasonable and states he is willing to reconsider the procedure depending on clinical course.  6/29: Pt is clinically improving, plan to be downgraded to floors today, affect is bright, smiling, no SI/HI, hopeful and future oriented.

## 2020-06-29 NOTE — PROGRESS NOTE BEHAVIORAL HEALTH - RISK ASSESSMENT
Risk factors include hx bipolar disorder, lives alone, active substance use, male, acute medical issues. Protective factors include no prior inpatient hospitalizations, no prior suicide attempts, connected with family, has a pet, engaged in work, highly educated

## 2020-06-29 NOTE — DISCHARGE NOTE NURSING/CASE MANAGEMENT/SOCIAL WORK - NSDCFUADDAPPT_GEN_ALL_CORE_FT
Please follow up with your surgeon, Dr. Sauer in 1-2 weeks. Call [phone] to make an appointment.    Please follow up with NEPHROLOGY, Dr. Acosta in 1-2weeks at [address] [place] [address].  Call [phone] to make an appointment.    Please follow up with PLASTIC SURGERY, Dr. De Leon in 1-2 weeks. Call  [phone] to make an appointment.    Continue to followup with outpatient psychiatrist Dr. White.  You may follow up with [facility] [phone] for substance use treatment.

## 2020-06-29 NOTE — PROGRESS NOTE ADULT - ATTENDING COMMENTS
ANTOINETTE in the setting of rhabdo  ATN  now making urine    will cont to monitor for signs of post ATN diuresis and need for D5W    Cont to monitor closely, no need for HD at this time

## 2020-06-29 NOTE — PROGRESS NOTE ADULT - PROBLEM SELECTOR PROBLEM 1
CKD (chronic kidney disease) stage 5, GFR less than 15 ml/min
ANTOINETTE (acute kidney injury)
CKD (chronic kidney disease) stage 5, GFR less than 15 ml/min
Compartment syndrome of left lower extremity, initial encounter

## 2020-06-29 NOTE — PROGRESS NOTE ADULT - SUBJECTIVE AND OBJECTIVE BOX
Plastic Surgery Progress Note    Subjective:  - Pt seen and examined on AM rounds  - saturated dressing on proximal lateral thigh changed this AM     Objective:    PHYSICAL EXAM:  General: NAD   LE: Proximal RLE incision with superficial dehiscence serous output on dressing, MEÑO SS with serous drainage in superior MEÑO consistent with fat necrosis. No surrounding erythema. Rocio c/d/i    T(C): 37.4 (06-29-20 @ 04:00), Max: 37.6 (06-29-20 @ 00:00)  HR: 123 (06-29-20 @ 07:00) (102 - 126)  BP: 139/74 (06-29-20 @ 07:00) (115/75 - 155/90)  RR: 19 (06-29-20 @ 07:00) (11 - 20)  SpO2: 98% (06-29-20 @ 07:00) (90% - 100%)  Wt(kg): --  06-28 @ 07:01  -  06-29 @ 07:00  --------------------------------------------------------  IN:    IV PiggyBack: 50 mL    Oral Fluid: 2000 mL    Packed Red Blood Cells: 310 mL  Total IN: 2360 mL    OUT:    Drain: 30 mL    Drain: 10 mL    Drain: 15 mL    Voided: 4950 mL  Total OUT: 5005 mL    Total NET: -2645 mL

## 2020-06-29 NOTE — PROGRESS NOTE ADULT - SUBJECTIVE AND OBJECTIVE BOX
[facility] DIVISION OF KIDNEY DISEASES AND HYPERTENSION -- FOLLOW UP NOTE  --------------------------------------------------------------------------------  HPI: 29-year-old male, with history bipolar depression was transferred from [facility] to [facility] for severe rhabdomyolysis. As per paper chart, pt was found to have cocaine and amphetamines in urine drug screen. Pt initially with elevated CPK ~ 180K. Nephrology team consulted for ANTOINETTE and rhabdomyolyiss. Upon review of labs on [facility], Scr was 1.11 on 8/23/2017. Pt. underwent fasciotomy on 6/11/20 for LLE compartment syndrome. Pt. had wound vac placed on  6/15/20.  Pt. underwent partial closure of fasciotomy by plastic surgery team on 6/19/20. Last HD treatment on 6/23/20.    Pt. evaluated at bedside, in no distress. Pt. remains polyuric. Scr improved to 3.62 today.    PAST HISTORY  --------------------------------------------------------------------------------  No significant changes to PMH, PSH, FHx, SHx, unless otherwise noted    ALLERGIES & MEDICATIONS  --------------------------------------------------------------------------------  Allergies    No Known Allergies    Intolerances    Standing Inpatient Medications  chlorhexidine 4% Liquid 1 Application(s) Topical <User Schedule>  fluconAZOLE   Tablet 200 milliGRAM(s) Oral daily  gabapentin 200 milliGRAM(s) Oral three times a day  heparin   Injectable 5000 Unit(s) SubCutaneous every 8 hours  LORazepam     Tablet 2 milliGRAM(s) Oral <User Schedule>  niCARdipine 20 milliGRAM(s) Oral three times a day  polyethylene glycol 3350 17 Gram(s) Oral two times a day  QUEtiapine 25 milliGRAM(s) Oral at bedtime  senna 2 Tablet(s) Oral at bedtime  sevelamer carbonate 800 milliGRAM(s) Oral three times a day with meals  sodium zirconium cyclosilicate 10 Gram(s) Oral daily    REVIEW OF SYSTEMS  --------------------------------------------------------------------------------  Gen: no lethargy  Respiratory: No dyspnea  CV: No chest pain  GI: No abdominal pain  MSK: Left leg pain+  Neuro: No dizziness  Heme: No bleeding    All other systems were reviewed and are negative, except as noted.    VITALS/PHYSICAL EXAM  --------------------------------------------------------------------------------  T(C): 36.4 (06-29-20 @ 08:00), Max: 37.6 (06-29-20 @ 00:00)  HR: 110 (06-29-20 @ 09:00) (102 - 126)  BP: 144/96 (06-29-20 @ 09:00) (115/75 - 155/90)  RR: 13 (06-29-20 @ 09:00) (11 - 20)  SpO2: 97% (06-29-20 @ 09:00) (90% - 100%)  Wt(kg): --    06-28-20 @ 07:01  -  06-29-20 @ 07:00  --------------------------------------------------------  IN: 2360 mL / OUT: 5005 mL / NET: -2645 mL    06-29-20 @ 07:01  -  06-29-20 @ 09:25  --------------------------------------------------------  IN: 0 mL / OUT: 500 mL / NET: -500 mL    Physical Exam:  	Gen: resting, NAD  	HEENT: No JVD   	Pulm: CTA B/L  	CV: S1S2+  	Abd: Soft, +BS               Ext: LLE : multiple drains seen, LE and scrotal edema, improving  	Neuro: Awake, alert  	Skin: Warm and dry  	  LABS/STUDIES  --------------------------------------------------------------------------------              8.4    12.75 >-----------<  404      [06-29-20 @ 03:44]              26.2     139  |  100  |  88  ----------------------------<  100      [06-29-20 @ 03:44]  4.7   |  23  |  3.62        Ca     8.8     [06-29-20 @ 03:44]      Mg     2.4     [06-29-20 @ 03:44]      Phos  6.7     [06-29-20 @ 03:44]    Creatinine Trend:  SCr 3.62 [06-29 @ 03:44]  SCr 4.26 [06-28 @ 06:30]  SCr 5.17 [06-27 @ 05:30]  SCr 5.77 [06-26 @ 02:50]  SCr 5.48 [06-25 @ 15:45]

## 2020-06-29 NOTE — PROGRESS NOTE BEHAVIORAL HEALTH - NSBHCONSULTRECOMMENDOTHER_PSY_A_CORE FT
- hold adderall, hold lithium for now
- hold adderall, hold lithium for now      **Pt has capacity to refuse washout procedure at this time. Pt agrees to reconsider procedure in the future if WBC continues to rise, or if other infectious signs/symptoms emerge.**

## 2020-06-29 NOTE — PROGRESS NOTE ADULT - ASSESSMENT
A/P:   30 y/o M in SICU following found down, presenting with rhabdomyolysis/ compartment syndrome with fasciotomies to L LE X 4  closure on 6/19.    - Okay to dress incisions with guaze, ABDs, and tegaderms. Can change dressing PRN  - Please encourage L LE elevation to reduce swelling   - Continue MEÑO drains, monitor output   - CONT ABX  - Pain control   - Management per SICU     Plastic Surgery   [id number]

## 2020-06-29 NOTE — DISCHARGE NOTE PROVIDER - CARE PROVIDER_API CALL
Adam Sauer  VASCULAR SURGERY  [address] [place] [address]  Phone: [phone]  Fax: [phone]  Follow Up Time: 1 week    Meaghan Acosta  INTERNAL MEDICINE  [address] [place] [address] [place] [address]  Phone: [phone]  Fax: [phone]  Follow Up Time: 1 week Adam Sauer  VASCULAR SURGERY  [address] [place] [address]  Phone: [phone]  Fax: [phone]  Follow Up Time: 1 week    Meaghan Acosta  INTERNAL MEDICINE  [address] [place] [address]  Phone: [phone]  Fax: [phone]  Follow Up Time: 1 week    Alexis Mejia  INTERNAL MEDICINE  [address] [place] [address]  Phone: [phone]  Fax: [phone]  Follow Up Time:

## 2020-06-29 NOTE — PROGRESS NOTE BEHAVIORAL HEALTH - NSBHCONSULTMEDANXIETY_PSY_A_CORE FT
continue with xanax 1mg PO TID PRN for anxiety - hold for excessive sedation
can give xanax 1mg PO TID PRN for anxiety - hold for excessive sedation
continue with xanax 1mg PO TID PRN for anxiety - hold for excessive sedation

## 2020-06-29 NOTE — PROGRESS NOTE BEHAVIORAL HEALTH - NSBHATTESTSEENBY_PSY_A_CORE
Attending Psychiatrist supervising NP/Trainee, meeting pt...
Attending Psychiatrist supervising NP/Trainee, meeting pt...
Trainee with telephonic supervision from Attending Psychiatrist
Trainee with telephonic supervision from Attending Psychiatrist
attending Psychiatrist without NP/Trainee
Attending Psychiatrist supervising NP/Trainee, meeting pt...

## 2020-06-29 NOTE — DISCHARGE NOTE PROVIDER - PROVIDER TOKENS
PROVIDER:[TOKEN:[[id number]],FOLLOWUP:[1 week]],PROVIDER:[TOKEN:[[id number]],FOLLOWUP:[1 week]] PROVIDER:[TOKEN:[[id number]],FOLLOWUP:[1 week]],PROVIDER:[TOKEN:[[id number]],FOLLOWUP:[1 week]],PROVIDER:[TOKEN:[[id number]]]

## 2020-06-29 NOTE — PROGRESS NOTE ADULT - ATTENDING COMMENTS
ASHTYN Sauer MD have seen and examined the patient today and agree with  the  evaluation, assessment and plan of the surgical house officer  ASHTYN Sauer MD have personally seen and examined the patient at bedside today at 10am

## 2020-06-29 NOTE — PROGRESS NOTE ADULT - PROBLEM SELECTOR PROBLEM 2
Compartment syndrome of buttock
Hyperkalemia
Hyperkalemia
Hypervolemia
Hypocalcemia
Hyponatremia
Hyponatremia
Traumatic rhabdomyolysis, sequela
Compartment syndrome of buttock
Compartment syndrome of thigh

## 2020-06-29 NOTE — PROGRESS NOTE BEHAVIORAL HEALTH - NSBHCHARTREVIEWLAB_PSY_A_CORE FT
7.0    12.52 )-----------( 401      ( 23 Jun 2020 10:18 )             21.4   06-23    130<L>  |  92<L>  |  50<H>  ----------------------------<  112<H>  4.4   |  25  |  4.16<H>    Ca    8.4      23 Jun 2020 03:25  Phos  4.7     06-23  Mg     1.9     06-23    PT/INR - ( 22 Jun 2020 04:30 )   PT: 11.7 SEC;   INR: 1.03          PTT - ( 22 Jun 2020 04:30 )  PTT:28.4 SEC
8.4    12.75 )-----------( 404      ( 29 Jun 2020 03:44 )             26.2     06-29    139  |  100  |  88<H>  ----------------------------<  100<H>  4.7   |  23  |  3.62<H>    Ca    8.8      29 Jun 2020 03:44  Phos  6.7     06-29  Mg     2.4     06-29          PT/INR - ( 28 Jun 2020 06:30 )   PT: 12.3 SEC;   INR: 1.08          PTT - ( 28 Jun 2020 06:30 )  PTT:30.4 SEC
9.6    17.47 )-----------( 317      ( 17 Jun 2020 01:50 )             28.0     06-17    123<L>  |  88<L>  |  110<H>  ----------------------------<  117<H>  5.7<H>   |  18<L>  |  8.30<H>    Ca    7.3<L>      17 Jun 2020 07:29  Phos  8.4     06-17  Mg     2.4     06-17    TPro  4.9<L>  /  Alb  2.3<L>  /  TBili  0.4  /  DBili  x   /  AST  85<H>  /  ALT  11  /  AlkPhos  111  06-16      LIVER FUNCTIONS - ( 16 Jun 2020 01:30 )  Alb: 2.3 g/dL / Pro: 4.9 g/dL / ALK PHOS: 111 u/L / ALT: 11 u/L / AST: 85 u/L / GGT: x           PT/INR - ( 16 Jun 2020 01:30 )   PT: 11.1 SEC;   INR: 0.97          PTT - ( 16 Jun 2020 01:30 )  PTT:29.7 SEC
9.6    17.47 )-----------( 317      ( 17 Jun 2020 01:50 )             28.0     06-17    123<L>  |  88<L>  |  110<H>  ----------------------------<  117<H>  5.7<H>   |  18<L>  |  8.30<H>    Ca    7.3<L>      17 Jun 2020 07:29  Phos  8.4     06-17  Mg     2.4     06-17    TPro  4.9<L>  /  Alb  2.3<L>  /  TBili  0.4  /  DBili  x   /  AST  85<H>  /  ALT  11  /  AlkPhos  111  06-16      LIVER FUNCTIONS - ( 16 Jun 2020 01:30 )  Alb: 2.3 g/dL / Pro: 4.9 g/dL / ALK PHOS: 111 u/L / ALT: 11 u/L / AST: 85 u/L / GGT: x           PT/INR - ( 16 Jun 2020 01:30 )   PT: 11.1 SEC;   INR: 0.97          PTT - ( 16 Jun 2020 01:30 )  PTT:29.7 SEC
CBC Full  -  ( 26 Jun 2020 06:58 )  WBC Count : 18.80 K/uL  RBC Count : 2.58 M/uL  Hemoglobin : 7.4 g/dL  Hematocrit : 22.2 %  Platelet Count - Automated : 430 K/uL  Mean Cell Volume : 86.0 fL  Mean Cell Hemoglobin : 28.7 pg  Mean Cell Hemoglobin Concentration : 33.3 %  Auto Neutrophil # : 14.88 K/uL  Auto Lymphocyte # : 2.03 K/uL  Auto Monocyte # : 0.98 K/uL  Auto Eosinophil # : 0.31 K/uL  Auto Basophil # : 0.09 K/uL  Auto Neutrophil % : 79.2 %  Auto Lymphocyte % : 10.8 %  Auto Monocyte % : 5.2 %  Auto Eosinophil % : 1.6 %  Auto Basophil % : 0.5 %    06-26    134<L>  |  96<L>  |  87<H>  ----------------------------<  94  4.5   |  20<L>  |  5.77<H>    Ca    8.3<L>      26 Jun 2020 02:50  Phos  7.1     06-26  Mg     2.4     06-26
10.0   18.01 )-----------( 254      ( 15 Ramírez 2020 00:50 )             28.9     06-15    127<L>  |  90<L>  |  58<H>  ----------------------------<  109<H>  4.3   |  19<L>  |  6.30<H>    Ca    7.6<L>      15 Ramírez 2020 00:50  Phos  5.3     06-15  Mg     2.4     06-15    TPro  5.1<L>  /  Alb  2.5<L>  /  TBili  0.5  /  DBili  x   /  AST  177<H>  /  ALT  24  /  AlkPhos  124<H>  06-15      LIVER FUNCTIONS - ( 15 Ramírez 2020 00:50 )  Alb: 2.5 g/dL / Pro: 5.1 g/dL / ALK PHOS: 124 u/L / ALT: 24 u/L / AST: 177 u/L / GGT: x

## 2020-06-29 NOTE — PROGRESS NOTE ADULT - ASSESSMENT
29M w/ PMH of bipolar depression, s/p found down at home, transferred from [facility] for further management of acute renal failure due to rhabdomyolysis, s/p four compartment fasciotomy and lateral/medial thigh fasciotomies with ANTOINETTE on HD. Now s/p fasciotomy closure    Neuro hx of bipolar depression  - A&O x 3  - Pain Control: oral dilaudid, tylenol, methadone  - IV dilaudid dosing adjusted  - Xanax 1 mg q8 PRN   - ativan BID  - Seroquel QHS  - Increased gabapentin, standing tizanidine per Pain service  - Psych following, no plan to restart home lithium  - Pain following, appreciate recommendations    Resp  - RA   - Mobilize, OOB to chair  - Obtain Chest XR    CVS  - Appropriate mental status, appears well perfused   - c/w nicardipine - 20 tid    GI  - Bowel regimen senna 2 tabs qHS  - Patient declining miralax, last BM 06/17 prior to that > 1w  - Switch to renal diet       - ANTOINETTE in setting of rhabdomyolysis, slowly improving  - CK down  - urine output improving  - IV locked   - R fem shiley placed; no HD 6/25, removed 6/26  - Nephrology following    Heme  - trend CBC  - SQH for dvt ppx    ID  - afebrile over 48hours  - Leukocytosis to 18  - bcx NTD, Ucx neg, CXR neg  - started on cefazolin, diflucan for a 7 day course    Endo  - monitor FGS    Lines  - Right radial a line    Critical Care dx:  Rhabdomyolysis   ANTOINETTE

## 2020-06-29 NOTE — PROGRESS NOTE BEHAVIORAL HEALTH - NSBHCHARTREVIEWVS_PSY_A_CORE FT
ICU Vital Signs Last 24 Hrs  T(C): 37.3 (22 Jun 2020 12:00), Max: 38.6 (21 Jun 2020 23:25)  T(F): 99.1 (22 Jun 2020 12:00), Max: 101.4 (21 Jun 2020 23:25)  HR: 110 (22 Jun 2020 12:00) (102 - 125)  BP: 152/102 (22 Jun 2020 12:00) (120/81 - 182/92)  BP(mean): 109 (22 Jun 2020 12:00) (75 - 121)  ABP: --  ABP(mean): --  RR: 22 (22 Jun 2020 12:00) (11 - 22)  SpO2: 99% (22 Jun 2020 12:00) (96% - 100%)
Vital Signs Last 24 Hrs  T(C): 36.8 (23 Jun 2020 12:00), Max: 38.3 (22 Jun 2020 20:00)  T(F): 98.3 (23 Jun 2020 12:00), Max: 101 (22 Jun 2020 20:00)  HR: 111 (23 Jun 2020 12:00) (106 - 129)  BP: 146/90 (23 Jun 2020 12:00) (114/59 - 169/104)  BP(mean): 105 (23 Jun 2020 12:00) (75 - 119)  RR: 20 (23 Jun 2020 12:00) (12 - 20)  SpO2: 99% (23 Jun 2020 12:00) (96% - 100%)
Vital Signs Last 24 Hrs  T(C): 37.1 (29 Jun 2020 12:00), Max: 37.6 (29 Jun 2020 00:00)  T(F): 98.8 (29 Jun 2020 12:00), Max: 99.7 (29 Jun 2020 00:00)  HR: 105 (29 Jun 2020 12:00) (101 - 126)  BP: 146/91 (29 Jun 2020 12:00) (115/75 - 155/90)  BP(mean): 103 (29 Jun 2020 12:00) (74 - 117)  RR: 11 (29 Jun 2020 12:00) (11 - 20)  SpO2: 100% (29 Jun 2020 12:00) (90% - 100%)
Vital Signs Last 24 Hrs  T(C): 37.2 (26 Jun 2020 16:00), Max: 37.6 (25 Jun 2020 20:00)  T(F): 98.9 (26 Jun 2020 16:00), Max: 99.7 (25 Jun 2020 20:00)  HR: 117 (26 Jun 2020 17:00) (109 - 125)  BP: 160/96 (26 Jun 2020 17:00) (129/81 - 163/93)  BP(mean): 109 (26 Jun 2020 17:00) (87 - 115)  RR: 16 (26 Jun 2020 17:00) (12 - 19)  SpO2: 98% (26 Jun 2020 17:00) (91% - 98%)
Vital Signs Last 24 Hrs  T(C): 37.7 (17 Jun 2020 14:27), Max: 38.8 (17 Jun 2020 12:00)  T(F): 99.9 (17 Jun 2020 14:27), Max: 101.9 (17 Jun 2020 12:00)  HR: 109 (17 Jun 2020 16:00) (103 - 119)  BP: 147/88 (16 Jun 2020 20:00) (147/88 - 175/110)  BP(mean): 101 (16 Jun 2020 20:00) (101 - 124)  RR: 16 (17 Jun 2020 16:00) (14 - 23)  SpO2: 100% (17 Jun 2020 16:00) (94% - 100%)
Vital Signs Last 24 Hrs  T(C): 37.4 (15 Ramírez 2020 08:00), Max: 38.6 (15 Ramírez 2020 00:00)  T(F): 99.4 (15 Ramírez 2020 08:00), Max: 101.4 (15 Ramírez 2020 00:00)  HR: 110 (15 Ramírez 2020 09:00) (106 - 126)  BP: --  BP(mean): --  RR: 22 (15 Ramírez 2020 09:00) (19 - 30)  SpO2: 96% (15 Ramírez 2020 09:00) (89% - 100%)

## 2020-06-29 NOTE — PROGRESS NOTE ADULT - ASSESSMENT
29M with PMH of bipolar depression transferred from [facility] for further management of acute renal failure due to rhabdomyolysis, Taken to the OR for LLE compartment syndrome. Now s/p four compartment fasciotomy of the left lower leg 6/11. s/p closure with plastics on 6/19. Doing well clinically, however continue to spike fever with leukocytosis.      Plan  - pain control PRN  - wbc continuing to improve  stable from vasc surg standpoint for d/c  as long as cleared by plastics and renal  will follow

## 2020-06-29 NOTE — PROGRESS NOTE BEHAVIORAL HEALTH - NSBHCHARTREVIEWINVESTIGATE_PSY_A_CORE FT
< from: 12 Lead ECG (06.17.20 @ 20:53) >      Ventricular Rate 117 BPM    Atrial Rate 117 BPM    P-R Interval 136 ms    QRS Duration 78 ms    Q-T Interval 308 ms    QTC Calculation(Bezet) 429 ms    P Axis 52 degrees    R Axis 73 degrees    T Axis -2 degrees    Diagnosis Line Sinus tachycardia  Nonspecific ST abnormality  Abnormal QRS-T angle, consider primary T wave abnormality  Abnormal ECG    < end of copied text >

## 2020-06-29 NOTE — PROGRESS NOTE ADULT - SUBJECTIVE AND OBJECTIVE BOX
SICU Daily Progress Note  =====================================================  HPI:  29M with PMH of bipolar depression transferred from [facility] for further management of acute renal failure due to rhabdomyolysis. Pt was found unresponsive at home, lying on his L side. He states he has not been taking his Lithium for at least 2 wks. At the OSH, he was treated for hyperkalemia. He received D5, insulin, calcium gluconate, Kayexalate Last BM was on 6/9. For L leg pain he was given lidocaine patch, dilaudid 1mg prn and tramadol 50mg. Hydralazine 20mg given for HTN. From 1 to 7 am only 100cc of UOP. Urine was tea colored. 500cc bolus of NS was given. CT cervical spine at OSH was (-). CT Head (-). CTAP and CXR were (-). CK on admission there was 177,000.   Pt was seen by surgery team in MICU for LUE swelling and compartment syndrome was ruled out. Surgery team was re-consulted today due to new symptoms of L foot drop and numbness. Pt was found to be highly suspicious for compartment syndrome in the upper thigh, and was emergently taken to OR.   Pt is now s/p four compartment fasciotomy of LLE, and left lateral and medial thigh fasciotomies. SICU consulted for q1h neurovascular checks and hemodynamic monitoring. Now s/p closure.      Interval/Overnight Events:    -No acute events overnight.    HISTORY  29y Male  Allergies: No Known Allergies    PAST MEDICAL & SURGICAL HISTORY:  Bipolar depression  Migraine  Displaced fracture of proximal phalanx of left little finger  Difficulty sleeping  ADD (attention deficit disorder)  History of facial surgery: Fadi-Facial Realignment Surgery in 2008  History of fasciotomy  Compartment syndrome, nontraumatic, lower extremity  Hypervolemia  Hyponatremia  Traumatic rhabdomyolysis, sequela  Bipolar 1 disorder  Compartment syndrome of left lower extremity, initial encounter  Compartment syndrome of thigh  Compartment syndrome of buttock  CKD (chronic kidney disease) stage 5, GFR less than 15 ml/min  Hypocalcemia  Hyperkalemia  ANTOINETTE (acute kidney injury)  Insertion, catheter, dialysis, temporary  Closure, wound, delayed  Fasciotomy, thigh  Decompression fasciotomy, lower extremity  Gluteal-iliotibial fasciotomy  Fasciotomy of left hip  Fasciotomy, thigh      MEDICATIONS:   --------------------------------------------------------------------------------------  Neurologic Medications  gabapentin 200 milliGRAM(s) Oral three times a day  HYDROmorphone   Tablet 4 milliGRAM(s) Oral every 3 hours PRN Moderate Pain (4 - 6)  HYDROmorphone   Tablet 6 milliGRAM(s) Oral every 3 hours PRN Severe Pain (7 - 10)  HYDROmorphone  Injectable 1 milliGRAM(s) IV Push every 6 hours PRN For PT and extensive movement only  LORazepam     Tablet 1 milliGRAM(s) Oral <User Schedule>  LORazepam     Tablet 2 milliGRAM(s) Oral <User Schedule>  QUEtiapine 25 milliGRAM(s) Oral at bedtime  tiZANidine 1 milliGRAM(s) Oral every 6 hours PRN muscle spasm and pain    Respiratory Medications    Cardiovascular Medications  niCARdipine 20 milliGRAM(s) Oral three times a day    Gastrointestinal Medications  polyethylene glycol 3350 17 Gram(s) Oral two times a day  senna 2 Tablet(s) Oral at bedtime  sodium chloride 0.9% lock flush 10 milliLiter(s) IV Push every 1 hour PRN Pre/post blood products, medications, blood draw, and to maintain line patency    Genitourinary Medications    Hematologic/Oncologic Medications  heparin   Injectable 5000 Unit(s) SubCutaneous every 8 hours    Antimicrobial/Immunologic Medications  ceFAZolin   IVPB 1000 milliGRAM(s) IV Intermittent every 24 hours  fluconAZOLE   Tablet 200 milliGRAM(s) Oral daily    Endocrine/Metabolic Medications    Topical/Other Medications  chlorhexidine 4% Liquid 1 Application(s) Topical <User Schedule>  sevelamer carbonate 800 milliGRAM(s) Oral three times a day with meals  sodium zirconium cyclosilicate 10 Gram(s) Oral daily    --------------------------------------------------------------------------------------    VITAL SIGNS, INS/OUTS (last 24 hours):  --------------------------------------------------------------------------------------  T(C): 37.6 (06-29-20 @ 00:00), Max: 37.6 (06-28-20 @ 04:00)  HR: 120 (06-29-20 @ 00:00) (104 - 126)  BP: 116/64 (06-29-20 @ 00:00) (115/75 - 155/86)  BP(mean): 74 (06-29-20 @ 00:00) (74 - 124)  ABP: --  ABP(mean): --  RR: 17 (06-29-20 @ 00:00) (9 - 20)  SpO2: 93% (06-29-20 @ 00:00) (90% - 100%)  Wt(kg): --  CVP(mm Hg): --  CI: --  CAPILLARY BLOOD GLUCOSE       N/A      06-27 @ 07:01  -  06-28 @ 07:00  --------------------------------------------------------  IN:    Oral Fluid: 580 mL  Total IN: 580 mL    OUT:    Drain: 35 mL    Drain: 25 mL    Drain: 35 mL    Voided: 4750 mL  Total OUT: 4845 mL    Total NET: -4265 mL      06-28 @ 07:01  -  06-29 @ 00:28  --------------------------------------------------------  IN:    Oral Fluid: 800 mL    Packed Red Blood Cells: 310 mL  Total IN: 1110 mL    OUT:    Drain: 20 mL    Drain: 5 mL    Drain: 5 mL    Voided: 2750 mL  Total OUT: 2780 mL    Total NET: -1670 mL  --------------------------------------------------------------------------------------    EXAM:  NEUROLOGY  Exam: Normal, NAD, alert, oriented x3, no focal deficits. PERRLA.   [x] Adequacy of sedation and pain control has been assessed and adjusted    RESPIRATORY  Exam: Lungs clear to auscultation, Normal expansion/effort.       CARDIOVASCULAR  Exam: S1, S2.  Regular rate and rhythm.        GI/NUTRITION  Exam: Abdomen soft, Non-tender, Non-distended.    Current Diet: Regular diet    METABOLIC/FLUIDS/ELECTROLYTES      HEMATOLOGIC  [x] DVT Prophylaxis: heparin   Injectable 5000 Unit(s) SubCutaneous every 8 hours    Transfusions:	[] PRBC	[] Platelets		[] FFP	[] Cryoprecipitate    INFECTIOUS DISEASE  Antimicrobials/Immunologic Medications:  ceFAZolin   IVPB 1000 milliGRAM(s) IV Intermittent every 24 hours  fluconAZOLE   Tablet 200 milliGRAM(s) Oral daily    Day #  of  ***    Tubes/Lines/Drains   [x] Peripheral IV  [] Central Venous Line     	[] R	[] L	[] IJ	[] Fem	[] SC	Date Placed:   [] Arterial Line		[] R	[] L	[] Fem	[] Rad	[] Ax	Date Placed:   [] PICC		[] Midline		[] Mediport  [] Urinary Catheter		Date Placed:   [x] Necessity of urinary, arterial, and venous catheters discussed    VASCULAR  Exam: Extremities warm, pink, well-perfused. LLE: Prevena vacs in place and holding suction, pitting edema surrounding superior lateral fasciotomy sites, MEÑO SS with serous drainage in superior MEÑO consistent with fat necrosis. No surrounding erythema.     MUSCULOSKELETAL  Exam: All extremities moving spontaneously without limitations.     SKIN  Exam: Good skin turgor, no skin breakdown.     LABS  --------------------------------------------------------------------------------------  CBC (06-28 @ 06:30)                              6.9<LL>                         12.38<H>  )----------------(  428<H>     --    % Neutrophils, --    % Lymphocytes, ANC: --                                  21.5<L>    BMP (06-28 @ 06:30)             138     |  99      |  92<H> 		Ca++ --      Ca 8.5                ---------------------------------( 103<H>		Mg 2.5                4.6     |  23      |  4.26<H>			Ph 7.2<H>      Coags (06-28 @ 06:30)  aPTT 30.4 / INR 1.08 / PT 12.3            -> .Urine Clean Catch (Midstream) Culture (06-23 @ 00:14)     NG    NG    <10,000 CFU/mL Normal Urogenital Karen    -> .Blood Blood-Venous Culture (06-22 @ 07:00)     NG    NG    No Growth Final    -> .Blood Blood-Venous Culture (06-20 @ 19:30)     NG    NG    No Growth Final      --------------------------------------------------------------------------------------    OTHER LABORATORY:     IMAGING STUDIES:   CXR: SICU Daily Progress Note  =====================================================  HPI:  29M with PMH of bipolar depression transferred from [facility] for further management of acute renal failure due to rhabdomyolysis. Pt was found unresponsive at home, lying on his L side. He states he has not been taking his Lithium for at least 2 wks. At the OSH, he was treated for hyperkalemia. He received D5, insulin, calcium gluconate, Kayexalate Last BM was on 6/9. For L leg pain he was given lidocaine patch, dilaudid 1mg prn and tramadol 50mg. Hydralazine 20mg given for HTN. From 1 to 7 am only 100cc of UOP. Urine was tea colored. 500cc bolus of NS was given. CT cervical spine at OSH was (-). CT Head (-). CTAP and CXR were (-). CK on admission there was 177,000.   Pt was seen by surgery team in MICU for LUE swelling and compartment syndrome was ruled out. Surgery team was re-consulted today due to new symptoms of L foot drop and numbness. Pt was found to be highly suspicious for compartment syndrome in the upper thigh, and was emergently taken to OR.   Pt is now s/p four compartment fasciotomy of LLE, and left lateral and medial thigh fasciotomies. SICU consulted for q1h neurovascular checks and hemodynamic monitoring. Now s/p closure.      Interval/Overnight Events:    -No acute events overnight.    HISTORY  29y Male  Allergies: No Known Allergies    PAST MEDICAL & SURGICAL HISTORY:  Bipolar depression  Migraine  Displaced fracture of proximal phalanx of left little finger  Difficulty sleeping  ADD (attention deficit disorder)  History of facial surgery: Fadi-Facial Realignment Surgery in 2008  History of fasciotomy  Compartment syndrome, nontraumatic, lower extremity  Hypervolemia  Hyponatremia  Traumatic rhabdomyolysis, sequela  Bipolar 1 disorder  Compartment syndrome of left lower extremity, initial encounter  Compartment syndrome of thigh  Compartment syndrome of buttock  CKD (chronic kidney disease) stage 5, GFR less than 15 ml/min  Hypocalcemia  Hyperkalemia  ANTOINETTE (acute kidney injury)  Insertion, catheter, dialysis, temporary  Closure, wound, delayed  Fasciotomy, thigh  Decompression fasciotomy, lower extremity  Gluteal-iliotibial fasciotomy  Fasciotomy of left hip  Fasciotomy, thigh      MEDICATIONS:   --------------------------------------------------------------------------------------  Neurologic Medications  gabapentin 200 milliGRAM(s) Oral three times a day  HYDROmorphone   Tablet 4 milliGRAM(s) Oral every 3 hours PRN Moderate Pain (4 - 6)  HYDROmorphone   Tablet 6 milliGRAM(s) Oral every 3 hours PRN Severe Pain (7 - 10)  HYDROmorphone  Injectable 1 milliGRAM(s) IV Push every 6 hours PRN For PT and extensive movement only  LORazepam     Tablet 1 milliGRAM(s) Oral <User Schedule>  LORazepam     Tablet 2 milliGRAM(s) Oral <User Schedule>  QUEtiapine 25 milliGRAM(s) Oral at bedtime  tiZANidine 1 milliGRAM(s) Oral every 6 hours PRN muscle spasm and pain    Respiratory Medications    Cardiovascular Medications  niCARdipine 20 milliGRAM(s) Oral three times a day    Gastrointestinal Medications  polyethylene glycol 3350 17 Gram(s) Oral two times a day  senna 2 Tablet(s) Oral at bedtime  sodium chloride 0.9% lock flush 10 milliLiter(s) IV Push every 1 hour PRN Pre/post blood products, medications, blood draw, and to maintain line patency    Genitourinary Medications    Hematologic/Oncologic Medications  heparin   Injectable 5000 Unit(s) SubCutaneous every 8 hours    Antimicrobial/Immunologic Medications  ceFAZolin   IVPB 1000 milliGRAM(s) IV Intermittent every 24 hours  fluconAZOLE   Tablet 200 milliGRAM(s) Oral daily    Endocrine/Metabolic Medications    Topical/Other Medications  chlorhexidine 4% Liquid 1 Application(s) Topical <User Schedule>  sevelamer carbonate 800 milliGRAM(s) Oral three times a day with meals  sodium zirconium cyclosilicate 10 Gram(s) Oral daily    --------------------------------------------------------------------------------------    VITAL SIGNS, INS/OUTS (last 24 hours):  --------------------------------------------------------------------------------------  T(C): 37.6 (06-29-20 @ 00:00), Max: 37.6 (06-28-20 @ 04:00)  HR: 120 (06-29-20 @ 00:00) (104 - 126)  BP: 116/64 (06-29-20 @ 00:00) (115/75 - 155/86)  BP(mean): 74 (06-29-20 @ 00:00) (74 - 124)  ABP: --  ABP(mean): --  RR: 17 (06-29-20 @ 00:00) (9 - 20)  SpO2: 93% (06-29-20 @ 00:00) (90% - 100%)  Wt(kg): --  CVP(mm Hg): --  CI: --  CAPILLARY BLOOD GLUCOSE       N/A      06-27 @ 07:01  -  06-28 @ 07:00  --------------------------------------------------------  IN:    Oral Fluid: 580 mL  Total IN: 580 mL    OUT:    Drain: 35 mL    Drain: 25 mL    Drain: 35 mL    Voided: 4750 mL  Total OUT: 4845 mL    Total NET: -4265 mL      06-28 @ 07:01  -  06-29 @ 00:28  --------------------------------------------------------  IN:    Oral Fluid: 800 mL    Packed Red Blood Cells: 310 mL  Total IN: 1110 mL    OUT:    Drain: 20 mL    Drain: 5 mL    Drain: 5 mL    Voided: 2750 mL  Total OUT: 2780 mL    Total NET: -1670 mL  --------------------------------------------------------------------------------------    EXAM:  NEUROLOGY  Exam: Normal, NAD, alert, oriented x3, no focal deficits. PERRLA.   [x] Adequacy of sedation and pain control has been assessed and adjusted    RESPIRATORY  Exam: Lungs clear to auscultation, Normal expansion/effort.       CARDIOVASCULAR  Exam: S1, S2.  Regular rate and rhythm.        GI/NUTRITION  Exam: Abdomen soft, Non-tender, Non-distended.    Current Diet: Regular diet    METABOLIC/FLUIDS/ELECTROLYTES      HEMATOLOGIC  [x] DVT Prophylaxis: heparin   Injectable 5000 Unit(s) SubCutaneous every 8 hours    Transfusions:	[] PRBC	[] Platelets		[] FFP	[] Cryoprecipitate    INFECTIOUS DISEASE  Antimicrobials/Immunologic Medications:  ceFAZolin   IVPB 1000 milliGRAM(s) IV Intermittent every 24 hours  fluconAZOLE   Tablet 200 milliGRAM(s) Oral daily        Tubes/Lines/Drains   [x] Peripheral IV  [] Central Venous Line     	[] R	[] L	[] IJ	[] Fem	[] SC	Date Placed:   [] Arterial Line		[] R	[] L	[] Fem	[] Rad	[] Ax	Date Placed:   [] PICC		[] Midline		[] Mediport  [] Urinary Catheter		Date Placed:   [x] Necessity of urinary, arterial, and venous catheters discussed    VASCULAR  Exam: Extremities warm, pink, well-perfused. LLE: edema surrounding superior lateral fasciotomy sites, Both MEÑO drains with SS ooutput. No surrounding erythema.     MUSCULOSKELETAL  Exam: All extremities moving spontaneously without limitations.     SKIN  Exam: Good skin turgor, no skin breakdown.     LABS  --------------------------------------------------------------------------------------  CBC (06-28 @ 06:30)                              6.9<LL>                         12.38<H>  )----------------(  428<H>     --    % Neutrophils, --    % Lymphocytes, ANC: --                                  21.5<L>    BMP (06-28 @ 06:30)             138     |  99      |  92<H> 		Ca++ --      Ca 8.5                ---------------------------------( 103<H>		Mg 2.5                4.6     |  23      |  4.26<H>			Ph 7.2<H>      Coags (06-28 @ 06:30)  aPTT 30.4 / INR 1.08 / PT 12.3            -> .Urine Clean Catch (Midstream) Culture (06-23 @ 00:14)     NG    NG    <10,000 CFU/mL Normal Urogenital Karen    -> .Blood Blood-Venous Culture (06-22 @ 07:00)     NG    NG    No Growth Final    -> .Blood Blood-Venous Culture (06-20 @ 19:30)     NG    NG    No Growth Final      --------------------------------------------------------------------------------------    OTHER LABORATORY:     IMAGING STUDIES:   CXR:

## 2020-06-29 NOTE — DISCHARGE NOTE PROVIDER - HOSPITAL COURSE
This patient is a 29 y M with PMH of bipolar depression transferred from [facility] for further management of acute renal failure due to rhabdomyolysis. Pt was found unresponsive at home, lying on his L side. He states he has not been taking his Lithium for at least 2 wks. At the OSH, he was treated for hyperkalemia. He received D5, insulin, calcium gluconate, Kayexalate Last BM was on 6/9. For L leg pain he was given lidocaine patch, dilaudid 1mg prn and tramadol 50mg. Hydralazine 20mg given for HTN. From 1 to 7 am only 100cc of UOP. Urine was tea colored. 500cc bolus of NS was given. CT cervical spine at OSH was (-). CT Head (-). CTAP and CXR were (-). CK on admission there was 177,000. This patient is a 29 y M with PMH of bipolar depression transferred from [facility] for further management of acute renal failure due to rhabdomyolysis. Pt was found unresponsive at home, lying on his L side. He states he has not been taking his Lithium for at least 2 wks. At the OSH, he was treated for hyperkalemia. He received D5, insulin, calcium gluconate, Kayexalate Last BM was on 6/9. For L leg pain he was given lidocaine patch, dilaudid 1mg prn and tramadol 50mg. Hydralazine 20mg given for HTN. From 1 to 7 am only 100cc of UOP. Urine was tea colored. 500cc bolus of NS was given. CT cervical spine at OSH was (-). CT Head (-). CTAP and CXR were (-). CK on admission there was 177,000.         6/10: Vascular surgery was consulted r/o compartment syndrome in LUE    Nephrology was consulted given oliguric ANTOINETTE in the setting of rhabdomyolysis--> temporary dialysis catheter was placed and patient was started on hemodialysis.         6/11: Vascular surgery was re-consulted for concern of compartment syndrome in the left lower extremity.    He was taken to the operating room and underwent four compartment fasciotomy of the left lower leg performed. All muscle appeared viable. Left lateral and medial thigh fasciotomies performed with bulging muscle along lateral site, but with viable muscle. Left gluteal fasciotomy performed. Pt tolerated procedure well, without complication, he was transferred to SICU postoperatively.     s/p hemodialysis session.         6/12:  Give Bumex 4 mg IV- no recovery of kidney function.     Behavioral health was consulted given bipolar history-- recommended to hold lithium as he was no longer on it; hold Adderall; started patient on xanax 1mg BID PO    6/13: Patient with worsening SOB, increasing oxygen requirements, hypertensive, and oliguric -- continued with intermittent hemodialysis.     6/14: Remained oliguric. CK downtrended.  Bumex 4 mg IV again. Continued with dialysis.     6/15: CK continued to downtrend. No hemodialysis needed.     6/18: Planned for permacath placement for continued dialysis need    6/19: Plastic surgery was consulted.     He was taken to the OR--> Fasciotomy sites to LLE primarily closed. Prevena incisional vac placed to all four sites, MEÑO drains x 5 left in place. Pt tolerated procedure well, without complication.    Transfused 1 pRBC for downtrending H/H    6/20 - 6/23: Continued on intermittent dialysis, with intermittent Bumex IV    6/23: signs of renal recovery, No dialysis. Febrile- started on antibiotics (diflucan and cefazolin)    CT abd/pelvis--No hydronephrosis. Asymmetric enlargement of the left gluteal musculature with thickening of the fascia, possibly myositis/rhabdomyolysis.    6/25: Ultrasound lower extremity--No collection identified in the left gluteal region.    6/24: Transfused 1pRBC    6/27: Urine output continued to improve. No need for dialysis since 6/23        He was evaluated by physical and occupational therapy prior to discharge and deemed stable for discharged with home services.         Pt currently ambulating with assistance, voiding, tolerating a regular diet, with pain well controlled on PO pain meds. Patient is stable for discharge home to follow up as an outpatient, instructed to call to schedule appointment. This patient is a 29 y M with PMH of bipolar depression transferred from [facility] for further management of acute renal failure due to rhabdomyolysis. Pt was found unresponsive at home, lying on his L side. He states he has not been taking his Lithium for at least 2 wks. At the OSH, he was treated for hyperkalemia. He received D5, insulin, calcium gluconate, Kayexalate Last BM was on 6/9. For L leg pain he was given lidocaine patch, dilaudid 1mg prn and tramadol 50mg. Hydralazine 20mg given for HTN. From 1 to 7 am only 100cc of UOP. Urine was tea colored. 500cc bolus of NS was given. CT cervical spine at OSH was (-). CT Head (-). CTAP and CXR were (-). CK on admission there was 177,000.         6/10: Vascular surgery was consulted r/o compartment syndrome in LUE    Nephrology was consulted given oliguric ANTOINETTE in the setting of rhabdomyolysis--> temporary dialysis catheter was placed and patient was started on hemodialysis.         6/11: Vascular surgery was re-consulted for concern of compartment syndrome in the left lower extremity.    He was taken to the operating room and underwent four compartment fasciotomy of the left lower leg performed. All muscle appeared viable. Left lateral and medial thigh fasciotomies performed with bulging muscle along lateral site, but with viable muscle. Left gluteal fasciotomy performed. Pt tolerated procedure well, without complication, he was transferred to SICU postoperatively.     s/p hemodialysis session.         6/12:  Give Bumex 4 mg IV- no recovery of kidney function.     Behavioral health was consulted given bipolar history-- recommended to hold lithium as he was no longer on it; hold Adderall; started patient on xanax 1mg BID PO    6/13: Patient with worsening SOB, increasing oxygen requirements, hypertensive, and oliguric -- continued with intermittent hemodialysis.     6/14: Remained oliguric. CK downtrended.  Bumex 4 mg IV again. Continued with dialysis.     6/15: CK continued to downtrend. No hemodialysis needed.     6/18: Planned for permacath placement for continued dialysis need    6/19: Plastic surgery was consulted.     He was taken to the OR--> Fasciotomy sites to LLE primarily closed. Prevena incisional vac placed to all four sites, MEÑO drains x 5 left in place. Pt tolerated procedure well, without complication.    Transfused 1 pRBC for downtrending H/H    6/20 - 6/23: Continued on intermittent dialysis, with intermittent Bumex IV    6/23: signs of renal recovery, No dialysis. Febrile- started on antibiotics (diflucan and cefazolin)    CT abd/pelvis--No hydronephrosis. Asymmetric enlargement of the left gluteal musculature with thickening of the fascia, possibly myositis/rhabdomyolysis.    6/25: Ultrasound lower extremity--No collection identified in the left gluteal region.    6/24: Transfused 1pRBC    6/27: Urine output continued to improve. No need for dialysis since 6/23        He was evaluated by physical and occupational therapy prior to discharge and deemed stable for discharged with home services.         Pt currently ambulating with assistance, voiding, tolerating a regular diet, with pain well controlled on PO pain meds. Patient is stable for discharge home to follow up as an outpatient, instructed to call to schedule appointment.         istop#: [id number]

## 2020-06-29 NOTE — PROGRESS NOTE ADULT - PROBLEM SELECTOR PLAN 3
Corrects for low serum albumin.  Can consider ionized calcium level if no improvement.  Further recommendations per course.
Corrects for low serum albumin.  Can consider ionized calcium level if no improvement.  Further recommendations per course.
I Adam Sauer MD have seen and examined the patient today and agree with  the  evaluation, assessment and plan of the surgical house officer
In the setting of ANTOINETTE, hypervolemia, and pain. sNa WNL at 135 today. Pt. on fluid restriction < 1L/day. Will UF with HD. Monitor serum sodium.
In the setting of ANTOINETTE, hypervolemia, and pain. sNa improved to 129 today. Serum osmolality 275 and uOsms 235. Pt. on fluid restriction < 1L/day. Monitor serum sodium.    If any questions, please feel free to contact me  Denys Lipscomb   Nephrology Fellow  [phone]  (After 5 pm or on weekends please page the on-call fellow)
In the setting of ANTOINETTE, hypervolemia, and pain. sNa improved to 131 today. Pt. on fluid restriction < 1L/day. Monitor serum sodium.    If any questions, please feel free to contact me  Denys Lipscomb   Nephrology Fellow  [phone]  (After 5 pm or on weekends please page the on-call fellow)
In the setting of ANTOINETTE, hypervolemia, and pain. sNa improved to 132 today. Pt. on fluid restriction < 1L/day. Monitor serum sodium.
In the setting of ANTOINETTE, hypervolemia, and pain. sNa stable at 130 today. Pt. on fluid restriction < 1L/day. Monitor serum sodium.
In the setting of ANTOINETTE, hypervolemia, and pain. sNa stable at 130 today. Pt. on fluid restriction < 1L/day. Will UF with HD. Monitor serum sodium.
Ionized calcium 0.98 in the setting of rhabdomyolysis. Replete with IV calcium. Monitor ionized calcium.
Pt. with hyperkalemia in the setting of surgical repair and ANTOINETTE. Serum potassium 3.7 today. Continue Lokelma 10 grams daily for now. Low potassium diet. Monitor serum potassium
Pt. with hyperkalemia in the setting of surgical repair and ANTOINETTE. Serum potassium 4.5 today. Continue Lokelma 10 grams daily for now. Low potassium diet. Monitor serum potassium
Serum calcium 8.5 (corrected) today after HD session with high calcium bath. Monitor serum calcium.    If any questions, please feel free to contact me  Denys Lipscomb   Nephrology Fellow  [phone]  (After 5 pm or on weekends please page the on-call fellow)
Serum calcium 8.5 (corrected) today after HD session with high calcium bath. Monitor serum calcium.    If any questions, please feel free to contact me  Denys Lipscomb   Nephrology Fellow  [phone]  (After 5 pm or on weekends please page the on-call fellow)
Serum calcuim 8.5 (corrected for albumin) in the setting of rhabdomyolysis. Will take for HD with 3 calcium bath. Monitor ionized calcium.
Serum calcuim 8.7 (corrected for albumin) in the setting of rhabdomyolysis.  Monitor ionized calcium.
Serum calcuim 8.7 (corrected for albumin) in the setting of rhabdomyolysis. Will take for HD with 3 calcium bath. Monitor ionized calcium.
I Adam Sauer MD have seen and examined the patient today and agree with  the  evaluation, assessment and plan of the surgical house officer
I dAam Sauer MD have seen and examined the patient today and agree with  the  evaluation, assessment and plan of the surgical house officer
I Adam Sauer MD have seen and examined the patient today and agree with  the  evaluation, assessment and plan of the surgical house officer

## 2020-06-29 NOTE — PROGRESS NOTE BEHAVIORAL HEALTH - CASE SUMMARY
Patient seen/evaluated in follow up with MS3 Cassie, agree with her above assessment/plan. Pt started on standing Ativan last night, reports feeling better today, less anxious and overwhelmed. States he gets more emotional when his parents visit his hospital room. Overall, pt is hopeful and future-oriented. Will follow.
Pt seen/evaluated in follow up today with Dr. Pool, agree with above history, MSE and A/P. Pt medically stable for discharge home today. Pt in good spirits. He should be discharged home with 1-month Rx for Seroquel 25mg HS and Ativan 2mg HS. Pt plans to f/u with a new outpatient psychiatrist with his parents' assistance.
Patient seen/evaluated in follow up with MS3 Cassie, agree with above. Pt/team report increased anxiety recently in setting of more tests and procedures. Will start pt on standing Ativan 1mg qam + 2mg qhs. C/w xanax prns. Holding lithium and adderall. Will follow.

## 2020-06-29 NOTE — PROGRESS NOTE ADULT - ATTENDING COMMENTS
I saw and examined the patient. I was physically present for the key portions of the evaluation and management (E/M) service provided.  I agree with the above history, physical, and plan which I have reviewed and edited where appropriate.    T79.6XXD Traumatic rhabdomyolysis, subsequent encounter   -postARF diuresis, urinating well, keeping up with output is keeping him hospitalized  Z98.890 History of fasciotomy   -healing well, plastics on board  -chronic pain following  -social work for substance abuse f/u and support     Petar Pulliam MD  Acute and Critical Care Surgery    The Acute Care Surgery (B Team) Attending Group Practice:  Dr. Pia Mena, Dr. Diogenes Amor, Dr. Petar Pulliam, and Dr. Ritchie Barnes    Urgent issues - spectra [id number] or [id number]  Nonurgent issues - [phone]  Patient appointments or after hours - [phone]

## 2020-06-30 PROBLEM — F31.9 BIPOLAR DISORDER, UNSPECIFIED: Chronic | Status: ACTIVE | Noted: 2020-06-10

## 2020-07-06 LAB
ALBUMIN SERPL ELPH-MCNC: 4.4 G/DL — SIGNIFICANT CHANGE UP (ref 3.3–5)
ALP SERPL-CCNC: 76 U/L — SIGNIFICANT CHANGE UP (ref 40–120)
ALT FLD-CCNC: 10 U/L — SIGNIFICANT CHANGE UP (ref 4–41)
ANION GAP SERPL CALC-SCNC: 16 MMO/L — HIGH (ref 7–14)
AST SERPL-CCNC: 14 U/L — SIGNIFICANT CHANGE UP (ref 4–40)
BASOPHILS # BLD AUTO: 0.16 K/UL — SIGNIFICANT CHANGE UP (ref 0–0.2)
BASOPHILS NFR BLD AUTO: 1.3 % — SIGNIFICANT CHANGE UP (ref 0–2)
BILIRUB SERPL-MCNC: 0.2 MG/DL — SIGNIFICANT CHANGE UP (ref 0.2–1.2)
BUN SERPL-MCNC: 34 MG/DL — HIGH (ref 7–23)
CALCIUM SERPL-MCNC: 9.9 MG/DL — SIGNIFICANT CHANGE UP (ref 8.4–10.5)
CHLORIDE SERPL-SCNC: 107 MMOL/L — SIGNIFICANT CHANGE UP (ref 98–107)
CO2 SERPL-SCNC: 20 MMOL/L — LOW (ref 22–31)
CREAT SERPL-MCNC: 1.41 MG/DL — HIGH (ref 0.5–1.3)
EOSINOPHIL # BLD AUTO: 0.16 K/UL — SIGNIFICANT CHANGE UP (ref 0–0.5)
EOSINOPHIL NFR BLD AUTO: 1.3 % — SIGNIFICANT CHANGE UP (ref 0–6)
GLUCOSE SERPL-MCNC: 130 MG/DL — HIGH (ref 70–99)
HCT VFR BLD CALC: 29.3 % — LOW (ref 39–50)
HGB BLD-MCNC: 9.3 G/DL — LOW (ref 13–17)
IMM GRANULOCYTES NFR BLD AUTO: 0.6 % — SIGNIFICANT CHANGE UP (ref 0–1.5)
LYMPHOCYTES # BLD AUTO: 17.3 % — SIGNIFICANT CHANGE UP (ref 13–44)
LYMPHOCYTES # BLD AUTO: 2.16 K/UL — SIGNIFICANT CHANGE UP (ref 1–3.3)
MCHC RBC-ENTMCNC: 27.4 PG — SIGNIFICANT CHANGE UP (ref 27–34)
MCHC RBC-ENTMCNC: 31.7 % — LOW (ref 32–36)
MCV RBC AUTO: 86.2 FL — SIGNIFICANT CHANGE UP (ref 80–100)
MONOCYTES # BLD AUTO: 0.78 K/UL — SIGNIFICANT CHANGE UP (ref 0–0.9)
MONOCYTES NFR BLD AUTO: 6.3 % — SIGNIFICANT CHANGE UP (ref 2–14)
NEUTROPHILS # BLD AUTO: 9.14 K/UL — HIGH (ref 1.8–7.4)
NEUTROPHILS NFR BLD AUTO: 73.2 % — SIGNIFICANT CHANGE UP (ref 43–77)
NRBC # FLD: 0 K/UL — SIGNIFICANT CHANGE UP (ref 0–0)
PLATELET # BLD AUTO: 595 K/UL — HIGH (ref 150–400)
PMV BLD: 9.2 FL — SIGNIFICANT CHANGE UP (ref 7–13)
POTASSIUM SERPL-MCNC: 4 MMOL/L — SIGNIFICANT CHANGE UP (ref 3.5–5.3)
POTASSIUM SERPL-SCNC: 4 MMOL/L — SIGNIFICANT CHANGE UP (ref 3.5–5.3)
PROT SERPL-MCNC: 7.4 G/DL — SIGNIFICANT CHANGE UP (ref 6–8.3)
RBC # BLD: 3.4 M/UL — LOW (ref 4.2–5.8)
RBC # FLD: 13.2 % — SIGNIFICANT CHANGE UP (ref 10.3–14.5)
SODIUM SERPL-SCNC: 143 MMOL/L — SIGNIFICANT CHANGE UP (ref 135–145)
WBC # BLD: 12.48 K/UL — HIGH (ref 3.8–10.5)
WBC # FLD AUTO: 12.48 K/UL — HIGH (ref 3.8–10.5)

## 2020-07-07 ENCOUNTER — APPOINTMENT (OUTPATIENT)
Dept: VASCULAR SURGERY | Facility: CLINIC | Age: 30
End: 2020-07-07
Payer: COMMERCIAL

## 2020-07-07 VITALS
HEART RATE: 114 BPM | HEIGHT: 71 IN | TEMPERATURE: 98.2 F | WEIGHT: 175 LBS | SYSTOLIC BLOOD PRESSURE: 165 MMHG | DIASTOLIC BLOOD PRESSURE: 130 MMHG | BODY MASS INDEX: 24.5 KG/M2

## 2020-07-07 PROCEDURE — 99024 POSTOP FOLLOW-UP VISIT: CPT

## 2020-07-07 RX ORDER — DEXAMETHASONE 4 MG/1
TABLET ORAL
Refills: 0 | Status: ACTIVE | COMMUNITY

## 2020-07-07 RX ORDER — ALPRAZOLAM 2 MG/1
TABLET ORAL
Refills: 0 | Status: ACTIVE | COMMUNITY

## 2020-07-07 RX ORDER — ACETAMINOPHEN 160 MG/1
160 TABLET, CHEWABLE ORAL
Refills: 0 | Status: ACTIVE | COMMUNITY

## 2020-07-07 RX ORDER — ACETAMINOPHEN 325 MG/1
TABLET, FILM COATED ORAL
Refills: 0 | Status: ACTIVE | COMMUNITY

## 2020-07-07 NOTE — REASON FOR VISIT
[FreeTextEntry1] : s/p lle and gluteal fasciotomy for lle neurogenic and arterial compartment syndrome

## 2020-07-07 NOTE — PHYSICAL EXAM
[JVD] : no jugular venous distention  [1+] : right 1+ [Ankle Swelling On The Left] : of the left ankle [2+] : left 2+ [Ankle Swelling (On Exam)] : present [Ankle Swelling On The Right] : mild [Abdomen Masses] : No abdominal masses [No HSM] : no hepatosplenomegaly [Tender] : was nontender [Stool Sample Taken] : No stool obtained  on rectal exam [No Rash or Lesion] : No rash or lesion [Oriented to Person] : oriented to person [Oriented to Place] : oriented to place [Alert] : alert [de-identified] : nad [Oriented to Time] : oriented to time [Calm] : calm [de-identified] : wnl [FreeTextEntry1] : lle incisions healing\par jonn le no art insuff\par jonn le warm well perfused \par slight left foot drop\par motor 4/5 plantar and dorsiflexion  [de-identified] : wnl [de-identified] : cooperative [de-identified] : as above  [de-identified] : Keanu Cranial nerves 2-12 keanu grossly intact

## 2020-07-07 NOTE — ASSESSMENT
[Arterial/Venous Disease] : arterial/venous disease [FreeTextEntry1] : Impression clinically improving w ongoing pain c/o \par \par \par Plan Med Conservative management leg elevation prn\par continue gabapentin prn \par continue pt/ot \par f/u w neurologist for  further neurologist eval\par f/u prn w pain management Dr Colvin\par f/u w plastics \par f/u w renal \par f/u w  internist re indications for liver  transaminases f/u \par ov  to re eval 2-3 mo\par rto prn \par  [Medication Management] : medication management

## 2020-07-07 NOTE — HISTORY OF PRESENT ILLNESS
[FreeTextEntry1] : pt is s/o lle and lt gluteal fasciotomy  for lle arterial and neurogenic compartment syndrome\par pt is at home receiving home pt/ot\par pt states that he ambulates w crutches but cannot stand independently\par pt is f/u w plastics\par pt states constant left foot pain  worse román w wt bearing\par gabapentin is helping to some degree but pain is constant \par pt denies fevers chills or any other constitutional c/o

## 2020-07-09 ENCOUNTER — APPOINTMENT (OUTPATIENT)
Dept: NEPHROLOGY | Facility: CLINIC | Age: 30
End: 2020-07-09
Payer: COMMERCIAL

## 2020-07-09 VITALS
OXYGEN SATURATION: 99 % | BODY MASS INDEX: 24.41 KG/M2 | DIASTOLIC BLOOD PRESSURE: 112 MMHG | SYSTOLIC BLOOD PRESSURE: 152 MMHG | WEIGHT: 175 LBS | HEART RATE: 106 BPM

## 2020-07-09 VITALS — DIASTOLIC BLOOD PRESSURE: 115 MMHG | SYSTOLIC BLOOD PRESSURE: 154 MMHG

## 2020-07-09 DIAGNOSIS — Z86.59 PERSONAL HISTORY OF OTHER MENTAL AND BEHAVIORAL DISORDERS: ICD-10-CM

## 2020-07-09 DIAGNOSIS — Z78.9 OTHER SPECIFIED HEALTH STATUS: ICD-10-CM

## 2020-07-09 DIAGNOSIS — Z87.39 PERSONAL HISTORY OF OTHER DISEASES OF THE MUSCULOSKELETAL SYSTEM AND CONNECTIVE TISSUE: ICD-10-CM

## 2020-07-09 PROCEDURE — 99214 OFFICE O/P EST MOD 30 MIN: CPT

## 2020-07-09 RX ORDER — AMLODIPINE BESYLATE 10 MG/1
10 TABLET ORAL DAILY
Qty: 30 | Refills: 3 | Status: ACTIVE | COMMUNITY
Start: 2020-07-09 | End: 1900-01-01

## 2020-07-09 RX ORDER — CEPHALEXIN 500 MG/1
500 CAPSULE ORAL
Qty: 28 | Refills: 0 | Status: ACTIVE | COMMUNITY
Start: 2020-06-29

## 2020-07-09 RX ORDER — GABAPENTIN 600 MG/1
600 TABLET, COATED ORAL EVERY 8 HOURS
Qty: 45 | Refills: 0 | Status: ACTIVE | COMMUNITY
Start: 1900-01-01 | End: 1900-01-01

## 2020-07-09 RX ORDER — NICARDIPINE HYDROCHLORIDE 20 MG/1
20 CAPSULE ORAL 3 TIMES DAILY
Qty: 90 | Refills: 0 | Status: ACTIVE | COMMUNITY
Start: 2020-07-09 | End: 1900-01-01

## 2020-07-09 RX ORDER — SEVELAMER CARBONATE 800 MG/1
800 TABLET, FILM COATED ORAL
Qty: 21 | Refills: 0 | Status: ACTIVE | COMMUNITY
Start: 2020-06-29

## 2020-07-09 NOTE — REVIEW OF SYSTEMS
[Fever] : no fever [Feeling Tired] : not feeling tired [Chills] : no chills [Feeling Poorly] : not feeling poorly [Eyesight Problems] : no eyesight problems [Nosebleeds] : no nosebleeds [Shortness Of Breath] : no shortness of breath [Lower Ext Edema] : no extremity edema [Chest Pain] : no chest pain [Wheezing] : no wheezing [Vomiting] : no vomiting [Abdominal Pain] : no abdominal pain [As Noted in HPI] : as noted in HPI [Arthralgias] : no arthralgias [Dizziness] : no dizziness [Joint Pain] : no joint pain [Fainting] : no fainting [Depression] : no depression [Anxiety] : no anxiety [Easy Bleeding] : no tendency for easy bleeding [Easy Bruising] : no tendency for easy bruising [FreeTextEntry9] : left lower limb pain

## 2020-07-09 NOTE — ASSESSMENT
[FreeTextEntry1] : 29 years old man with history of bipolar depression seen in follow up after an episode of rhabdomyolysis causing ATN \par \par Acute Tubular Necrosis -- Latest serum creatinine was 1.4 representing an overall improvement.  Likely in the polyuric phase of ATN recovery.  Overall the patient's kidneys seem to be recovering nicely however it is too soon to tell if there will be residual chronic kidney disease from this episode.  Rhabdo, especially in recovery phase, can be associated with abnormalities of calcium and phosphorus and therefore will  repeat blood work today.  We spoke at length today about strategies to optimize protection of kidney function.  He should avoid NSAIDs, keep a heart healthy diet, notify me for medication changes.  If blood work stable upon repeat this week we will follow up his renal function in six months and then yearly.  Increasing gabapentin to 300 TID is acceptable for his eGFR.\par \par Hypertension -- Likely related to pain with a contribution from ANTOINETTE.  I asked him to switch from nicardipine to amlodipine 10 mg daily.  He will check his BP daily and call my office monday with his blood pressure readings.  He should also see pain management.

## 2020-07-09 NOTE — PHYSICAL EXAM
[General Appearance - In No Acute Distress] : in no acute distress [General Appearance - Alert] : alert [General Appearance - Well Developed] : well developed [General Appearance - Well Nourished] : well nourished [Nasal Cavity] : the nasal mucosa and septum were normal [Hearing Threshold Finger Rub Not Drew] : hearing was normal [Sclera] : the sclera and conjunctiva were normal [Neck Appearance] : the appearance of the neck was normal [Neck Cervical Mass (___cm)] : no neck mass was observed [Respiration, Rhythm And Depth] : normal respiratory rhythm and effort [] : no respiratory distress [Jugular Venous Distention Increased] : there was no jugular-venous distention [Exaggerated Use Of Accessory Muscles For Inspiration] : no accessory muscle use [Auscultation Breath Sounds / Voice Sounds] : lungs were clear to auscultation bilaterally [Heart Sounds] : normal S1 and S2 [Heart Sounds Gallop] : no gallops [Edema] : there was no peripheral edema [FreeTextEntry1] : ambulates slowly [Oriented To Time, Place, And Person] : oriented to person, place, and time

## 2020-07-09 NOTE — HISTORY OF PRESENT ILLNESS
[FreeTextEntry1] : Today I had the pleasure of seeing Colin Camacho in the office.  I previously met him at [facility] where he was admitted with rhabdomyolysis after being found down.  He was s/p fasciotomy.  He and oliguric renal failure with hypervolemia requiring hemodialysis.  Last HD session was 6/27.  Creatinine following discharge was 1.4 earlier this week.  Since being home the patient's blood pressure has remained high.  He was off nicardipine but restarted it yesterday.  He has been urinating a lot and he has had terrible pain in his leg that he describes as sharp and burning in nature.  He has no chest pain and no dyspnea.

## 2020-07-10 LAB
25(OH)D3 SERPL-MCNC: 13.7 NG/ML
ALBUMIN SERPL ELPH-MCNC: 5.2 G/DL
ANION GAP SERPL CALC-SCNC: 21 MMOL/L
APPEARANCE: CLEAR
BACTERIA: NEGATIVE
BASOPHILS # BLD AUTO: 0.16 K/UL
BASOPHILS NFR BLD AUTO: 1.4 %
BILIRUBIN URINE: NEGATIVE
BLOOD URINE: NEGATIVE
BUN SERPL-MCNC: 23 MG/DL
CALCIUM SERPL-MCNC: 10.4 MG/DL
CALCIUM SERPL-MCNC: 10.4 MG/DL
CHLORIDE SERPL-SCNC: 104 MMOL/L
CHOLEST SERPL-MCNC: 169 MG/DL
CHOLEST/HDLC SERPL: 6.1 RATIO
CO2 SERPL-SCNC: 18 MMOL/L
COLOR: NORMAL
CREAT SERPL-MCNC: 1.14 MG/DL
CREAT SPEC-SCNC: 80 MG/DL
CREAT/PROT UR: 0.2 RATIO
EOSINOPHIL # BLD AUTO: 0.09 K/UL
EOSINOPHIL NFR BLD AUTO: 0.8 %
ESTIMATED AVERAGE GLUCOSE: 94 MG/DL
FERRITIN SERPL-MCNC: 620 NG/ML
GLUCOSE QUALITATIVE U: NEGATIVE
GLUCOSE SERPL-MCNC: 101 MG/DL
HBA1C MFR BLD HPLC: 4.9 %
HCT VFR BLD CALC: 34.8 %
HDLC SERPL-MCNC: 28 MG/DL
HGB BLD-MCNC: 10.5 G/DL
HYALINE CASTS: 1 /LPF
IMM GRANULOCYTES NFR BLD AUTO: 0.6 %
IRON SATN MFR SERPL: 25 %
IRON SERPL-MCNC: 96 UG/DL
KETONES URINE: NEGATIVE
LDLC SERPL CALC-MCNC: 99 MG/DL
LEUKOCYTE ESTERASE URINE: NEGATIVE
LYMPHOCYTES # BLD AUTO: 2.73 K/UL
LYMPHOCYTES NFR BLD AUTO: 23.8 %
MAN DIFF?: NORMAL
MCHC RBC-ENTMCNC: 27.2 PG
MCHC RBC-ENTMCNC: 30.2 GM/DL
MCV RBC AUTO: 90.2 FL
MICROSCOPIC-UA: NORMAL
MONOCYTES # BLD AUTO: 0.88 K/UL
MONOCYTES NFR BLD AUTO: 7.7 %
NEUTROPHILS # BLD AUTO: 7.55 K/UL
NEUTROPHILS NFR BLD AUTO: 65.7 %
NITRITE URINE: NEGATIVE
PARATHYROID HORMONE INTACT: 15 PG/ML
PH URINE: 7
PHOSPHATE SERPL-MCNC: 4.9 MG/DL
PLATELET # BLD AUTO: 714 K/UL
POTASSIUM SERPL-SCNC: 4.6 MMOL/L
PROT UR-MCNC: 19 MG/DL
PROTEIN URINE: NORMAL
RBC # BLD: 3.86 M/UL
RBC # FLD: 13.2 %
RED BLOOD CELLS URINE: 2 /HPF
SODIUM SERPL-SCNC: 143 MMOL/L
SPECIFIC GRAVITY URINE: 1.02
SQUAMOUS EPITHELIAL CELLS: 1 /HPF
TIBC SERPL-MCNC: 384 UG/DL
TRIGL SERPL-MCNC: 210 MG/DL
UIBC SERPL-MCNC: 288 UG/DL
UROBILINOGEN URINE: NORMAL
WBC # FLD AUTO: 11.48 K/UL
WHITE BLOOD CELLS URINE: 5 /HPF

## 2020-07-16 RX ORDER — VALSARTAN 160 MG/1
160 TABLET, COATED ORAL
Qty: 30 | Refills: 2 | Status: ACTIVE | COMMUNITY
Start: 2020-07-16 | End: 1900-01-01

## 2020-08-10 ENCOUNTER — RX RENEWAL (OUTPATIENT)
Age: 30
End: 2020-08-10

## 2020-10-01 ENCOUNTER — EMERGENCY (EMERGENCY)
Facility: HOSPITAL | Age: 30
LOS: 1 days | Discharge: HOME | End: 2020-10-01
Attending: EMERGENCY MEDICINE
Payer: COMMERCIAL

## 2020-10-01 VITALS — HEIGHT: 71 IN

## 2020-10-01 DIAGNOSIS — Z98.890 OTHER SPECIFIED POSTPROCEDURAL STATES: Chronic | ICD-10-CM

## 2020-10-01 LAB
ALBUMIN SERPL ELPH-MCNC: 4.9 G/DL — SIGNIFICANT CHANGE UP (ref 3.3–5)
ALP SERPL-CCNC: 70 U/L — SIGNIFICANT CHANGE UP (ref 40–120)
ALT FLD-CCNC: 50 U/L — HIGH (ref 10–45)
ANION GAP SERPL CALC-SCNC: 12 MMOL/L — SIGNIFICANT CHANGE UP (ref 5–17)
ANION GAP SERPL CALC-SCNC: 17 MMOL/L — SIGNIFICANT CHANGE UP (ref 5–17)
AST SERPL-CCNC: 36 U/L — SIGNIFICANT CHANGE UP (ref 10–40)
BASOPHILS # BLD AUTO: 0.08 K/UL — SIGNIFICANT CHANGE UP (ref 0–0.2)
BASOPHILS NFR BLD AUTO: 0.7 % — SIGNIFICANT CHANGE UP (ref 0–2)
BILIRUB SERPL-MCNC: 0.4 MG/DL — SIGNIFICANT CHANGE UP (ref 0.2–1.2)
BUN SERPL-MCNC: 19 MG/DL — SIGNIFICANT CHANGE UP (ref 7–23)
BUN SERPL-MCNC: 20 MG/DL — SIGNIFICANT CHANGE UP (ref 7–23)
CALCIUM SERPL-MCNC: 8.5 MG/DL — SIGNIFICANT CHANGE UP (ref 8.4–10.5)
CALCIUM SERPL-MCNC: 9 MG/DL — SIGNIFICANT CHANGE UP (ref 8.4–10.5)
CHLORIDE SERPL-SCNC: 104 MMOL/L — SIGNIFICANT CHANGE UP (ref 96–108)
CHLORIDE SERPL-SCNC: 106 MMOL/L — SIGNIFICANT CHANGE UP (ref 96–108)
CK SERPL-CCNC: 69 U/L — SIGNIFICANT CHANGE UP (ref 30–200)
CO2 SERPL-SCNC: 22 MMOL/L — SIGNIFICANT CHANGE UP (ref 22–31)
CO2 SERPL-SCNC: 22 MMOL/L — SIGNIFICANT CHANGE UP (ref 22–31)
CREAT SERPL-MCNC: 1.09 MG/DL — SIGNIFICANT CHANGE UP (ref 0.5–1.3)
CREAT SERPL-MCNC: 1.34 MG/DL — HIGH (ref 0.5–1.3)
EOSINOPHIL # BLD AUTO: 0.14 K/UL — SIGNIFICANT CHANGE UP (ref 0–0.5)
EOSINOPHIL NFR BLD AUTO: 1.1 % — SIGNIFICANT CHANGE UP (ref 0–6)
ETHANOL SERPL-MCNC: SIGNIFICANT CHANGE UP MG/DL (ref 0–10)
GAS PNL BLDV: SIGNIFICANT CHANGE UP
GAS PNL BLDV: SIGNIFICANT CHANGE UP
GLUCOSE SERPL-MCNC: 109 MG/DL — HIGH (ref 70–99)
GLUCOSE SERPL-MCNC: 181 MG/DL — HIGH (ref 70–99)
HCT VFR BLD CALC: 43.4 % — SIGNIFICANT CHANGE UP (ref 39–50)
HGB BLD-MCNC: 13.8 G/DL — SIGNIFICANT CHANGE UP (ref 13–17)
IMM GRANULOCYTES NFR BLD AUTO: 0.6 % — SIGNIFICANT CHANGE UP (ref 0–1.5)
LYMPHOCYTES # BLD AUTO: 27.7 % — SIGNIFICANT CHANGE UP (ref 13–44)
LYMPHOCYTES # BLD AUTO: 3.39 K/UL — HIGH (ref 1–3.3)
MCHC RBC-ENTMCNC: 27.5 PG — SIGNIFICANT CHANGE UP (ref 27–34)
MCHC RBC-ENTMCNC: 31.8 GM/DL — LOW (ref 32–36)
MCV RBC AUTO: 86.5 FL — SIGNIFICANT CHANGE UP (ref 80–100)
MONOCYTES # BLD AUTO: 0.86 K/UL — SIGNIFICANT CHANGE UP (ref 0–0.9)
MONOCYTES NFR BLD AUTO: 7 % — SIGNIFICANT CHANGE UP (ref 2–14)
NEUTROPHILS # BLD AUTO: 7.72 K/UL — HIGH (ref 1.8–7.4)
NEUTROPHILS NFR BLD AUTO: 62.9 % — SIGNIFICANT CHANGE UP (ref 43–77)
NRBC # BLD: 0 /100 WBCS — SIGNIFICANT CHANGE UP (ref 0–0)
PLATELET # BLD AUTO: 412 K/UL — HIGH (ref 150–400)
POTASSIUM SERPL-MCNC: 3.8 MMOL/L — SIGNIFICANT CHANGE UP (ref 3.5–5.3)
POTASSIUM SERPL-MCNC: 4.6 MMOL/L — SIGNIFICANT CHANGE UP (ref 3.5–5.3)
POTASSIUM SERPL-SCNC: 3.8 MMOL/L — SIGNIFICANT CHANGE UP (ref 3.5–5.3)
POTASSIUM SERPL-SCNC: 4.6 MMOL/L — SIGNIFICANT CHANGE UP (ref 3.5–5.3)
PROT SERPL-MCNC: 7.3 G/DL — SIGNIFICANT CHANGE UP (ref 6–8.3)
RBC # BLD: 5.02 M/UL — SIGNIFICANT CHANGE UP (ref 4.2–5.8)
RBC # FLD: 13.2 % — SIGNIFICANT CHANGE UP (ref 10.3–14.5)
SODIUM SERPL-SCNC: 140 MMOL/L — SIGNIFICANT CHANGE UP (ref 135–145)
SODIUM SERPL-SCNC: 143 MMOL/L — SIGNIFICANT CHANGE UP (ref 135–145)
WBC # BLD: 12.26 K/UL — HIGH (ref 3.8–10.5)
WBC # FLD AUTO: 12.26 K/UL — HIGH (ref 3.8–10.5)

## 2020-10-01 PROCEDURE — 82435 ASSAY OF BLOOD CHLORIDE: CPT

## 2020-10-01 PROCEDURE — 83605 ASSAY OF LACTIC ACID: CPT

## 2020-10-01 PROCEDURE — 85018 HEMOGLOBIN: CPT

## 2020-10-01 PROCEDURE — 80048 BASIC METABOLIC PNL TOTAL CA: CPT

## 2020-10-01 PROCEDURE — 84132 ASSAY OF SERUM POTASSIUM: CPT

## 2020-10-01 PROCEDURE — 82550 ASSAY OF CK (CPK): CPT

## 2020-10-01 PROCEDURE — 84295 ASSAY OF SERUM SODIUM: CPT

## 2020-10-01 PROCEDURE — 82803 BLOOD GASES ANY COMBINATION: CPT

## 2020-10-01 PROCEDURE — 85025 COMPLETE CBC W/AUTO DIFF WBC: CPT

## 2020-10-01 PROCEDURE — 85014 HEMATOCRIT: CPT

## 2020-10-01 PROCEDURE — 82947 ASSAY GLUCOSE BLOOD QUANT: CPT

## 2020-10-01 PROCEDURE — 80307 DRUG TEST PRSMV CHEM ANLYZR: CPT

## 2020-10-01 PROCEDURE — 80053 COMPREHEN METABOLIC PANEL: CPT

## 2020-10-01 PROCEDURE — 99285 EMERGENCY DEPT VISIT HI MDM: CPT

## 2020-10-01 PROCEDURE — 93005 ELECTROCARDIOGRAM TRACING: CPT

## 2020-10-01 PROCEDURE — 93010 ELECTROCARDIOGRAM REPORT: CPT | Mod: NC

## 2020-10-01 PROCEDURE — 82330 ASSAY OF CALCIUM: CPT

## 2020-10-01 RX ORDER — SODIUM CHLORIDE 9 MG/ML
1000 INJECTION INTRAMUSCULAR; INTRAVENOUS; SUBCUTANEOUS ONCE
Refills: 0 | Status: COMPLETED | OUTPATIENT
Start: 2020-10-01 | End: 2020-10-01

## 2020-10-01 RX ADMIN — SODIUM CHLORIDE 1000 MILLILITER(S): 9 INJECTION INTRAMUSCULAR; INTRAVENOUS; SUBCUTANEOUS at 21:14

## 2020-10-01 NOTE — ED PROVIDER NOTE - NS ED ROS FT
General: denies fever, chills  HENT: denies nasal congestion, rhinorrhea  Eyes: denies visual changes, blurred vision  CV: denies chest pain, palpitations  Resp: denies difficulty breathing, cough  Abdominal: denies nausea, vomiting, diarrhea, abdominal pain  MSK: denies muscle aches, leg swelling  Neuro: denies headaches, numbness, tingling  Skin: denies rashes, bruises  Pysch: denies HI/SI  Heme: denies petechia, abnormal bleeding

## 2020-10-01 NOTE — ED PROVIDER NOTE - ATTENDING CONTRIBUTION TO CARE
Attending MD Natalee Kohli:  I personally have seen and examined this patient.  Resident note reviewed and agree on plan of care and except where noted.  See HPI, PE, and MDM for details.
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
## 2020-10-01 NOTE — ED PROVIDER NOTE - PHYSICAL EXAMINATION
CONSTITUTIONAL: NAD, awake, alert  HEAD: Normocephalic; atraumatic  ENMT: External appears normal, MMM  NECK: no tenderness, FROM  CARD: Normal Sl, S2; no audible murmurs  RESP: normal wob, lungs ctab  ABD: soft, non-distended; non-tender  MSK: no edema, normal ROM in all four extremities  SKIN: Warm, dry, no rashes  NEURO: aaox3, moving all extremities spontaneously CONSTITUTIONAL: NAD, awake, alert  HEAD: Normocephalic; atraumatic  ENMT: External appears normal, MMM  NECK: no tenderness, FROM  CARD: Normal Sl, S2; no audible murmurs  RESP: normal wob, lungs ctab  ABD: soft, non-distended; non-tender  MSK: no edema, normal ROM in all four extremities  SKIN: Warm, dry, no rashes  NEURO: aaox3, moving all extremities spontaneously  Attending Natalee Kohli: Gen: awake and following command, heent: pin point pupils. mmm, neck: nttp, supple, full rom, cv: rrr, lung ctab, abd: soft, nontender, ext: wwp, surgical scar to left leg, neuro: awake and alert following commands, is sleepy but easily arousable. no clonus, no tremors. skin dry.

## 2020-10-01 NOTE — ED PROVIDER NOTE - NSFOLLOWUPINSTRUCTIONS_ED_ALL_ED_FT
You were seen an evaluated in the emergency room for lethargy likely due to an overdose of medications.    Please follow up with your primary care provider in the next few days.    Do not take more oxycodone than prescribed.    Seek immediate medical attention for any worsening, new, or concerning symptoms.    Please read all attached.
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
## 2020-10-01 NOTE — ED PROVIDER NOTE - PATIENT PORTAL LINK FT
You can access the FollowMyHealth Patient Portal offered by [facility] by registering at the following website: [email or web address]. By joining [facility]’s FollowMyHealth portal, you will also be able to view your health information using other applications (apps) compatible with our system.

## 2020-10-01 NOTE — ED PROVIDER NOTE - PROGRESS NOTE DETAILS
Attending Natalee Kohli: HR improving, currently 83. pt continues to be easily arousable Karoline Wilcox, resident MD: pt was signed out to me and arrived with concerns for oxycodone overdose with good response to narcan with bloodwork wnl. will continue to monitor on etCO2 for rebound symptoms. Attending MD Tao.  Pt signed out to me in stable condition pending PO challenge, reconfirm no SI/HI and dispo accordingly.  Rpt labs, 29 yo male with hx of drug abuse, hx of compartment syndrome 2/2 being down previously, pt states he snorted 1.5 tabs oxycodone, ANTOINETTE resolved, lactate resolved, pt denies any SI/HI, plan to PO challenge and d/c. Attending MD Tao.  Pt passed PO challenge, is not nauseas, denies SI/HI.  Pt requests a moment before he eats his sandwich. Karoline Wilcox, resident MD: pt was able to tolerate PO without difficulty. denies SI/HI. pt feels comfortable being discharged at this time and updated pt's parents with his request who will come pick him up. printed out copies of results for patient to take home. Attending MD Tao.  Pt continues to deny SI/HI.  Pt's mother called by Dr. Wilcox and advised of pt's clinical improvement and planned d/c.  She states that they will pick him up.  pt educated re: need to abstain from opiate use/abuse in the future and return to ED for any acutely new/worsening sxs including desire for assistance with abstinence or should he develop any SI/HI.  Stable for discharge home.

## 2020-10-01 NOTE — ED PROVIDER NOTE - CLINICAL SUMMARY MEDICAL DECISION MAKING FREE TEXT BOX
30M s/p opiate OD, improved w/ narcan, now at baseline mental status, no somatic complaints currently, placed on end tidal, will monitor for respiratory depression, patient agrees to allow discussion of care w/ father 30M s/p opiate OD, improved w/ narcan, now at baseline mental status, no somatic complaints currently, placed on end tidal, will monitor for respiratory depression, patient agrees to allow discussion of care w/ father  Attending Natalee Kohli: 29 y/o male presenting with opiate use. pt reportedly snorted oxycodone and given narcan by ems. upon arrival to the ed pt placed on end tidal. responsive to stimuli and protecting airway. denies any SI or HI. d/w pt concern with opiate use and risk for mortality with overdose. consider having narcan kit. will monitor in the ed and re-eval. denies any other substabnce use. no reports of trauma or injury

## 2020-10-01 NOTE — ED ADULT NURSE NOTE - PMH
ADD (attention deficit disorder)    Bipolar depression    Difficulty sleeping    Displaced fracture of proximal phalanx of left little finger    Migraine

## 2020-10-02 ENCOUNTER — EMERGENCY (EMERGENCY)
Facility: HOSPITAL | Age: 30
LOS: 1 days | Discharge: HOME | End: 2020-10-02
Attending: EMERGENCY MEDICINE
Payer: COMMERCIAL

## 2020-10-02 VITALS
OXYGEN SATURATION: 100 % | WEIGHT: 179.9 LBS | RESPIRATION RATE: 18 BRPM | TEMPERATURE: 98 F | HEIGHT: 71 IN | HEART RATE: 107 BPM | DIASTOLIC BLOOD PRESSURE: 99 MMHG | SYSTOLIC BLOOD PRESSURE: 149 MMHG

## 2020-10-02 VITALS
RESPIRATION RATE: 12 BRPM | OXYGEN SATURATION: 99 % | DIASTOLIC BLOOD PRESSURE: 68 MMHG | HEART RATE: 78 BPM | TEMPERATURE: 98 F | SYSTOLIC BLOOD PRESSURE: 114 MMHG

## 2020-10-02 DIAGNOSIS — Z98.890 OTHER SPECIFIED POSTPROCEDURAL STATES: Chronic | ICD-10-CM

## 2020-10-02 LAB
ALBUMIN SERPL ELPH-MCNC: 4.8 G/DL — SIGNIFICANT CHANGE UP (ref 3.3–5)
ALP SERPL-CCNC: 61 U/L — SIGNIFICANT CHANGE UP (ref 40–120)
ALT FLD-CCNC: 35 U/L — SIGNIFICANT CHANGE UP (ref 10–45)
ANION GAP SERPL CALC-SCNC: 13 MMOL/L — SIGNIFICANT CHANGE UP (ref 5–17)
APAP SERPL-MCNC: <15 UG/ML — SIGNIFICANT CHANGE UP (ref 10–30)
APPEARANCE UR: CLEAR — SIGNIFICANT CHANGE UP
AST SERPL-CCNC: 16 U/L — SIGNIFICANT CHANGE UP (ref 10–40)
BACTERIA # UR AUTO: NEGATIVE — SIGNIFICANT CHANGE UP
BASOPHILS # BLD AUTO: 0.08 K/UL — SIGNIFICANT CHANGE UP (ref 0–0.2)
BASOPHILS NFR BLD AUTO: 0.9 % — SIGNIFICANT CHANGE UP (ref 0–2)
BILIRUB SERPL-MCNC: 0.5 MG/DL — SIGNIFICANT CHANGE UP (ref 0.2–1.2)
BILIRUB UR-MCNC: NEGATIVE — SIGNIFICANT CHANGE UP
BUN SERPL-MCNC: 18 MG/DL — SIGNIFICANT CHANGE UP (ref 7–23)
CALCIUM SERPL-MCNC: 9.3 MG/DL — SIGNIFICANT CHANGE UP (ref 8.4–10.5)
CHLORIDE SERPL-SCNC: 103 MMOL/L — SIGNIFICANT CHANGE UP (ref 96–108)
CO2 SERPL-SCNC: 25 MMOL/L — SIGNIFICANT CHANGE UP (ref 22–31)
COLOR SPEC: YELLOW — SIGNIFICANT CHANGE UP
CREAT SERPL-MCNC: 1.17 MG/DL — SIGNIFICANT CHANGE UP (ref 0.5–1.3)
DIFF PNL FLD: NEGATIVE — SIGNIFICANT CHANGE UP
EOSINOPHIL # BLD AUTO: 0.19 K/UL — SIGNIFICANT CHANGE UP (ref 0–0.5)
EOSINOPHIL NFR BLD AUTO: 2.1 % — SIGNIFICANT CHANGE UP (ref 0–6)
EPI CELLS # UR: 0 /HPF — SIGNIFICANT CHANGE UP
ETHANOL SERPL-MCNC: SIGNIFICANT CHANGE UP MG/DL (ref 0–10)
GLUCOSE SERPL-MCNC: 142 MG/DL — HIGH (ref 70–99)
GLUCOSE UR QL: NEGATIVE — SIGNIFICANT CHANGE UP
HCT VFR BLD CALC: 41.7 % — SIGNIFICANT CHANGE UP (ref 39–50)
HGB BLD-MCNC: 13.6 G/DL — SIGNIFICANT CHANGE UP (ref 13–17)
HYALINE CASTS # UR AUTO: 3 /LPF — HIGH (ref 0–2)
IMM GRANULOCYTES NFR BLD AUTO: 0.7 % — SIGNIFICANT CHANGE UP (ref 0–1.5)
KETONES UR-MCNC: NEGATIVE — SIGNIFICANT CHANGE UP
LEUKOCYTE ESTERASE UR-ACNC: NEGATIVE — SIGNIFICANT CHANGE UP
LYMPHOCYTES # BLD AUTO: 3.07 K/UL — SIGNIFICANT CHANGE UP (ref 1–3.3)
LYMPHOCYTES # BLD AUTO: 33.5 % — SIGNIFICANT CHANGE UP (ref 13–44)
MCHC RBC-ENTMCNC: 28.3 PG — SIGNIFICANT CHANGE UP (ref 27–34)
MCHC RBC-ENTMCNC: 32.6 GM/DL — SIGNIFICANT CHANGE UP (ref 32–36)
MCV RBC AUTO: 86.7 FL — SIGNIFICANT CHANGE UP (ref 80–100)
MONOCYTES # BLD AUTO: 0.71 K/UL — SIGNIFICANT CHANGE UP (ref 0–0.9)
MONOCYTES NFR BLD AUTO: 7.7 % — SIGNIFICANT CHANGE UP (ref 2–14)
NEUTROPHILS # BLD AUTO: 5.06 K/UL — SIGNIFICANT CHANGE UP (ref 1.8–7.4)
NEUTROPHILS NFR BLD AUTO: 55.1 % — SIGNIFICANT CHANGE UP (ref 43–77)
NITRITE UR-MCNC: NEGATIVE — SIGNIFICANT CHANGE UP
NRBC # BLD: 0 /100 WBCS — SIGNIFICANT CHANGE UP (ref 0–0)
PCP SPEC-MCNC: SIGNIFICANT CHANGE UP
PH UR: 5.5 — SIGNIFICANT CHANGE UP (ref 5–8)
PLATELET # BLD AUTO: 323 K/UL — SIGNIFICANT CHANGE UP (ref 150–400)
POTASSIUM SERPL-MCNC: 3.7 MMOL/L — SIGNIFICANT CHANGE UP (ref 3.5–5.3)
POTASSIUM SERPL-SCNC: 3.7 MMOL/L — SIGNIFICANT CHANGE UP (ref 3.5–5.3)
PROT SERPL-MCNC: 7.1 G/DL — SIGNIFICANT CHANGE UP (ref 6–8.3)
PROT UR-MCNC: ABNORMAL
RBC # BLD: 4.81 M/UL — SIGNIFICANT CHANGE UP (ref 4.2–5.8)
RBC # FLD: 13.2 % — SIGNIFICANT CHANGE UP (ref 10.3–14.5)
RBC CASTS # UR COMP ASSIST: 1 /HPF — SIGNIFICANT CHANGE UP (ref 0–4)
SALICYLATES SERPL-MCNC: <2 MG/DL — LOW (ref 15–30)
SODIUM SERPL-SCNC: 141 MMOL/L — SIGNIFICANT CHANGE UP (ref 135–145)
SP GR SPEC: 1.02 — SIGNIFICANT CHANGE UP (ref 1.01–1.02)
UROBILINOGEN FLD QL: NEGATIVE — SIGNIFICANT CHANGE UP
WBC # BLD: 9.17 K/UL — SIGNIFICANT CHANGE UP (ref 3.8–10.5)
WBC # FLD AUTO: 9.17 K/UL — SIGNIFICANT CHANGE UP (ref 3.8–10.5)
WBC UR QL: 2 /HPF — SIGNIFICANT CHANGE UP (ref 0–5)

## 2020-10-02 PROCEDURE — 99212 OFFICE O/P EST SF 10 MIN: CPT

## 2020-10-02 PROCEDURE — 99285 EMERGENCY DEPT VISIT HI MDM: CPT

## 2020-10-02 PROCEDURE — 90792 PSYCH DIAG EVAL W/MED SRVCS: CPT | Mod: 95

## 2020-10-02 PROCEDURE — 93010 ELECTROCARDIOGRAM REPORT: CPT | Mod: NC

## 2020-10-02 NOTE — ED BEHAVIORAL HEALTH ASSESSMENT NOTE - HPI (INCLUDE ILLNESS QUALITY, SEVERITY, DURATION, TIMING, CONTEXT, MODIFYING FACTORS, ASSOCIATED SIGNS AND SYMPTOMS)
This is a 30 year old single, employed male, domiciled with parents, non-caregiver, with past psychiatric history of bipolar disorder, currently in outpatient treatment with Dr. Dowling, no prior psychiatric hospitalizations, no suicide attempts or non-suicidal self injury, prior substance use history (primarily cocaine historically but also emerging history of opioid use disorder), no formal substance treatments, no legal history of history of violence and past medical history of medical admission June 2020 for acute renal failure 2/2 rhabdomyolysis after being found unresponsive at home by neighbors s/p fasciotomy for compartment syndrome in [facility] at that time who presents to ED today for the second day in a row with inadvertent opioid overdose. Yesterday he was found unresponsive in his car by father who called EMS; narcan 2 mg administered on EMS arrival with good response; noted with hypoxemia, acidosis and ANTOINETTE in the ED yesterday, improved and discharged home with instructions to abstain from opioid use.     Collateral obtained from father as such:   Father says that there is certainly a prominent issue with drug use which patient down plays. Collateral also feels patient is in emotional pain from the change to his lifestyle since having the fasciotomy. Collateral notes that patient significantly minimizes his substance issues, can be a "real charmer" so is able to down play the seriousness of his actions. Father says he does not believe patient intended to kill himself because patient had a bag a pills with him (at least 2 pills) but says he only took a pill and half. On the other hand, collateral notes that by the time he located patient at his desk, his breathing was shallow and he was already turning blue. Yesterday was even worse as he was found in his car somewhere in the bushes. Collateral says that patient often talks about not wanting to be here but he began making comments like that years ago. Collateral relays patient has chronic issues of insomnia, feels unaccepted due to his sexuality issues, some impulsivity, can be charming/gamey, excessive spending at times, has stolen from them but feels its more of a personality disorder. Collateral relays that he is unwilling to take patient home at this point and feels patient needs to stay in the hospital overnight which will allow time for father to speak with patient's psychiatrist in the morning, obtain treatment recommendations and convince patient to accept an inpatient treatment plan of some sort. Father is uncertain whether patient needs an inpatient rehab vs an inpatient psychiatric evaluation at this point but relays a preference for outside facilities ([facility]) when [facility] inpatient psychiatric options ([facility], [facility], [facility]) are relayed to him. This is a 30 year old single, employed male, domiciled with parents, non-caregiver, with past psychiatric history of bipolar disorder, currently in outpatient treatment with Dr. Dowling, no prior psychiatric hospitalizations, no suicide attempts or non-suicidal self injury, prior substance use history (primarily cocaine historically but also emerging history of opioid use disorder), no formal substance treatments, no legal history of history of violence and past medical history of medical admission June 2020 for acute renal failure 2/2 rhabdomyolysis after being found unresponsive at home by neighbors s/p fasciotomy for compartment syndrome in [facility] at that time who presents to ED today for the second day in a row with inadvertent opioid overdose. Yesterday he was found unresponsive in his car by father who called EMS; narcan 2 mg administered on EMS arrival with good response; noted with hypoxemia, acidosis and ANTOINETTE in the ED yesterday, improved and discharged home with instructions to abstain from opioid use. At that time, patient had endorsed talking 1.5 tablets of oxycodone for pain relief and denied any suicidality.     On assessment today, patient relays that he took a medication then woke up in the ED. At another point he relays having no memory of what happened until he woke up in the ambulance. He relays that yesterday he took two capsules of oxycodone 30 mg capsules he "got from someone I know." He relays having purchased about 8 capsules on Tuesday and that yesterday was the first time he took any. He relays feeling that he must've taken too much yesterday so today he was in pain and decided to take 1 capsule around 5 pm "to relieve my pain." He relays taking half of another after 10 minutes because of ongoing pain. He relays that was the last thing he remembers prior to coming to in the ambulance. When asked why he would take the medication after what happened the day before he relays that he was "so busy with work I didn't think about it." He relays he had been working all day from home. He expresses a realization that he has been reckless with him using oxycodone and relays that he wasn't explicitly told to avoid use altogether after the incident yesterday but that now he knows. He relays that yesterday he and his father didn't know that there were extra pills in his car but that now they know and he is certain his father has gotten rid of them. He relays being "pretty positive" this will never happen again because "for one I can't go down the is road" and elaborates "the road to becoming an addict, relying on it." He relays that he and his parents are "going to look into a program."    Patient relays that his main criteria for a program is finding one that will allow him to continue working full time. He expresses the importance of continuing to work after having missed 8 weeks of work for medical reasons when he was hospitalized in June. He notes that     Collateral obtained from father as such:   Father says that there is certainly a prominent issue with drug use which patient down plays. Collateral also feels patient is in emotional pain from the change to his lifestyle since having the fasciotomy. Collateral notes that patient significantly minimizes his substance issues, can be a "real charmer" so is able to down play the seriousness of his actions. Father says he does not believe patient intended to kill himself because patient had a bag a pills with him (at least 2 pills) but says he only took a pill and half. On the other hand, collateral notes that by the time he located patient at his desk, his breathing was shallow and he was already turning blue. Yesterday was even worse as he was found in his car somewhere in the bushes. Collateral says that patient often talks about not wanting to be here but he began making comments like that years ago. Collateral relays patient has chronic issues of insomnia, feels unaccepted due to his sexuality issues, some impulsivity, can be charming/gamey, excessive spending at times, has stolen from them but feels its more of a personality disorder. Collateral relays that he is unwilling to take patient home at this point and feels patient needs to stay in the hospital overnight which will allow time for father to speak with patient's psychiatrist in the morning, obtain treatment recommendations and convince patient to accept an inpatient treatment plan of some sort. Father is uncertain whether patient needs an inpatient rehab vs an inpatient psychiatric evaluation at this point but relays a preference for outside facilities ([facility]) when [facility] inpatient psychiatric options ([facility], HH, [facility]) are relayed to him. This is a 30 year old single, employed male, domiciled with parents, non-caregiver, with past psychiatric history of bipolar disorder vs ADHD, currently in outpatient treatment with Dr. Dowling, no prior psychiatric hospitalizations, no suicide attempts or non-suicidal self injury, prior substance use history (primarily cocaine historically but also emerging history of opioid use disorder), no formal substance treatments, no legal history of history of violence and past medical history of medical admission June 2020 for acute renal failure 2/2 rhabdomyolysis after being found unresponsive at home by neighbors s/p fasciotomy for compartment syndrome in [facility] at that time who presents to ED today for the second day in a row with inadvertent opioid overdose. Yesterday he was found unresponsive in his car by father who called EMS; narcan 2 mg administered on EMS arrival with good response; noted with hypoxemia, acidosis and ANTOINETTE in the ED yesterday, improved and discharged home with instructions to abstain from opioid use. At that time, patient had endorsed talking 1.5 tablets of oxycodone for pain relief and denied any suicidality.     On assessment today, patient relays that he took a medication then woke up in the ED. At another point he relays having no memory of what happened until he woke up in the ambulance. He relays that yesterday he took two capsules of oxycodone 30 mg capsules he "got from someone I know." He relays having purchased about 8 capsules on Tuesday and that yesterday was the first time he took any. He relays feeling that he must've taken too much yesterday, that he took 2 capsules yesterday so today he was in pain and decided to take 1 capsule around 5 pm "to relieve my pain." He relays taking half of another after 10 minutes because of ongoing pain. He relays that was the last thing he remembers prior to coming to in the ambulance. When asked why he would take the medication after what happened the day before he relays that he was "so busy with work I didn't think about it." He relays he had been working all day from home. He expresses a realization that he has been reckless with him using oxycodone and relays that he wasn't explicitly told to avoid use altogether after the incident yesterday but that now he knows. He relays that yesterday he and his father didn't know that there were extra pills in his car but that now they know and he is certain his father has gotten rid of them. He relays being "pretty positive" this will never happen again because "for one I can't go down the is road" and elaborates "the road to becoming an addict, relying on it." He relays that he and his parents are "going to look into a program."    Patient relays that his main criteria for a program is finding one that will allow him to continue working full time. He expresses the importance of continuing to work after having missed 8 weeks of work for medical reasons when he was hospitalized in June. He notes that he doesn't want to lose his job because he loves what he does. He relays that his mood has been stable but there have been times he felt it "could be better especially with my leg and everything." He relays poor sleep chronically and that lately he sleeps about 3-4 hours on a good night because of difficulty getting comfortable with his leg. He reports that he still does his best to socialize, interact with friend and has played "golf a few times even with my bum leg." He relays that there are times when he feels frustrated and makes passive statements about death and provides an example of how someone called out of work on Tuesday so he was given double responsibilities and felt stressed. He remembers saying "Oh my God I'm so stressed out I wish I was dead." He denies SI, HI, AVH and paranoia. He relays feeling like he would not benefit from being in a psychiatric facility at this time because "I don't want to kill myself." He relays having good rapport with his outpatient psychiatrist whom he also does therapy with and is currently prescribed Alprazolam 1.5 mg night. He relays last taking it 2 nights ago because of having been in the ED today and last night.    Patient is asked to relay what exactly happened in June that led to his medical admission. He relays that he was at home and had a really bad headache, passed out, hit his head and was knocked out for a while. He says he was found later after having gone into kidney failure. He relays that he could have been dehydrated or something but denies that any substance were involved as a cause for that admission. He relays that he was prescribed oxycodone to go home with to manage pain from the fasciotomy but that he ran out some time in July or August. He is unable to explain why he would resume oxycodone use just now if pain has been ongoing since he ran out.     COVID Exposure Screen- Patient  1.*In the past 14 days, have you been around anyone with a positive COVID-19 test? No  7. *Have you been out of [place] within the past 14 days? No

## 2020-10-02 NOTE — ED BEHAVIORAL HEALTH ASSESSMENT NOTE - SUBSTANCE ISSUES AND PLAN (INCLUDE STANDING AND PRN MEDICATION)
obtain pain panel to assess for other opioids of misuse - monitor for signs/sx of benzodiazepine or opioid withdrawal

## 2020-10-02 NOTE — ED ADULT NURSE REASSESSMENT NOTE - NS ED NURSE REASSESS COMMENT FT1
pt PO challenged and passed MD notified and aware. Pt to be discharged waiting for his father to pick him up.

## 2020-10-02 NOTE — ED PROVIDER NOTE - NS ED ROS FT

## 2020-10-02 NOTE — ED BEHAVIORAL HEALTH ASSESSMENT NOTE - OTHER
not assessed annoyed currently has renal failure and s/p fasciotomy Father [facility] ED, [facility] Inpatient, [facility] CL, [facility] ED, [facility] Inpatient, [facility] CL, [facility] Outpatient Medical, H[facility] Outpatient BH, HIE ED, CVM Inpatient, C[facility] Outpatient, [facility] Inpatient, Tier E&A, [facility] Inpatient, [facility] ED, [facility], [facility] Inpatient, [facility] CL, Media (For example - [employer], [employer], [employer]), Web search, [employer]abases intermittent opioid vs benzodiazepine misuse Substance use issues superficially cooperative and notable for inconsistencies in history provided unable to assess Notably for some false history provision when compared against collateral report and previous reports from prior presentation

## 2020-10-02 NOTE — ED BEHAVIORAL HEALTH ASSESSMENT NOTE - OTHER PAST PSYCHIATRIC HISTORY (INCLUDE DETAILS REGARDING ONSET, COURSE OF ILLNESS, INPATIENT/OUTPATIENT TREATMENT)
see HPI As per HPI  Pt notably relayed hx of MDD, Anxiety and ADHD but did not disclose BAD history  He sees a Dr. Dowling for both medication management and psychotherapy

## 2020-10-02 NOTE — ED PROVIDER NOTE - CLINICAL SUMMARY MEDICAL DECISION MAKING FREE TEXT BOX
"Chief Complaint   Patient presents with     URI       Initial /66  Pulse 94  Temp 98.2  F (36.8  C) (Tympanic)  Resp 16  Ht 5' 6.5\" (1.689 m)  Wt 173 lb (78.5 kg)  LMP 09/26/2018  SpO2 99%  BMI 27.5 kg/m2 Estimated body mass index is 27.5 kg/(m^2) as calculated from the following:    Height as of this encounter: 5' 6.5\" (1.689 m).    Weight as of this encounter: 173 lb (78.5 kg).    Patient presents to the clinic using No DME    Health Maintenance that is potentially due pending provider review:  NONE    n/a    Is there anyone who you would like to be able to receive your results? No  If yes have patient fill out ELIZA      "
Opioid overdose s/p Narcan. Pt awake, alert, protecting airway. Will obtain labs, observe, and consult psych given multiple recent overdoses.

## 2020-10-02 NOTE — ED BEHAVIORAL HEALTH ASSESSMENT NOTE - SUMMARY
Patient is a 30 y/o single male, no significant PMHx, PPHx bipolar disorder, no prior inpatient hospitalizations, follows w/ Dr. White once every 2 weeks, no prior SA, no prior NSSIB, occasional cocaine and alcohol use, transferred from [facility] after presenting with acute renal failure 2/2 rhabdomyolysis after being found unresponsive at home by neighbors. Patient has received HDx2 since admission, and is s/p fasciotomy for compartment syndrome in E. No current mood symptoms, no psychosis, denies suicidal ideation, adamantly states he does not know what led him to being on the floor. Utox + cocaine, benzo, amphetamines, oxy. This is a 30 year old single, employed male, domiciled with parents, non-caregiver, with past psychiatric history of bipolar disorder vs ADHD, currently in outpatient treatment with Dr. Dowling, no prior psychiatric hospitalizations, no suicide attempts or non-suicidal self injury, prior substance use history (primarily cocaine historically but also emerging history of opioid use disorder), no formal substance treatments, no legal history of history of violence and past medical history of medical admission June 2020 for acute renal failure 2/2 rhabdomyolysis after being found unresponsive at home by neighbors s/p fasciotomy for compartment syndrome in [facility] at that time who presents to ED today for the second day in a row with inadvertent opioid overdose. Yesterday he was found unresponsive in his car by father who called EMS; narcan 2 mg administered on EMS arrival with good response; noted with hypoxemia, acidosis and ANTOINETTE in the ED yesterday, improved and discharged home with instructions to abstain from opioid use. At that time, patient had endorsed talking 1.5 tablets of oxycodone for pain relief and denied any suicidality.     On assessment today, he relays to me how he once again took an accidental overdose of opioids for pain relief and did not intent to end his life. He states reducing the amount he took today stating that he tookt 1.5 caps of 30 mg oxycodone today compared to 2 that he took yesterday, yet chart review from yesterday indicates that he reported also taking 1.5 tabs yesterday. On the other hand, collateral notes that patient had an excessive number of pills on him when discovered today, so notably he took much less than he could have taken, had he truly been suicidal. He minimizes the presence of any prominent mood symptoms though does disclose sleep impairment, that he makes passive suicidal statements at times and does inadvertently reveal that he was stressed and frustrated and made a suicidal statement on Tuesday which is the same day he had earlier relayed purchasing opioid medications supposedly for pain relief with these two significant safety incidents occurring just days after that.     Of concern is that he also minimizes the extent of any substance use disorder, describing it more as an emerging concern rather than the ongoing worsening issue described by collateral. Father relays that opioids were also in his system during the significant medical incident that occurred in June. Strangely enough, despite patient's report of having taken Oxycodone and collateral reports of him responding to narcan today, his tox screen is positive for benzodiazepines and thc only, both of which patient denied using (relayed last took prescribed Xanax 2 nights ago, and used to use thc and cbd remotely). Ultimately, patient's assessment is deemed to be unreliable and thus, at this time, he remains at elevated risk of harm to self. It remains unclear whether his risks of harm to self are suicidal in nature vs simply reckless with his substance use and both pt and father are not interested with proceeding with psychiatric admission within [facility] at this time. Father has asked that patient be held overnight to allow for conference with patient's outpatient psychiatrist and exploration of outside hospital options (i.e. [facility]).

## 2020-10-02 NOTE — ED BEHAVIORAL HEALTH ASSESSMENT NOTE - DESCRIPTION
none As per BTCM note:   PRE-HOSPITAL COURSE  ===================  SOURCE:  RN and triage documentation.   DETAILS:  Patient was BIBEMS; chief complaint of overdose.     ============  ED COURSE   ============  SOURCE:  RN and triage documentation.   ARRIVAL:  Patient was cooperative with triage process and allowed for gowning/wanding without incident. Patient presents with good hygiene.   BELONGINGS: None notable, clothing locked up with security.   BEHAVIOR: Patient has been tearful yet cooperative while in ED and gave blood/urine for routine labs without noted incident. Patient presently denies SI/HI/AH/VH, seemingly remorseful for overdosing on oxycodone. Per documentation patient presented to ED with similar complaint last night. Patient's speech is of normal volume/rate; patient is AOx4 with logical thought process. Patient makes good eye contact and has been interacting appropriately with ED staff. Patient has been resting in hospital bed and has not eaten/drank.   TREATMENT:  Patient has not required medication intervention while in ED.   VISITORS: Patient is presently accompanied by parents while in ED; no notable interactions. Collateral obtained from father as such:   Father says that there is certainly a prominent issue with drug use which patient down plays. Collateral also feels patient is in emotional pain from the change to his lifestyle since having the fasciotomy. Collateral notes that patient significantly minimizes his substance issues, can be a "real charmer" so is able to down play the seriousness of his actions. Father says he does not believe patient intended to kill himself because patient had a bag a pills with him (at least 2 pills) but says he only took a pill and half. On the other hand, collateral notes that by the time he located patient at his desk, his breathing was shallow and he was already turning blue. Yesterday was even worse as he was found in his car somewhere in the bushes. Collateral says that patient often talks about not wanting to be here but he began making comments like that years ago. Collateral relays patient has chronic issues of insomnia, feels unaccepted due to his sexuality issues, some impulsivity, can be charming/gamey, excessive spending at times, has stolen from them but feels its more of a personality disorder. Collateral relays that he is unwilling to take patient home at this point and feels patient needs to stay in the hospital overnight which will allow time for father to speak with patient's psychiatrist in the morning, obtain treatment recommendations and convince patient to accept an inpatient treatment plan of some sort. Father is uncertain whether patient needs an inpatient rehab vs an inpatient psychiatric evaluation at this point but relays a preference for outside facilities (mentions [facility]) when [facility] inpatient psychiatric options ([facility], [facility], [facility]) are relayed to him.    ED course is as per BT note:   PRE-HOSPITAL COURSE  SOURCE:  RN and triage documentation.   DETAILS:  Patient was BIBEMS; chief complaint of overdose.     ============  ED COURSE   SOURCE:  RN and triage documentation.   ARRIVAL:  Patient was cooperative with triage process and allowed for gowning/wanding without incident. Patient presents with good hygiene.   BELONGINGS: None notable, clothing locked up with security.   BEHAVIOR: Patient has been tearful yet cooperative while in ED and gave blood/urine for routine labs without noted incident. Patient presently denies SI/HI/AH/VH, seemingly remorseful for overdosing on oxycodone. Per documentation patient presented to ED with similar complaint last night. Patient's speech is of normal volume/rate; patient is AOx4 with logical thought process. Patient makes good eye contact and has been interacting appropriately with ED staff. Patient has been resting in hospital bed and has not eaten/drank.   TREATMENT:  Patient has not required medication intervention while in ED.   VISITORS: Patient is presently accompanied by parents while in ED; no notable interactions. as per HPI.

## 2020-10-02 NOTE — ED PROVIDER NOTE - PHYSICAL EXAMINATION
Gen: AAOx3, non-toxic  Head: NCAT  HEENT: DIDIER 2mm reactive b/l, EOMI, oral mucosa moist, normal conjunctiva  Lung: CTAB, no respiratory distress, no wheezes/rhonchi/rales B/L, speaking in full sentences  CV: RRR, no murmurs, rubs or gallops  Abd: soft, NTND, no guarding, no CVA tenderness  MSK: no visible deformities  Neuro: No focal sensory or motor deficits, normal CN exam, no clonus or rigidity   Skin: Warm, well perfused, no rash  Psych: normal affect.     Jenaro Kelly PGY3

## 2020-10-02 NOTE — ED BEHAVIORAL HEALTH ASSESSMENT NOTE - REASON
ongoing safety assessment and disposition planning - further input from father and outpt psychiatrist regarding disposition plan (inpt rehab vs inpt psychiatry vs other TBD in AM)

## 2020-10-02 NOTE — ED PROVIDER NOTE - NSFOLLOWUPINSTRUCTIONS_ED_ALL_ED_FT
You were seen and evaluated in Emergency Department, currently you do not meet inpatient psychiatric admission criteria.    Activities as tolerated. Please encourage good oral and fluid intake. For pain, please take Motrin 400mg every 4 hours as needed, or Tylenol 650mg every 6 hours as needed.    Please see your primary care doctor within 24-48 hours for further management of your symptoms.  Please follow up with substance abuse in one week for further evaluation of your symptoms.    Please seek emergent medical management if you have any worsening signs or symptoms.

## 2020-10-02 NOTE — ED BEHAVIORAL HEALTH ASSESSMENT NOTE - ACTIVATING EVENTS/STRESSORS
Acute medical problem Substance intoxication or withdrawal/Triggering events leading to humiliation, shame, and/or despair (e.g. Loss of relationship, financial or health status) (real or anticipated)

## 2020-10-02 NOTE — ED PROVIDER NOTE - PROGRESS NOTE DETAILS
Ramakrishna Ervin PGY3  psych evaluated, was unable to contact outpatient psych, but states patient currently not candidate for inpatient psych, but recommends outpatient substance abuse f/u, SW gave pt resources. medically and psych cleared for dc with f/u. father to  patient.

## 2020-10-02 NOTE — ED ADULT NURSE REASSESSMENT NOTE - NS ED NURSE REASSESS COMMENT FT1
Received report from ED YOUSUF Joshua; patient A&Ox3, afebrile, VSS, 18 g IV in L AC patent and site WNL, patient on 1:1 CO for safety, risk to self, SI. Pending lab results.

## 2020-10-02 NOTE — ED BEHAVIORAL HEALTH ASSESSMENT NOTE - DIFFERENTIAL
bipolar disorder Opioid use disorder  Rule out exacerbated mood disorder  Cluster B personality disorder

## 2020-10-02 NOTE — ED PROVIDER NOTE - OBJECTIVE STATEMENT
30M with PMH of bipolar and opioid abuse presents with suspected opioid overdose. States the last thing he remember was taking a 30 milligram oxycodone. Found by mother who called EMS. EMS found the pt to be unresponsive and hypoxic. Given 0.5 mg of Narcan which lead to return to baseline mental status. Pt denies any current symptoms. Denies SI/HI/hallucinations. Of note, pt was seen and discharged last night after being  found unresponsive from snorting oxycodone, given Narcan at that time as well. Pt was also admitted in June for acute renal failure 2/2 rhabdo and compartment syndrome of the LLE s/p fasciotomy (also after being found unresponsive).

## 2020-10-02 NOTE — ED PROVIDER NOTE - ATTENDING CONTRIBUTION TO CARE
29 yo male hx of bipolar disorder and opiate abuse p/w oxycodone overdose -- per patient, 30 mg tab x 1 without coingestion.  here <24h ago for similar overdose.  got narcan 0.5 x 1 by EMS en route.  denies SI but will require medical clearance and a full psych eval given OD x 2 in such a short time period.  check labs, UDS, ETOH level, at least 6h of observation followed by telepsych eval.  ultimately, likely requires inpatient management.

## 2020-10-02 NOTE — ED BEHAVIORAL HEALTH ASSESSMENT NOTE - SUICIDE RISK FACTORS
Mood Disorder current/past/ADHD current/past/Access to lethal methods (pills, firearm, etc.: Ask specifically about presence or absence of a firearm in the home or ease of accessing Alcohol/Substance abuse disorders/Insomnia/Access to lethal methods (pills, firearm, etc.: Ask specifically about presence or absence of a firearm in the home or ease of accessing/Chronic pain/ADHD current/past/Mood Disorder current/past

## 2020-10-02 NOTE — ED BEHAVIORAL HEALTH NOTE - BEHAVIORAL HEALTH NOTE
===================  PRE-HOSPITAL COURSE  ===================  SOURCE:  RN and triage documentation.   DETAILS:  Patient was BIBEMS; chief complaint of overdose.     ============  ED COURSE   ============  SOURCE:  RN and triage documentation.   ARRIVAL:  Patient was cooperative with triage process and allowed for gowning/wanding without incident. Patient presents with good hygiene.   BELONGINGS: None notable, clothing locked up with security.   BEHAVIOR: Patient has been tearful yet cooperative while in ED and gave blood/urine for routine labs without noted incident. Patient presently denies SI/HI/AH/VH, seemingly remorseful for overdosing on oxycodone. Per documentation patient presented to ED with similar complaint last night. Patient's speech is of normal volume/rate; patient is AOx4 with logical thought process. Patient makes good eye contact and has been interacting appropriately with ED staff. Patient has been resting in hospital bed and has not eaten/drank.   TREATMENT:  Patient has not required medication intervention while in ED.   VISITORS: Patient is presently accompanied by parents while in ED; no notable interactions.   COVID Exposure Screen- collateral (i.e. third-party, chart review, belongings, etc; include EMS and ED staff)     1.        *In the past 14 days, has the patient been around anyone with a positive COVID-19 test?*  (  ) Yes   ( X) No   (  ) Unknown- Reason (e.g. collateral uncertain, refusing to answer, etc.):  ______  IF YES PROCEED TO QUESTION #2. IF NO or UNKNOWN, PLEASE SKIP TO QUESTION #7  2.        Was the patient within 6 feet of them for at least 15 minutes? (  ) Yes   (  ) No   (  ) Unknown- Reason: ______   3.        Did the patient provide care for them? (  ) Yes   (  ) No   (  ) Unknown- Reason: ______   4.        Did the patient have direct physical contact with them (touched, hugged, or kissed them)? (  ) Yes   (  ) No    (  ) Unknown- Reason: ______   5.        Did the patient share eating or drinking utensils with them? (  ) Yes   (  ) No    (  ) Unknown- Reason: ______   6.        Have they sneezed, coughed, or somehow got respiratory droplets on the patient? (  ) Yes   (  ) No    (  ) Unknown- Reason: ______   7.        *Has the patient been out of [place] within the past 14 days?*  (  ) Yes   ( X) No   (  ) Unknown- Reason (e.g. collateral uncertain, refusing to answer, etc.): _______  IF YES PLEASE ANSWER THE FOLLOWING QUESTIONS:  8.        Which state/country has the patient been to? ______   9.        Was the patient there over 24 hours? (  ) Yes   (  ) No    (  ) Unknown- Reason: ______   10.     What date did the patient return to [facility]? ______.

## 2020-10-02 NOTE — ED BEHAVIORAL HEALTH ASSESSMENT NOTE - DETAILS
denies SI/I/P Diabetes grandmother pain as per HPI plan discussed with father discussed with ED provider relays an incident with LE pain s/p LE fasciotomy with gait imbalances and LE pain

## 2020-10-02 NOTE — ED BEHAVIORAL HEALTH ASSESSMENT NOTE - VIOLENCE PROTECTIVE FACTORS:
Employment stability/Engagement in treatment/Residential stability Employment stability/Residential stability/Engagement in treatment/Affective Stability

## 2020-10-02 NOTE — ED BEHAVIORAL HEALTH ASSESSMENT NOTE - SUICIDE PROTECTIVE FACTORS
Beloved pets/Engaged in work or school/Identifies reasons for living/Has future plans/Responsibility to family and others/Supportive social network of family or friends/Positive therapeutic relationships Beloved pets/Supportive social network of family or friends/Positive therapeutic relationships/Identifies reasons for living/Has future plans/Engaged in work or school

## 2020-10-02 NOTE — ED ADULT NURSE NOTE - NSIMPLEMENTINTERV_GEN_ALL_ED
Implemented All Fall with Harm Risk Interventions:  [place] to call system. Call bell, personal items and telephone within reach. Instruct patient to call for assistance. Room bathroom lighting operational. Non-slip footwear when patient is off stretcher. Physically safe environment: no spills, clutter or unnecessary equipment. Stretcher in lowest position, wheels locked, appropriate side rails in place. Provide visual cue, wrist band, yellow gown, etc. Monitor gait and stability. Monitor for mental status changes and reorient to person, place, and time. Review medications for side effects contributing to fall risk. Reinforce activity limits and safety measures with patient and family. Provide visual clues: red socks.

## 2020-10-02 NOTE — ED BEHAVIORAL HEALTH ASSESSMENT NOTE - DESCRIPTION (FIRST USE, LAST USE, QUANTITY, FREQUENCY, DURATION)
drinks 1 drink/week uses "rarely," last use 2 weeks ago. Of note, utox + cocaine at [facility] relays remote THC and CBD use (notably tox positive) Relays past experimentation with cocaine but denies use in "a while." Of note, utox + cocaine at [facility] when pt was first admitted in June with unresponsiveness As per HPI Prescribed Xanax but denies use in >48 hours yet is tox positive. Denies hx of misuse

## 2020-10-02 NOTE — CHART NOTE - NSCHARTNOTEFT_GEN_A_CORE
Patient is a 30 year old single presented to the ED for reported overdose.  Patient was seen in the ED yesterday for the same concern. Per chart patient’s medical history includes: June for acute renal failure 2/2 rhabdo, compartment syndrome of the LLE s/p fasciotomy bipolar and opioid abuse. LMSW introduced herself to the patient and he verbalized understanding the role of the .  Patient is alert and oriented x 4 spheres.  Patient is alert and oriented x 4 spheres. Demographic information was reviewed and confirmed. LMSW explored his substance abuse history. Patient denies substance abuse history despite being brought to the ED for the second time this week. Patient endorses a lot pain due to the surgery he had in June.  LMSW confronted patient about his actions and the consequences.  Patient continued to minimize his actions and attributes his actions to pain. LMSW explored with patient his treatment history. Patient declined to provide any information and requested to end the interview.  LMSW exited the room; update provided to the medical team.  LMSW will follow up as needed.

## 2020-10-02 NOTE — ED PROVIDER NOTE - PATIENT PORTAL LINK FT
You can access the FollowMyHealth Patient Portal offered by [facility] by registering at the following website: [email or web address]. By joining [employer]’s FollowMyHealth portal, you will also be able to view your health information using other applications (apps) compatible with our system.

## 2020-10-02 NOTE — ED BEHAVIORAL HEALTH ASSESSMENT NOTE - RISK ASSESSMENT
Risk factors include hx bipolar disorder, lives alone, active substance use, male, acute medical issues. Protective factors include no prior inpatient hospitalizations, no prior suicide attempts, connected with family, has a pet, engaged in work, highly educated Low Acute Suicide Risk Moderate Acute Suicide Risk Risks; 2 significant safety incidents on 2 subsequent days, unreliable history provided, substance use, endorses triggering work stressors earlier in the week, known mood disorder with prominent insomnia, chronic pain.   Protective factors: engaged in work, spontaneously future oriented and identifies reasons for living, positive therapeutic relationships, supportive social network, denying suicidal ideation/intent/plan and some evidence to support this

## 2020-10-02 NOTE — ED ADULT NURSE NOTE - OBJECTIVE STATEMENT
30 year old male, biba, today s/p being found unresponsive by parents. pt was seen in the ER last night for an overdose last night. pt states he took oxycodone 30mg tonight.  Pt states the oxycodone is not prescribed to him, but reports pain to left leg. In july 2020, pt had a fasciotomy in left leg, significant 30 year old male, biba, today s/p being found unresponsive by parents. pt was seen in the ER last night for an overdose last night. pt states he took oxycodone 30mg tonight.  Pt was given Narcan en route to ED tonight, pt was more responsive after narcan as per EMS. Pt states the oxycodone is not prescribed to him, but reports pain to left leg. In july 2020, pt had a fasciotomy in left leg, significant surgical scar noted to left leg. pt states he has pain to the left leg. pt denies suicidal ideations/homicidal ideation. Lungs cta unlabored, no cough/fevers. Abd soft nontender, nondistended. No nausea/vomiting/dizziness/visual changes.  Denies difficulty breathing, sob, or resp distress. Moves all four extremities, full strength. Denies any changes in sensation to extremities. skin w/d/i.  constant observation in progress. items secured per protocol. security called for pt to be wanded per protocol. on cardiac monitor, vitals documented.

## 2020-10-03 VITALS
DIASTOLIC BLOOD PRESSURE: 77 MMHG | RESPIRATION RATE: 15 BRPM | SYSTOLIC BLOOD PRESSURE: 115 MMHG | OXYGEN SATURATION: 100 % | HEART RATE: 80 BPM

## 2020-10-03 DIAGNOSIS — F11.99 OPIOID USE, UNSPECIFIED WITH UNSPECIFIED OPIOID-INDUCED DISORDER: ICD-10-CM

## 2020-10-03 DIAGNOSIS — F31.9 BIPOLAR DISORDER, UNSPECIFIED: ICD-10-CM

## 2020-10-03 DIAGNOSIS — F60.9 PERSONALITY DISORDER, UNSPECIFIED: ICD-10-CM

## 2020-10-03 LAB
SARS-COV-2 RNA SPEC QL NAA+PROBE: SIGNIFICANT CHANGE UP
TSH SERPL-MCNC: 10.3 UIU/ML — HIGH (ref 0.27–4.2)

## 2020-10-03 PROCEDURE — 99285 EMERGENCY DEPT VISIT HI MDM: CPT

## 2020-10-03 PROCEDURE — U0003: CPT

## 2020-10-03 PROCEDURE — 80307 DRUG TEST PRSMV CHEM ANLYZR: CPT

## 2020-10-03 PROCEDURE — 93005 ELECTROCARDIOGRAM TRACING: CPT

## 2020-10-03 PROCEDURE — 85025 COMPLETE CBC W/AUTO DIFF WBC: CPT

## 2020-10-03 PROCEDURE — 80053 COMPREHEN METABOLIC PANEL: CPT

## 2020-10-03 PROCEDURE — 84443 ASSAY THYROID STIM HORMONE: CPT

## 2020-10-03 PROCEDURE — 81001 URINALYSIS AUTO W/SCOPE: CPT

## 2020-10-03 RX ORDER — GABAPENTIN 400 MG/1
200 CAPSULE ORAL ONCE
Refills: 0 | Status: COMPLETED | OUTPATIENT
Start: 2020-10-03 | End: 2020-10-03

## 2020-10-03 RX ADMIN — GABAPENTIN 200 MILLIGRAM(S): 400 CAPSULE ORAL at 11:27

## 2020-10-03 NOTE — ED ADULT NURSE REASSESSMENT NOTE - NS ED NURSE REASSESS COMMENT FT1
Patient resting comfortably- remains calm, VSS, 18 g IV in L AC patent and site WNL, patient pending psych consult in the AM. Remains on 1:1.

## 2020-10-13 ENCOUNTER — APPOINTMENT (OUTPATIENT)
Dept: VASCULAR SURGERY | Facility: CLINIC | Age: 30
End: 2020-10-13
Payer: COMMERCIAL

## 2020-10-13 VITALS
TEMPERATURE: 98.9 F | WEIGHT: 180 LBS | BODY MASS INDEX: 25.2 KG/M2 | HEIGHT: 71 IN | SYSTOLIC BLOOD PRESSURE: 154 MMHG | DIASTOLIC BLOOD PRESSURE: 112 MMHG | HEART RATE: 79 BPM

## 2020-10-13 VITALS — DIASTOLIC BLOOD PRESSURE: 110 MMHG | HEART RATE: 81 BPM | SYSTOLIC BLOOD PRESSURE: 159 MMHG

## 2020-10-13 DIAGNOSIS — M79.A22 NONTRAUMATIC COMPARTMENT SYNDROME OF LEFT LOWER EXTREMITY: ICD-10-CM

## 2020-10-13 DIAGNOSIS — T79.A29A TRAUMATIC COMPARTMENT SYNDROME OF UNSPECIFIED LOWER EXTREMITY, INITIAL ENCOUNTER: ICD-10-CM

## 2020-10-13 PROCEDURE — 99214 OFFICE O/P EST MOD 30 MIN: CPT

## 2020-10-13 NOTE — HISTORY OF PRESENT ILLNESS
[FreeTextEntry1] : pt is s/o lle and lt gluteal fasciotomy  for lle arterial and neurogenic compartment syndrome\par pt is at home receiving home pt/ot\par pt states that he ambulates w crutches but cannot stand independently\par pt is f/u w plastics\par pt states constant left foot pain  worse román w wt bearing\par gabapentin is helping to some degree but pain is constant \par pt denies fevers chills or any other constitutional c/o  [de-identified] : pt is continuing  f/u w neurologist  and gabapentin rx\par pt states that he is about 70 %  back to normal activity\par pt is walking w/o cane or crutches \par pt is compliant w comp stockings

## 2020-10-13 NOTE — PHYSICAL EXAM
[1+] : left 1+ [Ankle Swelling (On Exam)] : present [Ankle Swelling On The Left] : of the left ankle [Ankle Swelling On The Right] : mild [No HSM] : no hepatosplenomegaly [No Rash or Lesion] : No rash or lesion [Alert] : alert [Oriented to Person] : oriented to person [Oriented to Place] : oriented to place [Oriented to Time] : oriented to time [Calm] : calm [JVD] : no jugular venous distention  [2+] : right 2+ [Abdomen Masses] : No abdominal masses [Tender] : was nontender [Stool Sample Taken] : No stool obtained  on rectal exam [de-identified] : nad [de-identified] : wnl [FreeTextEntry1] : lle incisions healed well\par jonn le no art insuff\par jonn le warm well perfused \par no sig left foot drop at this time\par  jonn le gross intact motor and sensory fx  [de-identified] : wnl [de-identified] : as above  [de-identified] : Keanu Cranial nerves 2-12 keanu grossly intact [de-identified] : cooperative

## 2020-10-13 NOTE — ASSESSMENT
[Arterial/Venous Disease] : arterial/venous disease [Medication Management] : medication management [FreeTextEntry1] : Impression clinically improving w ongoing pain \par \par \par Plan Med Conservative management leg elevation, comp stockings \par continue gabapentin prn \par continue pt/ot \par f/u w neurologist \par f/u prn w pain management prn\par f/u w plastics \par f/u w renal \par f/u w  internist prn\par rto prn \par

## 2020-12-15 ENCOUNTER — EMERGENCY (EMERGENCY)
Facility: HOSPITAL | Age: 30
LOS: 1 days | Discharge: HOME | End: 2020-12-15
Attending: EMERGENCY MEDICINE
Payer: COMMERCIAL

## 2020-12-15 VITALS
DIASTOLIC BLOOD PRESSURE: 110 MMHG | OXYGEN SATURATION: 94 % | TEMPERATURE: 98 F | HEIGHT: 71 IN | SYSTOLIC BLOOD PRESSURE: 152 MMHG | WEIGHT: 195.11 LBS | HEART RATE: 118 BPM | RESPIRATION RATE: 20 BRPM

## 2020-12-15 DIAGNOSIS — Z98.890 OTHER SPECIFIED POSTPROCEDURAL STATES: Chronic | ICD-10-CM

## 2020-12-15 LAB
ALBUMIN SERPL ELPH-MCNC: 5.2 G/DL — HIGH (ref 3.3–5)
ALP SERPL-CCNC: 77 U/L — SIGNIFICANT CHANGE UP (ref 40–120)
ALT FLD-CCNC: 19 U/L — SIGNIFICANT CHANGE UP (ref 10–45)
ANION GAP SERPL CALC-SCNC: 15 MMOL/L — SIGNIFICANT CHANGE UP (ref 5–17)
APAP SERPL-MCNC: 21 UG/ML — SIGNIFICANT CHANGE UP (ref 10–30)
AST SERPL-CCNC: 14 U/L — SIGNIFICANT CHANGE UP (ref 10–40)
BASE EXCESS BLDV CALC-SCNC: -3.4 MMOL/L — LOW (ref -2–2)
BASOPHILS # BLD AUTO: 0.12 K/UL — SIGNIFICANT CHANGE UP (ref 0–0.2)
BASOPHILS NFR BLD AUTO: 0.7 % — SIGNIFICANT CHANGE UP (ref 0–2)
BILIRUB SERPL-MCNC: 1.1 MG/DL — SIGNIFICANT CHANGE UP (ref 0.2–1.2)
BUN SERPL-MCNC: 20 MG/DL — SIGNIFICANT CHANGE UP (ref 7–23)
CA-I SERPL-SCNC: 1.26 MMOL/L — SIGNIFICANT CHANGE UP (ref 1.12–1.3)
CALCIUM SERPL-MCNC: 9.3 MG/DL — SIGNIFICANT CHANGE UP (ref 8.4–10.5)
CHLORIDE BLDV-SCNC: 107 MMOL/L — SIGNIFICANT CHANGE UP (ref 96–108)
CHLORIDE SERPL-SCNC: 102 MMOL/L — SIGNIFICANT CHANGE UP (ref 96–108)
CO2 BLDV-SCNC: 28 MMOL/L — SIGNIFICANT CHANGE UP (ref 22–30)
CO2 SERPL-SCNC: 21 MMOL/L — LOW (ref 22–31)
CREAT SERPL-MCNC: 1.23 MG/DL — SIGNIFICANT CHANGE UP (ref 0.5–1.3)
EOSINOPHIL # BLD AUTO: 0.21 K/UL — SIGNIFICANT CHANGE UP (ref 0–0.5)
EOSINOPHIL NFR BLD AUTO: 1.3 % — SIGNIFICANT CHANGE UP (ref 0–6)
ETHANOL SERPL-MCNC: SIGNIFICANT CHANGE UP MG/DL (ref 0–10)
GAS PNL BLDV: 139 MMOL/L — SIGNIFICANT CHANGE UP (ref 135–145)
GAS PNL BLDV: SIGNIFICANT CHANGE UP
GAS PNL BLDV: SIGNIFICANT CHANGE UP
GLUCOSE BLDV-MCNC: 108 MG/DL — HIGH (ref 70–99)
GLUCOSE SERPL-MCNC: 111 MG/DL — HIGH (ref 70–99)
HCO3 BLDV-SCNC: 26 MMOL/L — SIGNIFICANT CHANGE UP (ref 21–29)
HCT VFR BLD CALC: 45.4 % — SIGNIFICANT CHANGE UP (ref 39–50)
HCT VFR BLDA CALC: 48 % — SIGNIFICANT CHANGE UP (ref 39–50)
HGB BLD CALC-MCNC: 15.8 G/DL — SIGNIFICANT CHANGE UP (ref 13–17)
HGB BLD-MCNC: 14.8 G/DL — SIGNIFICANT CHANGE UP (ref 13–17)
IMM GRANULOCYTES NFR BLD AUTO: 0.5 % — SIGNIFICANT CHANGE UP (ref 0–1.5)
LACTATE BLDV-MCNC: 1.3 MMOL/L — SIGNIFICANT CHANGE UP (ref 0.7–2)
LITHIUM SERPL-MCNC: <.2 MMOL/L — LOW (ref 0.6–1.2)
LYMPHOCYTES # BLD AUTO: 1.99 K/UL — SIGNIFICANT CHANGE UP (ref 1–3.3)
LYMPHOCYTES # BLD AUTO: 12.4 % — LOW (ref 13–44)
MAGNESIUM SERPL-MCNC: 2 MG/DL — SIGNIFICANT CHANGE UP (ref 1.6–2.6)
MCHC RBC-ENTMCNC: 28.4 PG — SIGNIFICANT CHANGE UP (ref 27–34)
MCHC RBC-ENTMCNC: 32.6 GM/DL — SIGNIFICANT CHANGE UP (ref 32–36)
MCV RBC AUTO: 87.1 FL — SIGNIFICANT CHANGE UP (ref 80–100)
MONOCYTES # BLD AUTO: 1.41 K/UL — HIGH (ref 0–0.9)
MONOCYTES NFR BLD AUTO: 8.8 % — SIGNIFICANT CHANGE UP (ref 2–14)
NEUTROPHILS # BLD AUTO: 12.23 K/UL — HIGH (ref 1.8–7.4)
NEUTROPHILS NFR BLD AUTO: 76.3 % — SIGNIFICANT CHANGE UP (ref 43–77)
NRBC # BLD: 0 /100 WBCS — SIGNIFICANT CHANGE UP (ref 0–0)
PCO2 BLDV: 68 MMHG — HIGH (ref 35–50)
PH BLDV: 7.21 — LOW (ref 7.35–7.45)
PLATELET # BLD AUTO: 415 K/UL — HIGH (ref 150–400)
PO2 BLDV: 82 MMHG — HIGH (ref 25–45)
POTASSIUM BLDV-SCNC: 4 MMOL/L — SIGNIFICANT CHANGE UP (ref 3.5–5.3)
POTASSIUM SERPL-MCNC: 3.8 MMOL/L — SIGNIFICANT CHANGE UP (ref 3.5–5.3)
POTASSIUM SERPL-SCNC: 3.8 MMOL/L — SIGNIFICANT CHANGE UP (ref 3.5–5.3)
PROT SERPL-MCNC: 7.2 G/DL — SIGNIFICANT CHANGE UP (ref 6–8.3)
RBC # BLD: 5.21 M/UL — SIGNIFICANT CHANGE UP (ref 4.2–5.8)
RBC # FLD: 13.1 % — SIGNIFICANT CHANGE UP (ref 10.3–14.5)
SALICYLATES SERPL-MCNC: <2 MG/DL — LOW (ref 15–30)
SAO2 % BLDV: 94 % — HIGH (ref 67–88)
SODIUM SERPL-SCNC: 138 MMOL/L — SIGNIFICANT CHANGE UP (ref 135–145)
WBC # BLD: 16.04 K/UL — HIGH (ref 3.8–10.5)
WBC # FLD AUTO: 16.04 K/UL — HIGH (ref 3.8–10.5)

## 2020-12-15 PROCEDURE — 93005 ELECTROCARDIOGRAM TRACING: CPT

## 2020-12-15 PROCEDURE — 84132 ASSAY OF SERUM POTASSIUM: CPT

## 2020-12-15 PROCEDURE — 80178 ASSAY OF LITHIUM: CPT

## 2020-12-15 PROCEDURE — 99285 EMERGENCY DEPT VISIT HI MDM: CPT | Mod: 25

## 2020-12-15 PROCEDURE — 82330 ASSAY OF CALCIUM: CPT

## 2020-12-15 PROCEDURE — 82947 ASSAY GLUCOSE BLOOD QUANT: CPT

## 2020-12-15 PROCEDURE — 84295 ASSAY OF SERUM SODIUM: CPT

## 2020-12-15 PROCEDURE — 83605 ASSAY OF LACTIC ACID: CPT

## 2020-12-15 PROCEDURE — 85014 HEMATOCRIT: CPT

## 2020-12-15 PROCEDURE — 84443 ASSAY THYROID STIM HORMONE: CPT

## 2020-12-15 PROCEDURE — 87635 SARS-COV-2 COVID-19 AMP PRB: CPT

## 2020-12-15 PROCEDURE — 85018 HEMOGLOBIN: CPT

## 2020-12-15 PROCEDURE — 93010 ELECTROCARDIOGRAM REPORT: CPT | Mod: NC

## 2020-12-15 PROCEDURE — 80053 COMPREHEN METABOLIC PANEL: CPT

## 2020-12-15 PROCEDURE — 85025 COMPLETE CBC W/AUTO DIFF WBC: CPT

## 2020-12-15 PROCEDURE — 96374 THER/PROPH/DIAG INJ IV PUSH: CPT

## 2020-12-15 PROCEDURE — 82803 BLOOD GASES ANY COMBINATION: CPT

## 2020-12-15 PROCEDURE — 80307 DRUG TEST PRSMV CHEM ANLYZR: CPT

## 2020-12-15 PROCEDURE — 83735 ASSAY OF MAGNESIUM: CPT

## 2020-12-15 PROCEDURE — 82435 ASSAY OF BLOOD CHLORIDE: CPT

## 2020-12-15 PROCEDURE — 99285 EMERGENCY DEPT VISIT HI MDM: CPT

## 2020-12-15 RX ORDER — NALOXONE HYDROCHLORIDE 4 MG/.1ML
0.5 SPRAY NASAL ONCE
Refills: 0 | Status: COMPLETED | OUTPATIENT
Start: 2020-12-15 | End: 2020-12-15

## 2020-12-15 RX ORDER — SODIUM CHLORIDE 9 MG/ML
1000 INJECTION INTRAMUSCULAR; INTRAVENOUS; SUBCUTANEOUS ONCE
Refills: 0 | Status: COMPLETED | OUTPATIENT
Start: 2020-12-15 | End: 2020-12-15

## 2020-12-15 RX ADMIN — SODIUM CHLORIDE 1000 MILLILITER(S): 9 INJECTION INTRAMUSCULAR; INTRAVENOUS; SUBCUTANEOUS at 20:18

## 2020-12-15 RX ADMIN — NALOXONE HYDROCHLORIDE 0.5 MILLIGRAM(S): 4 SPRAY NASAL at 20:13

## 2020-12-15 NOTE — ED PROVIDER NOTE - NS ED ROS FT
Gen: Denies fever, weight loss  CV: Denies chest pain, palpitations  Skin: Denies rash, erythema, color changes  Resp: Denies SOB, cough  Endo: Denies sensitivity to heat, cold, increased urination  GI: Denies diarrhea, constipation, nausea, vomiting  Msk: Denies back pain, LE swelling, extremity pain  : Denies dysuria, increased frequency  Neuro: Denies LOC, weakness, seizures

## 2020-12-15 NOTE — ED ADULT TRIAGE NOTE - CHIEF COMPLAINT QUOTE
patient brought to ED by father with concern for AMS, per father, patient has hx of renal failure, potential for drug use patient brought to ED by father with concern for AMS, per father, patient has hx of renal failure, potential for drug use; patient falling asleep in triage alarcon

## 2020-12-15 NOTE — ED PROVIDER NOTE - OBJECTIVE STATEMENT
29 y/o male with pmhx of opioid abuse, bipolar disorder, ADD presenting with AMS. Patient father (Dr. Camacho: surgeon at [facility]) noted that at 4pm patient started to become intermittently responsive/falling asleep. Patient's father reports that patient recently was in ICU after drug use complicated by rhabdomyolysis and ANTOINETTE requiring dialysis. Father reports that patient has a Xanax prescription but is unsure if uses any other drugs of abuse. Patient reports that he took xanax and oxycodone (unsure of amount) but denies SI/HI or any intent to overdose. Patient denies any trauma/falls, LOC, chest pain, palpitations, SOB, difficulty breathing, fevers/chills, n/v/d, cough, or changes in urination. 31 y/o male with pmhx of opioid abuse, bipolar disorder, ADD presenting with AMS. Patient father (Dr. Camacho: surgeon at [facility]) noted that at 4pm patient started to become intermittently responsive/falling asleep. Patient's father reports that patient recently was in ICU after drug use complicated by rhabdomyolysis and ANTOINETTE requiring dialysis. Father reports that patient has a Xanax prescription but is unsure if uses any other drugs of abuse. Patient reports that he took xanax and oxycodone (unsure of amount) but denies SI/HI or any intent to overdose. Patient denies any trauma/falls, LOC, chest pain, palpitations, SOB, difficulty breathing, fevers/chills, n/v/d, cough, or changes in urination.    Attendinyo male presents with altered mental status.  pt has history of oxycodone abuse.  states he took xanax.  told nurse he took oxycodone but denies it to physicians.

## 2020-12-15 NOTE — ED PROVIDER NOTE - PROGRESS NOTE DETAILS
Psychiatrist Phone number [phone] Patient AAOx4 with no respiratory distress of hypoxia. Able to ambulate with no difficulty

## 2020-12-15 NOTE — ED PROVIDER NOTE - NSFOLLOWUPINSTRUCTIONS_ED_ALL_ED_FT
You were seen in the ER for your altered mental status. Opioids and benzodiazepines were found in your urine. Please follow up with your Psychiatrist as we discussed.     Opioid Use Disorder    WHAT YOU NEED TO KNOW:    Opioid use disorder (OUD) is a condition that causes you to abuse and become dependent on an opioid. Abuse means you continue to use the opioid even though it is hurting you or others. Dependence means your body gets used to the amount you take. OUD prevents you from controlling your opioid use. This causes distress that affects your life. You also have trouble doing daily activities because of physical and mental problems from the opioid. OUD can happen with an illegal opioid such as heroin, or a prescription opioid such as hydrocodone or fentanyl.    DISCHARGE INSTRUCTIONS:    Call your local emergency number (911 in the [place]), or have someone else call if:   •You have chest pain or trouble breathing.      •You have a seizure.      •You cannot be woken.      Call your doctor if:   •You have trouble staying awake and your breathing is slow or shallow.      •You have a fast, slow, or irregular heartbeat.      •You feel lightheaded or faint.      •Your speech is slurred, or you are confused.      •You have nausea and are vomiting, or you cannot stop vomiting.      •You have balance problems.      •You have questions or concerns about your condition or care.      What you need to know about opioid medicine safety:   •Do not suddenly stop taking the opioid. If you have been taking the opioid for longer than 2 weeks, a sudden stop may cause dangerous side effects. Work with your healthcare provider to decrease your dose slowly.      •Do not take opioids that belong to someone else. The kind or amount that person takes may not be right for you.      •Do not mix opioids with other medicines or alcohol. The combination can cause an overdose, or cause you to stop breathing. Alcohol, sleeping pills, and medicines such as antihistamines can make you sleepy. A combination with opioids can lead to a coma.      •Learn about the signs of an overdose so you know how to respond. Tell others about these signs so they will know what to do if needed. Talk to your healthcare provider about naloxone. You may be able to keep naloxone at home in case of an overdose. Your family and friends can also be trained on how to give it to you if needed.      •Take prescribed opioids exactly as directed. Do not take more than the recommended amount. Do not take it more often than recommended. If you use a pain patch, be sure to remove the old patch before you place a new one. Make sure the patch is not exposed to sunlight. Sunlight speeds up the opioid release from the patch.      •Keep opioids out of the reach of children. Store opioids in a locked cabinet or in a location that children cannot get to.      •Follow instructions for what to do with prescription opioids you do not use. Your healthcare provider will give you instructions for how to dispose of it safely. This helps make sure no one else takes it.      Follow up with your doctor or therapist as directed: Write down your questions so you remember to ask them during your visits.    For support and more information:   •Substance Abuse and Mental Health Services Administration  [address]  [place] [address]  Web Address: http://www.[facility].gov

## 2020-12-15 NOTE — ED ADULT NURSE NOTE - CINV DISCH MEDS REVIEWED YN
Narcan rescue kit provided, education provided to father and patient, patient provided verbal understanding and teach back/Yes Narcan rescue kit provided, kit number [id number], education provided to father and patient, patient provided verbal understanding and teach back/Yes

## 2020-12-15 NOTE — ED PROVIDER NOTE - PHYSICAL EXAMINATION
Gen: WDWN, intermittently responsive, lethargic    HEENT: B/l pupillary constriction, EOMI but delayed, no nasal discharge, mucous membranes moist, no oropharyngeal edema or exudates   CV: Tachycardic, +S1/S2, no M/R/G  Resp: SPO2 94% on RA with desaturation to 77% and RR as low as 4, 100% on 4L NC, CTAB, no W/R/R  GI: Abdomen soft non-distended, NTTP, no masses  MSK: No open wounds, no bruising, no LE edema  Neuro: CN2-12 grossly intact, A&Ox4, MS +5/5 in UE and LE BL, gross sensation intact in UE and LE BL,   Psych: appropriate mood, denies AH, VH, SI

## 2020-12-15 NOTE — ED PROVIDER NOTE - PATIENT PORTAL LINK FT
You can access the FollowMyHealth Patient Portal offered by [facility] by registering at the following website: [email or web address]. By joining [employer]’s FollowMyHealth portal, you will also be able to view your health information using other applications (apps) compatible with our system.

## 2020-12-15 NOTE — ED PROVIDER NOTE - CLINICAL SUMMARY MEDICAL DECISION MAKING FREE TEXT BOX
29 y/o male with pmhx of opioid abuse, bipolar disorder, ADD presenting with AMS with history of taking xanax and oxycodone with desaturations to 77% and RR as low as 44. Will give narcan and monitor oxygen status; Drug screen, acetaminophen/aspirin/alcohol levels, CMP, VBG, EKG. Will reassess after narcan

## 2020-12-15 NOTE — ED ADULT NURSE REASSESSMENT NOTE - NS ED NURSE REASSESS COMMENT FT1
Pt more alert after Narcan but remains lethargic. Pt oriented to person, place & date but does not recall how he got here or remembers why he is here. He is calm, able to follow commands, pupils still pinpoint. Breathing spontaneous & nonlabored, respiratory rate 12, 100% on RA. NSR on cardiac monitor. Abdomen soft & nondistended. Strength 5/5 x 4 extremities, skin clean dry & intact. Pt now denies taking oxycodone and states he just took xanax. Pt denies chest pain, SOB, n/v/d, fever, chills, changes in vision, SI & HI. Pt in hospital gown, in view of nursing station.

## 2020-12-15 NOTE — ED ADULT NURSE NOTE - INTERVENTIONS DEFINITIONS
[place] to call system/Call bell, personal items and telephone within reach/Non-slip footwear when patient is off stretcher

## 2020-12-15 NOTE — ED ADULT NURSE NOTE - CHIEF COMPLAINT QUOTE
patient brought to ED by father with concern for AMS, per father, patient has hx of renal failure, potential for drug use; patient falling asleep in triage alarcon

## 2020-12-15 NOTE — ED ADULT NURSE NOTE - ED STAT RN HANDOFF DETAILS
YOUSUF Lenz YOUSUF Lenz. RN made aware in handoff that patient is awaiting Naloxone rescue kit to be discharged with.

## 2020-12-16 VITALS
OXYGEN SATURATION: 100 % | DIASTOLIC BLOOD PRESSURE: 85 MMHG | TEMPERATURE: 98 F | SYSTOLIC BLOOD PRESSURE: 122 MMHG | HEART RATE: 94 BPM | RESPIRATION RATE: 17 BRPM

## 2020-12-16 DIAGNOSIS — F31.9 BIPOLAR DISORDER, UNSPECIFIED: ICD-10-CM

## 2020-12-16 DIAGNOSIS — F11.10 OPIOID ABUSE, UNCOMPLICATED: ICD-10-CM

## 2020-12-16 LAB
AMPHET UR-MCNC: NEGATIVE — SIGNIFICANT CHANGE UP
BARBITURATES UR SCN-MCNC: NEGATIVE — SIGNIFICANT CHANGE UP
BENZODIAZ UR-MCNC: POSITIVE
COCAINE METAB.OTHER UR-MCNC: NEGATIVE — SIGNIFICANT CHANGE UP
METHADONE UR-MCNC: NEGATIVE — SIGNIFICANT CHANGE UP
OPIATES UR-MCNC: NEGATIVE — SIGNIFICANT CHANGE UP
OXYCODONE UR-MCNC: POSITIVE
PCP SPEC-MCNC: SIGNIFICANT CHANGE UP
PCP UR-MCNC: NEGATIVE — SIGNIFICANT CHANGE UP
SARS-COV-2 RNA SPEC QL NAA+PROBE: SIGNIFICANT CHANGE UP
THC UR QL: NEGATIVE — SIGNIFICANT CHANGE UP
TSH SERPL-MCNC: 7.35 UIU/ML — HIGH (ref 0.27–4.2)

## 2020-12-16 PROCEDURE — 90792 PSYCH DIAG EVAL W/MED SRVCS: CPT | Mod: 95

## 2020-12-16 NOTE — ED BEHAVIORAL HEALTH ASSESSMENT NOTE - DESCRIPTION
see BH note.     COVID Exposure Screen- collateral (i.e. third-party, chart review, belongings, etc; include EMS and ED staff)     1.        *Has the patient had a COVID-19 test in the last 21 days?  (  ) Yes   (x  ) No   (  ) Unknown- Reason: ______  IF YES PROCEED TO QUESTION #2. IF NO OR UNKNOWN THEN PLEASE SKIP TO QUESTION #3.  2.        Date of test: ________  3.        Do you know the result? (  ) Negative   (  ) Positive   (  ) No result available  4.        *In the past 14 days, has the patient been around anyone with a positive COVID-19 test?*  (  ) Yes   (x  ) No   (  ) Unknown- Reason (e.g. collateral uncertain, refusing to answer, etc.):  ______  IF YES PROCEED TO QUESTION #5. IF NO or UNKNOWN, PLEASE SKIP TO QUESTION #10  5.        Was the patient within 6 feet of them for at least 15 minutes? (  ) Yes   (  ) No   (  ) Unknown- Reason: ______   6.        Did the patient provide care for them? (  ) Yes   (  ) No   (  ) Unknown- Reason: ______   7.        Did the patient have direct physical contact with them (touched, hugged, or kissed them)? (  ) Yes   (  ) No    (  ) Unknown- Reason: ______   8.        Did the patient share eating or drinking utensils with them? (  ) Yes   (  ) No    (  ) Unknown- Reason: ______   9.        Have they sneezed, coughed, or somehow got respiratory droplets on the patient? (  ) Yes   (  ) No    (  ) Unknown- Reason: ______   10.     *Have you been out of [place] within the past 14 days?*  (  ) Yes   ( x ) No   (  ) Unknown- Reason (e.g. patient uncertain, sedated, refusing to answer, etc.): _______  IF YES PLEASE ANSWER THE FOLLOWING QUESTIONS:  11.     Which state/country have you been to? ______  12.     Were you there over 24 hours? (  ) Yes   (  ) No    (  ) Unknown- Reason: ______  13.     Date of return to [facility]: ______ compartment syndrome lives with parents. employed.

## 2020-12-16 NOTE — ED ADULT NURSE REASSESSMENT NOTE - NS ED NURSE REASSESS COMMENT FT1
Pt awake and oriented x 4, able to answer questions and follow commands. Respiratory rate 21 at this time. Pending disposition.

## 2020-12-16 NOTE — ED BEHAVIORAL HEALTH ASSESSMENT NOTE - HPI (INCLUDE ILLNESS QUALITY, SEVERITY, DURATION, TIMING, CONTEXT, MODIFYING FACTORS, ASSOCIATED SIGNS AND SYMPTOMS)
Patient is a 31 y/o M, domiciled with family, employed, with psychiatric hx of bipolar disorder, with no hx of hospitalization, no known hx of suicidality, in outpatient treatment, with polysubstance hx (primarily opioids, also cocaine, cannabis, etoh) with hx of multiple ED visits for opioid OD (including a visit in Summer 2020 when found down after an OD with rhabdo, compartment syndrome, and ANTOINETTE requiring dialysis), who presents to ED due to concern for AMS at home.     Patient seen via [employer]. He presents as a poor historian, fairly drowsy on evaluation and falling asleep repeatedly. he also provides apparently contradictory narrative (see below, where he reported to private MD that he had taken 4 oxycodone today, denies to this MD). He is unable to provide any details surrounding his presentation to ED, saying he took only alprazolam. He feels his drowsiness is adequately explained by the hour of interview. he denies acute psychiatric sx, denies SI/HI/AVH/PI, feels he is safe to go home. reports vague manic/hypomanic sx on ROS, but unable to clarify timeline/extent sufficiently. He again falls asleep and cannot provide further information.     MD spoke to patient's father Dr. Mullen Held at listed cell number. He states that he feels patient has been struggling with depression recently. He notes, however, that patient appeared to be fairly well on Monday and nothing specific seemed different than baseline. Yesterday evening, however, he noted patient appeared very somnolent and he was concerned that patient had taken a drug. He notes patient's extensive hx of substance OD and expresses concern about patient's function. patient has not made any explicit, recent, suicidal statements. has made vague statements in the past, no known hx of intentional self harm.     MD spoke to Dr. Dowling for professional collateral. He states he has known the patient for a few months, and has been treating for bipolar d/o and unspecified personality disorder. He notes patient has had some mood instability of late. he also notes patient has made some suicidal comments to family without reporting a specific plan/intent. he states patient's degree of impulsivity is highly concerning to him. He notes that he spoke to patient in ED, and at that time patient admitted to taking 4 oxycodone and 4 alprazolam for unclear reason. Dr. Dowling feels patient would benefit from voluntary admission.

## 2020-12-16 NOTE — ED BEHAVIORAL HEALTH ASSESSMENT NOTE - RISK ASSESSMENT
Unable to determine Suicide Risk chronic rf: male sex, REJI  acute rf: impulsivity, recent loss of MGM  pf: family support, engaged in work

## 2020-12-16 NOTE — ED ADULT NURSE REASSESSMENT NOTE - NS ED NURSE REASSESS COMMENT FT1
Patient resting in the stretcher. Patient's father (who is an MD at [employer]) is at the bedside. Awaiting narcan kit from pharmacy. Will educate the patient and patient's father on how to use the kit once it is sent down, after patient returns demonstration patient will be d/c. Safety measures maintained. Bed in the lowest position. Call bell within reach.   No acute distress noted or further complaints at this time.

## 2020-12-16 NOTE — ED ADULT NURSE REASSESSMENT NOTE - NS ED NURSE REASSESS COMMENT FT1
MD spoke with patients family. Pt's family wants patient to be discharged and states that they will fall up outpatient with a psychiatrist. MD spoke with patients family. Pt's family wants patient to be discharged and states that they will fall up outpatient with a psychiatrist. Pt being prepared for discharged, constant observation discontinued. MD spoke with patients family. Pt's family wants patient to be discharged and states that they will fall up outpatient with a psychiatrist. Family does not want patient to wait in ED to be seen by inpatient psych team.  Pt being prepared for discharged, constant observation discontinued.

## 2020-12-16 NOTE — ED ADULT NURSE REASSESSMENT NOTE - NS ED NURSE REASSESS COMMENT FT1
Reviewed narcan kit with patient and patient's father. Patient provided a return demonstration. All questions answered at this time. Patient and father verbalized understanding. Patient educated on proper use as well as proper storage of narcan kit. Patient alert and stable at this time. Patient to be d/c home with father.

## 2020-12-16 NOTE — ED ADULT NURSE REASSESSMENT NOTE - NS ED NURSE REASSESS COMMENT FT1
Patient unwilling to wait for pharmacy to send down a new kit. Patient and patient father given a kit expiring in 2020 (kit number [id number]). Patient educated that they can exchange the kit at the pharmacy once . Patient alert and stable at time of d/c.

## 2020-12-16 NOTE — ED ADULT NURSE REASSESSMENT NOTE - NS ED NURSE REASSESS COMMENT FT1
Spoke with Lissett at [facility] ([phone]) and gave her report on patient.

## 2020-12-16 NOTE — ED BEHAVIORAL HEALTH ASSESSMENT NOTE - SUMMARY
Patient is a 29 y/o M, domiciled with family, employed, with psychiatric hx of bipolar disorder, with no hx of hospitalization, no known hx of suicidality, in outpatient treatment, with polysubstance hx (primarily opioids, also cocaine, cannabis, etoh) with hx of multiple ED visits for opioid OD (including a visit in Summer 2020 when found down after an OD with rhabdo, compartment syndrome, and ANTOINETTE requiring dialysis), who presents to ED due to concern for AMS at home.     patient presents somnolent, concern for lingering intoxication. collateral is concerning, not clear at this time whether patient is fully medically clear, and unclear whether if medically clear patient meets criteria for involuntary admission (given significant and possibly primary substance component. Patient will require psychiatric re-eval when more sober/alert.

## 2020-12-16 NOTE — ED ADULT NURSE REASSESSMENT NOTE - NS ED NURSE REASSESS COMMENT FT1
All narcan kits in the ED  in 2020. Pharmacy notified multiple times. [facility] and says that they will notify main pharmacy to obtain a kit the expires in march.

## 2021-03-02 ENCOUNTER — LABORATORY RESULT (OUTPATIENT)
Age: 31
End: 2021-03-02

## 2021-03-15 DIAGNOSIS — Z11.52 ENCOUNTER FOR SCREENING FOR COVID-19: ICD-10-CM

## 2021-03-20 ENCOUNTER — EMERGENCY (EMERGENCY)
Facility: HOSPITAL | Age: 31
LOS: 1 days | Discharge: HOME | End: 2021-03-20
Attending: EMERGENCY MEDICINE
Payer: COMMERCIAL

## 2021-03-20 VITALS
HEART RATE: 77 BPM | TEMPERATURE: 97 F | OXYGEN SATURATION: 99 % | RESPIRATION RATE: 17 BRPM | SYSTOLIC BLOOD PRESSURE: 106 MMHG | DIASTOLIC BLOOD PRESSURE: 64 MMHG

## 2021-03-20 VITALS — HEIGHT: 71 IN

## 2021-03-20 DIAGNOSIS — F11.99 OPIOID USE, UNSPECIFIED WITH UNSPECIFIED OPIOID-INDUCED DISORDER: ICD-10-CM

## 2021-03-20 DIAGNOSIS — Z98.890 OTHER SPECIFIED POSTPROCEDURAL STATES: Chronic | ICD-10-CM

## 2021-03-20 LAB
ALBUMIN SERPL ELPH-MCNC: 4.1 G/DL — SIGNIFICANT CHANGE UP (ref 3.3–5)
ALBUMIN SERPL ELPH-MCNC: 4.7 G/DL — SIGNIFICANT CHANGE UP (ref 3.3–5)
ALP SERPL-CCNC: 64 U/L — SIGNIFICANT CHANGE UP (ref 40–120)
ALP SERPL-CCNC: 73 U/L — SIGNIFICANT CHANGE UP (ref 40–120)
ALT FLD-CCNC: 19 U/L — SIGNIFICANT CHANGE UP (ref 10–45)
ALT FLD-CCNC: 24 U/L — SIGNIFICANT CHANGE UP (ref 10–45)
ANION GAP SERPL CALC-SCNC: 11 MMOL/L — SIGNIFICANT CHANGE UP (ref 5–17)
ANION GAP SERPL CALC-SCNC: 13 MMOL/L — SIGNIFICANT CHANGE UP (ref 5–17)
APAP SERPL-MCNC: 24 UG/ML — SIGNIFICANT CHANGE UP (ref 10–30)
APPEARANCE UR: CLEAR — SIGNIFICANT CHANGE UP
AST SERPL-CCNC: 15 U/L — SIGNIFICANT CHANGE UP (ref 10–40)
AST SERPL-CCNC: 22 U/L — SIGNIFICANT CHANGE UP (ref 10–40)
BACTERIA # UR AUTO: NEGATIVE — SIGNIFICANT CHANGE UP
BASE EXCESS BLDV CALC-SCNC: 0.1 MMOL/L — SIGNIFICANT CHANGE UP (ref -2–2)
BASOPHILS # BLD AUTO: 0.09 K/UL — SIGNIFICANT CHANGE UP (ref 0–0.2)
BASOPHILS NFR BLD AUTO: 0.8 % — SIGNIFICANT CHANGE UP (ref 0–2)
BILIRUB SERPL-MCNC: 0.8 MG/DL — SIGNIFICANT CHANGE UP (ref 0.2–1.2)
BILIRUB SERPL-MCNC: 1 MG/DL — SIGNIFICANT CHANGE UP (ref 0.2–1.2)
BILIRUB UR-MCNC: NEGATIVE — SIGNIFICANT CHANGE UP
BUN SERPL-MCNC: 15 MG/DL — SIGNIFICANT CHANGE UP (ref 7–23)
BUN SERPL-MCNC: 17 MG/DL — SIGNIFICANT CHANGE UP (ref 7–23)
CA-I SERPL-SCNC: 1.17 MMOL/L — SIGNIFICANT CHANGE UP (ref 1.12–1.3)
CALCIUM SERPL-MCNC: 8.4 MG/DL — SIGNIFICANT CHANGE UP (ref 8.4–10.5)
CALCIUM SERPL-MCNC: 9.3 MG/DL — SIGNIFICANT CHANGE UP (ref 8.4–10.5)
CHLORIDE BLDV-SCNC: 108 MMOL/L — SIGNIFICANT CHANGE UP (ref 96–108)
CHLORIDE SERPL-SCNC: 103 MMOL/L — SIGNIFICANT CHANGE UP (ref 96–108)
CHLORIDE SERPL-SCNC: 104 MMOL/L — SIGNIFICANT CHANGE UP (ref 96–108)
CK SERPL-CCNC: 234 U/L — HIGH (ref 30–200)
CO2 BLDV-SCNC: 29 MMOL/L — SIGNIFICANT CHANGE UP (ref 22–30)
CO2 SERPL-SCNC: 22 MMOL/L — SIGNIFICANT CHANGE UP (ref 22–31)
CO2 SERPL-SCNC: 24 MMOL/L — SIGNIFICANT CHANGE UP (ref 22–31)
COLOR SPEC: COLORLESS — SIGNIFICANT CHANGE UP
CREAT SERPL-MCNC: 1.29 MG/DL — SIGNIFICANT CHANGE UP (ref 0.5–1.3)
CREAT SERPL-MCNC: 1.33 MG/DL — HIGH (ref 0.5–1.3)
DIFF PNL FLD: NEGATIVE — SIGNIFICANT CHANGE UP
EOSINOPHIL # BLD AUTO: 0.23 K/UL — SIGNIFICANT CHANGE UP (ref 0–0.5)
EOSINOPHIL NFR BLD AUTO: 2 % — SIGNIFICANT CHANGE UP (ref 0–6)
EPI CELLS # UR: 2 — SIGNIFICANT CHANGE UP
ETHANOL SERPL-MCNC: SIGNIFICANT CHANGE UP MG/DL (ref 0–10)
GAS PNL BLDV: 137 MMOL/L — SIGNIFICANT CHANGE UP (ref 135–145)
GAS PNL BLDV: SIGNIFICANT CHANGE UP
GLUCOSE BLDV-MCNC: 87 MG/DL — SIGNIFICANT CHANGE UP (ref 70–99)
GLUCOSE SERPL-MCNC: 197 MG/DL — HIGH (ref 70–99)
GLUCOSE SERPL-MCNC: 84 MG/DL — SIGNIFICANT CHANGE UP (ref 70–99)
GLUCOSE UR QL: NEGATIVE — SIGNIFICANT CHANGE UP
HCO3 BLDV-SCNC: 27 MMOL/L — SIGNIFICANT CHANGE UP (ref 21–29)
HCT VFR BLD CALC: 45.8 % — SIGNIFICANT CHANGE UP (ref 39–50)
HCT VFR BLDA CALC: 42 % — SIGNIFICANT CHANGE UP (ref 39–50)
HGB BLD CALC-MCNC: 13.6 G/DL — SIGNIFICANT CHANGE UP (ref 13–17)
HGB BLD-MCNC: 14.3 G/DL — SIGNIFICANT CHANGE UP (ref 13–17)
HYALINE CASTS # UR AUTO: 0 /LPF — SIGNIFICANT CHANGE UP (ref 0–2)
IMM GRANULOCYTES NFR BLD AUTO: 0.6 % — SIGNIFICANT CHANGE UP (ref 0–1.5)
KETONES UR-MCNC: NEGATIVE — SIGNIFICANT CHANGE UP
LACTATE BLDV-MCNC: 2 MMOL/L — SIGNIFICANT CHANGE UP (ref 0.7–2)
LEUKOCYTE ESTERASE UR-ACNC: NEGATIVE — SIGNIFICANT CHANGE UP
LITHIUM SERPL-MCNC: 1 MMOL/L — SIGNIFICANT CHANGE UP (ref 0.6–1.2)
LYMPHOCYTES # BLD AUTO: 1.56 K/UL — SIGNIFICANT CHANGE UP (ref 1–3.3)
LYMPHOCYTES # BLD AUTO: 13.3 % — SIGNIFICANT CHANGE UP (ref 13–44)
MCHC RBC-ENTMCNC: 28.3 PG — SIGNIFICANT CHANGE UP (ref 27–34)
MCHC RBC-ENTMCNC: 31.2 GM/DL — LOW (ref 32–36)
MCV RBC AUTO: 90.7 FL — SIGNIFICANT CHANGE UP (ref 80–100)
MONOCYTES # BLD AUTO: 0.47 K/UL — SIGNIFICANT CHANGE UP (ref 0–0.9)
MONOCYTES NFR BLD AUTO: 4 % — SIGNIFICANT CHANGE UP (ref 2–14)
NEUTROPHILS # BLD AUTO: 9.34 K/UL — HIGH (ref 1.8–7.4)
NEUTROPHILS NFR BLD AUTO: 79.3 % — HIGH (ref 43–77)
NITRITE UR-MCNC: NEGATIVE — SIGNIFICANT CHANGE UP
NRBC # BLD: 0 /100 WBCS — SIGNIFICANT CHANGE UP (ref 0–0)
PCO2 BLDV: 55 MMHG — HIGH (ref 35–50)
PH BLDV: 7.31 — LOW (ref 7.35–7.45)
PH UR: 6 — SIGNIFICANT CHANGE UP (ref 5–8)
PLATELET # BLD AUTO: 368 K/UL — SIGNIFICANT CHANGE UP (ref 150–400)
PO2 BLDV: 46 MMHG — HIGH (ref 25–45)
POTASSIUM BLDV-SCNC: 3.5 MMOL/L — SIGNIFICANT CHANGE UP (ref 3.5–5.3)
POTASSIUM SERPL-MCNC: 3.7 MMOL/L — SIGNIFICANT CHANGE UP (ref 3.5–5.3)
POTASSIUM SERPL-MCNC: 3.8 MMOL/L — SIGNIFICANT CHANGE UP (ref 3.5–5.3)
POTASSIUM SERPL-SCNC: 3.7 MMOL/L — SIGNIFICANT CHANGE UP (ref 3.5–5.3)
POTASSIUM SERPL-SCNC: 3.8 MMOL/L — SIGNIFICANT CHANGE UP (ref 3.5–5.3)
PROT SERPL-MCNC: 6.3 G/DL — SIGNIFICANT CHANGE UP (ref 6–8.3)
PROT SERPL-MCNC: 7.2 G/DL — SIGNIFICANT CHANGE UP (ref 6–8.3)
PROT UR-MCNC: NEGATIVE — SIGNIFICANT CHANGE UP
RBC # BLD: 5.05 M/UL — SIGNIFICANT CHANGE UP (ref 4.2–5.8)
RBC # FLD: 13.2 % — SIGNIFICANT CHANGE UP (ref 10.3–14.5)
RBC CASTS # UR COMP ASSIST: 0 /HPF — SIGNIFICANT CHANGE UP (ref 0–4)
SALICYLATES SERPL-MCNC: <2 MG/DL — LOW (ref 15–30)
SAO2 % BLDV: 80 % — SIGNIFICANT CHANGE UP (ref 67–88)
SODIUM SERPL-SCNC: 138 MMOL/L — SIGNIFICANT CHANGE UP (ref 135–145)
SODIUM SERPL-SCNC: 139 MMOL/L — SIGNIFICANT CHANGE UP (ref 135–145)
SP GR SPEC: 1.01 — LOW (ref 1.01–1.02)
TSH SERPL-MCNC: 14.9 UIU/ML — HIGH (ref 0.27–4.2)
UROBILINOGEN FLD QL: NEGATIVE — SIGNIFICANT CHANGE UP
WBC # BLD: 11.76 K/UL — HIGH (ref 3.8–10.5)
WBC # FLD AUTO: 11.76 K/UL — HIGH (ref 3.8–10.5)
WBC UR QL: 1 /HPF — SIGNIFICANT CHANGE UP (ref 0–5)

## 2021-03-20 PROCEDURE — 84436 ASSAY OF TOTAL THYROXINE: CPT

## 2021-03-20 PROCEDURE — 82962 GLUCOSE BLOOD TEST: CPT

## 2021-03-20 PROCEDURE — 84132 ASSAY OF SERUM POTASSIUM: CPT

## 2021-03-20 PROCEDURE — 99291 CRITICAL CARE FIRST HOUR: CPT

## 2021-03-20 PROCEDURE — 85014 HEMATOCRIT: CPT

## 2021-03-20 PROCEDURE — 80307 DRUG TEST PRSMV CHEM ANLYZR: CPT

## 2021-03-20 PROCEDURE — 85025 COMPLETE CBC W/AUTO DIFF WBC: CPT

## 2021-03-20 PROCEDURE — 82803 BLOOD GASES ANY COMBINATION: CPT

## 2021-03-20 PROCEDURE — 82330 ASSAY OF CALCIUM: CPT

## 2021-03-20 PROCEDURE — 84295 ASSAY OF SERUM SODIUM: CPT

## 2021-03-20 PROCEDURE — 83605 ASSAY OF LACTIC ACID: CPT

## 2021-03-20 PROCEDURE — 81001 URINALYSIS AUTO W/SCOPE: CPT

## 2021-03-20 PROCEDURE — 84443 ASSAY THYROID STIM HORMONE: CPT

## 2021-03-20 PROCEDURE — 82435 ASSAY OF BLOOD CHLORIDE: CPT

## 2021-03-20 PROCEDURE — 80178 ASSAY OF LITHIUM: CPT

## 2021-03-20 PROCEDURE — 96374 THER/PROPH/DIAG INJ IV PUSH: CPT

## 2021-03-20 PROCEDURE — 80053 COMPREHEN METABOLIC PANEL: CPT

## 2021-03-20 PROCEDURE — 82565 ASSAY OF CREATININE: CPT

## 2021-03-20 PROCEDURE — 82947 ASSAY GLUCOSE BLOOD QUANT: CPT

## 2021-03-20 PROCEDURE — 93010 ELECTROCARDIOGRAM REPORT: CPT

## 2021-03-20 PROCEDURE — 85018 HEMOGLOBIN: CPT

## 2021-03-20 PROCEDURE — 84480 ASSAY TRIIODOTHYRONINE (T3): CPT

## 2021-03-20 PROCEDURE — 82550 ASSAY OF CK (CPK): CPT

## 2021-03-20 PROCEDURE — 90792 PSYCH DIAG EVAL W/MED SRVCS: CPT

## 2021-03-20 PROCEDURE — 93005 ELECTROCARDIOGRAM TRACING: CPT

## 2021-03-20 PROCEDURE — 99291 CRITICAL CARE FIRST HOUR: CPT | Mod: 25

## 2021-03-20 RX ORDER — NALOXONE HYDROCHLORIDE 4 MG/.1ML
3 SPRAY NASAL
Qty: 4 | Refills: 0 | Status: DISCONTINUED | OUTPATIENT
Start: 2021-03-20 | End: 2021-03-20

## 2021-03-20 RX ORDER — NALOXONE HYDROCHLORIDE 4 MG/.1ML
2 SPRAY NASAL
Qty: 4 | Refills: 0 | Status: DISCONTINUED | OUTPATIENT
Start: 2021-03-20 | End: 2021-03-23

## 2021-03-20 RX ORDER — SODIUM CHLORIDE 9 MG/ML
1000 INJECTION, SOLUTION INTRAVENOUS ONCE
Refills: 0 | Status: COMPLETED | OUTPATIENT
Start: 2021-03-20 | End: 2021-03-20

## 2021-03-20 RX ADMIN — NALOXONE HYDROCHLORIDE 750 MG/HR: 4 SPRAY NASAL at 10:02

## 2021-03-20 RX ADMIN — NALOXONE HYDROCHLORIDE 500 MG/HR: 4 SPRAY NASAL at 13:02

## 2021-03-20 RX ADMIN — SODIUM CHLORIDE 1000 MILLILITER(S): 9 INJECTION, SOLUTION INTRAVENOUS at 09:10

## 2021-03-20 NOTE — BEHAVIORAL HEALTH ASSESSMENT NOTE - SUICIDE PROTECTIVE FACTORS
Positive therapeutic relationships Has future plans/Engaged in work or school/Positive therapeutic relationships

## 2021-03-20 NOTE — BEHAVIORAL HEALTH ASSESSMENT NOTE - VIOLENCE RISK FACTORS:
Substance abuse/Impulsivity Substance abuse/Impulsivity/Lack of insight into violence risk/need for treatment

## 2021-03-20 NOTE — BEHAVIORAL HEALTH ASSESSMENT NOTE - NSBHCHARTREVIEWINVESTIGATE_PSY_A_CORE FT
< from: 12 Lead ECG (03.20.21 @ 09:02) >    Ventricular Rate 116 BPM    Atrial Rate 116 BPM    P-R Interval 154 ms    QRS Duration 88 ms    Q-T Interval 336 ms    QTC Calculation(Bazett) 467 ms    P Axis 56 degrees    R Axis 53 degrees    T Axis 22 degrees    Diagnosis Line SINUS TACHYCARDIA  POSSIBLE LEFT ATRIAL ENLARGEMENT  NONSPECIFIC T WAVE ABNORMALITY  ABNORMAL ECG  WHEN COMPARED WITH ECG OF 15-DEC-2020 20:58,  SIGNIFICANT CHANGES HAVE OCCURRED    < end of copied text >

## 2021-03-20 NOTE — ED PROVIDER NOTE - OBJECTIVE STATEMENT
31 yo M with PMH opioid use disorder, bipolar disorder presents with concern for overdose. Per EMS, patient was reportedly in respira 29 yo M with PMH opioid use disorder, bipolar disorder presents with concern for overdose. Per EMS, patient was reportedly in respiratory arrest. Received 4 mg Narcan by father (surgeon Dr. Camacho). Patient was still altered by the time EMS arrived, so he received another 2 mg Narcan. Patient states he remembers walking his dog outside this morning, and then remembers seeing EMS. Endorses taking his usual effexor, trazadone and lithium this morning but denies illict drug use.

## 2021-03-20 NOTE — ED ADULT NURSE NOTE - OBJECTIVE STATEMENT
Pt is a 29 yo male BIBA sp overdose. As per EMS pt states that pts father called 911 after finding his son unresponsive and gave 4mg of Narcan and was giving rescue breaths by father. Pt was given an addition 2 mg of Narcan by EMS. Pt denies taking any other medications then his prescribed dose. He denies any SI/HI but states he was suicidal in the past. He denies any CP or SOB no N/V/D no cough fever or chills. Pt states he was walking his dog and woke up to  around him, he denies take any other substances.

## 2021-03-20 NOTE — ED PROVIDER NOTE - INTERPRETATION
EKG reviewed for rate, rhythm, axis, intervals and segments, including QRS morphology, P wave appearance T wave appearance, MA interval, and QT interval.  I find the EKG to be unremarkable in all of these regards except as follows: sinus tach

## 2021-03-20 NOTE — ED ADULT NURSE REASSESSMENT NOTE - NS ED NURSE REASSESS COMMENT FT1
(Break coverage RN)Patient resting in bed alert and oriented x 4, denied pain and all other complaints, respirations even and nonlabored, breathing spontaneously on room air, patient updated on plan of care, pending further orders. Jasmin RN

## 2021-03-20 NOTE — BEHAVIORAL HEALTH ASSESSMENT NOTE - NSBHCHARTREVIEWVS_PSY_A_CORE FT
Vital Signs Last 24 Hrs  T(C): 36.6 (20 Mar 2021 13:00), Max: 37 (20 Mar 2021 09:13)  T(F): 97.9 (20 Mar 2021 13:00), Max: 98.6 (20 Mar 2021 09:13)  HR: 79 (20 Mar 2021 13:48) (56 - 121)  BP: 116/75 (20 Mar 2021 13:48) (105/90 - 138/91)  BP(mean): --  RR: 18 (20 Mar 2021 13:00) (16 - 18)  SpO2: 99% (20 Mar 2021 13:00) (96% - 100%)

## 2021-03-20 NOTE — ED PROVIDER NOTE - ATTENDING CONTRIBUTION TO CARE
31 yo male, hx of bipolar, opiate abuse, found down by family, unknown ingestion, "respiratory arrest" per EMS though they stated no compressions, got 4 mg narcan pre-ems by family, unknown route, got an additional 2 mg narcan on EMS arrival for a total of 6, brought in to ED.  s/p 6 mg, awake, grossly oriented x 3, a bit slow to respond but not bradypneic, normal sat, denies opiate ingestion but states "maybe something was mixed into my lithium".  denies SI/HI.  given hefty wake-up dose of narcan and still with mild somnolence here, will start narcan GTT @ 3, check labs, EKG, likely admit for further management.

## 2021-03-20 NOTE — ED PROVIDER NOTE - CLINICAL SUMMARY MEDICAL DECISION MAKING FREE TEXT BOX
29 yo M with PMH opioid use disorder, bipolar disorder presents with concern for overdose. received 6 mg narcan in the field for reported respiratory arrest. will give ivf, check labs including CK (given prior history), EKG, tox panel. Will start on narcan gtt given amount of narcan needed in the field. will observe closely.

## 2021-03-20 NOTE — ED PROVIDER NOTE - PATIENT PORTAL LINK FT
You can access the FollowMyHealth Patient Portal offered by [facility] by registering at the following website: [email or web address]. By joining [employer]’s FollowMyHealth portal, you will also be able to view your health information using other applications (apps) compatible with our system.

## 2021-03-20 NOTE — BEHAVIORAL HEALTH ASSESSMENT NOTE - HPI (INCLUDE ILLNESS QUALITY, SEVERITY, DURATION, TIMING, CONTEXT, MODIFYING FACTORS, ASSOCIATED SIGNS AND SYMPTOMS)
AH is a 28 y/o single man, employed, lives with family, no significant PMHx, PPHx bipolar disorder vs borderline PD, ADHD, opioid use d/o, currently in outpatient psych treatment with Dr. Dowling, 1 prior 2-wk inpatient hospitalization in 2020 at [facility], no prior SA, no prior NSSIB, BIB EMS after father found him down, unresponsive, suspected opiate overdose due to partial response to narcan. Psychiatry consulted to rule out suicide attempt via intentional overdose.    On exam, pt appears somewhat sedated but able to participate in interview. Pt states he does not remember much of what happened before coming to the hospital. He states that he was feeling stressed about his job, decided to take 2 xanax to help him calm down. Then he remembers his parents calling EMS. When interviewer informed patient that he responded to narcan, which is used for patient's who take too many opiates, pt denies taking opiates. He says maybe opiates were mixed up with his xanax somehow. He denies feeling suicidal or having any intention of hurting himself. He does endorse past history of SI with plan to end his life several months ago. He states he made a "failed attempt" but he is unable to elaborate on prior plans or methods. Pt states he has never been in substance use treatment because he does not have issues with drugs.    Per collateral from patient's father Dr. Sebas Camacho, pt has long history of psych issues, though he has not been definitively diagnosed; differential includes borderline PD vs. bipolar disorder. Pt was previously high-functioning, [place], then moved back to [facility], where he started struggling with drug use. Drug use was initially episodic but has become a more chronic issue with multiple overdoses over the past year or so. Pt has not received any sort of substance use treatment. He is in treatment with Dr. Dowling psychiatrist and Dr. Mauro Graham psychologist. 1 prior inpatient psych hospitalization at [facility] in 2020. Pt has made statements of passive SI (e.g. "I shouldn't be here") but has never threatened to end his life or formed a plan. He has never self-injured. On the day of admission, dad heard a strange noise upstairs in their home, found pt lying on the floor, unresponsive, gave mouth-to-mouth and administered narcan, which caused pt to become more alert. He called EMS to transport pt to ED.

## 2021-03-20 NOTE — BEHAVIORAL HEALTH ASSESSMENT NOTE - SUICIDE RISK FACTORS
Alcohol/Substance abuse disorders/Impulsivity/Mood Disorder current/past Alcohol/Substance abuse disorders/Impulsivity/Mood Disorder current/past/Cluster B Personality disorders or traits current/past

## 2021-03-20 NOTE — ED PROVIDER NOTE - PHYSICAL EXAMINATION
PHYSICAL EXAM:   General: tired appearing but awake and alert, interactive  HEENT:  PERRLA 2-3 mm,  airway patent  Cardiovascular: regular rate and rhythm, + S1/S2, no murmurs, rubs, gallops appreciated  Respiratory: clear to auscultation bilaterally, good aeration bilaterally, nonlabored respirations  Abdominal: soft, nontender, nondistended, no rebound, guarding or rigidity  Extremities: no LE edema b/l.   Neuro: awake, alert, interactive.   Psychiatric: flat affect, tired appearing  -Fina Parish PGY-3

## 2021-03-20 NOTE — ED PROVIDER NOTE - NS ED ROS FT
REVIEW OF SYSTEMS:  General:  no fever, no chills, +feels tired  HEENT: no headache, no vision changes  Cardiac: no chest pain, no palpitations  Respiratory: no cough, no shortness of breath  Gastrointestinal: no abdominal pain, no nausea, no vomiting, no diarrhea, no melena, no hematochezia   -Fina Parish PGY-3

## 2021-03-20 NOTE — ED PROVIDER NOTE - PROGRESS NOTE DETAILS
Per chart review: in june 2020, patient found unresponsive at home. found to be in renal failure 2/2 rhabdo with hyperkalemia. also with LUE compartment syndrome at that time. Fina Parish MD PGY-3 spoke with father who found patient unresponsive and cyanotic with a pulse. he then administered narcan, and ems administered additional narcan when they arrived. father states he did not see any bottles around patient Fina Parish MD PGY-3 telepsych talking with patient Cassie Márquez MD: Pt awake and alert, ambulating. Will dc

## 2021-03-20 NOTE — BEHAVIORAL HEALTH ASSESSMENT NOTE - CASE SUMMARY
DANILO is a 28 y/o single man, employed, lives with family, no significant PMHx, PPHx bipolar disorder vs borderline PD, ADHD, opioid use d/o, currently in outpatient psych treatment with Dr. Dowling, 1 prior 2-wk inpatient hospitalization in 2020 at [facility], no prior SA, no prior NSSIB, BIB EMS after father found him down, unresponsive, suspected opiate overdose due to partial response to narcan. Psychiatry consulted to rule out suicide attempt via intentional overdose.    On exam, pt appears somewhat sedated but able to participate in interview. Pt states he does not remember much of what happened before coming to the hospital. He states that he was feeling stressed about his job, decided to take 2 xanax to help him calm down. Then he remembers his parents calling EMS.Pt denies taking opiates recently, denies si/hi. pt doesn't warrant inpt psych admission. pt will be discharged back home, pt will f/u with his psychiatrist.

## 2021-03-20 NOTE — ED ADULT NURSE REASSESSMENT NOTE - NS ED NURSE REASSESS COMMENT FT1
Narcan infusion turned off at 1600, as per MD Natalee Kohli. Pt awake and eating. waiting for psych consult

## 2021-03-20 NOTE — BEHAVIORAL HEALTH ASSESSMENT NOTE - RISK ASSESSMENT
Risk factors include hx bipolar disorder, lives alone, active substance use, male, acute medical issues. Protective factors include no prior inpatient hospitalizations, no prior suicide attempts, connected with family, has a pet, engaged in work, highly educated Low Acute Suicide Risk

## 2021-03-20 NOTE — ED PROVIDER NOTE - NSFOLLOWUPINSTRUCTIONS_ED_ALL_ED_FT
-Follow up with your primary care provider in 24-48 hours.   -A copy of resulted labs, imaging, and findings have been provided to you.   -As we discussed, please return to the Emergency Department if you experience any new/worsening symptoms, including, but are not limited to: unrelenting nausea, vomiting, fever, chills, chest pain, shortness of breath, dizziness, worsening abdominal pain, worsening back pain, syncope, blood in stool, headache that does not resolve, numbness or tingling, loss of sensation, loss of motor function, or any other concerning symptoms.  -If you have issues obtaining follow up, please call: [phone] to obtain a doctor or specialist who takes your insurance in your area.  You may call [phone] to make an appointment with the internal medicine clinic.

## 2021-03-20 NOTE — ED ADULT NURSE REASSESSMENT NOTE - NS ED NURSE REASSESS COMMENT FT1
Pt AAOx3, NAD, sitting up comfortably in room. Pt currently calm and cooperative with staff. 1:1 in progress as per order. Safe environment maintained. Pt wanded by security and belongings placed in safe. Narcan infusion decreased to 1 mg/hr as per MD Steel Pt AAOx3, NAD, sitting up comfortably in room. Pt currently calm and cooperative with staff. 1:1 in progress as per order. Safe environment maintained. Pt wanded by security and belongings placed in safe. Narcan infusion decreased to 1 mg/hr at 1425 as per MD Steel

## 2021-03-20 NOTE — BEHAVIORAL HEALTH ASSESSMENT NOTE - DETAILS
Diabetes grandmother "Failed attempt" 60+ months ago; pt unable to provide detail "Failed attempt" 6+ months ago; pt unable to provide detail

## 2021-03-20 NOTE — BEHAVIORAL HEALTH ASSESSMENT NOTE - SUMMARY
Patient is a 30 y/o single man, no significant PMHx, PPHx bipolar disorder, ADHD, opioid use d/o, currently in outpatient treatment with Dr. Dowling, no prior inpatient hospitalizations, no prior SA, no prior NSSIB, occasional cocaine and alcohol use, BIB EMS DANILO is a 30 y/o single man, employed, lives with family, no significant PMHx, PPHx bipolar disorder vs borderline PD, ADHD, opioid use d/o, currently in outpatient psych treatment with Dr. Dowling, 1 prior 2-wk inpatient hospitalization in 2020 at [facility], no prior SA, no prior NSSIB, BIB EMS after father found him down, unresponsive, suspected opiate overdose due to partial response to narcan. Psychiatry consulted to rule out suicide attempt via intentional overdose.    On exam, pt is guarded, minimizes substance use, denies intentional overdose. He endorses history of active SI with "failed attempts" but denies current passive or active SI, plan, or intent. He does not pose an acute risk to himself or others and does not meet criteria for inpatient admission at this time. He would likely benefit from substance use treatment.

## 2021-03-21 LAB
T3 SERPL-MCNC: 77 NG/DL — LOW (ref 80–200)
T4 AB SER-ACNC: 5.1 UG/DL — SIGNIFICANT CHANGE UP (ref 4.6–12)

## 2021-03-30 LAB
AMPHET UR-MCNC: NEGATIVE — SIGNIFICANT CHANGE UP
BARBITURATES, URINE.: NEGATIVE — SIGNIFICANT CHANGE UP
BENZODIAZ UR-MCNC: SIGNIFICANT CHANGE UP
COCAINE METAB.OTHER UR-MCNC: SIGNIFICANT CHANGE UP
CREATININE, URINE THERAPEUTIC: 45.8 MG/DL — SIGNIFICANT CHANGE UP
METHADONE UR-MCNC: NEGATIVE — SIGNIFICANT CHANGE UP
METHAQUALONE UR QL: NEGATIVE — SIGNIFICANT CHANGE UP
METHAQUALONE UR-MCNC: NEGATIVE — SIGNIFICANT CHANGE UP
OPIATES UR-MCNC: SIGNIFICANT CHANGE UP
PCP UR-MCNC: NEGATIVE — SIGNIFICANT CHANGE UP
PROPOXYPH UR QL: NEGATIVE — SIGNIFICANT CHANGE UP
THC UR QL: NEGATIVE — SIGNIFICANT CHANGE UP

## 2021-03-31 ENCOUNTER — OUTPATIENT (OUTPATIENT)
Dept: OUTPATIENT SERVICES | Facility: HOSPITAL | Age: 31
LOS: 1 days | Discharge: HOME | End: 2021-03-31

## 2021-03-31 DIAGNOSIS — Z98.890 OTHER SPECIFIED POSTPROCEDURAL STATES: Chronic | ICD-10-CM

## 2021-04-01 DIAGNOSIS — Z91.89 OTHER SPECIFIED PERSONAL RISK FACTORS, NOT ELSEWHERE CLASSIFIED: ICD-10-CM

## 2021-05-11 NOTE — ED ADULT NURSE NOTE - CAS TRG GENERAL AIRWAY, MLM
Patent Doxycycline Pregnancy And Lactation Text: This medication is Pregnancy Category D and not consider safe during pregnancy. It is also excreted in breast milk but is considered safe for shorter treatment courses.

## 2021-06-16 NOTE — ED BEHAVIORAL HEALTH ASSESSMENT NOTE - EYE CONTACT
Action 2: Continue Sig For Treatment 1 (If Needed): twice weekly Detail Level: Zone Treatment 1: clobetasol 0.05% solution Other Good

## 2021-11-26 ENCOUNTER — NON-APPOINTMENT (OUTPATIENT)
Age: 31
End: 2021-11-26

## 2021-11-27 ENCOUNTER — LABORATORY RESULT (OUTPATIENT)
Age: 31
End: 2021-11-27

## 2021-12-03 ENCOUNTER — APPOINTMENT (OUTPATIENT)
Dept: NEPHROLOGY | Facility: CLINIC | Age: 31
End: 2021-12-03
Payer: COMMERCIAL

## 2021-12-03 DIAGNOSIS — R35.8 XXOTHER POLYURIA: ICD-10-CM

## 2021-12-03 DIAGNOSIS — I10 ESSENTIAL (PRIMARY) HYPERTENSION: ICD-10-CM

## 2021-12-03 DIAGNOSIS — N17.9 ACUTE KIDNEY FAILURE, UNSPECIFIED: ICD-10-CM

## 2021-12-03 PROCEDURE — 99214 OFFICE O/P EST MOD 30 MIN: CPT | Mod: 95

## 2021-12-03 NOTE — ASSESSMENT
[FreeTextEntry1] : 31 years old man with history of bipolar depression seen in follow up after an episode of rhabdomyolysis causing ATN now on lithium with symptoms of polyuria.\par \par Acute Tubular Necrosis -- Latest creatinine reviewed 0.98.  Resolved but is at risk for future ANTOINETTE so will need annual follow up.\par \par Hypertension -- seems to have resolved but he will monitor periodically\par \par Polyuria -- The patient has a new complaint of polyuria today after starting lithium.  The fact that he wakes up thirsty but also needs to urinate 2 times per night strongly indicates a nephrogenic diabetes insipidus.  I explained the physiology of kidney water reclamation and how lithium interrupts this balanced.  I also explained that the effects can be irreversible with long term exposure.  However if he is benefitting from the medication we can prescribe amiloride to provide some degree of protection.  I asked him to do blood and urine tests for me.  I counseled him on staying well hydrated.  I am going to speak with his psychiatrics Dr. Shubham Owusu.  Colin will make a follow up appointment with me.

## 2021-12-03 NOTE — REVIEW OF SYSTEMS
[Fever] : no fever [Chills] : no chills [Feeling Poorly] : not feeling poorly [Feeling Tired] : not feeling tired [Eyesight Problems] : no eyesight problems [Nosebleeds] : no nosebleeds [Chest Pain] : no chest pain [Lower Ext Edema] : no extremity edema [Shortness Of Breath] : no shortness of breath [Wheezing] : no wheezing [Abdominal Pain] : no abdominal pain [Vomiting] : no vomiting [As Noted in HPI] : as noted in HPI [Arthralgias] : no arthralgias [Dizziness] : no dizziness [Fainting] : no fainting [Anxiety] : no anxiety [Depression] : no depression [Easy Bleeding] : no tendency for easy bleeding [Easy Bruising] : no tendency for easy bruising

## 2021-12-03 NOTE — HISTORY OF PRESENT ILLNESS
[Home] : at home, [unfilled] , at the time of the visit. [Medical Office: ([facility])___] : at the medical office located in  [Verbal consent obtained from patient] : the patient, [unfilled] [FreeTextEntry1] : Today I had the pleasure of seeing Colin Held in the office.  I previously met him at [facility] where he was admitted with rhabdomyolysis after being found down.  He was s/p fasciotomy.  He and oliguric renal failure with hypervolemia requiring hemodialysis.  Last HD session was 6/27.  Creatinine following discharge was 1.4 earlier this week.  Since being home the patient's blood pressure has remained high.  He was off nicardipine but restarted it yesterday.  He has been urinating a lot and he has had terrible pain in his leg that he describes as sharp and burning in nature.  He has no chest pain and no dyspnea.\par \par 12/3/21 -- I saw Colin today by televisit.  Since our last meeting he reports that his blood pressure has been well controlled off of all medications.  He had blood work done on November 27 which I have reviewed.  He reports now being on Lithium for the past few months.  He experiences urinary frequency, wakes up twice per night thirsty but also needing to urinate.

## 2021-12-22 ENCOUNTER — LABORATORY RESULT (OUTPATIENT)
Age: 31
End: 2021-12-22

## 2021-12-22 ENCOUNTER — TRANSCRIPTION ENCOUNTER (OUTPATIENT)
Age: 31
End: 2021-12-22

## 2022-02-07 ENCOUNTER — NON-APPOINTMENT (OUTPATIENT)
Age: 32
End: 2022-02-07

## 2022-02-07 LAB
ALBUMIN SERPL ELPH-MCNC: 4.9 G/DL
ANION GAP SERPL CALC-SCNC: 14 MMOL/L
BUN SERPL-MCNC: 16 MG/DL
CALCIUM SERPL-MCNC: 9.5 MG/DL
CHLORIDE SERPL-SCNC: 107 MMOL/L
CO2 SERPL-SCNC: 21 MMOL/L
CREAT SERPL-MCNC: 0.96 MG/DL
GLUCOSE SERPL-MCNC: 100 MG/DL
OSMOLALITY SERPL: 297 MOSMOL/KG
OSMOLALITY UR: 793 MOSM/KG
PHOSPHATE SERPL-MCNC: 3.4 MG/DL
POTASSIUM SERPL-SCNC: 4.4 MMOL/L
SODIUM ?TM SUB UR QN: 128 MMOL/L
SODIUM SERPL-SCNC: 142 MMOL/L

## 2022-08-26 NOTE — ED ADULT NURSE REASSESSMENT NOTE - NS ED NURSE REASSESS COMMENT FT1
RN received phone call that [facility] is ready to speak with patient. Tele[employer] cart set up at bedside. As per hospital policy, patient must be placed on 1:1 to one due to psych consult. MD made aware, constant observation ordered. Charge RN made aware, 1:1 at bedside. HLD (hyperlipidemia)

## 2022-12-26 NOTE — ED PROVIDER NOTE - GENITOURINARY NEGATIVE STATEMENT, MLM
Comprehensive Nutrition Assessment    Type and Reason for Visit: Reassess  Unintentional Weight Loss (Hospitalists)    Nutrition Recommendations/Plan:   Meals and Snacks:  Diet: Continue current order  Progression per MD.   Nutrition Supplement Therapy:  Medical food supplement therapy:  Change Ensure Clear three times per day (this provides 240 kcal and 8 grams protein per bottle)     Malnutrition Assessment:  Malnutrition Status: Moderate malnutrition  Context: Chronic Illness  Findings of clinical characteristics of malnutrition:   Energy Intake:  Unable to assess (pt doesn't provide a quantifiable nutrition hx)  Weight Loss:  Greater than 10% over 6 months (12% loss over 8 months)     Body Fat Loss:  Mild body fat loss Triceps   Muscle Mass Loss:  Mild muscle mass loss Temples (temporalis), Calf (gastrocnemius), Hand (interosseous), Clavicles (pectoralis & deltoids)  Fluid Accumulation:  No significant fluid accumulation     Strength:  Not Performed     Nutrition Assessment:  Nutrition History: Pt doesn't provide a specific nutrition hx, she only fixates on ensure costing \"$9 a can\" and not being allowed to eat here. She reports wt loss > 20#, followed by stating she has been 92.5# for sometime. Wt hx per IM office used to evaluate wt loss. Do You Have Any Cultural, Yarsanism, or Ethnic Food Preferences?: No   Nutrition Background:       Medical history significant for duodenal ulcer complicated by perforation requiring bilroth II,  idiopathic pancreatitis, rheumatoid arthritis on enbrel/ current course of steroids, HTN, ongoing tobacco.  Admitted with acute recurrent pancreatitis in setting of idiopathic pancreatitis, lung nodule, unintentional wt loss, thyroid nodule. Nutrition Interval:  Initially on clears, increased to fulls 12/23 and easy to chew 12/24, diet decreased to clears 12/25 after increased pain with oral intake.   Outpt colonoscopy cancelled, GI note indicates likely for EUS this week.    Visit with pt and daughter at bedside. Pt c/o increased abdominal pain and diarrhea with diet advance to solids. She continues to report a burning sensation with oral intake including liquids. Current Nutrition Therapies:  ADULT ORAL NUTRITION SUPPLEMENT; Breakfast, Lunch, Dinner; Standard High Calorie/High Protein Oral Supplement  ADULT DIET;  Clear Liquid    Current Intake:   Average Meal Intake: 26-50% (back on clears) Average Supplements Intake:  (current order not allowed on clears)      Anthropometric Measures:  Height: 5' 2\" (157.5 cm)  Current Body Wt: 87 lb (39.5 kg) (12/26), Weight source: Bed Scale  BMI: 15.9, Underweight (BMI less than 22) age over 72  Admission Body Weight: 89 lb (40.4 kg) (stated)  Ideal Body Weight (Kg) (Calculated): 50 kg (110 lbs), 84.2 %  Usual Body Wt: 105 lb (47.6 kg) (Wt hx per IM office: 108# 10/6/21, 105# 4/8/22, 94# 10/12/22), Percent weight change: -11.8       Edema:Peripheral Vascular  Peripheral Vascular (WDL): Within Defined Limits   BMI Category Underweight (BMI less than 22) age over 72  Estimated Daily Nutrient Needs:  Energy (kcal/day): 4687-1580 (30-35 kcal/kg) (Kcal/kg Weight used: 42 kg Current  Protein (g/day): 50-63 (1.2-1.5 g/kg) Weight Used: (Current) 42 kg  Fluid (ml/day):   (1 ml/kcal)    Nutrition Diagnosis:   Inadequate oral intake related to altered GI function as evidenced by NPO or clear liquid status due to medical condition  Moderate malnutrition, In context of chronic illness related to altered GI function as evidenced by Criteria as identified in malnutrition assessment  Nutrition Interventions:   Food and/or Nutrient Delivery: Continue Current Diet, Modify Oral Nutrition Supplement     Coordination of Nutrition Care: Continue to monitor while inpatient, Interdisciplinary Rounds       Goals:   Previous Goal Met: No Progress toward Goal(s)  Active Goal:  (Tolerate diet progression by next RD assessment)       Nutrition Monitoring and Evaluation:      Food/Nutrient Intake Outcomes: Diet Advancement/Tolerance, Food and Nutrient Intake, Supplement Intake  Physical Signs/Symptoms Outcomes: Biochemical Data, GI Status, Meal Time Behavior, Weight    Discharge Planning:     Too soon to determine    JAY KAN, RD no dysuria, no frequency, and no hematuria.

## 2023-01-15 NOTE — H&P ADULT - NSHPREVIEWOFSYSTEMS_GEN_ALL_CORE
REVIEW OF SYSTEMS:    CONSTITUTIONAL: No fevers or chills  EYES/ENT: No visual changes;  No vertigo or throat pain   NECK: No pain or stiffness  RESPIRATORY: No cough, wheezing, hemoptysis; No shortness of breath  CARDIOVASCULAR: No chest pain or palpitations  GASTROINTESTINAL: No abdominal or epigastric pain. No nausea, vomiting, or hematemesis; No diarrhea or constipation. No melena or hematochezia.  GENITOURINARY: No dysuria, frequency or hematuria  NEUROLOGICAL: No numbness , + generalized weakness  MSK: LUE and L thigh and L leg pain  PSYCH: Denies SI/HI  SKIN: No itching, burning, rashes, or lesions   All other review of systems is negative unless indicated above.
supervision

## 2023-01-16 NOTE — H&P PST ADULT - RS GEN PE MLT RESP DETAILS PC
normal...
clear to auscultation bilaterally/airway patent/no chest wall tenderness/breath sounds equal/good air movement/respirations non-labored

## 2023-03-29 NOTE — BEHAVIORAL HEALTH ASSESSMENT NOTE - NSBHMEDSOTHERFT_PSY_A_CORE
[Initial Consultation] : an initial consultation for [Friend] : friend [FreeTextEntry2] : MADAY Lithium, Adderall, Xanax

## 2023-11-14 NOTE — H&P PST ADULT - NEGATIVE GASTROINTESTINAL SYMPTOMS
Neg pap, neg HPV - routine screening - released to [facility] [employer] no diarrhea/no nausea/no melena/no abdominal pain/no constipation/no change in bowel habits/no vomiting

## 2023-12-26 NOTE — DISCHARGE NOTE PROVIDER - NSDCHHATTENDCERT_GEN_ALL_CORE
OP   Telephone Anticoagulation Service Note      Anticoagulation Summary  As of 2023      INR goal:  2.5-3.0   TTR:  55.9 % (8.5 y)   INR used for dosin.60 (2023)   Warfarin maintenance plan:  3 mg (3 mg x 1) every day   Weekly warfarin total:  21 mg   Plan last modified:  Vandana Vallecillo, PharmD (1/3/2023)   Next INR check:  2024   Priority:  Routine   Target end date:  Indefinite    Indications    Permanent atrial fibrillation (HCC) [I48.21]  Long term current use of anticoagulant therapy [Z79.01]  Secondary hypercoagulable state (HCC) [D68.69]                 Anticoagulation Episode Summary       INR check location:  Home Draw    Preferred lab:      Send INR reminders to:      Comments:  [facility] [phone] -   goal range changed to 2.5-3.2 per raghu vaca 2019          Anticoagulation Care Providers       Provider Role Specialty Phone number    Hu Gamez M.D. Referring Internal Medicine Clinical Cardiac Electrophysiology [phone]    [facility] Anticoagulation Services Responsible  [phone]          Anticoagulation Patient Findings  Patient Findings       Negatives:  Signs/symptoms of thrombosis, Signs/symptoms of bleeding, Laboratory test error suspected, Change in health, Change in alcohol use, Change in activity, Upcoming invasive procedure, Emergency department visit, Upcoming dental procedure, Missed doses, Extra doses, Change in medications, Change in diet/appetite, Hospital admission, Bruising, Other complaints          Spoke with the patient on the phone today, reporting a SUPRA-therapeutic INR of 4.6.  Confirmed the current warfarin dosing regimen and patient compliance.  Patient reports that she has been sick with the flu and was not eating normally. She also reports that she may be getting a new abx this afternoon for UTI.   Requested she call the clinic once she finds out name of abx so we can preemptively make dosing changes if necessary. Patient reports she will  call the clinic once she picks up med.    Patient denies any signs/symptoms of bleeding or clotting.    Patient instructed to HOLD warfarin dose TONIGHT, then to reduce dose to 1.5mg tomorrow, then to resume her current dosing regimen.   Patient asked to retest again in 1 week.     Jason RobbD     My signature below certifies that the above stated patient is homebound and upon completion of the Face-To-Face encounter, has the need for intermittent skilled nursing, physical therapy and/or speech or occupational therapy services in their home for their current diagnosis as outlined in their initial plan of care. These services will continue to be monitored by myself or another physician.

## 2024-06-22 NOTE — ED ADULT NURSE NOTE - NS ED STAT RN HAND OFF 2
Received New start PA request via [employer]  for Semaglutide, 1 MG/DOSE, (OZEMPIC, 1 MG/DOSE,) 4 MG/3ML Solution Pen-injector . (Quantity:9 mls, Day Supply:90)     Insurance: [employer] Rx D   Member ID:  [id number]  BIN: [id number]  PCN: [id number]  Group: [id number]     Ran Test claim via [facility] medication Pays for a $33/90DS ; $11/30DS copay. Will outreach to patient to offer specialty pharmacy services and or release to preferred pharmacy    HARI Martinez, PhT  Vascular Pharmacy Liaison (Rx Coordinator)  P: [phone]  6/21/2024 6:42 PM      
Additional handoff

## 2024-07-05 NOTE — ED ADULT NURSE REASSESSMENT NOTE - NS ED NURSE REASSESS COMMENT FT1
MD asked for patient to be discharged with Narcan rescue kit. All Narcan rescue kits in ER [facility] are to  2020, pharmacy made aware and asked Narcan kit with a further out expiration date. Pharmacist said they would ask day shift pharmacist and would call back RN with an answer. Pt aware that he is awaiting Narcan rescue kit to go home with. Patient was received in signout at 6:08 AM.     IN BRIEF   47 female with a history of cirrhotic liver disease and ascites presents with worsening abdominal distention.  Patient had been worked up and case discussed with patient's primary care who recommended paracentesis and subsequent discharge.  At the time of handoff awaiting paracentesis.       ED COURSE   After several hours abdominal paracentesis was able to be obtained however only 1 L was removed.  Patient still endorses upper abdominal pain and nausea.  Given this she will be admitted for further care.        FINAL IMPRESSION      1. Hyperglycemia due to diabetes mellitus (Multi)    2. Other ascites    3. End stage liver disease (Multi)          DISPOSITION    Admit 07/05/2024 12:02:14 PM   MD asked for patient to be discharged with Narcan rescue kit. All Narcan rescue kits in ER \[facility]\"" are to  2020, pharmacy made aware and asked Narcan kit with a further out expiration date. Pharmacist said they would ask day shift pharmacist and would call back RN with an answer. Pt aware that he is awaiting Narcan rescue kit to go home with.

## 2024-10-31 NOTE — BEHAVIORAL HEALTH ASSESSMENT NOTE - ORIENTED TO TIME
BIB EMS from OSH for consult, pt states frequent falls (4x this week), multiple L sided rib fractures, smaall pneumo per EMS and abd hematoma, pt A&Ox3   Yes

## 2024-12-10 NOTE — ED BEHAVIORAL HEALTH ASSESSMENT NOTE - NS ED BHA TELEPSYCH PROVIDER LOCATION
12/10/2024  Primary cardiologist: Dr. Solomon    CC:   Rachel  is an established 73 y.o.  female here for a follow up on valvular heart disease      SUBJECTIVE/OBJECTIVE:  Rachel is a 73 y.o. female with a history of VHD- AI/MI, hypertension and hyperlipidemia    HPI:  Rachel reports she is feeling well.  No chest pain, shortness of breath or palpitations.     Review of Systems   Constitutional: Negative for diaphoresis and malaise/fatigue.   Cardiovascular:  Negative for chest pain, claudication, dyspnea on exertion, irregular heartbeat, leg swelling, near-syncope, orthopnea, palpitations and paroxysmal nocturnal dyspnea.   Respiratory:  Negative for shortness of breath.    Neurological:  Negative for dizziness and light-headedness.       Vitals:    12/10/24 1006   BP: 126/86   Site: Left Upper Arm   Position: Sitting   Cuff Size: Medium Adult   Pulse: 73   Weight: 64.2 kg (141 lb 9.6 oz)   Height: 1.588 m (5' 2.52\")     Wt Readings from Last 3 Encounters:   12/10/24 64.2 kg (141 lb 9.6 oz)   01/08/24 65.2 kg (143 lb 12.8 oz)   12/13/23 64.4 kg (142 lb)      Body mass index is 25.47 kg/m².     Physical Exam  Vitals reviewed.   Eyes:      Pupils: Pupils are equal, round, and reactive to light.   Neck:      Vascular: No carotid bruit.   Cardiovascular:      Rate and Rhythm: Normal rate and regular rhythm.      Pulses: Normal pulses.   Pulmonary:      Effort: Pulmonary effort is normal.      Breath sounds: Normal breath sounds. No rales.   Chest:      Chest wall: No tenderness.   Musculoskeletal:      Right lower leg: No edema.      Left lower leg: No edema.   Skin:     General: Skin is warm and dry.      Capillary Refill: Capillary refill takes less than 2 seconds.   Neurological:      Mental Status: She is alert and oriented to person, place, and time.                Current Outpatient Medications   Medication Sig Dispense Refill    brimonidine-timolol (COMBIGAN) 0.2-0.5 % ophthalmic solution INSTILL 1 DROP INTO 
[facility]

## 2025-01-16 NOTE — ED ADULT TRIAGE NOTE - TEMPERATURE IN FAHRENHEIT (DEGREES F)
Reason for call:   [x] Refill   [] Prior Auth  [] Other:     Office:   [] PCP/Provider -   [x] Specialty/Provider - Psych    Medication: Prazosin 2 mg, take 1 capsule by mouth daily at bedtime       Pharmacy: [facility]     Does the patient have enough for 3 days?   [x] Yes   [] No - Send as HP to POD   
98.2

## 2025-03-06 ENCOUNTER — NON-APPOINTMENT (OUTPATIENT)
Age: 35
End: 2025-03-06

## 2025-03-12 ENCOUNTER — APPOINTMENT (OUTPATIENT)
Dept: OTOLARYNGOLOGY | Facility: CLINIC | Age: 35
End: 2025-03-12
Payer: COMMERCIAL

## 2025-03-12 ENCOUNTER — NON-APPOINTMENT (OUTPATIENT)
Age: 35
End: 2025-03-12

## 2025-03-12 VITALS
BODY MASS INDEX: 24.38 KG/M2 | DIASTOLIC BLOOD PRESSURE: 85 MMHG | HEIGHT: 72 IN | HEART RATE: 95 BPM | OXYGEN SATURATION: 100 % | SYSTOLIC BLOOD PRESSURE: 130 MMHG | WEIGHT: 180 LBS

## 2025-03-12 DIAGNOSIS — J34.9 UNSPECIFIED DISORDER OF NOSE AND NASAL SINUSES: ICD-10-CM

## 2025-03-12 DIAGNOSIS — G44.89 OTHER HEADACHE SYNDROME: ICD-10-CM

## 2025-03-12 PROCEDURE — 99204 OFFICE O/P NEW MOD 45 MIN: CPT | Mod: 25

## 2025-03-12 PROCEDURE — 31575 DIAGNOSTIC LARYNGOSCOPY: CPT

## 2025-03-18 ENCOUNTER — APPOINTMENT (OUTPATIENT)
Dept: CT IMAGING | Facility: CLINIC | Age: 35
End: 2025-03-18
Payer: COMMERCIAL

## 2025-03-18 PROCEDURE — 70486 CT MAXILLOFACIAL W/O DYE: CPT

## 2025-03-26 ENCOUNTER — APPOINTMENT (OUTPATIENT)
Dept: OTOLARYNGOLOGY | Facility: CLINIC | Age: 35
End: 2025-03-26

## 2025-04-22 ENCOUNTER — APPOINTMENT (OUTPATIENT)
Dept: OTOLARYNGOLOGY | Facility: HOSPITAL | Age: 35
End: 2025-04-22